# Patient Record
Sex: FEMALE | Race: WHITE | NOT HISPANIC OR LATINO | Employment: PART TIME | URBAN - METROPOLITAN AREA
[De-identification: names, ages, dates, MRNs, and addresses within clinical notes are randomized per-mention and may not be internally consistent; named-entity substitution may affect disease eponyms.]

---

## 2019-02-11 ENCOUNTER — HOSPITAL ENCOUNTER (INPATIENT)
Facility: CLINIC | Age: 19
LOS: 4 days | Discharge: PSYCHIATRIC OR SUBSTANCE USE FACILITY | End: 2019-02-15
Attending: EMERGENCY MEDICINE | Admitting: PSYCHIATRY & NEUROLOGY
Payer: MEDICAID

## 2019-02-11 ENCOUNTER — TELEPHONE (OUTPATIENT)
Dept: BEHAVIORAL HEALTH | Facility: CLINIC | Age: 19
End: 2019-02-11

## 2019-02-11 DIAGNOSIS — F19.20 POLYSUBSTANCE (EXCLUDING OPIOIDS) DEPENDENCE (H): ICD-10-CM

## 2019-02-11 LAB
ALCOHOL BREATH TEST: 0.08 (ref 0–0.01)
AMPHETAMINES UR QL SCN: NEGATIVE
BARBITURATES UR QL: NEGATIVE
BENZODIAZ UR QL: NEGATIVE
CANNABINOIDS UR QL SCN: POSITIVE
COCAINE UR QL: POSITIVE
ETHANOL UR QL SCN: NEGATIVE
HCG UR QL: NEGATIVE
OPIATES UR QL SCN: NEGATIVE

## 2019-02-11 PROCEDURE — 25000132 ZZH RX MED GY IP 250 OP 250 PS 637: Performed by: NURSE PRACTITIONER

## 2019-02-11 PROCEDURE — 99284 EMERGENCY DEPT VISIT MOD MDM: CPT | Mod: Z6 | Performed by: EMERGENCY MEDICINE

## 2019-02-11 PROCEDURE — 12800008 ZZH R&B CD ADULT

## 2019-02-11 PROCEDURE — 80307 DRUG TEST PRSMV CHEM ANLYZR: CPT | Performed by: FAMILY MEDICINE

## 2019-02-11 PROCEDURE — HZ2ZZZZ DETOXIFICATION SERVICES FOR SUBSTANCE ABUSE TREATMENT: ICD-10-PCS | Performed by: PSYCHIATRY & NEUROLOGY

## 2019-02-11 PROCEDURE — 99285 EMERGENCY DEPT VISIT HI MDM: CPT | Mod: 25 | Performed by: EMERGENCY MEDICINE

## 2019-02-11 PROCEDURE — 81025 URINE PREGNANCY TEST: CPT | Performed by: EMERGENCY MEDICINE

## 2019-02-11 PROCEDURE — 82075 ASSAY OF BREATH ETHANOL: CPT | Performed by: EMERGENCY MEDICINE

## 2019-02-11 PROCEDURE — 80320 DRUG SCREEN QUANTALCOHOLS: CPT | Performed by: FAMILY MEDICINE

## 2019-02-11 PROCEDURE — 25000132 ZZH RX MED GY IP 250 OP 250 PS 637: Performed by: EMERGENCY MEDICINE

## 2019-02-11 RX ORDER — ATENOLOL 50 MG/1
50 TABLET ORAL DAILY PRN
Status: DISCONTINUED | OUTPATIENT
Start: 2019-02-11 | End: 2019-02-15 | Stop reason: HOSPADM

## 2019-02-11 RX ORDER — IBUPROFEN 600 MG/1
600 TABLET, FILM COATED ORAL EVERY 6 HOURS PRN
Status: DISCONTINUED | OUTPATIENT
Start: 2019-02-11 | End: 2019-02-15 | Stop reason: HOSPADM

## 2019-02-11 RX ORDER — ACETAMINOPHEN 325 MG/1
650 TABLET ORAL EVERY 4 HOURS PRN
Status: DISCONTINUED | OUTPATIENT
Start: 2019-02-11 | End: 2019-02-15 | Stop reason: HOSPADM

## 2019-02-11 RX ORDER — FOLIC ACID 1 MG/1
1 TABLET ORAL DAILY
Status: DISCONTINUED | OUTPATIENT
Start: 2019-02-12 | End: 2019-02-15 | Stop reason: HOSPADM

## 2019-02-11 RX ORDER — LANOLIN ALCOHOL/MO/W.PET/CERES
100 CREAM (GRAM) TOPICAL DAILY
Status: DISPENSED | OUTPATIENT
Start: 2019-02-12 | End: 2019-02-14

## 2019-02-11 RX ORDER — TRAZODONE HYDROCHLORIDE 50 MG/1
50 TABLET, FILM COATED ORAL
Status: DISCONTINUED | OUTPATIENT
Start: 2019-02-11 | End: 2019-02-15 | Stop reason: HOSPADM

## 2019-02-11 RX ORDER — ALUMINA, MAGNESIA, AND SIMETHICONE 2400; 2400; 240 MG/30ML; MG/30ML; MG/30ML
30 SUSPENSION ORAL EVERY 4 HOURS PRN
Status: DISCONTINUED | OUTPATIENT
Start: 2019-02-11 | End: 2019-02-15 | Stop reason: HOSPADM

## 2019-02-11 RX ORDER — DIAZEPAM 5 MG
5-20 TABLET ORAL EVERY 30 MIN PRN
Status: DISCONTINUED | OUTPATIENT
Start: 2019-02-11 | End: 2019-02-15 | Stop reason: HOSPADM

## 2019-02-11 RX ORDER — NICOTINE 21 MG/24HR
1 PATCH, TRANSDERMAL 24 HOURS TRANSDERMAL ONCE
Status: COMPLETED | OUTPATIENT
Start: 2019-02-11 | End: 2019-02-11

## 2019-02-11 RX ORDER — MULTIPLE VITAMINS W/ MINERALS TAB 9MG-400MCG
1 TAB ORAL DAILY
Status: DISCONTINUED | OUTPATIENT
Start: 2019-02-12 | End: 2019-02-15 | Stop reason: HOSPADM

## 2019-02-11 RX ORDER — HYDROXYZINE HYDROCHLORIDE 25 MG/1
25 TABLET, FILM COATED ORAL EVERY 4 HOURS PRN
Status: DISCONTINUED | OUTPATIENT
Start: 2019-02-11 | End: 2019-02-15 | Stop reason: HOSPADM

## 2019-02-11 RX ORDER — ONDANSETRON 4 MG/1
4 TABLET, ORALLY DISINTEGRATING ORAL EVERY 6 HOURS PRN
Status: DISCONTINUED | OUTPATIENT
Start: 2019-02-11 | End: 2019-02-15 | Stop reason: HOSPADM

## 2019-02-11 RX ADMIN — NICOTINE 1 PATCH: 21 PATCH, EXTENDED RELEASE TRANSDERMAL at 14:31

## 2019-02-11 RX ADMIN — IBUPROFEN 600 MG: 600 TABLET ORAL at 23:23

## 2019-02-11 RX ADMIN — HYDROXYZINE HYDROCHLORIDE 25 MG: 25 TABLET ORAL at 23:23

## 2019-02-11 ASSESSMENT — ACTIVITIES OF DAILY LIVING (ADL)
RETIRED_EATING: 0-->INDEPENDENT
BATHING: 0-->INDEPENDENT
RETIRED_COMMUNICATION: 0-->UNDERSTANDS/COMMUNICATES WITHOUT DIFFICULTY
PRIOR_FUNCTIONAL_LEVEL_COMMENT: INDEPENDENT
TRANSFERRING: 0-->INDEPENDENT
DRESS: 0-->INDEPENDENT
TOILETING: 0-->INDEPENDENT
AMBULATION: 0-->INDEPENDENT
COGNITION: 0 - NO COGNITION ISSUES REPORTED
NUMBER_OF_TIMES_PATIENT_HAS_FALLEN_WITHIN_LAST_SIX_MONTHS: 2
SWALLOWING: 0-->SWALLOWS FOODS/LIQUIDS WITHOUT DIFFICULTY
FALL_HISTORY_WITHIN_LAST_SIX_MONTHS: YES

## 2019-02-11 ASSESSMENT — ENCOUNTER SYMPTOMS
DYSURIA: 0
CHEST TIGHTNESS: 0
SEIZURES: 0
VOMITING: 0
DIARRHEA: 0
NAUSEA: 0
ABDOMINAL PAIN: 0
NUMBNESS: 0
PALPITATIONS: 0
SHORTNESS OF BREATH: 0

## 2019-02-11 ASSESSMENT — MIFFLIN-ST. JEOR: SCORE: 1226.3

## 2019-02-11 NOTE — ED TRIAGE NOTES
Pt. here for chemical dependency treatment for alcohol, xanax and cocaine.  Last used alcohol  and cocaine today.  Xanax last used yesterday.

## 2019-02-11 NOTE — ED PROVIDER NOTES
History     Chief Complaint   Patient presents with     Addiction Problem     HPI  Sophia Stiles is a 18 year old female who has a PMHx of polysubstance abuse requesting detox.  Patient has been using drinking a pint of christiana per day for the past couple of weeks.  Also 1 g of cocaine for the past week and Xanax over the past week.  Last use of everything was this morning.  Denies hallucinations, voices, suicidal or homicidal ideation at this point.  Patient denies any anxiety or other symptoms of withdrawal.  She is never had a seizure or delirium tremens that she knows.  She has been sober in the past.  Denies any other issues at this point.    I have reviewed the Medications, Allergies, Past Medical and Surgical History, and Social History in the Epic system.    Review of Systems   Respiratory: Negative for chest tightness and shortness of breath.    Cardiovascular: Negative for chest pain and palpitations.   Gastrointestinal: Negative for abdominal pain, diarrhea, nausea and vomiting.   Genitourinary: Negative for dysuria.   Neurological: Negative for seizures and numbness.   Psychiatric/Behavioral: Negative for self-injury and suicidal ideas.   All other systems reviewed and are negative.      Physical Exam   BP: 120/65  Heart Rate: 93  Temp: 98  F (36.7  C)  Resp: 16  Weight: 49.8 kg (109 lb 12.8 oz)  SpO2: (!) 71 %      Physical Exam  Physical Exam   Constitutional: oriented to person, place, and time. appears well-developed and well-nourished.   HENT:   Head: Normocephalic and atraumatic.   Neck: Normal range of motion.   Pulmonary/Chest: Effort normal. No respiratory distress.   Cardiac: No murmurs, rubs, gallops. RRR.  Abdominal: Abdomen soft, nontender, nondistended. No rebound tenderness.  MSK: Long bones without deformity or evidence of trauma  Neurological: alert and oriented to person, place, and time. Gait intact.  No tongue fasciculations or tremor.  Skin: Skin is warm and dry.   Psychiatric:   normal mood and affect.  behavior is normal. Thought content normal.     ED Course        Procedures          Labs Ordered and Resulted from Time of ED Arrival Up to the Time of Departure from the ED   ALCOHOL BREATH TEST POCT - Abnormal; Notable for the following components:       Result Value    Alcohol Breath Test 0.078 (*)     All other components within normal limits            Assessments & Plan (with Medical Decision Making)   MDM  Patient requesting detox from multiple drugs.  Patient does not appear to be in active withdrawal.  I discussed with intake who accepts the patient pending discharge and will likely happen today.  Patient is voluntary.  And I believe patient would meet criteria to be placed on a hold.  Will observe for signs of withdrawal.    I have reviewed the nursing notes.    I have reviewed the findings, diagnosis, plan and need for follow up with the patient.       Medication List      There are no discharge medications for this visit.         Final diagnoses:   Polysubstance (excluding opioids) dependence (H)       2/11/2019   [facility], [place], EMERGENCY DEPARTMENT     Aj Tovar MD  02/11/19 8344

## 2019-02-11 NOTE — ED NOTES
I have performed an in person assessment of the patient. Based on this assessment the patient no longer requires a one on one attendant at this point in time.    Aj Tovar MD  1:49 PM  February 11, 2019         Aj Tovar MD  02/11/19 5664

## 2019-02-11 NOTE — TELEPHONE ENCOUNTER
S: Pt is a 19 yo female in the [place] ED for detox from etoh and xanax    B: Pt using etoh, drinking about 1 pint of christiana daily for the past week. PT using cocaine daily as well. Pt also has been taking Xanax daily for the past week and a half, dose unkown. Pt is calm and cooperative. No behavioral concerns. No SI/HI/SIB/hallucinations. No history of DT's or seizures.    A: Pt medically clear    R: Waiting for female discharge on 3a before presenting to [facility]

## 2019-02-11 NOTE — PHARMACY-ADMISSION MEDICATION HISTORY
Admission medication history interview status for the 2/11/2019 admission is complete. See Epic admission navigator for allergy information, pharmacy, prior to admission medications and immunization status.     Medication history interview sources:  patient, pt's sister     Changes made to PTA medication list (reason)  Added: none   Deleted: none  Changed: none    Additional medication history information (including reliability of information, actions taken by pharmacist):  Pt reported she did not get a flu vaccination this year yet.       Prior to Admission medications    Not on File         Medication history completed by: Gretchen Boo, ShaeD

## 2019-02-12 LAB
ALBUMIN SERPL-MCNC: 3.2 G/DL (ref 3.4–5)
ALP SERPL-CCNC: 65 U/L (ref 40–150)
ALT SERPL W P-5'-P-CCNC: 11 U/L (ref 0–50)
ANION GAP SERPL CALCULATED.3IONS-SCNC: 7 MMOL/L (ref 3–14)
AST SERPL W P-5'-P-CCNC: 11 U/L (ref 0–35)
BILIRUB SERPL-MCNC: 0.4 MG/DL (ref 0.2–1.3)
BUN SERPL-MCNC: 13 MG/DL (ref 7–30)
CALCIUM SERPL-MCNC: 8.6 MG/DL (ref 8.5–10.1)
CHLORIDE SERPL-SCNC: 111 MMOL/L (ref 96–110)
CO2 SERPL-SCNC: 23 MMOL/L (ref 20–32)
CREAT SERPL-MCNC: 0.88 MG/DL (ref 0.5–1)
ERYTHROCYTE [DISTWIDTH] IN BLOOD BY AUTOMATED COUNT: 13 % (ref 10–15)
GFR SERPL CREATININE-BSD FRML MDRD: >90 ML/MIN/{1.73_M2}
GGT SERPL-CCNC: 20 U/L (ref 0–30)
GLUCOSE SERPL-MCNC: 73 MG/DL (ref 70–99)
HCT VFR BLD AUTO: 42.2 % (ref 35–47)
HGB BLD-MCNC: 13.8 G/DL (ref 11.7–15.7)
MCH RBC QN AUTO: 32.4 PG (ref 26.5–33)
MCHC RBC AUTO-ENTMCNC: 32.7 G/DL (ref 31.5–36.5)
MCV RBC AUTO: 99 FL (ref 78–100)
PLATELET # BLD AUTO: 226 10E9/L (ref 150–450)
POTASSIUM SERPL-SCNC: 4 MMOL/L (ref 3.4–5.3)
PROT SERPL-MCNC: 6.6 G/DL (ref 6.8–8.8)
RBC # BLD AUTO: 4.26 10E12/L (ref 3.8–5.2)
SODIUM SERPL-SCNC: 141 MMOL/L (ref 133–144)
TSH SERPL DL<=0.005 MIU/L-ACNC: 0.54 MU/L (ref 0.4–4)
WBC # BLD AUTO: 5.9 10E9/L (ref 4–11)

## 2019-02-12 PROCEDURE — 25000131 ZZH RX MED GY IP 250 OP 636 PS 637: Performed by: NURSE PRACTITIONER

## 2019-02-12 PROCEDURE — 99207 ZZC CONSULT E&M CHANGED TO SUBSEQUENT LEVEL: CPT | Performed by: PHYSICIAN ASSISTANT

## 2019-02-12 PROCEDURE — 25000132 ZZH RX MED GY IP 250 OP 250 PS 637: Performed by: PSYCHIATRY & NEUROLOGY

## 2019-02-12 PROCEDURE — 36415 COLL VENOUS BLD VENIPUNCTURE: CPT | Performed by: NURSE PRACTITIONER

## 2019-02-12 PROCEDURE — 82977 ASSAY OF GGT: CPT | Performed by: NURSE PRACTITIONER

## 2019-02-12 PROCEDURE — 85027 COMPLETE CBC AUTOMATED: CPT | Performed by: NURSE PRACTITIONER

## 2019-02-12 PROCEDURE — 25000132 ZZH RX MED GY IP 250 OP 250 PS 637: Performed by: NURSE PRACTITIONER

## 2019-02-12 PROCEDURE — 84443 ASSAY THYROID STIM HORMONE: CPT | Performed by: NURSE PRACTITIONER

## 2019-02-12 PROCEDURE — 80053 COMPREHEN METABOLIC PANEL: CPT | Performed by: NURSE PRACTITIONER

## 2019-02-12 PROCEDURE — 99231 SBSQ HOSP IP/OBS SF/LOW 25: CPT | Performed by: PHYSICIAN ASSISTANT

## 2019-02-12 PROCEDURE — 99221 1ST HOSP IP/OBS SF/LOW 40: CPT | Mod: AI | Performed by: PSYCHIATRY & NEUROLOGY

## 2019-02-12 PROCEDURE — 99207 ZZC DOWN CODE DUE TO SUBSEQUENT EXAM: CPT | Performed by: PSYCHIATRY & NEUROLOGY

## 2019-02-12 PROCEDURE — 12800008 ZZH R&B CD ADULT

## 2019-02-12 RX ORDER — MIRTAZAPINE 15 MG/1
15 TABLET, FILM COATED ORAL AT BEDTIME
Status: DISCONTINUED | OUTPATIENT
Start: 2019-02-12 | End: 2019-02-15 | Stop reason: HOSPADM

## 2019-02-12 RX ORDER — PHENOBARBITAL 32.4 MG/1
32.4 TABLET ORAL AT BEDTIME
Status: DISCONTINUED | OUTPATIENT
Start: 2019-02-12 | End: 2019-02-15 | Stop reason: HOSPADM

## 2019-02-12 RX ADMIN — DIAZEPAM 5 MG: 5 TABLET ORAL at 16:15

## 2019-02-12 RX ADMIN — DIAZEPAM 5 MG: 5 TABLET ORAL at 08:50

## 2019-02-12 RX ADMIN — DIAZEPAM 5 MG: 5 TABLET ORAL at 12:17

## 2019-02-12 RX ADMIN — Medication 100 MG: at 08:50

## 2019-02-12 RX ADMIN — ONDANSETRON 4 MG: 4 TABLET, ORALLY DISINTEGRATING ORAL at 16:15

## 2019-02-12 RX ADMIN — MULTIPLE VITAMINS W/ MINERALS TAB 1 TABLET: TAB at 08:50

## 2019-02-12 RX ADMIN — PHENOBARBITAL 32.4 MG: 32.4 TABLET ORAL at 21:25

## 2019-02-12 RX ADMIN — FOLIC ACID 1 MG: 1 TABLET ORAL at 08:50

## 2019-02-12 RX ADMIN — ONDANSETRON 4 MG: 4 TABLET, ORALLY DISINTEGRATING ORAL at 08:51

## 2019-02-12 RX ADMIN — MIRTAZAPINE 15 MG: 15 TABLET, FILM COATED ORAL at 21:25

## 2019-02-12 ASSESSMENT — ACTIVITIES OF DAILY LIVING (ADL)
HYGIENE/GROOMING: INDEPENDENT
DRESS: INDEPENDENT
LAUNDRY: WITH SUPERVISION
ORAL_HYGIENE: INDEPENDENT

## 2019-02-12 NOTE — PLAN OF CARE
Behavioral Team Discussion: (2/12/2019)    Continued Stay Criteria/Rationale: Patient admitted for Chemical Use Issues.  Plan: The following services will be provided to the patient; psychiatric assessment, medication management, therapeutic milieu, individual and group support, and skills groups.   Participants: 3A Provider: Dr. Jake Blackwood MD; 3A RN's: Kranthi Baldwin, RN, Dutch Ferguson, RN, Shayna Scruggs, RN and Meera Trejo, SAUMYA; 3A CM's: Viviana Vela  and Jarod Collins.  Summary/Recommendation: Providers will assess today for treatment recommendations, discharge planning, and aftercare plans. CM will meet with pt for discharge planning.   Medical/Physical: Deferred (see medical notes).  Precautions:   Behavioral Orders   Procedures     Code 1 - Restrict to Unit     Fall precautions     Routine Programming     As clinically indicated     Status 15     Every 15 minutes.     Withdrawal precautions     Rationale for change in precautions or plan: N/A  Progress: Improving.

## 2019-02-12 NOTE — ED NOTES
ED to Behavioral Floor Handoff    SITUATION  Sophia Stiles is a 18 year old female who speaks English and lives in a home with family members The patient arrived in the ED by private car from home with a complaint of Addiction Problem  .The patient's current symptoms started/worsened 3 month(s) ago and during this time the symptoms have increased.   In the ED, pt was diagnosed with   Final diagnoses:   Polysubstance (excluding opioids) dependence (H)        Initial vitals were: BP: 120/65  Heart Rate: 93  Temp: 98  F (36.7  C)  Resp: 16  Weight: 49.8 kg (109 lb 12.8 oz)  SpO2: (!) 71 %   --------  Is the patient diabetic? No   If yes, last blood glucose? --     If yes, was this treated in the ED? --  --------  Is the patient inebriated (ETOH) No or Impaired on other substances? No  MSSA done? No  Last MSSA score: --    Were withdrawal symptoms treated? N/A  Does the patient have a seizure history? No. If yes, date of most recent seizure--  --------  Is the patient patient experiencing suicidal ideation? denies current or recent suicidal ideation     Homicidal ideation? denies current or recent homicidal ideation or behaviors.    Self-injurious behavior/urges? denies current or recent self injurious behavior or ideation.  ------  Was pt aggressive in the ED No  Was a code called No  Is the pt now cooperative? Yes  -------  Meds given in ED:   Medications   nicotine (NICODERM CQ) 21 MG/24HR 24 hr patch 1 patch (1 patch Transdermal Given 2/11/19 3330)      Family present during ED course? Yes  Family currently present? No    BACKGROUND  Does the patient have a cognitive impairment or developmental disability? No  Allergies: No Known Allergies.   Social demographics are   Social History     Socioeconomic History     Marital status: Single     Spouse name: None     Number of children: None     Years of education: None     Highest education level: None   Social Needs     Financial resource strain: None     Food  insecurity - worry: None     Food insecurity - inability: None     Transportation needs - medical: None     Transportation needs - non-medical: None   Occupational History     None   Tobacco Use     Smoking status: Current Every Day Smoker     Packs/day: 0.25     Smokeless tobacco: Current User   Substance and Sexual Activity     Alcohol use: Yes     Comment: 1 pint christiana per day,  last drink earlier today     Drug use: Yes     Types: Cocaine, Marijuana, Benzodiazepines     Comment: last snorted cocaine earlier today,  last used xanax yesterday     Sexual activity: None   Other Topics Concern     None   Social History Narrative     None        ASSESSMENT  Labs results   Labs Ordered and Resulted from Time of ED Arrival Up to the Time of Departure from the ED   DRUG ABUSE SCREEN 6 CHEM DEP URINE ([facility]) - Abnormal; Notable for the following components:       Result Value    Cannabinoids Qual Urine Positive (*)     Cocaine Qual Urine Positive (*)     All other components within normal limits   ALCOHOL BREATH TEST POCT - Abnormal; Notable for the following components:    Alcohol Breath Test 0.078 (*)     All other components within normal limits   HCG QUALITATIVE URINE      Imaging Studies: No results found for this or any previous visit (from the past 24 hour(s)).   Most recent vital signs /65   Temp 98  F (36.7  C) (Oral)   Resp 16   Wt 49.8 kg (109 lb 12.8 oz)   LMP 02/11/2019   SpO2 (!) 71%   BMI 19.45 kg/m     Abnormal labs/tests/findings requiring intervention:---   Pain control: pt had none  Nausea control: pt had none    RECOMMENDATION  Are any infection precautions needed (MRSA, VRE, etc.)? No If yes, what infection? --  ---  Does the patient have mobility issues? independently. If yes, what device does the pt use? ---  ---  Is patient on 72 hour hold or commitment? No If on 72 hour hold, have hold and rights been given to patient? N/A  Are admitting orders written if after 10 p.m. ?N/A  Tasks  needing to be completed:---     Marcela Lynch    [phone] [facility]

## 2019-02-12 NOTE — PLAN OF CARE
Alcohol Withdrawal  Alcohol Withdrawal Symptom Control  2/11/2019 2327 - No Change by Luz Penny, RN     Substance Withdrawal  Substance Withdrawal  Description  Signs and symptoms of listed problems will be absent or manageable.    1) Patient will achieve medical stabilization of acute withdrawal sx.  2) Patient will remain safe and free from injury  3) Patient will demonstrate improvement of ADLs (appetite, hygiene)  4) Verbalize reduction of fear or anxiety to a manageable level.  5) Verbalize knowledge of substance abuse as a disease  6) Verbalize risks and negative effects related to drug ingestion  7) Demonstrate participation in unit programming and attends specific substance use group therapy (i.e AA meetings)  8) Accept referral to substance abuse treatment  9) Express sense of regaining some control of situation/life (possible by verbalizing alternative coping mechanisms as alternatives to substance use in response to stress)   2/12/2019 1028 - No Change by Meera Trejo, RN    Patient remains isolative, withdrawn to room. Patient is alert and oriented x 4. Patient is not attending/participating in unit programming. Pt is not social with peers. Affect is blunted/flat, mood is irritable/labile. Patient denies SI/SIB/HI. Pt denies auditory/visual hallucinations. Pt did previous report passive SI upon admission, but patient denies that to this RN. Patient verbally contracts for safety on the unit.     This RN administered the PRN medications Zofran 4mg ODT x 1, (see MAR) at the request of the patient. Patient is tolerating medications well, denies any current side effects.     Pt's MSSA's = 8 upon first morning withdrawal assessment, patient medicated with 10mg valium per unit protocol. Patient reports a poor appetite, and fair sleep. Patient hopeful to discharge to [facility]. This RN has been encouraging patient to drink adequate amounts of fluids.     Blood pressure 108/75, pulse 72, temperature  "97.2  F (36.2  C), temperature source Oral, resp. rate 16, height 1.575 m (5' 2\"), weight 49.8 kg (109 lb 12.8 oz), last menstrual period 02/11/2019, SpO2 99 %.     "

## 2019-02-12 NOTE — PROGRESS NOTES
02/11/19 1088   Patient Belongings   Did you bring any home meds/supplements to the hospital?  No   Patient Belongings locker   Patient Belongings Remaining with Patient other (see comments)   Patient Belongings Put in Hospital Secure Location (Security or Locker, etc.) other (see comments)   Belongings Search Yes   Clothing Search Yes   Second Staff Arcelia   Comment see note   Storage bin  [place], pants w/string and bracelet   Medical Bin   Nothing  Security Envelope  Nothing  A             Admission:  I am responsible for any personal items that are not sent to the safe or pharmacy.  [facility] is not responsible for loss, theft or damage of any property in my possession.  Signature:  _________________________________ Date: _______  Time: _____                                              Staff Signature:  ____________________________ Date: ________  Time: _____      2nd Staff person, if patient is unable/unwilling to sign:    Signature: ________________________________ Date: ________  Time: _____   Discharge:  [facility] has returned all of my personal belongings:  Signature: _________________________________ Date: ________  Time: _____                                          Staff Signature:  ____________________________ Date: ________  Time: _____

## 2019-02-12 NOTE — H&P
Admitted:     02/11/2019      IDENTIFYING INFORMATION:  The patient is a 18-year-old  female.  She is single.  She is planning to live with her sister.  She works as a .      CHIEF COMPLAINT:  Alcohol.      The patient came to the emergency room wanting detox from alcohol.  She says that she began to drink alcohol at age 14 and at 17, it became more of a problem.  Six months ago, her drinking increased.  She was in treatment in 2017 but sober for almost 4 months and then relapsed and has been struggling to stay sober.  The patient has tolerance, withdrawal, progressive loss of control, tried to quit unsuccessfully and spread negative consequences, money, family.  She has been using Xanax this time for 2 weeks; however, in the past, she was using Xanax not prescribed to her; she quit for 2 months and then picked it up again.  She uses cocaine and snorts it.  She has smoking 1 pack a day.  She does have history of ADHD.  She has history of depression.  When she gets depressed, she feels sad, angry don't want to talk and she feels shutdown.  She has lack of sleep, lack of interest, lack of energy.  She also describes that she has anxiety, where she has shortness of breath and freaks out, like mad, heart races, shortness of breath.      PAST PSYCHIATRIC HISTORY:  Psychiatrically hospitalized twice for overdoses, once at the age of 14 and once at the age of 16.  She has history of self-injurious behaviors.  She has never had ECT.  She was in 1 inpatient treatment and 1 outpatient treatment.  Patient denies any symptoms of katie.      FAMILY HISTORY:  The patient has a significant family history of mental illness in the family.  Mother is bipolar.  Father has a significant recent substance use in his family.      SOCIAL HISTORY:  Born in [place].  Mood and moved here when she was 3, lost childhood.  The patient dropped out of high school.  She has 2 sisters.      PAST MEDICAL HISTORY:  A 10-point was  reviewed and is negative.        The patient's vitals are as below:   VITAL SIGNS:  Temperature of 97.2, pulse of 72, respiratory rate of 16, blood pressure of 108/72.      MENTAL STATUS EXAMINATION:  The patient is a 18-year-old  female lying in bed, poor grooming, poor hygiene, poor eye contact.  Mood is tired.  Affect is congruent.  Speech is spontaneous, normal rate.  Less logical in thinking, no loose associations.  Insight and judgment are limited.  Does not have any active suicidal or homicidal ideation, plan or intent.  Does not have any auditory or visual hallucinations.  Recent and remote memory, language, fund of knowledge are all adequate.    dx alcohol use dx severe  Panic dx  mdd    PLAN:  The patient will be detoxed off alcohol using MSSA protocol and Valium.  She will have a very short taper off phenobarbital 30 mg at bedtime.  The patient will be started on Remeron for her mood.  The patient wants to do treatment.  The patient will be seen by case management and Internal Medicine.         KAHLIL TERRELL MD             D: 2019   T: 2019   MT: ASH      Name:     ZHANE DUMONT   MRN:      [id number]        Account:      [id number]   :      2000        Admitted:     2019                   Document: [id number]

## 2019-02-12 NOTE — CONSULTS
[facility]  Internal Medicine Consult     Sophia Stiles MRN# [id number]   Age: 18 year old YOB: 2000     Date of Admission: 2/11/2019  Date of Consult: 2/12/2019    Primary Care Provider: Pham Mena    Requesting Service: Dr. Blackwood  Reason for Consult: General Medical Evaluation      SUBJECTIVE   CC:   Hypotension    Assessment and Plan/Recommendations:   Sophia Stiles is a 18 year old female with history of polysubstance abuse, depression, anxiety, ADHD, and SIB who was admitted to station 3A with acute etoh withdrawal    Depression, anxiety, ADHD, polysubstance abuse: With acute etoh withdrawal  - Management per psych     Hypotension: BP low to 84/47 at 0415 while asleep. Asymptomatic. No recheck obtained. No intervention at the time, does not appear as if overnight medicine provider contacted. Most recent BP c/w prior at 108/75. Etiology likely due to some dehdyration in the setting of poor oral intake as well as physiologic given slightly low BP at BL, age, and the fact she was sleeping  - Trend vitals at least tid  - Contact medicine if BP <95/<50 or if patient symptomatic   - Encourage oral intake    Currently, medically stable and internal medicine will sign off. Please contact if future questions or concerns arise. Thank you for the opportunity to be a part of this patient's care.      Maren Smith  Internal Medicine [facility] Hospitalist  [phone]  February 12, 2019         HPI:   Sophia Stiles is a 18 year old female with history of polysubstance abuse, depression, anxiety, ADHD, and SIB who was admitted to station 3A with acute etoh withdrawal    Patient denies medical conditions and reports she would rather be left alone. She says she is very tired and nauseated. No chest pain or dyspnea. No confusion. Denies symptoms of hypotension overnight and reports her blood pressure often runs low. Remainder of HPI limited due to patient preference      Past  "Medical History:     Past Medical History:   Diagnosis Date     Substance abuse (H)         Reviewed and updated in [facility].     Past Surgical History:    History reviewed. No pertinent surgical history.   Unable to complete due to patient preference      Social History:   Unable to complete due to patient preference      Family History:   History reviewed. No pertinent family history.   Unable to complete due to patient preference      Allergies:   No Known Allergies   Unable to complete due to patient preference      Medications:   Reviewed. Please see MAR     Review of Systems:   10 point ROS of systems including Constitutional, Eyes, Respiratory, Cardiovascular, Gastroenterology, Genitourinary, Integumentary, Muscularskeletal, Psychiatric were all negative except for pertinent positives noted in my HPI.    OBJECTIVE   Physical Exam:   Vitals were reviewed  Blood pressure 108/75, pulse 72, temperature 97.2  F (36.2  C), temperature source Oral, resp. rate 16, height 1.575 m (5' 2\"), weight 49.8 kg (109 lb 12.8 oz), last menstrual period 02/11/2019, SpO2 99 %.  General: Alert and oriented x3, pleasantly declined complete H&P  HEENT: Anicteric sclera, EOMI, membranes moist  Cardiovascular: RRR, S1S2. No murmur noted  Lungs: CTAB without wheezing or crackles   GI: Refused  Vascular: Refused  Neurologic: Refused  Neuropsychiatric: Per psych  Skin: No jaundice, rashes, or lesions on exposed skin        Data:        Lab Results   Component Value Date     02/12/2019    Lab Results   Component Value Date    CHLORIDE 111 02/12/2019    Lab Results   Component Value Date    BUN 13 02/12/2019      Lab Results   Component Value Date    POTASSIUM 4.0 02/12/2019    Lab Results   Component Value Date    CO2 23 02/12/2019    Lab Results   Component Value Date    CR 0.88 02/12/2019        Lab Results   Component Value Date    WBC 5.9 02/12/2019    HGB 13.8 02/12/2019    HCT 42.2 02/12/2019    MCV 99 02/12/2019     " 02/12/2019     Lab Results   Component Value Date    WBC 5.9 02/12/2019

## 2019-02-12 NOTE — ED NOTES
Patient reports use of Xanax 3 pills daily (green ones, and white ones), last use yesterday. Cocaine snorts 1 gram plus per day, last use today PTA. Suni liquor, 1 pint daily, last use today PTA. Denies seizures with withdrawal.

## 2019-02-12 NOTE — PLAN OF CARE
QUIANA Stiles is a 18 year old year old female with a chief complaint of Addiction Problem        S = Situation:   Patient a voluntary admission seeking detox for alcohol and xanax    B  = Background:   Patient reports drinking a pint of christiana daily for 2 weeks, using 2-3 bars of xanax daily for 2 weeks, and also using cocaine. Patient's urine drug screen positive for cocaine and cannabinoids. Urine pregnancy was collected and was negative. Patient denies any history of withdrawal seizures. Patient reports being at [facility] once, at age 16. Patient has also been hospitalized for mental health and suicide attempts in the past. Patient has attended one residential  treatment, and has also done some outpatient psychotherapy. Patient used to self-harm by cutting or burning self. Has attempted suicide 3 times total. Patient reports being diagnosed with depression, anxiety, and ADHD. Patient also reports having lumps in both breasts, would like them assessed. No other medical concerns.     A  =  Assessment:   Patient affect flat, blunted. Patient denies active suicidal ideation, denies SIB, HI, or hallucinations. Mood is calm.Patient is not currently on medications for reported mental illnesses. Patient MSSA for alcohol and benzodiazepine withdrawal is 4. Patient's pupils moderately dilated as well.    R =   Request or Recommendation:   On-call [facility] notified of admission and gave orders. Patient is on MSSA with valium for alcohol withdrawal and benzodiapine withdrawal (per [facility]). Patient on fall and withdrawal precautions. Usual comfort medications and labs ordered. Lipid panel and vitamin B12 lab deferred at this time. Patient's general psychosocial and medical well-being will be monitored and interventions will be provided as needed and/or as ordered.

## 2019-02-13 PROCEDURE — 12800008 ZZH R&B CD ADULT

## 2019-02-13 PROCEDURE — 25000131 ZZH RX MED GY IP 250 OP 636 PS 637: Performed by: NURSE PRACTITIONER

## 2019-02-13 PROCEDURE — 25000132 ZZH RX MED GY IP 250 OP 250 PS 637: Performed by: PSYCHIATRY & NEUROLOGY

## 2019-02-13 RX ADMIN — PHENOBARBITAL 32.4 MG: 32.4 TABLET ORAL at 21:40

## 2019-02-13 RX ADMIN — MIRTAZAPINE 15 MG: 15 TABLET, FILM COATED ORAL at 21:40

## 2019-02-13 RX ADMIN — ONDANSETRON 4 MG: 4 TABLET, ORALLY DISINTEGRATING ORAL at 19:40

## 2019-02-13 ASSESSMENT — ACTIVITIES OF DAILY LIVING (ADL)
HYGIENE/GROOMING: HANDWASHING;INDEPENDENT
ORAL_HYGIENE: INDEPENDENT
DRESS: STREET CLOTHES;INDEPENDENT

## 2019-02-13 NOTE — PROGRESS NOTES
Case Management Note  2/13/2019    Writer met with pt to initiate discharge planning. Pt reports she would like to go to [facility]. Writer informed pt she will need to meet with the Finance Office to open her [employer] application for [place]. Writer informed pt Nieves Pike from the Finance Office will meet with her today to open her [employer] application. Writer encouraged pt to complete her intake information worksheet for assessment and referral purposes. Pt reports she will begin working on this right away.    Jarod Collins MA, LADC

## 2019-02-13 NOTE — PROGRESS NOTES
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 past but relapsed. Pt lacks insight into her personal relapse process along with early warning signs and triggers. Pt lacks impulse control, sober coping skills and long-term sober maintenance skills. Pt lacks insight into the effects her use has had on her physical and mental health. Pt is at a high risk for relapse/continued use.       DIMENSION VI - Recovery Environment   1. Are you employed/attending school? Tell me about that.     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    2A. Describe a typical day; evening for you. Work, school, social, leisure, volunteer, spiritual practices. Include time spent obtaining, using, recovering from drugs or alcohol. (DSM)     I get up and use, go to work, use, work, use, go home use some more then go to sleep. I don't do much of anything except for work and using and I lack daily structure.    Please describe what leisure activities have been associated with your substance abuse:     The patient denied having any leisure activities which had been associated with her substance abuse.    2B. How often do you spend more time than you planned using or use more than you planned? (DSM)     Alot.    3. How important is using to your social connections? Do many of  your family or friends use?     Not important at all.  Most of my family and friends use drugs and/or alcohol.    4A. Are you currently in a significant relationship?     Yes. Have been in a relationship for 4 months.    4C. Sexual Orientation:     Bisexual    5A. Who do you live with?      Pt reports she lives with her sister    5B. Tell me about their alcohol/drug use and mental health issues.     My sister is almost 3 years sober    5C. Are you concerned for your safety there? No    5D. Are you concerned about the safety of anyone else who lives with you? No    6A. Do you have children who live with you?     No    6B. Do you have children who do not live with you?     No    7A. Who supports you in making changes in your alcohol or drug use? What are they willing to do to support you? Who is upset or angry about you making changes in your alcohol or drug use? How big a problem is this for you?      My family and friends are supportive. I am sure some would help if I needed something and if they knew I was working hard on my sobriety.    7B. This table is provided to record information about the person s relationships and available support It is not necessary to ask each item; only to get a comprehensive picture of their support system.  How often can you count on the following people when you need someone?   Partner / Spouse Always supportive   Parent(s)/Aunt(s)/Uncle(s)/Grandparents The patient doesn't have any current contact with parents or other family members.   Sibling(s)/Cousin(s) Always supportive   Child(dee) The patient have any children.   Other relative(s) Always supportive   Friend(s)/neighbor(s) Always supportive   Child(dee) s father(s)/mother(s) The patient doesn't have any children.   Support group member(s) Always supportive   [facility] [place] members Always supportive   /counselor/therapist/healer Always supportive   Other (specify) No     8A. What is your current living  situation?     I am currently living with my sister    8B. What is your long term plan for where you will be living?     I would like to get a apartment    8C. Tell me about your living environment/neighborhood? Ask enough follow up questions to determine safety, criminal activity, availability of alcohol and drugs, supportive or antagonistic to the person making changes.      Everything is safe and I have no concerns.    9. Criminal justice history: Gather current/recent history and any significant history related to substance use--Arrests? Convictions? Circumstances? Alcohol or drug involvement? Sentences? Still on probation or parole? Expectations of the court? Current court order? Any sex offenses - lifetime? What level? (DSM)    Paying off a ticket for a domestic in [place]    10. What obstacles exist to participating in treatment? (Time off work, childcare, funding, transportation, pending nursing home time, living situation)     The patient denied having any obstacles for participating in substance abuse treatment.    Dimension VI Ratings   Recovery environment - The placing authority must use the criteria in Dimension VI to determine a client s recovery environment.   RISK DESCRIPTIONS - Severity ratin Client is engaged in structured, meaningful activity, but peers, family, significant other, and living environment are unsupportive, or there is criminal justice involvement by the client or among the client's peers, significant others, or in the client's living environment.    REASONS SEVERITY WAS ASSIGNED - (What support does the person have for making changes? What structure/stability does the person have in his or her daily life that will increase the likelihood that changes can be sustained? What problems exist in the person s environment that will jeopardize getting/staying clean and sober?)     Patient reports that her current living situation is supportive towards her recovery. Pt reports that she is  currently living with her sister. Pt lacks a daily structure and meaningful activities that promote recovery. Pt reports that she is currently employed and is not attending school at this time. Pt lacks a sober support network. Pt reports that she has some legal involvement for domestic in [place] and isn't on probation for it.         Client Choice/Exceptions   Would you like services specific to language, age, gender, culture, Muslim preference, race, ethnicity, sexual orientation or disability?  No    What particular treatment choices and options would you like to have? [facility]    Do you have a preference for a particular treatment program? [facility]    Criteria for Diagnosis     Criteria for Diagnosis  DSM-5 Criteria for Substance Use Disorder  Instructions: Determine whether the client currently meets the criteria for Substance Use Disorder using the diagnostic criteria in the DSM-V pp.481-58. Current means during the most recent 12 months outside a facility that controls access to substances    Category of Substance Severity (ICD-10 Code / DSM 5 Code)     Alcohol Use Disorder Severe  (10.20) (303.90)   Cannabis Use Disorder Moderate  (F12.20) (304.30)   Hallucinogen Use Disorder The patient does not meet the criteria for a Hallucinogen use disorder.   Inhalant Use Disorder The patient does not meet the criteria for an Inhalant use disorder.   Opioid Use Disorder The patient does not currently meet the criteria for an Opioid use disorder, but has a history of opioid use.   Sedative, Hypnotic, or Anxiolytic Use Disorder The patient does not currently meet the criteria for a Sedative/Hypnotic use disorder, but has a history of benzodiazapine use.   Stimulant Related Disorder The patient does not currently meet the criteria for a Stimulant use disorder, but has a history of Cocaine use.   Tobacco Use Disorder Mild    (Z72.0) (305.1)   Other (or unknown) Substance Use Disorder The patient does not  "meet the criteria for a Other (or unknown) Substance use disorder.       Collateral Contact Summary   Number of contacts made: 2    Contact with referring person:  Yes    If court related records were reviewed, summarize here: No court records had been reviewed at the time of this documentation.    Information from collateral contacts supported/largely agreed with information from the client and associated risk ratings.      Rule 25 Assessment Summary and Plan   's Recommendation    1)  Complete a residential based/IOP W/ lodging or similar treatment program.   2)  Abstain from all mood-altering chemicals unless prescribed by a licensed provider.   3)  Attend weekly 12-step support group meetings.     4)  Actively work with a female sponsor or  through [facility] ([phone]).   5)  Follow all the recommendations of your treatment/medical providers.  6)  Remain law abiding.  7)  Patient may benefit from obtaining a full mental health evaluation.  8)  Patient may benefit from 1:1 psychotherapy due to past abuse and mental health diagnosis.           Collateral Contacts     Name:    Dr. GABRIELA Blackwood MD   Relationship:    3A Physician   Phone Number:    [phone] Releases:         \"The patient came to the emergency room wanting detox from alcohol.  She says that she began to drink alcohol at age 14 and at 17, it became more of a problem.  Six months ago, her drinking increased.  She was in treatment in 2017 but sober for almost 4 months and then relapsed and has been struggling to stay sober.  The patient has tolerance, withdrawal, progressive loss of control, tried to quit unsuccessfully and spread negative consequences, money, family.  She has been using Xanax this time for 2 weeks; however, in the past, she was using Xanax not prescribed to her; she quit for 2 months and then picked it up again.  She uses cocaine and snorts it.  She has smoking 1 pack a day.  She does have " "history of ADHD.  She has history of depression.  When she gets depressed, she feels sad, angry don't want to talk and she feels shutdown.  She has lack of sleep, lack of interest, lack of energy.  She also describes that she has anxiety, where she has shortness of breath and freaks out, like mad, heart races, shortness of breath.\"    Collateral Contacts     Name:    Dr. MAGAN Tovar MD   Relationship:    ER Physician   Phone Number:    [phone]   Releases:         \"Sophia Stiles is a 18 year old female who has a PMHx of polysubstance abuse requesting detox.  Patient has been using drinking a pint of christiana per day for the past couple of weeks.  Also 1 g of cocaine for the past week and Xanax over the past week.  Last use of everything was this morning.  Denies hallucinations, voices, suicidal or homicidal ideation at this point.  Patient denies any anxiety or other symptoms of withdrawal.  She is never had a seizure or delirium tremens that she knows.  She has been sober in the past.  Denies any other issues at this point.\"    ollateral Contacts      A problematic pattern of alcohol/drug use leading to clinically significant impairment or distress, as manifested by at least two of the following, occurring within a 12-month period:    1.) Alcohol/drug is often taken in larger amounts or over a longer period than was intended.  2.) There is a persistent desire or unsuccessful efforts to cut down or control alcohol/drug use  3.) A great deal of time is spent in activities necessary to obtain alcohol, use alcohol, or recover from its effects.  4.) Craving, or a strong desire or urge to use alcohol/drug  5.) Recurrent alcohol/drug use resulting in a failure to fulfill major role obligations at work, school or home.  6.) Continued alcohol use despite having persistent or recurrent social or interpersonal problems caused or exacerbated by the effects of alcohol/drug.  7.) Important social, occupational, or " recreational activities are given up or reduced because of alcohol/drug use.  8.) Recurrent alcohol/drug use in situations in which it is physically hazardous.  9.) Alcohol/drug use is continued despite knowledge of having a persistent or recurrent physical or psychological problem that is likely to have been caused or exacerbated by alcohol.  10.) Tolerance, as defined by either of the following: A need for markedly increased amounts of alcohol/drug to achieve intoxication or desired effect. and A markedly diminished effect with continued use of the same amount of alcohol/drug.  11.) Withdrawal, as manifested by either of the following: The characteristic withdrawal syndrome for alcohol/drug (refer to Criteria A and B of the criteria set for alcohol/drug withdrawal). and Alcohol/drug (or a closely related substance, such as a benzodiazepine) is taken to relieve or avoid withdrawal symptoms.      Specify if: In early remission:  After full criteria for alcohol/drug use disorder were previously met, none of the criteria for alcohol/drug use disorder have been met for at least 3 months but for less than 12 months (with the exception that Criterion A4,  Craving or a strong desire or urge to use alcohol/drug  may be met).     In sustained remission:   After full criteria for alcohol use disorder were previously met, none of the criteria for alcohol/drug use disorder have been met at any time during a period of 12 months or longer (with the exception that Criterion A4,  Craving or strong desire or urge to use alcohol/drug  may be met).   Specify if:   This additional specifier is used if the individual is in an environment where access to alcohol is restricted.    Mild: Presence of 2-3 symptoms  Moderate: Presence of 4-5 symptoms  Severe: Presence of 6 or more symptoms

## 2019-02-14 PROCEDURE — 12800008 ZZH R&B CD ADULT

## 2019-02-14 PROCEDURE — 25000132 ZZH RX MED GY IP 250 OP 250 PS 637: Performed by: NURSE PRACTITIONER

## 2019-02-14 PROCEDURE — H2032 ACTIVITY THERAPY, PER 15 MIN: HCPCS

## 2019-02-14 PROCEDURE — 25000132 ZZH RX MED GY IP 250 OP 250 PS 637: Performed by: PSYCHIATRY & NEUROLOGY

## 2019-02-14 RX ORDER — BUSPIRONE HYDROCHLORIDE 5 MG/1
5 TABLET ORAL 3 TIMES DAILY
Status: DISCONTINUED | OUTPATIENT
Start: 2019-02-14 | End: 2019-02-15 | Stop reason: HOSPADM

## 2019-02-14 RX ADMIN — MIRTAZAPINE 15 MG: 15 TABLET, FILM COATED ORAL at 20:23

## 2019-02-14 RX ADMIN — PHENOBARBITAL 32.4 MG: 32.4 TABLET ORAL at 20:24

## 2019-02-14 RX ADMIN — FOLIC ACID 1 MG: 1 TABLET ORAL at 11:17

## 2019-02-14 RX ADMIN — HYDROXYZINE HYDROCHLORIDE 25 MG: 25 TABLET ORAL at 22:34

## 2019-02-14 RX ADMIN — NICOTINE POLACRILEX 8 MG: 4 GUM, CHEWING ORAL at 21:54

## 2019-02-14 RX ADMIN — Medication 25 MG: at 11:17

## 2019-02-14 RX ADMIN — BUSPIRONE HYDROCHLORIDE 5 MG: 5 TABLET ORAL at 11:16

## 2019-02-14 RX ADMIN — MULTIPLE VITAMINS W/ MINERALS TAB 1 TABLET: TAB at 11:17

## 2019-02-14 RX ADMIN — BUSPIRONE HYDROCHLORIDE 5 MG: 5 TABLET ORAL at 20:22

## 2019-02-14 RX ADMIN — BUSPIRONE HYDROCHLORIDE 5 MG: 5 TABLET ORAL at 16:37

## 2019-02-14 RX ADMIN — TRAZODONE HYDROCHLORIDE 50 MG: 50 TABLET ORAL at 22:35

## 2019-02-14 ASSESSMENT — ACTIVITIES OF DAILY LIVING (ADL)
HYGIENE/GROOMING: INDEPENDENT
LAUNDRY: WITH SUPERVISION
ORAL_HYGIENE: INDEPENDENT
HYGIENE/GROOMING: INDEPENDENT
DRESS: STREET CLOTHES

## 2019-02-14 NOTE — TELEPHONE ENCOUNTER
Referral created, kendy to team, Nieves ALANIZ and Jarod DUMONT on letting intake know when pmi is assigned to patient. Reg will need updating once [employer] Fund active. [facility]

## 2019-02-14 NOTE — TELEPHONE ENCOUNTER
LP SCREEN TELEPHONE NOTE   Sophia Stiles paperwork was reviewed by JUNE Anaya and the patient was deemed ELIGIBLE for the LP program.     Medical: Deferred, because this patient is currently on 3A IP detoxification unit.     Insurance: Consolidated Funding at [facility]. Jarod is working on the auth.     This will be a: 3A DIRECT TRANSFER, The LP RN will complete the VA & ISP.  The primary counselors will complete the LP UPDATE.     IV: This patient is not an IV drug user.     Business office: The patient has Consolidated Funds and she has met with the STACE[facility]   List: This patient CAN NOT be placed on the PRIORITY LP Waiting List until after we have the CPA from [facility].     Group: Women's Group or Mental Health Enhanced Mixed Group     Additional Info as needed: NA     The best current contact telephone number for the patient is: FADI Casanova,  JUNE Anaya   2/14/2019

## 2019-02-14 NOTE — PLAN OF CARE
S: Patient scored less than 8 on the MSSA for greater than 24 hours. She has required no medication for alcohol withdrawal for > 24 hours,.  B: Patient admitted for alcohol & benzodiazepines  withdrawal and detoxification.  A: Patient stable in the alcohol withdrawal process AEB MSSA scores < 8 for > 24 hours.  R: Patient removed from alcohol withdrawal status per unit Protocol. She remains on benzodiazepine withdrawal protocol.

## 2019-02-14 NOTE — PLAN OF CARE
"Patient's MSSA 1 and 1 for benzodiazepine. Patient is out of detox for ETOH. Patient has completed paperwork and is waiting for funding for treatment. Patient is [facility]. Patient ate well and has a bright affect, participated in group and in the milieu playing cards with peers. Patient rates anxiety at 8/10 due to waiting for funding for treatment and 2/10 for depression stating she is in a good mood, \"it's Reed's Day\". Patient requested and approved to have bf and sister visit after AA meeting tonight. Pt signed up for medicaid with financial counselor today and excited about having insurance.   "

## 2019-02-14 NOTE — PROGRESS NOTES
Case Management Note  2/14/2019    Writer met with pt to review completed intake information worksheet for pt's Rule 25 assessment. Pt signed DAGOBERTO for [facility] Rule 25. Rule 25 assessment completed and faxed to [facility] requesting Rule 25 funding for [facility].    Jarod Collins MA, [facility]

## 2019-02-14 NOTE — PROGRESS NOTES
"CLINICAL NUTRITION SERVICES - ASSESSMENT NOTE     Nutrition Prescription    RECOMMENDATIONS FOR MDs/PROVIDERS TO ORDER:  Continue to encourage PO.    Malnutrition Status:    Severe malnutrition in the context of chronic illness    Recommendations already ordered by Registered Dietitian (RD):  Supplements: Boost Plus strawberry TID with meals + 2pm Tuna salad sandwich with american cheese, lettuce, tomato on wheat bread    Future/Additional Recommendations:  Monitor PO and wt trends     REASON FOR ASSESSMENT  Sophia Stiles is a/an 18 year old female assessed by the dietitian for Admission Nutrition Risk Screen for unintentional loss of 10# or more in the past two months    NUTRITION HISTORY  PMH/Reason for Admission: Polysubstance abuse, depression, anxiety, ADHD, and SIB. Detox from polysubstance abuse.    Nutrition Hx: Per ED note: \"Patient has been using drinking a pint of christiana per day for the past couple of weeks.  Also 1 g of cocaine for the past week and Xanax over the past week.\"  Pt reports that she started losing wt ~5 months ago since starting to use cocaine; she reports minimal intake during this time (snacking on some foods occasionally).     CURRENT NUTRITION ORDERS  Diet: Regular  Intake/Tolerance: Good appetite and intake per pt. She reports eating 3 full meals daily and had trialed a Boost from staff on unit and enjoyed it a lot, requesting RD to order one with each meal. She reports being highly motivated with regaining back to her UBW (130-135 lbs).     LABS  Labs reviewed    MEDICATIONS  Medications reviewed  -Thera-Vit-M  -Folvite  -Thiamine    ANTHROPOMETRICS  Height: 157.5 cm (5' 2\")  Most Recent Weight: 49.8 kg (109 lb 12.8 oz) on 2/11/19   IBW: 50 kg   BMI: 20.08 kg/m2; Normal BMI  Weight History: Lost 20-30 lbs over past 5 months per pt. UBW is 130-135 lbs and she would like to regain back to this.   Wt Readings from Last 10 Encounters:   02/11/19 49.8 kg (109 lb 12.8 oz) (15 %)* "   10/14/15 53.1 kg (117 lb 1.6 oz) (43 %)*     * Growth percentiles are based on CDC (Girls, 2-20 Years) data.   Dosing Weight: 50 kg (actual, based on most recent wt of 49.8 kg on 2/11/19)    ASSESSED NUTRITION NEEDS  Estimated Energy Needs: 0276-3359+ kcals/day (25 - 30+ kcals/kg)  Justification: Maintenance and/or Repletion  Estimated Protein Needs: 60-75 grams protein/day (1.2 - 1.5 grams of pro/kg)  Justification: Repletion  Estimated Fluid Needs: 1 mL/kcal  Justification: Maintenance or Per provider pending fluid status    PHYSICAL FINDINGS  See malnutrition section below.    MALNUTRITION  % Intake: Decreased intake does not meet criteria, pt now eating well since admission  % Weight Loss: > 10% in 6 months (severe); 16% wt loss   Subcutaneous Fat Loss: Facial region, Upper arm, Lower arm: Mild-moderate  Muscle Loss: Temporal, Facial & jaw region, Upper arm (bicep, tricep), Lower arm  (forearm) and Dorsal hand: Mild-Moderate  Fluid Accumulation/Edema: None noted  Malnutrition Diagnosis: Severe malnutrition in the context of chronic illness    NUTRITION DIAGNOSIS  Unintended wt loss related to polysubstance abuse as evidenced by pt report of little intake during time of substance use (over past 5 months), wt loss of 20-30 lbs during this time, and signs of muscle and fat loss.       INTERVENTIONS  Implementation  -Nutrition Education: Provided education on adequate PO and continuing to eat 3 meals daily with snacks.    -Medical food supplement therapy: Ordered as above per pt preference.     Goals  Patient to consume % of nutritionally adequate meal trays TID, or the equivalent with supplements/snacks.     Monitoring/Evaluation  Progress toward goals will be monitored and evaluated per protocol.    Bisi Yancey RD, LD  Pager: [phone]

## 2019-02-14 NOTE — PROGRESS NOTES
"Pt mood greatly improved this evening. Out of bed all shift, social with peers, had \"sober BF and sister\" visit. She reports that she is eager to complete CD assessment and would like  to go directly to LP+. She had been tearful and wanting to leave during the day shift but is now accepting of need to stay complete the process and get into treatment.   "
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
## 2019-02-14 NOTE — PROGRESS NOTES
"Patient seen, chart reviewed, care discussed with staff.    Blood pressure 108/76, pulse 62, temperature 97  F (36.1  C), temperature source Oral, resp. rate 16, height 1.575 m (5' 2\"), weight 49.8 kg (109 lb 12.8 oz), last menstrual period 02/11/2019, SpO2 99 %.    Alert.  Affect fair, anxious.  Speech normal.  Eye contact low.  Psychomotor behavior and gait  normal.  No delusions or hallucinations.  Thoughts logical.  Associations intact. Cognitions intact.  Not suicidal.  Anxiety discussed, and cravings.    Plan: Start Buspar and Naltrexone      Current Facility-Administered Medications:      acetaminophen (TYLENOL) tablet 650 mg, 650 mg, Oral, Q4H PRN, Gabibe Guillermo APRN CNP     alum & mag hydroxide-simethicone (MYLANTA ES/MAALOX  ES) suspension 30 mL, 30 mL, Oral, Q4H PRN, Gabbie Guillermo APRN CNP     atenolol (TENORMIN) tablet 50 mg, 50 mg, Oral, Daily PRN, Gabbie Guillermo APRN CNP     busPIRone (BUSPAR) tablet 5 mg, 5 mg, Oral, TID, Jose L Samaniego MD     diazepam (VALIUM) tablet 5-20 mg, 5-20 mg, Oral, Q30 Min PRN, Gabbie Guillermo APRROBBY CNP, 5 mg at 02/12/19 1615     folic acid (FOLVITE) tablet 1 mg, 1 mg, Oral, Daily, Gabbie Guillermo APRN CNP, 1 mg at 02/12/19 0850     hydrOXYzine (ATARAX) tablet 25 mg, 25 mg, Oral, Q4H PRN, Gabbie Guillermo APRROBBY CNP, 25 mg at 02/11/19 2323     ibuprofen (ADVIL/MOTRIN) tablet 600 mg, 600 mg, Oral, Q6H PRN, MildarcieevaGabbieeva, APRN CNP, 600 mg at 02/11/19 2323     influenza quadrivalent (PF) vacc (FLUZONE or Flulaval or FLUARIX) injection 0.5 mL, 0.5 mL, Intramuscular, Prior to discharge, Gabbie Guillermo APRN CNP     magnesium hydroxide (MILK OF MAGNESIA) suspension 30 mL, 30 mL, Oral, At Bedtime PRN, Gabbie Guillermo APRN CNP     mirtazapine (REMERON) tablet 15 mg, 15 mg, Oral, At Bedtime, Jake Blackwood MD, 15 mg at 02/13/19 2326     multivitamin " w/minerals (THERA-VIT-M) tablet 1 tablet, 1 tablet, Oral, Daily, Gabbie Guillermo APRN CNP, 1 tablet at 02/12/19 0850     naltrexone (DEPADE;REVIA) half-tab 25 mg, 25 mg, Oral, Daily, Jose L Samaniego MD     nicotine polacrilex (NICORETTE) gum 4-8 mg, 4-8 mg, Buccal, Q1H PRN, Gabbie Guillermo APRN CNP     ondansetron (ZOFRAN-ODT) ODT tab 4 mg, 4 mg, Oral, Q6H PRN, Gabbie Guillermo APRN CNP, 4 mg at 02/13/19 1940     PHENobarbital (LUMINAL) tablet 32.4 mg, 32.4 mg, Oral, At Bedtime, Jake Blackwood MD, 32.4 mg at 02/13/19 2140     traZODone (DESYREL) tablet 50 mg, 50 mg, Oral, At Bedtime PRN, Gabbie Guillermo APRN CNP     vitamin B1 (THIAMINE) tablet 100 mg, 100 mg, Oral, Daily, Gabbie Guillermo APRN CNP, 100 mg at 02/12/19 0850  Recent Results (from the past 168 hour(s))   Alcohol breath test POCT    Collection Time: 02/11/19  1:42 PM   Result Value Ref Range    Alcohol Breath Test 0.078 (A) 0.00 - 0.01   Drug abuse screen 6 urine (tox)    Collection Time: 02/11/19  6:22 PM   Result Value Ref Range    Amphetamine Qual Urine Negative NEG^Negative    Barbiturates Qual Urine Negative NEG^Negative    Benzodiazepine Qual Urine Negative NEG^Negative    Cannabinoids Qual Urine Positive (A) NEG^Negative    Cocaine Qual Urine Positive (A) NEG^Negative    Ethanol Qual Urine Negative NEG^Negative    Opiates Qualitative Urine Negative NEG^Negative   HCG qualitative urine    Collection Time: 02/11/19  6:22 PM   Result Value Ref Range    HCG Qual Urine Negative NEG^Negative   CBC with platelets    Collection Time: 02/12/19  7:35 AM   Result Value Ref Range    WBC 5.9 4.0 - 11.0 10e9/L    RBC Count 4.26 3.8 - 5.2 10e12/L    Hemoglobin 13.8 11.7 - 15.7 g/dL    Hematocrit 42.2 35.0 - 47.0 %    MCV 99 78 - 100 fl    MCH 32.4 26.5 - 33.0 pg    MCHC 32.7 31.5 - 36.5 g/dL    RDW 13.0 10.0 - 15.0 %    Platelet Count 226 150 - 450 10e9/L   Comprehensive metabolic panel     Collection Time: 02/12/19  7:35 AM   Result Value Ref Range    Sodium 141 133 - 144 mmol/L    Potassium 4.0 3.4 - 5.3 mmol/L    Chloride 111 (H) 96 - 110 mmol/L    Carbon Dioxide 23 20 - 32 mmol/L    Anion Gap 7 3 - 14 mmol/L    Glucose 73 70 - 99 mg/dL    Urea Nitrogen 13 7 - 30 mg/dL    Creatinine 0.88 0.50 - 1.00 mg/dL    GFR Estimate >90 >60 mL/min/[1.73_m2]    GFR Estimate If Black >90 >60 mL/min/[1.73_m2]    Calcium 8.6 8.5 - 10.1 mg/dL    Bilirubin Total 0.4 0.2 - 1.3 mg/dL    Albumin 3.2 (L) 3.4 - 5.0 g/dL    Protein Total 6.6 (L) 6.8 - 8.8 g/dL    Alkaline Phosphatase 65 40 - 150 U/L    ALT 11 0 - 50 U/L    AST 11 0 - 35 U/L   GGT    Collection Time: 02/12/19  7:35 AM   Result Value Ref Range    GGT 20 0 - 30 U/L   TSH with free T4 reflex and/or T3 as indicated    Collection Time: 02/12/19  7:35 AM   Result Value Ref Range    TSH 0.54 0.40 - 4.00 mU/L

## 2019-02-15 ENCOUNTER — TELEPHONE (OUTPATIENT)
Dept: BEHAVIORAL HEALTH | Facility: CLINIC | Age: 19
End: 2019-02-15

## 2019-02-15 VITALS
WEIGHT: 109.8 LBS | HEART RATE: 63 BPM | SYSTOLIC BLOOD PRESSURE: 107 MMHG | RESPIRATION RATE: 16 BRPM | HEIGHT: 62 IN | BODY MASS INDEX: 20.2 KG/M2 | TEMPERATURE: 97 F | DIASTOLIC BLOOD PRESSURE: 67 MMHG | OXYGEN SATURATION: 99 %

## 2019-02-15 PROCEDURE — 25000132 ZZH RX MED GY IP 250 OP 250 PS 637: Performed by: NURSE PRACTITIONER

## 2019-02-15 PROCEDURE — 25000132 ZZH RX MED GY IP 250 OP 250 PS 637: Performed by: PSYCHIATRY & NEUROLOGY

## 2019-02-15 PROCEDURE — 99239 HOSP IP/OBS DSCHRG MGMT >30: CPT | Performed by: PSYCHIATRY & NEUROLOGY

## 2019-02-15 RX ORDER — NALTREXONE HYDROCHLORIDE 50 MG/1
50 TABLET, FILM COATED ORAL DAILY
Qty: 30 TABLET | Refills: 0 | Status: SHIPPED | OUTPATIENT
Start: 2019-02-16 | End: 2020-01-07

## 2019-02-15 RX ORDER — MIRTAZAPINE 15 MG/1
15 TABLET, FILM COATED ORAL AT BEDTIME
Qty: 30 TABLET | Refills: 0 | Status: SHIPPED | OUTPATIENT
Start: 2019-02-15 | End: 2020-01-07

## 2019-02-15 RX ORDER — MULTIPLE VITAMINS W/ MINERALS TAB 9MG-400MCG
1 TAB ORAL DAILY
Qty: 30 TABLET | Refills: 0 | Status: SHIPPED | OUTPATIENT
Start: 2019-02-15 | End: 2019-03-17

## 2019-02-15 RX ORDER — BUSPIRONE HYDROCHLORIDE 5 MG/1
5 TABLET ORAL 3 TIMES DAILY
Qty: 90 TABLET | Refills: 0 | Status: SHIPPED | OUTPATIENT
Start: 2019-02-15 | End: 2019-03-17

## 2019-02-15 RX ORDER — FOLIC ACID 1 MG/1
1 TABLET ORAL DAILY
Qty: 30 TABLET | Refills: 0 | Status: SHIPPED | OUTPATIENT
Start: 2019-02-15 | End: 2019-03-17

## 2019-02-15 RX ADMIN — FOLIC ACID 1 MG: 1 TABLET ORAL at 08:31

## 2019-02-15 RX ADMIN — MULTIPLE VITAMINS W/ MINERALS TAB 1 TABLET: TAB at 08:31

## 2019-02-15 RX ADMIN — BUSPIRONE HYDROCHLORIDE 5 MG: 5 TABLET ORAL at 14:01

## 2019-02-15 RX ADMIN — Medication 25 MG: at 08:31

## 2019-02-15 RX ADMIN — BUSPIRONE HYDROCHLORIDE 5 MG: 5 TABLET ORAL at 08:31

## 2019-02-15 ASSESSMENT — ACTIVITIES OF DAILY LIVING (ADL)
HYGIENE/GROOMING: HANDWASHING;INDEPENDENT
ORAL_HYGIENE: INDEPENDENT
DRESS: INDEPENDENT;STREET CLOTHES
HYGIENE/GROOMING: INDEPENDENT

## 2019-02-15 NOTE — PROGRESS NOTES
Day nurse reviewed all discharge orders, medications and follow up appointments with patient.  Patient denies thoughts of suicide, no intent to self harm.  Pt discharging to home to await admission [facility].  Pt has no questions.  Pt picked up by boyfriend.

## 2019-02-15 NOTE — TELEPHONE ENCOUNTER
Signed and dated CPA from Keily Holt for 28 days with [facility] for L+.  Inbasket to Jarod DUMONT and added cons funds to reg. Filed in blue folder. pricila

## 2019-02-15 NOTE — DISCHARGE INSTRUCTIONS
Behavioral Discharge Planning and Instructions  THANK YOU FOR CHOOSING THE [address]  [phone]    Summary: You were admitted to [facility] on 2/11/19 for detoxification from Alcohol and benzodiazepines. A medical exam was performed that included lab work. You have met with a  and opted to follow-up with referral to [facility]  Please take care and make your recovery a priority!    Main Diagnosis:  Per  Dr. Blackwood  Alcohol use disorder    Major Treatments, Procedures and Findings:  You were detoxed from alcohol. You have met with a  to develop a treatment plan for discharge.  You have had labs drawn and a copy of those labs will be sent home with you. Your alcohol withdrawal is complete and you are being discharged today.  Please bring your lab results with to your follow up doctor appointment.  Make your recovery a priority!                      Symptoms to Report:  If you experience more anxiety, confusion, sleeplessness, deep sadness or thoughts of suicide, notify your treatment team or notify your primary care physician. IF ANY OF THE SYMPTOMS YOU ARE EXPERIENCING ARE A MEDICAL EMERGENCY CALL 911 IMMEDIATELY.     Lifestyle Adjustment: Adjust your lifestyle to get enough sleep, relaxation, exercise and  good nutrition. Continue to develop healthy coping skills to decrease stress and promote a sober living environment. Do not use alcohol, illegal drugs or addictive medications other than what is currently prescribed. AA, NA, and  Sponsor are excellent resources for support.     Disposition: Home    Treatment Follow-Up:  [facility]  [address] [place] [address]-[address] [place] [address]  [phone]  *Please call the above number to schedule your admission to the [facility] program.    Rule 25 Funding Follow-Up:  [facility]   [phone]  *Please call the above number to verify your funding has been approved for  [facility]    Primary Care Follow-up:  You will schedule a follow up appointment when your insurance is active as you applied for MA and have been told that it would be active in 3 days. You have been seen at [facility] and would like to schedule with them. Information is listed below.    [facility]  Address: [address] [place] [address]  Phone: [phone]      DISCHARGE RESOURCES:  -SMART Recovery - self management for addiction recovery:  www.smartrecovery.org    -Pathways ~ A Health Crisis Resource & Support Center: 982.862.5271.  -[facility] [phone]   -[facility] Behavioral Intake [phone] or [phone].  -Crisis Intervention: 838.627.4297 or 410-763-8050 (TTY: 518.707.3409).  Call anytime.  -Suicide Awareness Voices of Education (SAVE) (www.save.org): 733-326-XAZH (0288)  -National Suicide Prevention Line (www.mentalhealthmn.org): 550-225-UEBT (0286)  -National Malcolm on Mental Illness (www.[place].audra.org): 219.808.4500 or 072-630-3867.  -Kovr4awvv: text the word LIFE to 23074 for immediate support and crisis intervention  -Mental Health Consumer/Survivor Network of [place] (www.mhcsn.net): 227.709.6282 or 412-569-3252  -Mental Health Association of [place] (www.mentalhealth.org): 158.891.8743 or 007-986-5938     -Substance Abuse and Mental Health Services (www.samhsa.gov)  -Harm Reduction Coalition (www. Harmreduction.org)  -www.prescribetoprevent.org or http://prescribetoprevent.org/video  -Poison control 8-680-436-7246     Sober Support Group Information:  AA/NA & Sponsor/Support  -Alcoholics Anonymous (www.alcoholics-anonymous.org): for local information 24 hours/day  -AA Intergroup service office in [place] (http://www.aastpaul.org/) 748.859.1984  -AA Intergroup service office in [facility]: 143.871.9286. (http://www.a[place].org/)  -Narcotics Anonymous (www.nam[place].org) (350) 102-9211   **Sober Fun Activities:  www.sober-activities.Gusto.[employer]/[facility]//[facility] Connection ([facility])  [facility] connects people seeking recovery to resources that help foster and sustain long-term recovery.  Whether you are seeking resources for treatment, transportation, housing, job training, education, health care or other pathways to recovery, [facility] is a great place to start.  [phone].  Www.minnesotarecovery.org    General Medication Instructions:   See your medication sheet(s) for instructions.   Take all medicines as directed.  Make no changes unless your doctor suggests them.   Go to all your doctor visits.  Be sure to have all your required lab tests. This way, your medicines can be refilled on time.  Do not use any drugs not prescribed by your provider.  AA/NA and Sponsors are excellent resources for support  Avoid alcohol.    Please Note:  If you have any questions at anytime after you are discharged please call the [facility] detox unit 3AW unit at [phone].  [facility], Behavioral Intake [phone]  Please take this discharge folder with you to all your follow up appointments, it contains your lab results, diagnosis, medication list and discharge recommendations.      THANK YOU FOR CHOOSING THE [facility]

## 2019-02-15 NOTE — PROGRESS NOTES
02/14/19 2208   General Information   Art Directive other (see comments)   AT directive is to draw a bridge to recovery, Goals of directive are to: identify positive supports, assess motivation for change, identify personal strengths and goals.  Pt was a positive participant, focused on task for the full duration of group.

## 2019-02-15 NOTE — DISCHARGE SUMMARY
More than 35 minutes spent on discharge summary, doing the discharge instructions, discharge medications, discharge mental status examination.       DISCHARGE DIAGNOSIS   Axis I.  Alcohol use disorder, severe.  Please review the detailed admission note by Dr. Blackwood on 02/12/2018.      HOSPITAL COURSE:  During the hospitalization, the patient was detoxed off alcohol using the MSSA protocol on Valium.  She was put on BuSpar for her anxiety. During the hospitalization, the patient's energy, motivation, sleep and interests improved.  She started on BuSpar 5 mg 3 times a day and Wellbutrin was restarted and she was seen by Maren Smith for an Internal Medicine consult.  Please see detailed note on 02/12/2018.  The patient had lab work done, which was normal.  Complete metabolic panel, chloride is 111, albumin is 3.2, protein is 6.6.  bhcg  is negative.  During hospitalization, the patient's energy, motivation, sleep, and interest improved.  She did not have any suicidal or homicidal ideation, plan or intent.  Met with the  and the plan is to do treatment at [facility].      DISCHARGE DISPOSITION:  The patient is supposed to go to treatment, but there is no case management.  The patient wants to go home  She is not holdable.  She is not suicidal.  Ideally, I would like her to actual treatment, but she wants to go home.  She will be discharged to her sister's care and will come back to treatment when there is a bed available.                 DISCHARGE MENTAL STATUS EXAMINATION:  The patient is alert, oriented x3.  Good fund of knowledge.  Good use of language.  Recent and remote memory, language, fund of knowledge are all adequate.  Euthymic mood congruent affect  Speech normal rate/rhythm linear tp no loose asso,The patient does not have any active suicidal or homicidal ideation.  Does not have any auditory or visual hallucination.  Fair insight/judgment At this time, the patient was  stable to be discharged.        Pt was not determined to not be a danger to himself or others. At the current time of discharge, the patient does not meet criteria for involuntary hospitalization. On the day of discharge, the patient reports that they do not have suicidal or homicidal ideation and would never hurt themselves or others. Steps taken to minimize risk include: assessing patient s behavior and thought process daily during hospital stay, discharging patient with adequate plan for follow up for mental and physical health and discussing safety plan of returning to the hospital should the patient ever have thoughts of harming themselves or others. Therefore, based on all available evidence including the factors cited above, the patient does not appear to be at imminent risk for self-harm, and is appropriate for outpatient level of care.     Educated about side effects/risk vs benefits /alternative including non treatment.Pt consented to be on medication.     .Total time spent on discharge summary more than 35 min  More than  20 min  planning, coordination of care, medication reconciliation and performance of physical exam on day of discharge.Care was coordinated with unit RN and unit therapist       Sophia Stiles   Home Medication Instructions ROYAL:[id number]    Printed on:02/24/19 4181   Medication Information                      busPIRone (BUSPAR) 5 MG tablet  Take 1 tablet (5 mg) by mouth 3 times daily             folic acid (FOLVITE) 1 MG tablet  Take 1 tablet (1 mg) by mouth daily             mirtazapine (REMERON) 15 MG tablet  Take 1 tablet (15 mg) by mouth At Bedtime             multivitamin w/minerals (THERA-VIT-M) tablet  Take 1 tablet by mouth daily             naltrexone (DEPADE/REVIA) 50 MG tablet  Take 1 tablet (50 mg) by mouth daily             nicotine polacrilex (NICORETTE) 4 MG gum  Place 1-2 each (4-8 mg) inside cheek every hour as needed for other (nicotine withdrawal symptoms)                  Disposition: Home     Treatment Follow-Up:  [facility]  [address]th [place] [address]  [phone]  *Please call the above number to schedule your admission to the [facility] program.     Rule 25 Funding Follow-Up:  [facility]   [phone]  *Please call the above number to verify your funding has been approved for [facility]     Primary Care Follow-up:  You will schedule a follow up appointment when your insurance is active as you applied for MA and have been told that it would be active in 3 days. You have been seen at [facility] and would like to schedule with them. Information is listed below.     [facility]  Address: [address] [place] [address]  Phone: [phone]        DISCHARGE RESOURCES  KAHLIL TERRELL MD             D: 02/15/2019   T: 02/15/2019   MT: CHAVA      Name:     ZHANE DUMONT   MRN:      [id number]        Account:        [id number]   :      2000           Admit Date:     2019                                  Discharge Date:       Document: [id number]       cc: HENRI GAONA MD

## 2019-02-15 NOTE — TELEPHONE ENCOUNTER
Marley Holt from [facility] calling to say she will be faxing the CPA to intake today for 28 days of L+. Left msg for Jarod DUMONT on 3A. We cannot take verbal auth from [facility], so we need to wait for the CPA to be faxed.

## 2019-02-15 NOTE — PROGRESS NOTES
"Case Management Note  2/15/2019    Writer called [facility] to check on the status of pt's Rule 25 request for [facility] funding. Writer informed the Rule 25 assessment has been received and will be assigned to a  this morning at 9:00 am for review. Writer will be contacted when a decision has been made. Pt informed. Pt reports \"I don't want to be here until Tuesday.\"    Writer met with pt to see if she was going to discharge today to her sisters or stay to wait on funding for [facility]. Pt reports she has not decided.    Writer received a voicemail message from [facility]. 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 Pt signed statement and writer faxed to [facility] [email or web address].    Jarod Collins MA, LADC  "

## 2019-02-18 NOTE — TELEPHONE ENCOUNTER
----- Message from JUNE Hickman sent at 2/18/2019  7:53 AM CST -----  Regarding: Add Pt to [facility] Wait List  Please add the above named pt to the Priority [facility] Wait List.    Signed and dated CPA from Marley Holt for 28 days with [facility] for L+. Received on Friday, 2/15/19.    Pt discharged to her sisters on Friday, 2/15/19 because there was no word from [facility].    Please contact pt to see if she is still wanting to admit to [facility].    Thanks,    Jarod

## 2019-02-25 NOTE — TELEPHONE ENCOUNTER
[facility] bed available, patient will have her admission on Tuesday, 2/26/19, at 10:00 am, but will arrive to [facility] at 9:00 am to check in for vaa/isp & LP update. Patient was on 3A, but discharged on 2/15/19. Reminded patient that she will need to bring 30 day supply of meds, anything over the counter will need to come in brand new bottles, she will need to be sober and not at risk of withdrawal to enter the program, and her admission cannot be reschedule if she cannot admit on scheduled day and time. Will go to women's group E.

## 2019-02-26 ENCOUNTER — HOSPITAL ENCOUNTER (OUTPATIENT)
Dept: BEHAVIORAL HEALTH | Facility: CLINIC | Age: 19
End: 2019-02-26
Attending: FAMILY MEDICINE
Payer: MEDICAID

## 2019-02-26 VITALS — TEMPERATURE: 97.4 F | HEART RATE: 82 BPM | DIASTOLIC BLOOD PRESSURE: 67 MMHG | SYSTOLIC BLOOD PRESSURE: 104 MMHG

## 2019-02-26 PROBLEM — F19.20 CHEMICAL DEPENDENCY (H): Status: ACTIVE | Noted: 2019-02-26

## 2019-02-26 PROCEDURE — 10020000 ZZH LODGING PLUS FACILITY CHARGE ADULT

## 2019-02-26 PROCEDURE — H2035 A/D TX PROGRAM, PER HOUR: HCPCS | Mod: HQ

## 2019-02-26 PROCEDURE — H2035 A/D TX PROGRAM, PER HOUR: HCPCS

## 2019-02-26 RX ORDER — LANOLIN ALCOHOL/MO/W.PET/CERES
3 CREAM (GRAM) TOPICAL
COMMUNITY
End: 2019-03-21

## 2019-02-26 RX ORDER — AMOXICILLIN 250 MG
2 CAPSULE ORAL DAILY PRN
COMMUNITY
End: 2019-03-21

## 2019-02-26 RX ORDER — IBUPROFEN 200 MG
200-400 TABLET ORAL EVERY 4 HOURS PRN
COMMUNITY
End: 2019-03-21

## 2019-02-26 RX ORDER — ACETAMINOPHEN 325 MG/1
325-650 TABLET ORAL EVERY 4 HOURS PRN
COMMUNITY
End: 2019-03-21

## 2019-02-26 RX ORDER — LORATADINE 10 MG/1
10 TABLET ORAL DAILY PRN
COMMUNITY
End: 2019-03-21

## 2019-02-26 RX ORDER — MAGNESIUM HYDROXIDE/ALUMINUM HYDROXICE/SIMETHICONE 120; 1200; 1200 MG/30ML; MG/30ML; MG/30ML
30 SUSPENSION ORAL EVERY 6 HOURS PRN
COMMUNITY
End: 2019-03-21

## 2019-02-26 ASSESSMENT — ANXIETY QUESTIONNAIRES
7. FEELING AFRAID AS IF SOMETHING AWFUL MIGHT HAPPEN: SEVERAL DAYS
3. WORRYING TOO MUCH ABOUT DIFFERENT THINGS: MORE THAN HALF THE DAYS
4. TROUBLE RELAXING: SEVERAL DAYS
5. BEING SO RESTLESS THAT IT IS HARD TO SIT STILL: SEVERAL DAYS
1. FEELING NERVOUS, ANXIOUS, OR ON EDGE: MORE THAN HALF THE DAYS
2. NOT BEING ABLE TO STOP OR CONTROL WORRYING: MORE THAN HALF THE DAYS
GAD7 TOTAL SCORE: 11
6. BECOMING EASILY ANNOYED OR IRRITABLE: MORE THAN HALF THE DAYS

## 2019-02-26 ASSESSMENT — PATIENT HEALTH QUESTIONNAIRE - PHQ9: SUM OF ALL RESPONSES TO PHQ QUESTIONS 1-9: 9

## 2019-02-26 NOTE — PROGRESS NOTES
Progress Note       This patient had a comprehensive assessment on 2/13/2019 completed by Jarod Collins.  This patient was seen for a face to face update of the comprehensive assessment on 2/26/2019 by Mary Bauer [facility]:  Yes    Alcohol/Drug use since the last CD evaluation (include date of last use):   Last use:  Alcohol: about 2/17/2019  THC: yesterday, 2/25/2019, 2 bowls  Coke: about 4-5 days ago.  Xanax: no use     Please note any other clinical changes since the last CD evaluation (such as medication changes, additional legal charges, detoxification admissions, overdoses, etc.)     No significant changes since the last CD evaluation       ASAM Dimensions Original scores Current Scores   I.) Intoxication and Withdrawal: 1 1   II.) Biomedical:  0 0   III.) Emotional and Behavioral:  2 2   IV.) Readiness to Change:  2 2   V.) Relapse Potential: 4 4   VI.) Recovery Environmental: 3 3     Please list clinical justifications for the above ASAM score changes since the original comprehensive assessment:     None of the ASAM scores on the six dimensions had changed since the original comprehensive assessment was completed on 2/13/2019.       Current RHEA: Current UA:     .000     Positive for BAR, HAN, BZO, & THC and negative for all other screened drugs.       PHQ-9, YELITZA-7   PHQ-9 on 2/26/2019 YELITZA-7 on 2/26/2019   The patient's PHQ-9 score was 9 out of 27, indicating mild depression.   The patient's YELITZA-7 score was 11 out of 21, indicating moderate anxiety.       [place]-Suicide Severity Rating Scale Reassessment   Have you ever wished you were dead or that you could go to sleep and not wake up?  Past Month:  No- not since she was discharged from detox on 2/15/2019     Have you actually had any thoughts of killing yourself?  Past Month:  No     Have you been thinking about how you might do this?     Past Month:  Yes, Describe: taking pills   Lifetime:  Yes   Have you had these thoughts and had some  "intention of acting on them?     Past Month:  Yes, Describe: see above   Lifetime:  Yes, Describe: taking pills   Have you started to work out the details of how to kill yourself?   Past Month:  Yes, Describe: see above   Lifetime:  Yes, Describe: see above   Do you intend to carry out this plan?   No     When you have the thoughts how long do they last?  1-4 hours/a lot of time     Are there things - anyone or anything (i.e. family, Gnosticism, pain of death) that stopped you from wanting to die or acting on thoughts of suicide?  Protective factors probably stopped you       2008  [employer] [facility] [employer]  Used with permission by Katarzyna Oliveros, PhD.       Guide to C-SSRS Risk Ratings   NO IDEATION:  with no active thoughts IDEATION: with a wish to die. IDEATION: with active thoughts. Risk Ratings   If Yes No No 0 - Very Low Risk   If NA Yes No 1 - Low Risk   If NA Yes Yes 2 - Low/moderate risk   IDEATION: associated thoughts of methods without intent or plan INTENT: Intent to follow through on suicide PLAN: Plan to follow through on suicide Risk Ratings cont...   If Yes No No 3 - Moderate Risk   If Yes Yes No 4 - High Risk   If Yes Yes Yes 5 - High Risk   The patient's ADDITIONAL RISK FACTORS and lack of PROTECTIVE FACTORS may increase their overall suicide risk ratings.     Additional Risk Factors:    Someone close to the patient (family member/friend) completed a suicide     Significant history of trauma and/or abuse issues   Protective Factors:    Having people in his/her life that would prevent the patient from considering a suicide attempt (i.e. young children, spouse, parents, etc.)     Having easy access to supportive family members     Having cultural, Mandaeism or spiritual beliefs that discourage suicide      Risk Status   1. - Low Risk: Evaluation Counselors:  Document in Epic / SBAR to counselor \"Low Risk\".      Treatment Counselors:  Reassess upon admission as applicable, assess " weekly in progress notes under Dimension 3 and summarize in Discharge / Treatment summary under Dimension 3.     Additional information to support suicide risk rating: There was no additional information to provide at this time.

## 2019-02-26 NOTE — PROGRESS NOTES
Initial Services Plan        Service Initiation Date: 2/26/2019    Immediate health and/or safety concerns: No    Identify health and safety concern(s) below and include plan to address:    None Identified    Client issues to be addressed in the first treatment sessions:     Fear of adjusting to roommate or different environment  Fear of failing  Fear of being in a group and/or speaking in front of people    Treatment suggestions for client during the time between intake (admit date) and completion of the individual treatment plan:     Look for a sober support network, i.e. 12 step, Smart Recovery, Cele[facility] Recovery, etc  Tour the treatment center or outpatient clinic  Introduce yourself to your treatment group. Spend time getting to know your peers  Review your patient or client handbook  Begin working on your treatment goal list    Completed by: JUNE Anaya  Date completed: 2/26/2019 at 9:29 AM

## 2019-02-26 NOTE — TELEPHONE ENCOUNTER
"2/26/2019  Pt admitted into [facility] today.    SBAR  Name:   Sophia Stiles   YOB: 2000 Age:  18 year old Gender:  female   Referral Source: Self   Referral DAGOBERTO: N/A   Insurance: [facility] Funding: [facility]     Precipitating Event: Treatment due to own awareness of need for help     DOC: Alcohol, Marijuana, Cocaine/Crack and Benzodiazepines     Additional abused substances: Meth/Amphetamines and Opiates     Medical: No chronic health problems     Mental Health: Depression, Anxiety, ADHD and ADD     Prior Detox admissions: 2 prior IP detoxification admission(s).    Prior CD treatments: 1 prior CD treatment(s).     Psychosocial history:   Single, in no serious relationship    No children    Stable housing and no concerns    Minimal support network, Relationship conflict with family members or friends due to substance abuse and Currently employed     [employer] Suicide Risk Status:  Past month: 2. - Low/moderate risk: Addressed in detox    Past 24 hours: 1. - Low Risk: Evaluation Counselors:  Document in Epic / SBAR to counselor \"Low Risk\".      Treatment Counselors:  Reassess upon admission as applicable, assess weekly in progress notes under Dimension 3 and summarize in Discharge / Treatment summary under Dimension 3.     Additional Info as needed: There was no additional information to provide at this time.         "
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
## 2019-02-26 NOTE — TELEPHONE ENCOUNTER
Patient have not arrive for her LP admission. Called and patient stated that she is outside of building.

## 2019-02-27 ENCOUNTER — HOSPITAL ENCOUNTER (OUTPATIENT)
Dept: BEHAVIORAL HEALTH | Facility: CLINIC | Age: 19
End: 2019-02-27
Attending: FAMILY MEDICINE
Payer: MEDICAID

## 2019-02-27 LAB
BARBITURATES UR QL: POSITIVE
CANNABINOIDS UR QL SCN: POSITIVE
COCAINE UR QL: POSITIVE

## 2019-02-27 PROCEDURE — 80307 DRUG TEST PRSMV CHEM ANLYZR: CPT | Performed by: FAMILY MEDICINE

## 2019-02-27 PROCEDURE — 10020000 ZZH LODGING PLUS FACILITY CHARGE ADULT

## 2019-02-27 PROCEDURE — H2035 A/D TX PROGRAM, PER HOUR: HCPCS | Mod: HQ

## 2019-02-27 ASSESSMENT — ANXIETY QUESTIONNAIRES: GAD7 TOTAL SCORE: 11

## 2019-02-28 ENCOUNTER — HOSPITAL ENCOUNTER (OUTPATIENT)
Dept: BEHAVIORAL HEALTH | Facility: CLINIC | Age: 19
End: 2019-02-28
Attending: FAMILY MEDICINE
Payer: MEDICAID

## 2019-02-28 PROCEDURE — 10020000 ZZH LODGING PLUS FACILITY CHARGE ADULT

## 2019-02-28 PROCEDURE — H2035 A/D TX PROGRAM, PER HOUR: HCPCS | Mod: HQ
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
## 2019-03-01 ENCOUNTER — HOSPITAL ENCOUNTER (OUTPATIENT)
Dept: BEHAVIORAL HEALTH | Facility: CLINIC | Age: 19
End: 2019-03-01
Attending: FAMILY MEDICINE
Payer: MEDICAID

## 2019-03-01 PROCEDURE — 10020000 ZZH LODGING PLUS FACILITY CHARGE ADULT

## 2019-03-01 PROCEDURE — H2035 A/D TX PROGRAM, PER HOUR: HCPCS

## 2019-03-01 PROCEDURE — H2035 A/D TX PROGRAM, PER HOUR: HCPCS | Mod: HQ

## 2019-03-02 ENCOUNTER — HOSPITAL ENCOUNTER (OUTPATIENT)
Dept: BEHAVIORAL HEALTH | Facility: CLINIC | Age: 19
End: 2019-03-02
Attending: FAMILY MEDICINE
Payer: MEDICAID

## 2019-03-02 PROCEDURE — 10020000 ZZH LODGING PLUS FACILITY CHARGE ADULT

## 2019-03-02 PROCEDURE — H2035 A/D TX PROGRAM, PER HOUR: HCPCS

## 2019-03-03 ENCOUNTER — HOSPITAL ENCOUNTER (OUTPATIENT)
Dept: BEHAVIORAL HEALTH | Facility: CLINIC | Age: 19
End: 2019-03-03
Attending: FAMILY MEDICINE
Payer: MEDICAID

## 2019-03-03 PROCEDURE — 10020000 ZZH LODGING PLUS FACILITY CHARGE ADULT

## 2019-03-03 PROCEDURE — H2035 A/D TX PROGRAM, PER HOUR: HCPCS

## 2019-03-04 ENCOUNTER — HOSPITAL ENCOUNTER (OUTPATIENT)
Dept: BEHAVIORAL HEALTH | Facility: CLINIC | Age: 19
End: 2019-03-04
Attending: FAMILY MEDICINE
Payer: MEDICAID

## 2019-03-04 PROCEDURE — H2035 A/D TX PROGRAM, PER HOUR: HCPCS

## 2019-03-04 PROCEDURE — 10020000 ZZH LODGING PLUS FACILITY CHARGE ADULT

## 2019-03-04 PROCEDURE — H2035 A/D TX PROGRAM, PER HOUR: HCPCS | Mod: HQ

## 2019-03-05 ENCOUNTER — HOSPITAL ENCOUNTER (OUTPATIENT)
Dept: BEHAVIORAL HEALTH | Facility: CLINIC | Age: 19
End: 2019-03-05
Attending: FAMILY MEDICINE
Payer: MEDICAID

## 2019-03-05 PROCEDURE — H2035 A/D TX PROGRAM, PER HOUR: HCPCS | Mod: HQ

## 2019-03-05 PROCEDURE — H2035 A/D TX PROGRAM, PER HOUR: HCPCS

## 2019-03-05 PROCEDURE — 10020000 ZZH LODGING PLUS FACILITY CHARGE ADULT

## 2019-03-06 ENCOUNTER — HOSPITAL ENCOUNTER (OUTPATIENT)
Dept: BEHAVIORAL HEALTH | Facility: CLINIC | Age: 19
End: 2019-03-06
Attending: FAMILY MEDICINE
Payer: MEDICAID

## 2019-03-06 PROCEDURE — 10020000 ZZH LODGING PLUS FACILITY CHARGE ADULT

## 2019-03-06 PROCEDURE — H2035 A/D TX PROGRAM, PER HOUR: HCPCS | Mod: HQ

## 2019-03-06 NOTE — PROGRESS NOTES
Patient met with program  to review and initiate aftercare placement.  Patient presents as a high risk for relapse with limited independent sober living skills.  She lacks insight into ways to effectively manage co-occurring disorders.  Patient acknowledges a need to transition into a program offering outpatient services   with a housing/residential component. Patient was provided a list of sober houses affiliated with the [facility] program. Required documentation was faxed to [facility] as well.

## 2019-03-07 ENCOUNTER — HOSPITAL ENCOUNTER (OUTPATIENT)
Dept: BEHAVIORAL HEALTH | Facility: CLINIC | Age: 19
End: 2019-03-07
Attending: FAMILY MEDICINE
Payer: MEDICAID

## 2019-03-07 PROCEDURE — H2035 A/D TX PROGRAM, PER HOUR: HCPCS

## 2019-03-07 PROCEDURE — 10020000 ZZH LODGING PLUS FACILITY CHARGE ADULT

## 2019-03-07 PROCEDURE — H2035 A/D TX PROGRAM, PER HOUR: HCPCS | Mod: HQ
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
## 2019-03-08 ENCOUNTER — HOSPITAL ENCOUNTER (OUTPATIENT)
Dept: BEHAVIORAL HEALTH | Facility: CLINIC | Age: 19
End: 2019-03-08
Attending: FAMILY MEDICINE
Payer: MEDICAID

## 2019-03-08 PROCEDURE — H2035 A/D TX PROGRAM, PER HOUR: HCPCS | Mod: HQ

## 2019-03-08 PROCEDURE — H2035 A/D TX PROGRAM, PER HOUR: HCPCS

## 2019-03-08 PROCEDURE — 10020000 ZZH LODGING PLUS FACILITY CHARGE ADULT

## 2019-03-09 ENCOUNTER — HOSPITAL ENCOUNTER (OUTPATIENT)
Dept: BEHAVIORAL HEALTH | Facility: CLINIC | Age: 19
End: 2019-03-09
Attending: FAMILY MEDICINE
Payer: MEDICAID

## 2019-03-09 PROCEDURE — H2035 A/D TX PROGRAM, PER HOUR: HCPCS | Mod: HQ

## 2019-03-09 PROCEDURE — 10020000 ZZH LODGING PLUS FACILITY CHARGE ADULT

## 2019-03-09 NOTE — PROGRESS NOTES
"Patient:  Sophia Stiles            Adult CD Progress Note and Treatment Plan Review     Attendance  Please refer to OP BEH CD Adult Attendance Record Documentation Flowsheet    Support group attended this week: yes    Reporting sobriety:  yes    Treatment Plan     Treatment Plan Review competed on: 3/9/19       Client preferred learning style: Visual  Hands on  Verbal  Demonstration    Staff Members contributing JUNE Beaulieu; JUNE Lamb            Received Supervision: Yes    Client: contributed to goals and plan.    Client received copy of plan/revised plan: Yes    Client agrees with plan/revised plan: Yes        Changes to Treatment Plan: No    New Goals added since last review None    Goals worked on since last review: Consequences assignment, healing abandonment wounds assignment, coping skills, relapse prevention, individual therapy and maintaining stabilization     Strategies effective: yes    Strategies need these changes: No    1) Care Coordination Activities:  Patient met with  JUNE Mandel and jaz ARENAS for [facility] and [facility]   2) Medical, Mental Health and other appointments the client attended: 1:1 with GERRY Enamorado  3) Medication issues: Following medication regime  4) Physical and mental health problems: See dimension 2 & 3  5) Any changes in Vulnerable Adult Status?  No If yes, add to treatment plan and individual abuse prevention plan.  6) Review and evaluation of the individual abuse prevention plan: Current IAPP for this program is adequate for this client      ASAM Risk Rating:    Dimension 1 0 Patient denies any withdrawal symptoms at this time. Patient's rating in this dimension lowered from a \"1\" to a \"0\" at this time.     Dimension 2 0 Patient denies any medical concerns and is able to seek medical services as needed independently.     Dimension 3 2 Patient reported her mood has been up and down this week. She met with staff therapist for " "individual therapy and began to process her relational trauma. Patient reports she feels safe to \"cry\" during group therapy and is practicing vulnerability. Patient denies current suicidal ideation or thoughts of self-harm.     Dimension 4 1 Patient is actively engaged in the treatment process and puts thoughtful effort into her  homework assignments. Patient's risk rating lowered from a \"2\" to a \"1\" in this dimension based on patient's increasing internal motivation for change.     Dimension 5 4 Patient reported strong cravings this week when a peer offered to \"get her anything she wanted\" after treatment. Patient spoke with counselor and practiced PMRT skills to reduce anxiety and cravings. Patient appears to be a high risk for relapse and is working to build tools for sobriety and managing her mental health.       Dimension 6 3 Patient is attending 12-step support groups and building relationships with female peers. Patient is in the process of researching aftercare options and working with case maanger.        Guide to Risk Ratings for Suicidality:   IDEATION: Active thoughts of suicide? INTENT: Intent to follow on suicide? PLAN: Plan to follow through on suicide? Level of Risk:   IF Yes Yes Yes Patient = High Emergent   IF Yes Yes No Patient = High Urgent/Non-Emergent   IF Yes No No Patient = Moderate Non-Urgent   IF No No   No Patient = Low Risk   The patient's ADDITIONAL RISK FACTORS and lack of PROTECTIVE FACTORS may increase their overall suicide risk ratings.     Patient's/client's current risk rating:  Low Risk    Family Involvement:   none schedule this week    Data:   offered feedback good insight client did actively participate      Intervention:   Aftercare planning  Behavior modification  Cognitive Behavioral Therapy  Counselor feedback  Education  Emotional management  Group feedback  Motivational Enhancement Therapy  Relapse prevention  Twelve Step [facility]  Mental health " education    Assessment:   Stages of Change Model  Contemplation  Preparation/Determination    Appears/Sounds:  Cooperative  Motivated  Engaged  Anxious    Plan:  Focus on recovery environment  Monitor emotional/physical health      JUNE Beaulieu

## 2019-03-10 ENCOUNTER — HOSPITAL ENCOUNTER (OUTPATIENT)
Dept: BEHAVIORAL HEALTH | Facility: CLINIC | Age: 19
End: 2019-03-10
Attending: FAMILY MEDICINE
Payer: MEDICAID

## 2019-03-10 PROCEDURE — 10020000 ZZH LODGING PLUS FACILITY CHARGE ADULT

## 2019-03-10 PROCEDURE — H2035 A/D TX PROGRAM, PER HOUR: HCPCS | Mod: HQ

## 2019-03-11 ENCOUNTER — HOSPITAL ENCOUNTER (OUTPATIENT)
Dept: BEHAVIORAL HEALTH | Facility: CLINIC | Age: 19
End: 2019-03-11
Attending: FAMILY MEDICINE
Payer: MEDICAID

## 2019-03-11 PROCEDURE — H2035 A/D TX PROGRAM, PER HOUR: HCPCS

## 2019-03-11 PROCEDURE — H2035 A/D TX PROGRAM, PER HOUR: HCPCS | Mod: HQ

## 2019-03-11 PROCEDURE — 10020000 ZZH LODGING PLUS FACILITY CHARGE ADULT

## 2019-03-12 ENCOUNTER — HOSPITAL ENCOUNTER (OUTPATIENT)
Dept: BEHAVIORAL HEALTH | Facility: CLINIC | Age: 19
End: 2019-03-12
Attending: FAMILY MEDICINE
Payer: MEDICAID

## 2019-03-12 PROCEDURE — 10020000 ZZH LODGING PLUS FACILITY CHARGE ADULT

## 2019-03-12 PROCEDURE — H2035 A/D TX PROGRAM, PER HOUR: HCPCS

## 2019-03-12 PROCEDURE — H2035 A/D TX PROGRAM, PER HOUR: HCPCS | Mod: HQ

## 2019-03-13 ENCOUNTER — HOSPITAL ENCOUNTER (OUTPATIENT)
Dept: BEHAVIORAL HEALTH | Facility: CLINIC | Age: 19
End: 2019-03-13
Attending: FAMILY MEDICINE
Payer: MEDICAID

## 2019-03-13 PROCEDURE — 10020000 ZZH LODGING PLUS FACILITY CHARGE ADULT

## 2019-03-13 PROCEDURE — H2035 A/D TX PROGRAM, PER HOUR: HCPCS | Mod: HQ

## 2019-03-13 PROCEDURE — H2035 A/D TX PROGRAM, PER HOUR: HCPCS
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
## 2019-03-13 NOTE — PROGRESS NOTES
"Patient:  Sophia Stiles            Adult CD Progress Note and Treatment Plan Review     Attendance  Please refer to OP BEH CD Adult Attendance Record Documentation Flowsheet    Support group attended this week: yes    Reporting sobriety:  yes    Treatment Plan     Treatment Plan Review competed on: 3/13/2019    Client preferred learning style: Visual  Hands on  Verbal  Demonstration    Staff Members contributing Rachelle Hdz [facility]; CARLA Lamb; JUNE Damon                Received Supervision: No    Client: contributed to goals and plan.    Client received copy of plan/revised plan: Yes    Client agrees with plan/revised plan: Yes    Changes to Treatment Plan: No    New Goals added since last review None    Goals worked on since last review:  coping skills, relapse prevention, individual therapy and maintaining stabilization     Strategies effective: yes    Strategies need these changes: No    1) Care Coordination Activities:  Patient met with  Lico Banerjee [facility] and jaz ARENAS for [facility] and [facility]   2) Medical, Mental Health and other appointments the client attended: 1:1 with GERRY Enamorado  3) Medication issues: Following medication regime  4) Physical and mental health problems: See dimension 2 & 3  5) Any changes in Vulnerable Adult Status?  No If yes, add to treatment plan and individual abuse prevention plan.  6) Review and evaluation of the individual abuse prevention plan: Current IAPP for this program is adequate for this client      ASAM Risk Rating:    Dimension 1 0 Patient denies any withdrawal symptoms at this time. Patient's rating in this dimension lowered from a \"1\" to a \"0\" at this time.     Dimension 2 0 Patient denies any medical concerns and is able to seek medical services as needed independently.     Dimension 3 2 Patient reported her mood has been up and down this week. She met with staff therapist for individual therapy and began to process her " "relational trauma. Patient reports she feels safe to \"cry\" during group therapy and is practicing vulnerability. Patient denies current suicidal ideation or thoughts of self-harm.     Dimension 4 1 Patient is actively engaged in the treatment process and puts thoughtful effort into her  homework assignments. Patient's risk rating lowered from a \"2\" to a \"1\" in this dimension based on patient's increasing internal motivation for change.     Dimension 5 4 Patient reported strong cravings this week when a peer offered to \"get her anything she wanted\" after treatment. Patient spoke with counselor and practiced PMRT skills to reduce anxiety and cravings. Patient appears to be a high risk for relapse and is working to build tools for sobriety and managing her mental health.       Dimension 6 3 Patient is attending 12-step support groups and building relationships with female peers. Patient is in the process of researching aftercare options and working with case maanger.        Guide to Risk Ratings for Suicidality:   IDEATION: Active thoughts of suicide? INTENT: Intent to follow on suicide? PLAN: Plan to follow through on suicide? Level of Risk:   IF Yes Yes Yes Patient = High Emergent   IF Yes Yes No Patient = High Urgent/Non-Emergent   IF Yes No No Patient = Moderate Non-Urgent   IF No No   No Patient = Low Risk   The patient's ADDITIONAL RISK FACTORS and lack of PROTECTIVE FACTORS may increase their overall suicide risk ratings.     Patient's/client's current risk rating:  Low Risk    Family Involvement:   none schedule this week    Data:   offered feedback good insight client did actively participate      Intervention:   Aftercare planning  Behavior modification  Cognitive Behavioral Therapy  Counselor feedback  Education  Emotional management  Group feedback  Motivational Enhancement Therapy  Relapse prevention  Twelve Step facilitation  Mental health education    Assessment:   Stages of Change " Model  Contemplation  Preparation/Determination    Appears/Sounds:  Cooperative  Motivated  Engaged  Anxious    Plan:  Focus on recovery environment  Monitor emotional/physical health      Albina Cagle, JUNE

## 2019-03-14 ENCOUNTER — HOSPITAL ENCOUNTER (OUTPATIENT)
Dept: BEHAVIORAL HEALTH | Facility: CLINIC | Age: 19
End: 2019-03-14
Attending: FAMILY MEDICINE
Payer: MEDICAID

## 2019-03-14 PROCEDURE — H2035 A/D TX PROGRAM, PER HOUR: HCPCS | Mod: HQ

## 2019-03-14 PROCEDURE — H2035 A/D TX PROGRAM, PER HOUR: HCPCS

## 2019-03-14 PROCEDURE — 10020000 ZZH LODGING PLUS FACILITY CHARGE ADULT

## 2019-03-15 ENCOUNTER — HOSPITAL ENCOUNTER (OUTPATIENT)
Dept: BEHAVIORAL HEALTH | Facility: CLINIC | Age: 19
End: 2019-03-15
Attending: FAMILY MEDICINE
Payer: MEDICAID

## 2019-03-15 PROCEDURE — 10020000 ZZH LODGING PLUS FACILITY CHARGE ADULT

## 2019-03-15 PROCEDURE — H2035 A/D TX PROGRAM, PER HOUR: HCPCS | Mod: HQ

## 2019-03-15 PROCEDURE — H2035 A/D TX PROGRAM, PER HOUR: HCPCS

## 2019-03-16 ENCOUNTER — HOSPITAL ENCOUNTER (OUTPATIENT)
Dept: BEHAVIORAL HEALTH | Facility: CLINIC | Age: 19
End: 2019-03-16
Attending: FAMILY MEDICINE
Payer: MEDICAID

## 2019-03-16 PROCEDURE — H2035 A/D TX PROGRAM, PER HOUR: HCPCS

## 2019-03-16 PROCEDURE — 10020000 ZZH LODGING PLUS FACILITY CHARGE ADULT

## 2019-03-17 ENCOUNTER — HOSPITAL ENCOUNTER (OUTPATIENT)
Dept: BEHAVIORAL HEALTH | Facility: CLINIC | Age: 19
End: 2019-03-17
Attending: FAMILY MEDICINE
Payer: MEDICAID

## 2019-03-17 PROCEDURE — H2035 A/D TX PROGRAM, PER HOUR: HCPCS

## 2019-03-17 PROCEDURE — 10020000 ZZH LODGING PLUS FACILITY CHARGE ADULT

## 2019-03-18 ENCOUNTER — HOSPITAL ENCOUNTER (OUTPATIENT)
Dept: BEHAVIORAL HEALTH | Facility: CLINIC | Age: 19
End: 2019-03-18
Attending: FAMILY MEDICINE
Payer: MEDICAID

## 2019-03-18 PROCEDURE — H2035 A/D TX PROGRAM, PER HOUR: HCPCS

## 2019-03-18 PROCEDURE — H2035 A/D TX PROGRAM, PER HOUR: HCPCS | Mod: HQ

## 2019-03-18 PROCEDURE — 10020000 ZZH LODGING PLUS FACILITY CHARGE ADULT

## 2019-03-19 ENCOUNTER — HOSPITAL ENCOUNTER (OUTPATIENT)
Dept: BEHAVIORAL HEALTH | Facility: CLINIC | Age: 19
End: 2019-03-19
Attending: FAMILY MEDICINE
Payer: MEDICAID

## 2019-03-19 PROCEDURE — 10020000 ZZH LODGING PLUS FACILITY CHARGE ADULT

## 2019-03-19 PROCEDURE — H2035 A/D TX PROGRAM, PER HOUR: HCPCS | Mod: HQ

## 2019-03-19 PROCEDURE — H2035 A/D TX PROGRAM, PER HOUR: HCPCS

## 2019-03-19 NOTE — PROGRESS NOTES
Program  was informed by admissions specialist that the [facility] program at [facility] will not have any openings until mid to late April. Patient had been provided with a list of sober houses affiliated with the [facility] program at her initial meeting with . Patient has been encouraged by both  and staff psychotherapist to actively pursue this as an alternative option.
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
## 2019-03-19 NOTE — PROGRESS NOTES
Spoke with Taya Holt with [facility] regarding patient's funding. Writer to fax copy of patient's med rec for continued coverage review. Requesting medication bridge until funding is in place.     JUNE Beaulieu

## 2019-03-20 ENCOUNTER — HOSPITAL ENCOUNTER (OUTPATIENT)
Dept: BEHAVIORAL HEALTH | Facility: CLINIC | Age: 19
End: 2019-03-20
Attending: FAMILY MEDICINE
Payer: MEDICAID

## 2019-03-20 PROCEDURE — 10020000 ZZH LODGING PLUS FACILITY CHARGE ADULT

## 2019-03-20 PROCEDURE — H2035 A/D TX PROGRAM, PER HOUR: HCPCS | Mod: HQ
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
## 2019-03-20 NOTE — TELEPHONE ENCOUNTER
A phone call was made as follow-up to the Family Program mailing for the week of 04/01.19. Message left or spoke in person to individuals on DAGOBERTO.
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
## 2019-03-21 ENCOUNTER — HOSPITAL ENCOUNTER (OUTPATIENT)
Dept: BEHAVIORAL HEALTH | Facility: CLINIC | Age: 19
End: 2019-03-21
Attending: FAMILY MEDICINE
Payer: MEDICAID

## 2019-03-21 PROCEDURE — 10020000 ZZH LODGING PLUS FACILITY CHARGE ADULT

## 2019-03-21 PROCEDURE — H2035 A/D TX PROGRAM, PER HOUR: HCPCS | Mod: HQ

## 2019-03-21 PROCEDURE — H2035 A/D TX PROGRAM, PER HOUR: HCPCS
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
## 2019-03-21 NOTE — IP AVS SNAPSHOT
"                  MRN:[id number]                      After Visit Summary   3/21/2019    Sophia Stiles    MRN: [id number]           Visit Information        Provider Department      3/21/2019  7:15 AM ADULT [facility]        Your next 10 appointments already scheduled    Mar 22, 2019  7:15 AM CDT  Treatment with ADULT [facility] E  [facility] ([facility]) [address] [place] [id number]  [phone]   Mar 23, 2019  7:15 AM CDT  Treatment with ADULT [facility]GING [facility] E  [facility] ([facility]) [address] [place] [phone]  [phone]   Mar 24, 2019  7:15 AM CDT  Treatment with ADULT [facility]GING PL[facility] E  [facility] ([facility]) [address] [place] [phone]  [phone]   Mar 25, 2019  7:15 AM CDT  Treatment with ADULT [facility]ING PL[facility] E  [facility] ([facility]) [address] [place] [phone]  [phone]   Mar 26, 2019  7:15 AM CDT  Treatment with ADULT [facility]GING PL[facility] E  [facility] ([facility]) [address] [place] [phone]  [phone]      MyChart Information    MyChart lets you send messages to your doctor, view your test results, renew your prescriptions, schedule appointments and more. To sign up, go to [email or web address] . Click on \"Log in\" on the left side of the screen, which will take you to the Welcome page. Then click on \"Sign up Now\" on the right side of the page.     You will be asked to enter the access code listed below, as well as some personal information. Please follow the directions to create your username and password.     Your access code is: " [email or web address]  Expires: 2019 11:12 AM     Your access code will  in 60 days. If you need help or a new code, please call your [place] [facility] clinic or [phone].       Care EveryWhere ID    This is your Care EveryWhere ID. This could be used by other organizations to access your [place] [facility] medical records  [id number]       Equal Access to Services    TOLU MOSES : Hadii colten hernandez Soadilson, waaxda lujoanneadaha, qaybta kaalmada adethong, sonya recinos. So [facility] [phone].    ATENCIÓN: Si habla español, tiene a wylie disposición servicios gratuitos de asistencia lingüística. Llame al [phone].    We comply with applicable federal civil rights laws and [place] laws. We do not discriminate on the basis of race, color, national origin, age, disability, sex, sexual orientation, or gender identity.

## 2019-03-21 NOTE — PROGRESS NOTES
A couple of peers from group approached this counselor regarding concern about pt. They shared they are concerned about pt that she is not in a good place.   Spoke to pt after group, she reported peers are telling her to contact her parents for assistance and she is telling them they will not help. Pt seems frustrated peers are not listening to her.   Pt was given information yesterday about financial assistance for those under the age of 21. Pt shared she is waiting for a call back to see if she qualifies for assistance from [employer].

## 2019-03-21 NOTE — IP AVS SNAPSHOT
Medication List       Patient:  ZHANE DUMONT   :  2000   Physician:  Pham Mena MD           This is your record.  Keep this with you and show to your community pharmacist(s) and physician(s) at each visit.     Allergies:  No Known Allergies          Medications  Valid as of: 2019 - 12:12 PM    Generic Name Brand Name Tablet Size Instructions for use    busPIRone HCl BUSPAR 5 MG Take 1 tablet (5 mg) by mouth 3 times daily        Folic Acid FOLVITE 1 MG Take 1 tablet (1 mg) by mouth daily        Mirtazapine REMERON 15 MG Take 1 tablet (15 mg) by mouth At Bedtime        Multiple Vitamins-Minerals THERA-VIT-M  Take 1 tablet by mouth daily        Naltrexone HCl DEPADE/REVIA 50 MG Take 1 tablet (50 mg) by mouth daily        Nicotine Polacrilex NICORETTE 4 MG Place 1-2 each (4-8 mg) inside cheek every hour as needed for other (nicotine withdrawal symptoms)        .           .           .           .

## 2019-03-22 ENCOUNTER — HOSPITAL ENCOUNTER (OUTPATIENT)
Dept: BEHAVIORAL HEALTH | Facility: CLINIC | Age: 19
End: 2019-03-22
Attending: FAMILY MEDICINE
Payer: MEDICAID

## 2019-03-22 PROCEDURE — 10020000 ZZH LODGING PLUS FACILITY CHARGE ADULT

## 2019-03-22 PROCEDURE — H2035 A/D TX PROGRAM, PER HOUR: HCPCS | Mod: HQ

## 2019-03-22 PROCEDURE — H2035 A/D TX PROGRAM, PER HOUR: HCPCS

## 2019-03-22 NOTE — PROGRESS NOTES
Patient reports that she has secured funds to cover the deposit for [facility] and is scheduled to move in on 3/26. Patient will transition into the [facility] outpatient program in [place]. Her intake appointment is set for 9:00am on Wednesday 3/27.

## 2019-03-23 ENCOUNTER — HOSPITAL ENCOUNTER (OUTPATIENT)
Dept: BEHAVIORAL HEALTH | Facility: CLINIC | Age: 19
End: 2019-03-23
Attending: FAMILY MEDICINE
Payer: MEDICAID

## 2019-03-23 PROCEDURE — H2035 A/D TX PROGRAM, PER HOUR: HCPCS | Mod: HQ

## 2019-03-23 PROCEDURE — 10020000 ZZH LODGING PLUS FACILITY CHARGE ADULT

## 2019-03-24 ENCOUNTER — HOSPITAL ENCOUNTER (OUTPATIENT)
Dept: BEHAVIORAL HEALTH | Facility: CLINIC | Age: 19
End: 2019-03-24
Attending: FAMILY MEDICINE
Payer: MEDICAID

## 2019-03-24 PROCEDURE — H2035 A/D TX PROGRAM, PER HOUR: HCPCS | Mod: HQ

## 2019-03-24 PROCEDURE — 10020000 ZZH LODGING PLUS FACILITY CHARGE ADULT

## 2019-03-25 ENCOUNTER — HOSPITAL ENCOUNTER (OUTPATIENT)
Dept: BEHAVIORAL HEALTH | Facility: CLINIC | Age: 19
End: 2019-03-25
Attending: FAMILY MEDICINE
Payer: MEDICAID

## 2019-03-25 PROCEDURE — H2035 A/D TX PROGRAM, PER HOUR: HCPCS | Mod: HQ

## 2019-03-25 PROCEDURE — 10020000 ZZH LODGING PLUS FACILITY CHARGE ADULT

## 2019-03-26 NOTE — TELEPHONE ENCOUNTER
Client was discharged from  today.  She came into [facility] with a cart full of belongings.  When she came into the office a brown paper bag that she had on the cart ripped and cranberry and orange juice single serving containers fell on the floor.  There were so many I had to give her a large trash bag to put them in.  There were at least 20-30 containers.
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
## 2019-11-05 ENCOUNTER — TRANSFERRED RECORDS (OUTPATIENT)
Dept: HEALTH INFORMATION MANAGEMENT | Facility: CLINIC | Age: 19
End: 2019-11-05

## 2019-12-19 ENCOUNTER — HOSPITAL ENCOUNTER (OUTPATIENT)
Dept: BEHAVIORAL HEALTH | Facility: CLINIC | Age: 19
End: 2019-12-19
Attending: FAMILY MEDICINE
Payer: COMMERCIAL

## 2019-12-19 VITALS — SYSTOLIC BLOOD PRESSURE: 154 MMHG | DIASTOLIC BLOOD PRESSURE: 101 MMHG | HEART RATE: 106 BPM

## 2019-12-19 PROCEDURE — 40000007 ZZH STATISTIC ADULT CD FACE TO FACE-NO CHRG

## 2019-12-19 PROCEDURE — 10020000 ZZH LODGING PLUS FACILITY CHARGE ADULT

## 2019-12-19 RX ORDER — IBUPROFEN 200 MG
200-400 TABLET ORAL EVERY 6 HOURS PRN
COMMUNITY
End: 2020-01-11

## 2019-12-19 RX ORDER — AMOXICILLIN 250 MG
2 CAPSULE ORAL DAILY PRN
COMMUNITY
End: 2020-01-07

## 2019-12-19 RX ORDER — MAGNESIUM HYDROXIDE/ALUMINUM HYDROXICE/SIMETHICONE 120; 1200; 1200 MG/30ML; MG/30ML; MG/30ML
30 SUSPENSION ORAL EVERY 6 HOURS PRN
COMMUNITY
End: 2020-01-07

## 2019-12-19 RX ORDER — LANOLIN ALCOHOL/MO/W.PET/CERES
3 CREAM (GRAM) TOPICAL
COMMUNITY
End: 2020-01-11

## 2019-12-19 RX ORDER — ACETAMINOPHEN 325 MG/1
325-650 TABLET ORAL EVERY 4 HOURS PRN
COMMUNITY
End: 2020-01-11

## 2019-12-19 RX ORDER — LORATADINE 10 MG/1
10 TABLET ORAL DAILY PRN
COMMUNITY
End: 2020-01-11

## 2019-12-19 ASSESSMENT — ANXIETY QUESTIONNAIRES
5. BEING SO RESTLESS THAT IT IS HARD TO SIT STILL: MORE THAN HALF THE DAYS
1. FEELING NERVOUS, ANXIOUS, OR ON EDGE: MORE THAN HALF THE DAYS
6. BECOMING EASILY ANNOYED OR IRRITABLE: MORE THAN HALF THE DAYS
3. WORRYING TOO MUCH ABOUT DIFFERENT THINGS: MORE THAN HALF THE DAYS
7. FEELING AFRAID AS IF SOMETHING AWFUL MIGHT HAPPEN: NOT AT ALL
2. NOT BEING ABLE TO STOP OR CONTROL WORRYING: MORE THAN HALF THE DAYS
4. TROUBLE RELAXING: NEARLY EVERY DAY
GAD7 TOTAL SCORE: 13
IF YOU CHECKED OFF ANY PROBLEMS ON THIS QUESTIONNAIRE, HOW DIFFICULT HAVE THESE PROBLEMS MADE IT FOR YOU TO DO YOUR WORK, TAKE CARE OF THINGS AT HOME, OR GET ALONG WITH OTHER PEOPLE: SOMEWHAT DIFFICULT

## 2019-12-19 ASSESSMENT — PATIENT HEALTH QUESTIONNAIRE - PHQ9: SUM OF ALL RESPONSES TO PHQ QUESTIONS 1-9: 19

## 2019-12-19 ASSESSMENT — PAIN SCALES - GENERAL: PAINLEVEL: NO PAIN (0)

## 2019-12-19 NOTE — PROGRESS NOTES
Initial Services Plan        Service Initiation Date: 12/19/2019    Immediate health and/or safety concerns: No    Identify health and safety concern(s) below and include plan to address:    None Identified    Treatment suggestions for client during the time between intake (admit date) and completion of the individual treatment plan:     Look for a sober support network, i.e. 12 step, [facility] Recovery, [facility], etc  Tour the treatment center or outpatient clinic  Introduce yourself to your treatment group. Spend time getting to know your peers  Review your patient or client handbook  Begin working on your treatment goal list    Completed by: JUNE Luna  Date completed: 12/19/2019 at 2:05 PM
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
## 2019-12-19 NOTE — PROGRESS NOTES
Progress Note    This patient had a Rule 25 Assessment on 11/5/2019 completed by Andie MAGALLON.  This patient was seen for a face to face update of the Rule 25 Assessment on 12/19/2019 by JUNE Luna.  OUTSIDE: A paper copy of the Rule 25 Assessment will be placed in the patient's paper chart until being scanned into the patient's electronic medical record in Epic under the Media tab.    Alcohol/Drug use since the last CD evaluation (include date of last use):   Heroin: Daily, snort, 1/2 gram  ADARSH: 12/18/2019 sort, 1/2 gram  Crack: Daily [id number]/1/2019, snort, 3 lines  ADARSH: 12/1/2019 snort 3 lines  THC: Daily, smoked, 1 blunt  ADARSH: 12/18/2019 3 bowls       Please note any other clinical changes since the last CD evaluation (such as medication changes, additional legal charges, detoxification admissions, overdoses, etc.)     No significant changes since the last CD evaluation       ASAM Dimensions Original scores Current Scores   I.) Intoxication and Withdrawal: 2 2   II.) Biomedical:  1 1   III.) Emotional and Behavioral:  2 2   IV.) Readiness to Change:  1 1   V.) Relapse Potential: 4 4   VI.) Recovery Environmental: 4 4     Please list clinical justifications for the above ASAM score changes since the original comprehensive assessment:     None of the ASAM scores on the six dimensions had changed since the Rule 25 Assessment was completed on 11/5/2019.       Current RHEA: Current UA:     0.000     Positive for Cocaine and THC and negative for all other screened drugs.       PHQ-9, YELITZA-7   PHQ-9 on 12/19/2019 YELITZA-7 on 12/19/2019   The patient's PHQ-9 score was 19 out of 27, indicating moderately severe depression.   The patient's YELITZA-7 score was 13 out of 21, indicating moderate anxiety.       Ingham-Suicide Severity Rating Scale Reassessment   Have you ever wished you were dead or that you could go to sleep and not wake up?  Past Month:  Yes     Have you actually had any thoughts of killing yourself?   Past Month:  Yes     Have you been thinking about how you might do this?     Past Month:  No   Lifetime:  Yes, Describe: overdose   Have you had these thoughts and had some intention of acting on them?     Past Month: No   Lifetime:  Yes, Describe: attempted at age 14 and again 1 year ago   Have you started to work out the details of how to kill yourself?   Past Month:  No   Lifetime:  Yes, Describe: overdose   Do you intend to carry out this plan?   No     When you have the thoughts how long do they last?  The patient denied having any suicidal thoughts within the past month.     Are there things - anyone or anything (i.e. family, Judaism, pain of death) that stopped you from wanting to die or acting on thoughts of suicide?  Protective factors definitely did not stop you       2008  [employer]  Used with permission by Katarzyna Oliveros, PhD.       Guide to C-SSRS Risk Ratings   NO IDEATION:  with no active thoughts IDEATION: with a wish to die. IDEATION: with active thoughts. Risk Ratings   If Yes No No 0 - Very Low Risk   If NA Yes No 1 - Low Risk   If NA Yes Yes 2 - Low/moderate risk   IDEATION: associated thoughts of methods without intent or plan INTENT: Intent to follow through on suicide PLAN: Plan to follow through on suicide Risk Ratings cont...   If Yes No No 3 - Moderate Risk   If Yes Yes No 4 - High Risk   If Yes Yes Yes 5 - High Risk   The patient's ADDITIONAL RISK FACTORS and lack of PROTECTIVE FACTORS may increase their overall suicide risk ratings.     Additional Risk Factors:    Significant history of having untreated or poorly treated mental health symptoms     A recent death of someone close to the patient and/or unresolved grief and loss issues     A recent loss that was significant to the patient, i.e. loss of job, loss of home, divorce, break-up, etc.     Significant history of trauma and/or abuse issues   Protective Factors:    Having people in his/her life  "that would prevent the patient from considering a suicide attempt (i.e. young children, spouse, parents, etc.)     Having easy access to supportive family members     Having a good community support network     Risk Status   0. - Very Low Risk:  Evaluation Counselors:  Document in Epic / SBAR to counselor \"Very Low Risk\".      Treatment Counselors:  Reassess upon admission as applicable, assess weekly in progress notes under Dimension 3 and summarize in Discharge / Treatment summary under Dimension 3.     Additional information to support suicide risk rating: There was no additional information to provide at this time.     "
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
## 2019-12-19 NOTE — PROGRESS NOTES
Name: Sophia Stiles  Date: 12/19/2019  Medical Record: [id number]    Envelope Number: [id number]    List of Contents (List each item separately in new row):   1 tube ORASOL (oral anesthetic gel)    Admission:  I am responsible for any personal items that are not sent to the safe or pharmacy.  [facility] is not responsible for loss, theft or damage of any property in my possession.      Patient Signature:  ___________________________________________       Date/Time:__________________________    Staff Signature: __________________________________       Date/Time:__________________________    2nd Staff person, if patient is unable/unwilling to sign:      __________________________________________________________       Date/Time: __________________________      Discharge:  [facility] has returned all of my personal belongings:    Patient Signature: ________________________________________     Date/Time: ____________________________________    Staff Signature: ______________________________________     Date/Time:_____________________________________
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
## 2019-12-20 ENCOUNTER — HOSPITAL ENCOUNTER (OUTPATIENT)
Dept: BEHAVIORAL HEALTH | Facility: CLINIC | Age: 19
End: 2019-12-20
Attending: FAMILY MEDICINE
Payer: COMMERCIAL

## 2019-12-20 PROCEDURE — 10020000 ZZH LODGING PLUS FACILITY CHARGE ADULT

## 2019-12-20 PROCEDURE — H2035 A/D TX PROGRAM, PER HOUR: HCPCS | Mod: HQ

## 2019-12-20 ASSESSMENT — ANXIETY QUESTIONNAIRES: GAD7 TOTAL SCORE: 13

## 2019-12-20 NOTE — PROGRESS NOTES
"Patient:  Sophia Stiles    Date: December 20, 2019    Comprehensive Assessment UPDATE       Comprehensive Summary Update and Review  Counselor met with patient on 12/20/19 and reviewed the Comprehensive Assessment.    Patient risk rating in dimension lowered to a \"1\" based on patient's report of mild withdrawal symptoms and ability to cope.     Rachelle Hdz [facility]  "
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
## 2019-12-21 ENCOUNTER — HOSPITAL ENCOUNTER (OUTPATIENT)
Dept: BEHAVIORAL HEALTH | Facility: CLINIC | Age: 19
End: 2019-12-21
Attending: FAMILY MEDICINE
Payer: COMMERCIAL

## 2019-12-21 PROCEDURE — 10020000 ZZH LODGING PLUS FACILITY CHARGE ADULT

## 2019-12-21 PROCEDURE — H2035 A/D TX PROGRAM, PER HOUR: HCPCS

## 2019-12-21 NOTE — PROGRESS NOTES
D. Patient met with counselor on this date due to patient being upset after a phone conversation she had with her significant other during break over lunch. Patient verbalized her shame and regret over her last relapse and being sick of using.   I. This writer validated patient feelings and discussed coping strategies.   A.  Patient verbalized understanding the importance of continuing to focus on herself and her recovery and to utilize appropriate boundaries when necessary.   P. Patient appeared to have calmed down and was able to return and participate in the afternoon group lecture.
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
## 2019-12-22 ENCOUNTER — HOSPITAL ENCOUNTER (OUTPATIENT)
Dept: BEHAVIORAL HEALTH | Facility: CLINIC | Age: 19
End: 2019-12-22
Attending: FAMILY MEDICINE
Payer: COMMERCIAL

## 2019-12-22 PROCEDURE — H2035 A/D TX PROGRAM, PER HOUR: HCPCS

## 2019-12-22 PROCEDURE — 10020000 ZZH LODGING PLUS FACILITY CHARGE ADULT

## 2019-12-23 ENCOUNTER — HOSPITAL ENCOUNTER (OUTPATIENT)
Dept: BEHAVIORAL HEALTH | Facility: CLINIC | Age: 19
End: 2019-12-23
Attending: FAMILY MEDICINE
Payer: COMMERCIAL

## 2019-12-23 PROCEDURE — H2035 A/D TX PROGRAM, PER HOUR: HCPCS

## 2019-12-23 PROCEDURE — H2035 A/D TX PROGRAM, PER HOUR: HCPCS | Mod: HQ

## 2019-12-23 PROCEDURE — 10020000 ZZH LODGING PLUS FACILITY CHARGE ADULT

## 2019-12-24 ENCOUNTER — HOSPITAL ENCOUNTER (OUTPATIENT)
Dept: BEHAVIORAL HEALTH | Facility: CLINIC | Age: 19
End: 2019-12-24
Attending: FAMILY MEDICINE
Payer: COMMERCIAL

## 2019-12-24 PROCEDURE — 10020000 ZZH LODGING PLUS FACILITY CHARGE ADULT

## 2019-12-24 PROCEDURE — H2035 A/D TX PROGRAM, PER HOUR: HCPCS | Mod: HQ

## 2019-12-25 ENCOUNTER — HOSPITAL ENCOUNTER (OUTPATIENT)
Dept: BEHAVIORAL HEALTH | Facility: CLINIC | Age: 19
End: 2019-12-25
Attending: FAMILY MEDICINE
Payer: COMMERCIAL

## 2019-12-25 PROCEDURE — 10020000 ZZH LODGING PLUS FACILITY CHARGE ADULT

## 2019-12-25 PROCEDURE — H2035 A/D TX PROGRAM, PER HOUR: HCPCS | Mod: HQ

## 2019-12-26 ENCOUNTER — HOSPITAL ENCOUNTER (OUTPATIENT)
Dept: BEHAVIORAL HEALTH | Facility: CLINIC | Age: 19
End: 2019-12-26
Attending: FAMILY MEDICINE
Payer: COMMERCIAL

## 2019-12-26 PROCEDURE — H2035 A/D TX PROGRAM, PER HOUR: HCPCS

## 2019-12-26 PROCEDURE — 10020000 ZZH LODGING PLUS FACILITY CHARGE ADULT

## 2019-12-26 PROCEDURE — H2035 A/D TX PROGRAM, PER HOUR: HCPCS | Mod: HQ

## 2019-12-26 NOTE — ADDENDUM NOTE
Encounter addended by: Keisha Grimm on: 12/26/2019 9:31 AM   Actions taken: Charge Capture section accepted
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
## 2019-12-27 ENCOUNTER — HOSPITAL ENCOUNTER (OUTPATIENT)
Dept: BEHAVIORAL HEALTH | Facility: CLINIC | Age: 19
End: 2019-12-27
Attending: FAMILY MEDICINE
Payer: COMMERCIAL

## 2019-12-27 PROCEDURE — H2035 A/D TX PROGRAM, PER HOUR: HCPCS | Mod: HQ

## 2019-12-27 PROCEDURE — 10020000 ZZH LODGING PLUS FACILITY CHARGE ADULT

## 2019-12-27 NOTE — PROGRESS NOTES
Patient met with program  to review and initiate aftercare placement opportunities. She is currently homeless, unemployed and lacks financial resources.Patient presents as a high risk for relapse with limited independent sober living skills. Patient acknowledges a need to enter a program offering outpatient services with an added housing/residential component. Required documentation has been forwarded to both [facility] and [facility] programs for placement consideration.

## 2019-12-27 NOTE — ADDENDUM NOTE
Encounter addended by: Rachelle Hdz LADC on: 12/27/2019 11:03 AM   Actions taken: Clinical Note Signed

## 2019-12-27 NOTE — PROGRESS NOTES
Patient:  Sophia Stiles            Adult CD Progress Note and Treatment Plan Review     Attendance  Please refer to OP BEH CD Adult Attendance Record Documentation Flowsheet    Support group attended this week: yes    Reporting sobriety:  yes    Treatment Plan     Treatment Plan Review competed on: 12/27/19       Client preferred learning style: Visual  Hands on  Demonstration    Staff Members contributing JUNE Beaulieu; JUNE Lamb                      Received Supervision: None    Client: contributed to goals and plan.    Client received copy of plan/revised plan: Yes    Client agrees with plan/revised plan: Yes    Changes to Treatment Plan: No    New Goals added since last review No additional goals added at this time    Goals worked on since last review Book of Me assignment, relapse prevention, coping skills, and maintaining stabilization.     Strategies effective: yes    Strategies need these changes: No changes needed at this time    1) Care Coordination Activities:  None this week    2) Medical, Mental Health and other appointments the client attended: None this week  3) Medication issues: Following medication regime  4) Physical and mental health problems: No concerns  5) Any changes in Vulnerable Adult Status?  No If yes, add to treatment plan and individual abuse prevention plan.  6) Review and evaluation of the individual abuse prevention plan: Current IAPP for this program is adequate for this client    ASAM Risk Rating:  Dimension 1, 1: Patient reports her last date of use as 12/18/19. Patient denies any withdrawal symptoms that would interfere with full participation in treatment programming at this time. Patient will continue to be monitored throughout treatment.   Dimension 2, 1: Patient denies any biomedical concerns that would interfere with full participation in treatment programming at this time.  Patient appears able to access medical aid as needed.  Patient will continue to be  "monitored throughout treatment.   Dimension 3, 2: Patient reports a mental health diagnosis of ADD, depression, and anxiety. Upon admissions patient was given a suicidal risk screening. Patient was rated as \"very low-risk\". Patient denies any suicidal thoughts or ideations at this time. Patient will continue to be monitored throughout treatment.   Dimension 4, 1: Upon admission patient verbally reports being motivated for treatment and sobriety. Patient appears to be in the contemplation, moving towards preparation stage of change.   Dimension 5, 4: Upon admission patient reports having attended two treatment facilities prior to entering into [facility].  Patient reports her longest period of sobriety as 90 days.   Dimension 6, 4: Upon admission patient reports she is homeless. Upon admission patient reports being unemployed. Patient denies any legal concerns at this time.     Guide to Risk Ratings for Suicidality:   IDEATION: Active thoughts of suicide? INTENT: Intent to follow on suicide? PLAN: Plan to follow through on suicide? Level of Risk:   IF Yes Yes Yes Patient = High Emergent   IF Yes Yes No Patient = High Urgent/Non-Emergent   IF Yes No No Patient = Moderate Non-Urgent   IF No No   No Patient = Low Risk   The patient's ADDITIONAL RISK FACTORS and lack of PROTECTIVE FACTORS may increase their overall suicide risk ratings.     Patient's/client's current risk rating:  Low Risk    Family Involvement:   DAGOBERTO signed    Data:   offered feedback good insight client did participate    Intervention:   Behavior modification  Cognitive Behavioral Therapy  Counselor feedback  Education  Emotional management  Group feedback  Motivational Enhancement Therapy  Relapse prevention  Mental health education    Assessment:   Stages of Change Model  Contemplation  Preparation/Determination    Appears/Sounds:  Cooperative  Engaged  Depressed  Anxious    Plan:  Focus on recovery environment  Monitor " emotional/physical health  Continue working through treatment plan goals.     Rachelle Hdz, JUNE

## 2019-12-28 ENCOUNTER — HOSPITAL ENCOUNTER (OUTPATIENT)
Dept: BEHAVIORAL HEALTH | Facility: CLINIC | Age: 19
End: 2019-12-28
Attending: FAMILY MEDICINE
Payer: COMMERCIAL

## 2019-12-28 PROCEDURE — H2035 A/D TX PROGRAM, PER HOUR: HCPCS

## 2019-12-28 PROCEDURE — 10020000 ZZH LODGING PLUS FACILITY CHARGE ADULT

## 2019-12-28 PROCEDURE — H2035 A/D TX PROGRAM, PER HOUR: HCPCS | Mod: HQ

## 2019-12-29 ENCOUNTER — HOSPITAL ENCOUNTER (OUTPATIENT)
Dept: BEHAVIORAL HEALTH | Facility: CLINIC | Age: 19
End: 2019-12-29
Attending: FAMILY MEDICINE
Payer: COMMERCIAL

## 2019-12-29 PROCEDURE — 10020000 ZZH LODGING PLUS FACILITY CHARGE ADULT

## 2019-12-29 PROCEDURE — H2035 A/D TX PROGRAM, PER HOUR: HCPCS | Mod: HQ

## 2019-12-30 ENCOUNTER — HOSPITAL ENCOUNTER (OUTPATIENT)
Dept: BEHAVIORAL HEALTH | Facility: CLINIC | Age: 19
End: 2019-12-30
Attending: FAMILY MEDICINE
Payer: COMMERCIAL

## 2019-12-30 PROCEDURE — H2035 A/D TX PROGRAM, PER HOUR: HCPCS | Mod: HQ

## 2019-12-30 PROCEDURE — H2035 A/D TX PROGRAM, PER HOUR: HCPCS

## 2019-12-30 PROCEDURE — 10020000 ZZH LODGING PLUS FACILITY CHARGE ADULT

## 2019-12-31 ENCOUNTER — OFFICE VISIT (OUTPATIENT)
Dept: ORTHOPEDICS | Facility: CLINIC | Age: 19
End: 2019-12-31
Payer: COMMERCIAL

## 2019-12-31 ENCOUNTER — HOSPITAL ENCOUNTER (OUTPATIENT)
Dept: BEHAVIORAL HEALTH | Facility: CLINIC | Age: 19
End: 2019-12-31
Attending: FAMILY MEDICINE
Payer: COMMERCIAL

## 2019-12-31 ENCOUNTER — ANCILLARY PROCEDURE (OUTPATIENT)
Dept: GENERAL RADIOLOGY | Facility: CLINIC | Age: 19
End: 2019-12-31
Attending: FAMILY MEDICINE
Payer: COMMERCIAL

## 2019-12-31 DIAGNOSIS — S52.552A OTHER CLOSED EXTRA-ARTICULAR FRACTURE OF DISTAL END OF LEFT RADIUS, INITIAL ENCOUNTER: ICD-10-CM

## 2019-12-31 DIAGNOSIS — S52.552A OTHER CLOSED EXTRA-ARTICULAR FRACTURE OF DISTAL END OF LEFT RADIUS, INITIAL ENCOUNTER: Primary | ICD-10-CM

## 2019-12-31 LAB — B-HCG SERPL-ACNC: <1 IU/L (ref 0–5)

## 2019-12-31 PROCEDURE — 10020000 ZZH LODGING PLUS FACILITY CHARGE ADULT

## 2019-12-31 PROCEDURE — H2035 A/D TX PROGRAM, PER HOUR: HCPCS

## 2019-12-31 PROCEDURE — H2035 A/D TX PROGRAM, PER HOUR: HCPCS | Mod: HQ
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
## 2019-12-31 NOTE — PROGRESS NOTES
Patient:  Sophia Stiles            Adult CD Progress Note and Treatment Plan Review     Attendance  Please refer to OP BEH CD Adult Attendance Record Documentation Flowsheet    Support group attended this week: yes    Reporting sobriety:  yes    Treatment Plan     Treatment Plan Review competed on: 12/31/19    Client preferred learning style: Visual  Hands on  Verbal  Demonstration    Staff Members contributing JUNE Beaulieu;  JUNE Lamb                        Received Supervision: None    Client: contributed to goals and plan.    Client received copy of plan/revised plan: Yes    Client agrees with plan/revised plan: Yes    Changes to Treatment Plan: No    New Goals added since last review No additional goals added at this time    Goals worked on since last review: Life story assignment, assertive communication, boundary setting, coping skills, and maintaining stabilization.    Strategies effective: yes    Strategies need these changes: No changes needed at this time    1) Care Coordination Activities:  Met with  Phil Banerjee to discuss aftercare planning  2) Medical, Mental Health and other appointments the client attended: None this week  3) Medication issues: Following medication regime  4) Physical and mental health problems: No concerns  5) Any changes in Vulnerable Adult Status?  No If yes, add to treatment plan and individual abuse prevention plan.  6) Review and evaluation of the individual abuse prevention plan: Current IAPP for this program is adequate for this client.    ASAM Risk Rating:    Dimension 1, 1: Patient reports mild symptoms of withdrawal. Patient is able to tolerate symptoms of post-acute withdrawal and participate fully in treatment.     Dimension 2, 1: Patient denies any biomedical concerns that would interfere with full participation in treatment programming at this time. Patient received a new cast for fracture in left arm.     Dimension 3, 2: Patient  "reports her mood is improving and feels \"genuinely happy.\" Patient reports symptoms of anxiety, likely related to her PTSD diagnosis. Patient denies symptoms of suicidal ideation or thoughts of self-harm at this time.     Dimension 4, 1: Upon admission patient verbally reports being motivated for long-term recovery. Patient appears to be in the preparation stage of change.     Dimension 5, 4: Patient reports her cravings a \"3\" on a scale of 1-10(high). Patient reports she is utilizing coping skills and seeking external resources.     Dimension 6, 4: Patient is attending 12-step meetings and building relationships with female peers. Patient is working on identifying a sponsor to work with in the community.     Guide to Risk Ratings for Suicidality:   IDEATION: Active thoughts of suicide? INTENT: Intent to follow on suicide? PLAN: Plan to follow through on suicide? Level of Risk:   IF Yes Yes Yes Patient = High Emergent   IF Yes Yes No Patient = High Urgent/Non-Emergent   IF Yes No No Patient = Moderate Non-Urgent   IF No No   No Patient = Low Risk   The patient's ADDITIONAL RISK FACTORS and lack of PROTECTIVE FACTORS may increase their overall suicide risk ratings.     Patient's/client's current risk rating:  Low Risk    Family Involvement:   DAGOBERTO signed    Data:   offered feedback good insight client did actively participate    Intervention:   Aftercare planning  Behavior modification  Cognitive Behavioral Therapy  Counselor feedback  Education  Emotional management  Group feedback  Motivational Enhancement Therapy  Relapse prevention  Twelve Step facilitation  Mental health education    Assessment:   Stages of Change Model  Preparation/Determination    Appears/Sounds:  Cooperative  Motivated  Engaged  Anxious    Plan:  Focus on recovery environment  Monitor emotional/physical health  Continue working through treatment plan goals.       JUNE Beaulieu    "

## 2019-12-31 NOTE — LETTER
[facility] SPORTS AND ORTHOPAEDIC WALK IN CLINIC  [address]  [address]  [facility] [address]  [phone]          December 31, 2019    RE:  Sophia Stiles                                                                                                                                                       [address] [place] [id number]            To whom it may concern:    Sophia Stiles is under my professional care for displaced distal radius fracture suffered about 7 weeks ago.  She was seen and examined today by me as this is my first encounter with her.  She has evidence of healing distal radius fracture.  I have recommended a brace that she should wear for daily activities over the next 3 weeks.  I will also like her to take the brace off daily for range of motion exercises.  She may return our orthopedic clinic if she continues to have pain.    Sincerely,        Yazan Torres, DO
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
## 2020-01-01 ENCOUNTER — HOSPITAL ENCOUNTER (OUTPATIENT)
Dept: BEHAVIORAL HEALTH | Facility: CLINIC | Age: 20
End: 2020-01-01
Attending: FAMILY MEDICINE
Payer: MEDICAID

## 2020-01-01 PROCEDURE — 10020000 ZZH LODGING PLUS FACILITY CHARGE ADULT

## 2020-01-01 PROCEDURE — H2035 A/D TX PROGRAM, PER HOUR: HCPCS | Mod: HQ

## 2020-01-02 ENCOUNTER — HOSPITAL ENCOUNTER (OUTPATIENT)
Dept: BEHAVIORAL HEALTH | Facility: CLINIC | Age: 20
End: 2020-01-02
Attending: FAMILY MEDICINE
Payer: MEDICAID

## 2020-01-02 PROCEDURE — H2035 A/D TX PROGRAM, PER HOUR: HCPCS | Mod: HQ

## 2020-01-02 PROCEDURE — 10020000 ZZH LODGING PLUS FACILITY CHARGE ADULT

## 2020-01-02 PROCEDURE — H2035 A/D TX PROGRAM, PER HOUR: HCPCS

## 2020-01-02 NOTE — ADDENDUM NOTE
Encounter addended by: Rachelle Hdz LADC on: 1/2/2020 1:29 PM   Actions taken: Charge Capture section accepted

## 2020-01-02 NOTE — PROGRESS NOTES
Patient has been approved for admission into the Transitions program. However, the are no current openings and she will be placed on their waiting list.  at [facility] will be returning to the office on 1/6. Alternative options will be discussed with patient.

## 2020-01-03 ENCOUNTER — HOSPITAL ENCOUNTER (OUTPATIENT)
Dept: BEHAVIORAL HEALTH | Facility: CLINIC | Age: 20
End: 2020-01-03
Attending: FAMILY MEDICINE
Payer: MEDICAID

## 2020-01-03 PROCEDURE — H2035 A/D TX PROGRAM, PER HOUR: HCPCS | Mod: HQ

## 2020-01-03 PROCEDURE — 10020000 ZZH LODGING PLUS FACILITY CHARGE ADULT

## 2020-01-04 ENCOUNTER — HOSPITAL ENCOUNTER (OUTPATIENT)
Dept: BEHAVIORAL HEALTH | Facility: CLINIC | Age: 20
End: 2020-01-04
Attending: FAMILY MEDICINE
Payer: MEDICAID

## 2020-01-04 PROCEDURE — H2035 A/D TX PROGRAM, PER HOUR: HCPCS | Mod: HQ

## 2020-01-04 PROCEDURE — 10020000 ZZH LODGING PLUS FACILITY CHARGE ADULT

## 2020-01-05 ENCOUNTER — HOSPITAL ENCOUNTER (OUTPATIENT)
Dept: BEHAVIORAL HEALTH | Facility: CLINIC | Age: 20
End: 2020-01-05
Attending: FAMILY MEDICINE
Payer: MEDICAID

## 2020-01-05 PROCEDURE — H2035 A/D TX PROGRAM, PER HOUR: HCPCS | Mod: HQ

## 2020-01-05 PROCEDURE — 10020000 ZZH LODGING PLUS FACILITY CHARGE ADULT

## 2020-01-06 ENCOUNTER — HOSPITAL ENCOUNTER (OUTPATIENT)
Dept: BEHAVIORAL HEALTH | Facility: CLINIC | Age: 20
End: 2020-01-06
Attending: FAMILY MEDICINE
Payer: MEDICAID

## 2020-01-06 PROCEDURE — H2035 A/D TX PROGRAM, PER HOUR: HCPCS | Mod: HQ

## 2020-01-06 PROCEDURE — H2035 A/D TX PROGRAM, PER HOUR: HCPCS

## 2020-01-06 PROCEDURE — 10020000 ZZH LODGING PLUS FACILITY CHARGE ADULT

## 2020-01-06 NOTE — PROGRESS NOTES
Patient:  Sophia Stiles            Adult CD Progress Note and Treatment Plan Review     Attendance  Please refer to OP BEH CD Adult Attendance Record Documentation Flowsheet    Support group attended this week: yes    Reporting sobriety:  yes    Treatment Plan     Treatment Plan Review competed on: 1/6/20    Client preferred learning style: Visual  Hands on  Verbal  Demonstration    Staff Members contributing JUNE Beaulieu;  JUNE Lamb, JUNE Hickey                       Received Supervision: None    Client: contributed to goals and plan.    Client received copy of plan/revised plan: Yes    Client agrees with plan/revised plan: Yes    Changes to Treatment Plan: No    New Goals added since last review:                                                                                                          Goals worked on since last review:   Motivation, Change, Relationships, Relapse Prevention, Narrative Therapy, DBT. Drama, Women and Relationships     Strategies effective: yes    Strategies need these changes: None    1) Care Coordination Activities: Patient will work with counselors and  to address and such issues.  2) Medical, Mental Health and other appointments the client attended: None this week  3) Medication issues: Following medication regime  4) Physical and mental health problems: No concerns  5) Any changes in Vulnerable Adult Status?  No If yes, add to treatment plan and individual abuse prevention plan.  6) Review and evaluation of the individual abuse prevention plan: Current IAPP for this program is adequate for this client.    ASAM Risk Rating:    Dimension 1-1  Patient reports symptomology which includes night terrors and sweats. Patient reports that these symptoms will not prohibit her from completing [facility] program.      Dimension 2, 1: Patient reports that her teeth are hurting and causing discomfort, but no biomedical issues that would interfere with  "her ability to complete [facility] program.      Dimension 3-2: Patient reports her mood as bi-polar with swings from super irritated to super happy.  Patient states, \"My anxiety is pretty bad!\" Patient reports that she deals with her anxiety by walking, breathing exercises and journaling, prayer, and meditation. Patient reports no suicidal ideation at this time.      Dimension 4-1 Patient is continuing to work on her internal motivation for change. Patient reports that her main motivation for change is herself, her peers and feeling good. Patient is working on her \"Staying Off Cocaine\" assignment and will present in group when complete.      Dimension 5, 4: Patient reports her cravings rate a 4 , 1-(low)-10-(high). Patient is working on her Reapd    Dimension 6, 4: Patient is attending 12-step meetings and building relationships with female peers. Patient is working on identifying a sponsor to work with in the community.     Guide to Risk Ratings for Suicidality:   IDEATION: Active thoughts of suicide? INTENT: Intent to follow on suicide? PLAN: Plan to follow through on suicide? Level of Risk:   IF Yes Yes Yes Patient = High Emergent   IF Yes Yes No Patient = High Urgent/Non-Emergent   IF Yes No No Patient = Moderate Non-Urgent   IF No No   No Patient = Low Risk   The patient's ADDITIONAL RISK FACTORS and lack of PROTECTIVE FACTORS may increase their overall suicide risk ratings.     Patient's/client's current risk rating:  Low Risk    Family Involvement:   DAGOBERTO signed    Data:   offered feedback good insight client did actively participate    Intervention:   Aftercare planning  Behavior modification  Cognitive Behavioral Therapy  Counselor feedback  Education  Emotional management  Group feedback  Motivational Enhancement Therapy  Relapse prevention  Twelve Step facilitation  Mental health education    Assessment:   Stages of Change " Model  Preparation/Determination    Appears/Sounds:  Cooperative  Motivated  Engaged  Anxious    Plan:  Focus on recovery environment  Monitor emotional/physical health  Continue working through treatment plan goals.       JUNE Beaulieu
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
## 2020-01-06 NOTE — PROGRESS NOTES
Patient:  Sophia Stiles            Adult CD Progress Note and Treatment Plan Review     Attendance  Please refer to OP BEH CD Adult Attendance Record Documentation Flowsheet    Support group attended this week: yes    Reporting sobriety:  yes    Treatment Plan     Treatment Plan Review competed on: 1/6/20    Client preferred learning style: Visual  Hands on  Verbal  Demonstration    Staff Members contributing JUNE Beaulieu;  CARLA Lamb, [facility]                       Received Supervision: None    Client: contributed to goals and plan.    Client received copy of plan/revised plan: Yes    Client agrees with plan/revised plan: Yes    Changes to Treatment Plan: No    New Goals added since last review:                                                                                                          Goals worked on since last review:   Motivation, Change, Relationships, Relapse Prevention, Narrative Therapy, DBT. Drama, Women and Relationships     Strategies effective: yes    Strategies need these changes: None    1) Care Coordination Activities: Patient will work with counselors and  to address and such issues.  2) Medical, Mental Health and other appointments the client attended: None this week  3) Medication issues: Following medication regime  4) Physical and mental health problems: No concerns  5) Any changes in Vulnerable Adult Status?  No If yes, add to treatment plan and individual abuse prevention plan.  6) Review and evaluation of the individual abuse prevention plan: Current IAPP for this program is adequate for this client.    ASAM Risk Rating:    Dimension 1-1  Patient reports symptomology which includes night terrors and sweats. Patient reports that these symptoms will not prohibit her from completing [facility] program.      Dimension 2, 1: Patient reports that her teeth are hurting and causing discomfort, but no biomedical issues that would interfere with  "her ability to complete [facility] program.      Dimension 3-2: Patient reports her mood as bi-polar with swings from super irritated to super happy.  Patient states, \"My anxiety is pretty bad!\" Patient reports that she deals with her anxiety by walking, breathing exercises, journaling, prayer, and meditation. Patient completed her \"Narrative Therapy\" assignment and presented in group. Patient has been completing her \"DBT Card\" assignment and reporting to counselor. Patient is also working on her \"Women and Relationships\" assignment and will present in group when complete.  Patient reports no suicidal ideation at this time.      Dimension 4-1: Patient is continuing to work on her internal motivation for change. Patient reports that her main motivation for change is herself, her peers and feeling good. Patient is working on her \"Staying Off Cocaine\" assignment and will present in group when complete.      Dimension 5-4: Patient reports her cravings rate a 4 , 1-(low)-10-(high). Patient is working on her \"Relapse Prevention Plan\" assignment and will present in group when complete.  Patient is also working  on her \"Identifying Relapse Triggers and Cues\" and \"Feelings and Defenses\" assignments and will present both in group when complete.      Dimension 6-4: Patient reports that she attended 3 12-step meetings this week according to her treatment plan.  Patient is also working on identifying a sponsor to work with in the community while in treatment at [facility]. Patient will continue to work with counselors and  to develop an aftercare treatment protocol.     Guide to Risk Ratings for Suicidality:   IDEATION: Active thoughts of suicide? INTENT: Intent to follow on suicide? PLAN: Plan to follow through on suicide? Level of Risk:   IF Yes Yes Yes Patient = High Emergent   IF Yes Yes No Patient = High Urgent/Non-Emergent   IF Yes No No Patient = Moderate Non-Urgent   IF No No   No Patient = Low Risk   The " patient's ADDITIONAL RISK FACTORS and lack of PROTECTIVE FACTORS may increase their overall suicide risk ratings.     Patient's/client's current risk rating:  Low Risk    Family Involvement:   DAGOBERTO signed    Data:   offered feedback good insight client did actively participate    Intervention:   Aftercare planning  Behavior modification  Cognitive Behavioral Therapy  Counselor feedback  Education  Emotional management  Group feedback  Motivational Enhancement Therapy  Relapse prevention  Twelve Step facilitation  Mental health education    Assessment:   Stages of Change Model  Preparation/Determination    Appears/Sounds:  Cooperative  Motivated  Engaged  Anxious    Plan:  Focus on recovery environment  Monitor emotional/physical health  Continue working through treatment plan goals.     JUNE Hickey

## 2020-01-07 ENCOUNTER — HOSPITAL ENCOUNTER (OUTPATIENT)
Dept: BEHAVIORAL HEALTH | Facility: CLINIC | Age: 20
End: 2020-01-07
Attending: FAMILY MEDICINE
Payer: MEDICAID

## 2020-01-07 ENCOUNTER — OFFICE VISIT (OUTPATIENT)
Dept: FAMILY MEDICINE | Facility: CLINIC | Age: 20
End: 2020-01-07
Payer: MEDICAID

## 2020-01-07 VITALS
HEIGHT: 63 IN | TEMPERATURE: 98.6 F | WEIGHT: 122.5 LBS | OXYGEN SATURATION: 98 % | HEART RATE: 89 BPM | BODY MASS INDEX: 21.71 KG/M2 | DIASTOLIC BLOOD PRESSURE: 68 MMHG | SYSTOLIC BLOOD PRESSURE: 118 MMHG

## 2020-01-07 DIAGNOSIS — F51.4 NIGHT TERRORS: ICD-10-CM

## 2020-01-07 DIAGNOSIS — F41.0 PANIC DISORDER WITHOUT AGORAPHOBIA: ICD-10-CM

## 2020-01-07 DIAGNOSIS — F41.1 GAD (GENERALIZED ANXIETY DISORDER): Primary | ICD-10-CM

## 2020-01-07 DIAGNOSIS — F43.10 PTSD (POST-TRAUMATIC STRESS DISORDER): ICD-10-CM

## 2020-01-07 DIAGNOSIS — Z11.3 SCREEN FOR STD (SEXUALLY TRANSMITTED DISEASE): ICD-10-CM

## 2020-01-07 PROCEDURE — 87591 N.GONORRHOEAE DNA AMP PROB: CPT | Performed by: PHYSICIAN ASSISTANT

## 2020-01-07 PROCEDURE — H2035 A/D TX PROGRAM, PER HOUR: HCPCS

## 2020-01-07 PROCEDURE — 87389 HIV-1 AG W/HIV-1&-2 AB AG IA: CPT | Performed by: PHYSICIAN ASSISTANT

## 2020-01-07 PROCEDURE — 86780 TREPONEMA PALLIDUM: CPT | Performed by: PHYSICIAN ASSISTANT

## 2020-01-07 PROCEDURE — H2035 A/D TX PROGRAM, PER HOUR: HCPCS | Mod: HQ

## 2020-01-07 PROCEDURE — 87491 CHLMYD TRACH DNA AMP PROBE: CPT | Performed by: PHYSICIAN ASSISTANT

## 2020-01-07 PROCEDURE — 36415 COLL VENOUS BLD VENIPUNCTURE: CPT | Performed by: PHYSICIAN ASSISTANT

## 2020-01-07 PROCEDURE — 86803 HEPATITIS C AB TEST: CPT | Performed by: PHYSICIAN ASSISTANT

## 2020-01-07 PROCEDURE — 99204 OFFICE O/P NEW MOD 45 MIN: CPT | Performed by: PHYSICIAN ASSISTANT

## 2020-01-07 PROCEDURE — 10020000 ZZH LODGING PLUS FACILITY CHARGE ADULT

## 2020-01-07 RX ORDER — MIRTAZAPINE 15 MG/1
15 TABLET, FILM COATED ORAL AT BEDTIME
Qty: 30 TABLET | Refills: 1 | Status: SHIPPED | OUTPATIENT
Start: 2020-01-07 | End: 2020-03-10

## 2020-01-07 RX ORDER — PRAZOSIN HYDROCHLORIDE 1 MG/1
1 CAPSULE ORAL AT BEDTIME
Qty: 30 CAPSULE | Refills: 1 | Status: SHIPPED | OUTPATIENT
Start: 2020-01-07 | End: 2020-03-10

## 2020-01-07 ASSESSMENT — MIFFLIN-ST. JEOR: SCORE: 1298.4

## 2020-01-07 NOTE — LETTER
January 13, 2020      Sophia Stiles  [address] [place] [id number]        Dear ,    We are writing to inform you of your test results.    Your test results fall within the expected range(s) or remain unchanged from previous results.  Please continue with current treatment plan.    Resulted Orders   NEISSERIA GONORRHOEA PCR   Result Value Ref Range    Specimen Descrip Urine     N Gonorrhea PCR Negative NEG^Negative      Comment:      Negative for N. gonorrhoeae rRNA by transcription mediated amplification.  A negative result by transcription mediated amplification does not preclude   the presence of N. gonorrhoeae infection because results are dependent on   proper and adequate collection, absence of inhibitors, and sufficient rRNA to   be detected.     CHLAMYDIA TRACHOMATIS PCR   Result Value Ref Range    Specimen Description Urine     Chlamydia Trachomatis PCR Negative NEG^Negative      Comment:      Negative for C. trachomatis rRNA by transcription mediated amplification.  A negative result by transcription mediated amplification does not preclude   the presence of C. trachomatis infection because results are dependent on   proper and adequate collection, absence of inhibitors, and sufficient rRNA to   be detected.     HIV Antigen Antibody Combo   Result Value Ref Range    HIV Antigen Antibody Combo Nonreactive NR^Nonreactive          Comment:      HIV-1 p24 Ag & HIV-1/HIV-2 Ab Not Detected   Hepatitis C antibody   Result Value Ref Range    Hepatitis C Antibody Nonreactive NR^Nonreactive      Comment:      Assay performance characteristics have not been established for newborns,   infants, and children     Treponema Abs w Reflex to RPR and Titer   Result Value Ref Range    Treponema Antibodies Nonreactive NR^Nonreactive       If you have any questions or concerns, please call the clinic at the number listed above.       Sincerely,        Luciana Richardson PA-C

## 2020-01-07 NOTE — PATIENT INSTRUCTIONS
Start Zoloft in the morning daily, Remeron 15 mg at night  If night terrors aren't controlled tonight, start Prazosin 1 mg nightly  Follow up on Friday for a recheck  Labs today  Return to clinic for any new or worsening symptoms or go to ER Urgent care in off hours

## 2020-01-08 ENCOUNTER — HOSPITAL ENCOUNTER (OUTPATIENT)
Dept: BEHAVIORAL HEALTH | Facility: CLINIC | Age: 20
End: 2020-01-08
Attending: FAMILY MEDICINE
Payer: MEDICAID

## 2020-01-08 LAB
C TRACH DNA SPEC QL NAA+PROBE: NEGATIVE
HCV AB SERPL QL IA: NONREACTIVE
HIV 1+2 AB+HIV1 P24 AG SERPL QL IA: NONREACTIVE
N GONORRHOEA DNA SPEC QL NAA+PROBE: NEGATIVE
SPECIMEN SOURCE: NORMAL
SPECIMEN SOURCE: NORMAL
T PALLIDUM AB SER QL: NONREACTIVE

## 2020-01-08 PROCEDURE — 10020000 ZZH LODGING PLUS FACILITY CHARGE ADULT

## 2020-01-08 PROCEDURE — H2035 A/D TX PROGRAM, PER HOUR: HCPCS

## 2020-01-08 PROCEDURE — H2035 A/D TX PROGRAM, PER HOUR: HCPCS | Mod: HQ

## 2020-01-08 NOTE — PROGRESS NOTES
Care Coordination Note  1/8/2020 10:05 AM  Length of Care Coordination service: 5    Person(s) contacted/involved: Meseret at [facility]  Type of assistance provided: Support accessing treatment follow up.  Narrative/Outcome: Transportation provided by [facility] will arrive at 10am on 1/16/2020 to bring patient to sober house.

## 2020-01-09 ENCOUNTER — HOSPITAL ENCOUNTER (OUTPATIENT)
Dept: BEHAVIORAL HEALTH | Facility: CLINIC | Age: 20
End: 2020-01-09
Attending: FAMILY MEDICINE
Payer: MEDICAID

## 2020-01-09 PROCEDURE — H2035 A/D TX PROGRAM, PER HOUR: HCPCS | Mod: HQ

## 2020-01-09 PROCEDURE — H2035 A/D TX PROGRAM, PER HOUR: HCPCS

## 2020-01-09 PROCEDURE — 10020000 ZZH LODGING PLUS FACILITY CHARGE ADULT

## 2020-01-10 ENCOUNTER — HOSPITAL ENCOUNTER (OUTPATIENT)
Dept: BEHAVIORAL HEALTH | Facility: CLINIC | Age: 20
End: 2020-01-10
Attending: FAMILY MEDICINE
Payer: MEDICAID

## 2020-01-10 PROCEDURE — H2035 A/D TX PROGRAM, PER HOUR: HCPCS | Mod: HQ

## 2020-01-10 PROCEDURE — 10020000 ZZH LODGING PLUS FACILITY CHARGE ADULT

## 2020-01-11 ENCOUNTER — HOSPITAL ENCOUNTER (OUTPATIENT)
Dept: BEHAVIORAL HEALTH | Facility: CLINIC | Age: 20
End: 2020-01-11
Attending: FAMILY MEDICINE
Payer: MEDICAID

## 2020-01-11 DIAGNOSIS — F17.200 NICOTINE DEPENDENCE: Primary | ICD-10-CM

## 2020-01-11 PROCEDURE — H2035 A/D TX PROGRAM, PER HOUR: HCPCS | Mod: HQ

## 2020-01-11 PROCEDURE — 10020000 ZZH LODGING PLUS FACILITY CHARGE ADULT

## 2020-01-11 NOTE — IP AVS SNAPSHOT
"                  MRN:[id number]                      After Visit Summary   1/11/2020    Sophia Stiles    MRN: [id number]           Visit Information        Provider Department      1/11/2020  7:15 AM ADULT LODGING PL[facility] E [facility]        Your next 10 appointments already scheduled    Jan 12, 2020  7:15 AM CST  Treatment with ADULT LODGING PLUS E  [facility] ([facility]) [address] [place] [address]  [phone]      Jan 13, 2020  7:15 AM CST  Treatment with ADULT LODGING PLUS E  [facility] ([facility]) [address] [place] [address]  [phone]      Jan 14, 2020  7:15 AM CST  Treatment with ADULT LODGING PLUS E  [facility] ([facility]) [address] [place] [address]  [phone]      Xavier 15, 2020  7:15 AM CST  Treatment with ADULT LODGING PLUS E  [facility] ([facility]) [address] [place] [address]  [phone]      Jan 16, 2020 10:20 AM CST  SHORT with Luciana Richardson PA-C  [facility] ([address] [address]  [phone]         MyChart Information    [employer] lets you send messages to your doctor, view your test results, renew your prescriptions, schedule appointments and more. To sign up, go to [email or web address] . Click on \"Log in\" on the left side of the screen, which will take you to the Welcome page. Then click on \"Sign up Now\" on the right side of the page.     You will be asked to enter the access code listed below, as well as some personal information. Please follow the directions to create your username and password.   "   Your access code is: [email or web address]  Expires: 3/5/2020  9:03 AM     Your access code will  in 60 days. If you need help or a new code, please call your [facility] clinic or [phone].       Care EveryWhere ID    This is your Care EveryWhere ID. This could be used by other organizations to access your [facility] medical records  [id number]       Equal Access to Services    TOLU MOSES : Hadii colten horner hadasho Soomaali, waaxda luqadaha, qaybta kaalmada adeegyada, sonya recinos. So [facility] [phone].    ATENCIÓN: Si habla español, tiene a wylie disposición servicios gratuitos de asistencia lingüística. Llame al [phone].    We comply with applicable federal and state civil rights laws, including the [place] Human Rights Act. We do not discriminate on the basis of race, color, creed, Yazidism, national origin, marital status, age, disability, sex, sexual orientation, or gender identity.

## 2020-01-11 NOTE — IP AVS SNAPSHOT
Medication List       Patient:  ZHANE DUMONT   :  2000   Physician:  Pham Mena MD           This is your record.  Keep this with you and show to your community pharmacist(s) and physician(s) at each visit.     Allergies:  No Known Allergies          Medications  Valid as of: 2020 - 11:13 AM    Generic Name Brand Name Tablet Size Instructions for use    Mirtazapine REMERON 15 MG Take 1 tablet (15 mg) by mouth At Bedtime May increase to 30 mg daily in 1 week        Prazosin HCl MINIPRESS 1 MG Take 1 capsule (1 mg) by mouth At Bedtime Ok to start if remeron isn't working        Sertraline HCl ZOLOFT 50 MG Take 1 tablet (50 mg) by mouth daily        .           .           .           .

## 2020-01-11 NOTE — PROGRESS NOTES
Nursing Discharge Planning Meeting    Writer completed discharge planning meeting with patient. Discharge is planned for 1/16/20. Pt is attending outpatient programming at [facility] and sober housing.    Discussed appropriate follow up care to manage FARA, MH, and to obtain medication refills. Patient given a copy of their current medications for reference. Questions were answered at this time and the patient verbalized an understanding of the post-discharge follow up plan.    Patient has a scheduled appointment with PCP Dr. Mann on 1/16/20. Pt given details on this appointment. Pt also has refills available on her medications. Pt verbalizes comfort with managing their healthcare independently in the future.    Continue to support patient in discharge planning as needed to assure appropriate continuity of care.     Tobacco Cessation  Patient participated in the nicotine replacement therapy for tobacco cessation or reduction during their treatment programming: Yes. Pt requesting nicotine gum to assist further with this also. ALICIA RN facilitated this per standing orders.    The patient was provided with community resources for follow-up to continue tobacco cessation support once in the community. Also the patient was encoruaged to discuss their tobacco cessation efforts with the primary care provider.

## 2020-01-12 ENCOUNTER — HOSPITAL ENCOUNTER (OUTPATIENT)
Dept: BEHAVIORAL HEALTH | Facility: CLINIC | Age: 20
End: 2020-01-12
Attending: FAMILY MEDICINE
Payer: MEDICAID

## 2020-01-12 PROCEDURE — H2035 A/D TX PROGRAM, PER HOUR: HCPCS | Mod: HQ

## 2020-01-12 PROCEDURE — 10020000 ZZH LODGING PLUS FACILITY CHARGE ADULT

## 2020-01-13 ENCOUNTER — HOSPITAL ENCOUNTER (OUTPATIENT)
Dept: BEHAVIORAL HEALTH | Facility: CLINIC | Age: 20
End: 2020-01-13
Attending: FAMILY MEDICINE
Payer: MEDICAID

## 2020-01-13 PROCEDURE — H2035 A/D TX PROGRAM, PER HOUR: HCPCS

## 2020-01-13 PROCEDURE — H2035 A/D TX PROGRAM, PER HOUR: HCPCS | Mod: HQ

## 2020-01-13 PROCEDURE — 10020000 ZZH LODGING PLUS FACILITY CHARGE ADULT

## 2020-01-13 NOTE — PROGRESS NOTES
"Patient:  Sophia Stiles            Adult CD Progress Note and Treatment Plan Review     Attendance  Please refer to OP BEH CD Adult Attendance Record Documentation Flowsheet    Support group attended this week: yes    Reporting sobriety:  Yes    Treatment Plan     Treatment Plan Review competed on: 1/13/2020  This review covers 1/06-1/13/2020      Client preferred learning style: Visual  Hands on    Staff Members contributing  CARLA Beaulieu and  CARLA Lamb, CARLA Hickey, Nirmala Wang [facility].      Received Supervision: Yes    Client: contributed to goals and plan.    Client received copy of plan/revised plan: Yes    Client agrees with plan/revised plan: Yes    Changes to Treatment Plan: No    New Goals added since last review No    Goals worked on since last review: Pt completed her assignments; \"Control dramas\", and \"Women and relationships    Strategies effective: yes    Strategies need these changes: Continue to address goals.    1) Care Coordination Activities:  Met with primary counselor to make after- care arrangement.  2) Medical, Mental Health and other appointments the client attended: Pt reports vivitral shot appointment on 1/16/2020, Thursday at 10:20 am  3) Medication issues: None reported  4) Physical and mental health problems: See dimension 2/3  5) Any changes in Vulnerable Adult Status?  No.  6) Review and evaluation of the individual abuse prevention plan: yes      Guide to Risk Ratings for Suicidality:   IDEATION: Active thoughts of suicide? INTENT: Intent to follow on suicide? PLAN: Plan to follow through on suicide? Level of Risk:   IF Yes Yes Yes Patient = High Emergent   IF Yes Yes No Patient = High Urgent/Non-Emergent   IF Yes No No Patient = Moderate Non-Urgent   IF No No   No Patient = Low Risk   The patient's ADDITIONAL RISK FACTORS and lack of PROTECTIVE FACTORS may increase their overall suicide risk ratings.     Patient's/client's current risk rating:  Low " "Risk      ASAM Risk Rating:    Dimension 1 Risk 1:  Pt reports last use as 12/18/2019. Pt reports no withdrawal symptoms this past week.     Dimension 2 Risk 1:  Pt denies any biomedical problems this past week.  Pt attended lecture given by LP nurse on, Self-care. Pt reports vivitral shot appointment on 1/16/2020, Thursday at 10:20 am     Dimension 3 Risk 2:  Pt's suicide risk assessment on admission was \"Very low risk\"  Pt denies  any thoughts of self-harm or suicide ideation. Pt's current CGI 3/2 .  Pt reports  Her mood this week is excitement, and timothy. She denies any stress and states she  Journal and pray as coping skills to manage stress and emotional difficulty. Pt reports Sangita is her therapist and had a session with her last Tuesday.    Dimension 4 Risk  Pt reports her motivation this week is \"Me, myself and I\".     Dimension 5 Risk 4:  Pt reports no cravings this past week. Pt participated in the spirituality group, facilitated by Trinidad Bridges and counseling staff was present during group. Pt participated in weekend workshop on relapse prevention and completed all activities.    Dimension 6  Risk 4:  Pt is spending free time with female peers and attending at least 3, 12-step meetings weekly while in LP. Pt reports after care plan post LP as continuing with out-patient treatment, sober housing, therapy, and meetings.    Any changes in Vulnerable Adult Status?  No      Family Involvement:   Not this week    Data:   offered feedback good insight client did actively participate    Intervention:   Aftercare planning  Behavior modification  Counselor feedback  Education  Emotional management  Group feedback  Motivational Enhancement Therapy  Relapse prevention  Twelve Step facilitation  Client & counselor reviewed and signed ISP & assessment summary  Mental health education      Assessment:   Stages of Change Model  Preparation/Determination    Appears/Sounds:  Cooperative  Motivated  Engaged    Plan:  Focus " on recovery environment    Nirmala Wang [facility]

## 2020-01-13 NOTE — PROGRESS NOTES
Name: Sophia Stiles  Date: 1/12/2020  Medical Record: [id number]  Envelope Number:[id number]  List of Contents (List each item separately in new row):   One cracked iphone 7  One manila envelope  Admission:  I am responsible for any personal items that are not sent to the safe or pharmacy.  [facility] is not responsible for loss, theft or damage of any property in my possession.    Patient Signature:  ___________________________________________       Date/Time:__________________________    Staff Signature: __________________________________       Date/Time:__________________________    2nd Staff person, if patient is unable/unwilling to sign:    __________________________________________________________       Date/Time: __________________________    Discharge:  [facility] has returned all of my personal belongings:    Patient Signature: ________________________________________     Date/Time: ____________________________________    Staff Signature: ______________________________________     Date/Time:_____________________________________

## 2020-01-14 ENCOUNTER — HOSPITAL ENCOUNTER (OUTPATIENT)
Dept: BEHAVIORAL HEALTH | Facility: CLINIC | Age: 20
End: 2020-01-14
Attending: FAMILY MEDICINE
Payer: MEDICAID

## 2020-01-14 PROCEDURE — H2035 A/D TX PROGRAM, PER HOUR: HCPCS

## 2020-01-14 PROCEDURE — 10020000 ZZH LODGING PLUS FACILITY CHARGE ADULT

## 2020-01-14 PROCEDURE — H2035 A/D TX PROGRAM, PER HOUR: HCPCS | Mod: HQ

## 2020-01-14 NOTE — PROGRESS NOTES
"INDIVIDUAL SESSION SUMMARY    D) Met with client on 1/14/20 from 10:00-10:30 am.    Client reported being a \"bit anxious\" about leaving  tomorrow.  Client reflected on her last experience in treatment and how she because bored and \"craved the chaos in her life\".  Client is hopeful that she will not relapse in her next phase of treatment at [facility]. Client reported that she used excuses in the past to allow her to fall into her old habits of using. Client spoke of having a goal of being sober for one year and then reconnecting with her Aunt, who raised her since she was 6.     I) Individual session with client provided client with verbal interventions including: validation, nurturing, compassion and support. Discussed the importance of recovery behaviors such as, a lifetime of sober support,  daily rituals, and goal setting. Discussed the benefits of: healthy boundaries, positive self-talk, gratitude, self-compassion, assertive communication. Therapist encouraged client to increase self-care activities including: praying and meditation. Therapist encouraged client to continue with individual therapy and provided several therapy referrals.     A) Client appears to have experienced early attachment disruption, resulting in lack of trust, loss of safety, fear of abandonment, and symptoms of co-dependency. Client appears to have trouble identifying emotions and to lack skills for emotional regulation and stress management. Client appears to struggle with regulating impulses and focusing attention. Client appears to have difficulty making decisions and planning for the future. . Client appears to lack a sober support network. Client appears to lack a daily routine, meaningful activities, and a sense of purpose. Client would benefit from continuing support to help with processing past traumas, stress management, emotional regulation, impulse control, increasing resiliency and self-esteem.     P) No " future sessions scheduled. This therapist has provided the client with several therapist referrals to contact in the community.   Sangita Tovar, Student Intern  1/14/2020

## 2020-01-15 ENCOUNTER — HOSPITAL ENCOUNTER (OUTPATIENT)
Dept: BEHAVIORAL HEALTH | Facility: CLINIC | Age: 20
End: 2020-01-15
Attending: FAMILY MEDICINE
Payer: MEDICAID

## 2020-01-15 PROCEDURE — H2035 A/D TX PROGRAM, PER HOUR: HCPCS | Mod: HQ

## 2020-01-15 PROCEDURE — H2035 A/D TX PROGRAM, PER HOUR: HCPCS

## 2020-01-15 PROCEDURE — 10020000 ZZH LODGING PLUS FACILITY CHARGE ADULT

## 2020-01-15 NOTE — PROGRESS NOTES
[facility] has approved funding for patient to enter the [facility] program.

## 2020-01-16 ENCOUNTER — OFFICE VISIT (OUTPATIENT)
Dept: FAMILY MEDICINE | Facility: CLINIC | Age: 20
End: 2020-01-16
Payer: MEDICAID

## 2020-01-16 ENCOUNTER — TELEPHONE (OUTPATIENT)
Dept: ADDICTION MEDICINE | Facility: CLINIC | Age: 20
End: 2020-01-16

## 2020-01-16 VITALS
SYSTOLIC BLOOD PRESSURE: 121 MMHG | TEMPERATURE: 98.6 F | WEIGHT: 126 LBS | DIASTOLIC BLOOD PRESSURE: 84 MMHG | HEART RATE: 102 BPM | OXYGEN SATURATION: 99 % | BODY MASS INDEX: 22.16 KG/M2

## 2020-01-16 DIAGNOSIS — F19.20 POLYSUBSTANCE (EXCLUDING OPIOIDS) DEPENDENCE (H): Primary | ICD-10-CM

## 2020-01-16 DIAGNOSIS — F41.1 GAD (GENERALIZED ANXIETY DISORDER): ICD-10-CM

## 2020-01-16 PROCEDURE — 99213 OFFICE O/P EST LOW 20 MIN: CPT | Performed by: PHYSICIAN ASSISTANT

## 2020-01-16 RX ORDER — HYDROXYZINE HYDROCHLORIDE 10 MG/1
10-50 TABLET, FILM COATED ORAL 3 TIMES DAILY PRN
Qty: 90 TABLET | Refills: 1 | Status: SHIPPED | OUTPATIENT
Start: 2020-01-16 | End: 2020-04-01

## 2020-01-16 NOTE — TELEPHONE ENCOUNTER
Please schedule appointment for patient with   Addiction Medicine Provider   For possible vivitrol per PCP (recently completed LP)

## 2020-01-16 NOTE — TELEPHONE ENCOUNTER
Writer attempted to reach pt; no answer. LVM requesting a call back for an appt. Two more attempts will be made.     Marilin Dexter  [facility]

## 2020-01-16 NOTE — PATIENT INSTRUCTIONS
Follow up in 4 weeks  Return to clinic for any new or worsening symptoms or go to ER Urgent care in off hours

## 2020-01-16 NOTE — PROGRESS NOTES
Shaina Stiles is a 19 year old female who presents to clinic today for the following health issues:    HPI   Follow up - night terrors   Onset: since started treatment    Description:   Pt gets night terrors when no using heroine, cocaine     Intensity: mild    Progression of Symptoms:  worsening    Accompanying Signs & Symptoms:  None     Previous history of similar problem:   Yes, only when not usig drugs     Precipitating factors:   Worsened by: not using     Alleviating factors:  Improved by: using and prazosin 1mg - helping     Therapies Tried and outcome:       Needs vivitrol shot    Feels significantly better  During the day her anxiety is still bothersome    Prazosin is working for nightmares    Graduates today  [facility] outpatient        Problem list and histories reviewed & adjusted, as indicated.  Additional history: as documented    ROS:  CONSTITUTIONAL: NEGATIVE for fever, chills, change in weight  ENT/MOUTH: NEGATIVE for ear, mouth and throat problems  RESP: NEGATIVE for significant cough or SOB  CV: NEGATIVE for chest pain, palpitations or peripheral edema    Patient Active Problem List   Diagnosis     Suicidal ideation     Polysubstance (excluding opioids) dependence (H)     Chemical dependency (H)     No past surgical history on file.    Social History     Tobacco Use     Smoking status: Current Every Day Smoker     Packs/day: 0.50     Start date: 1/7/2015     Smokeless tobacco: Current User     Tobacco comment: Declines NRT at admission   Substance Use Topics     Alcohol use: Yes     Comment: 1 pint christiana per day,  last drink earlier today     Family History   Adopted: Yes           Patient Active Problem List   Diagnosis     Suicidal ideation     Polysubstance (excluding opioids) dependence (H)     Chemical dependency (H)     No past surgical history on file.    Social History     Tobacco Use     Smoking status: Current Every Day Smoker     Packs/day: 0.50     Start date:  "1/7/2015     Smokeless tobacco: Current User     Tobacco comment: Declines NRT at admission   Substance Use Topics     Alcohol use: Yes     Comment: 1 pint christiana per day,  last drink earlier today     Family History   Adopted: Yes           OBJECTIVE:                                                    /84   Pulse 102   Temp 98.6  F (37  C) (Oral)   Wt 57.2 kg (126 lb)   SpO2 99%   BMI 22.16 kg/m   Body mass index is 22.16 kg/m .   GENERAL: healthy, alert, well nourished, well hydrated, no distress  HENT: ear canals- normal; TMs- normal; Nose- normal; Mouth- no ulcers, no lesions  NECK: no tenderness, no adenopathy, no asymmetry, no masses, no stiffness; thyroid- normal to palpation  RESP: lungs clear to auscultation - no rales, no rhonchi, no wheezes  CV: regular rates and rhythm, normal S1 S2, no S3 or S4 and no murmur, no click or rub -  PSYCH: Alert and oriented times 3; speech- coherent , normal rate and volume; able to articulate logical thoughts, able to abstract reason, no tangential thoughts, no hallucinations or delusions, affect- normal    No results found for this or any previous visit (from the past 24 hour(s)).       ASSESSMENT/PLAN:                                                        ICD-10-CM    1. Polysubstance (excluding opioids) dependence (H) F19.20 ADDICTION MEDICINE REFERRAL   2. YELITZA (generalized anxiety disorder) F41.1 hydrOXYzine (ATARAX) 10 MG tablet       Patient Instructions   Follow up in 4 weeks  Return to clinic for any new or worsening symptoms or go to ER Urgent care in off hours           Estimated body mass index is 22.16 kg/m  as calculated from the following:    Height as of 1/7/20: 1.606 m (5' 3.23\").    Weight as of this encounter: 57.2 kg (126 lb).       Luciana Richardson  [facility]    "

## 2020-01-16 NOTE — TELEPHONE ENCOUNTER
Please review referral. Please route back to reception pool #[phone].     Thank you,    Marilin Dexter  [facility]

## 2020-01-16 NOTE — PROGRESS NOTES
MICD Discharge Summary/Instructions     Patient: Sophia Stiles   MRN: [id number]  : 2000  Age: 19 year old Sex: female    Focus of Treatment / Discharge Recommendations    Personal Safety/ Management of Symptoms    * Follow your safety plan.  Report increased symptoms to your care team and /or go to the nearest Emergency Department.    * Call crisis lines as needed   [facility] [phone]                [facility] [phone]  [facility] [phone]               C[facility]is Connection [phone]  [facility] [phone]              [facility] [phone]  [facility] [phone]          National Suicide Prevention [phone]  [facility] [phone]          Suicide Prevention [phone]  [facility] [phone]   CALL 911    Abstinence/Relapse Prevention  * Take all medicines as directed.  Carry a current list of medicines with you.   * Use coping skills: nutrition, rest, exercise, spirituality, journal, meditation, engage in activities you enjoy, seek sober support, affirmations, voice feelings of shame, fear and other feelings  * Do not use illicit (street) drugs, controlled substances (narcotics) or alcohol.    Develop/Improve Independent Living/Socialization Skills: Pt is entering [facility] Program with sober housing.     Community Resources/Supports and Discharge Planning:  Maintain abstinence from all mood altering substances, attend sober support per [facility] recommendations, enter [facility] outpatient program - and follow recommendations, continue with 1.1 therapy, maintain good physical and mental health care,     [facility] Alcoholics Anonymous:  [phone] ( 24 hours a day)    [place]  Alcoholics Anonymous : [phone]    Narcotics Anonymous : [address] ( [phone])    [place] Recovery Connection: Free recovery resources.  [phone]    Access chats, online meetings,  discussions and healthy check-in activities any day, any time, from anywhere. Participate as anonymously as you like. In order to access this resource ,  Go to: [email or web address]   Click on the In recovery tab. Then click on Social Community. Click on The daily Pledge: people helping people 24/7 to join.     Follow up with psychiatrist / main caregiver: Dr Mann    Next visit: 1/16/2020 @ 10:20 am    Follow up with your therapist: MARIANNE   Next visit: n/a    Go to group therapy and / or support groups at: Attend programming at [facility] and sober support groups in your community.    See your medical doctor about: any medical concerns    Other:  n/a    Client Signature:_______________________   Date / Time:___________    Staff Signature:________________________   Date / Time:___________

## 2020-01-16 NOTE — PROGRESS NOTES
[address] and 6[address] [place] [address]          Sophia Stiles, 2000, was admitted for evaluation/treatment of chemical dependency at [facility].  This person took part in these program(s):    ______ The Inpatient Program   ______ The Outpatient Program   __X___ The L[facility]ging Plus Program   ______ Lodging Day Outpatient       Date admitted: 12/19/19  Date discharged: 1/16/2020     Type of discharge:   __X___ Satisfactory - completed evaluation / treatment   ______ Discharged without completing   ______ Behavioral discharge   ______ Transferred to another chemical dependency program   ______ Transferred to another type of service   ______ Left against medical advice (AMA) / Eloped       Comments: Sophia completed the [facility]ging Plus Program this date. She is entering [facility], in a door to door transfer today.       Counselor: JUNE Lamb                       Date: 1/16/2020             Time: 9:39 AM

## 2020-01-17 NOTE — ADDENDUM NOTE
Encounter addended by: Nirmala Wang [facility] on: 1/17/2020 2:14 PM   Actions taken: Pend clinical note

## 2020-01-17 NOTE — ADDENDUM NOTE
Encounter addended by: Nirmala Wang [facility] on: 1/17/2020 2:47 PM   Actions taken: Pend clinical note

## 2020-01-17 NOTE — PROGRESS NOTES
CHEMICAL DEPENDENCY DISCHARGE SUMMARY   NAME:   :   MR #     EVALUATION COUNSELOR:   TREATMENT COUNSELOR:  Rachelle Hdz, [facility]; Deb Lazo [facility], Nirmala Wnag [facility].    REFERRAL SOURCE:  Self           PROGRAM:  Adult Chemical Dependency [facility]ging Plus    ADMISSION DATE: 2019   LAST SESSION DATE:1/15/2020   DISCHARGE DATE: 2020    ADMISSION DIAGNOSIS:  F15.20 Stimulant Use Disorder, Moderate/Severe   F11.20 Opioid Use Disorder, Moderate/Severe  F12.20 Cannabis Use Disorder, Moderate/Severe    DISCHARGE DIAGNOSIS:   F15.20 Stimulant Use Disorder, Moderate/Severe   F11.20 Opioid Use Disorder, Moderate/Severe  F12.20 Cannabis Use Disorder, Moderate/Severe    DISCHARGE STATUS: Patient was discharged with staff approval.    LAST USE DATE:2019    DAYS OF TREATMENT COMPLETED: 28      PRESENTING INFORMATION: Per Brett Yates,this patient had a Rule 25 Assessment on 2019 at [facility] completed by Andie Yancey.       SERVICES PROVIDED:  Services included assessment, orientation, treatment planning, individual counseling, group therapy sessions, family therapy, spiritual care counseling, aftercare planning, acupuncture, nutrition in recovery lecture, workshops focusing on relapse prevention and relationships, education on chemical dependency, mental illness, and AIDS/HIV awareness.     ISSUES ADDRESSED IN TREATMENT: Patient s treatment plan goals        DIMENSION 1/ACUTE WITHDRAWAL ISSUES/DETOX:    Admit RR:1        DC RR: 0   Patient reports last use date of 2019.  Patient denied symptoms of post-acute withdrawal while in treatment.  She attended an educational lecture on post-acute withdrawal and verbalized understanding of the timeline of symptoms. Pt was stable at the time of discharge, therefore her risk rating is reduced to 0.       DIMENSION 2/BIOMEDICAL:  Admit RR:1           DC RR: 0    Patient denied any biomedical concerns that would interfere with treatment. Patient plans to  "follow up with her primary provider as needed.       DIMENSION 3/EMOTIONAL/BEHAVIORAL:   Admit RR: 2     DC RR: 2     Patient reports a mental health diagnosis of ADD, depression, anxiety, and hx of trauma. Patient was compliant with following medication regime while in treatment. Patient participated in a suicide risk screening on admission and was assessed as very low risk. Patient completed a required safety plan the first week of treatment with primary counselor. Patient denied suicidal or homicidal ideation while in treatment. Patient worked to develop a more positive self-image by completing her  Book of Me  assignment and presenting in group. Patient demonstrated the ability to turn her negative self-talk into positive affirmations and began to practice daily meditation while in treatment. Patient met with staff therapist and to process mental health concerns, se also read a card daily from the DBT skills and wrote down skills she can use post Lodging plus. She also completed assignment entitled \"control drama, \" and \"Women in relationship\" to address male dependency and gain skills to form healthy relationship. Due to patient's difficulty with emotional regulation and low distress tolerance, she remains at risk rating  2.      DIMENSION 4/READINESS TO CHANGE:  Admit RR: 1          DC RR: 1     At time of admission, patient verbalized her primary motivation for treatment was for herself. Patient completed her  Consequences  assignment and identified how her alcohol use has contributed to violating her personal value system. Patient's internal motivation was increased by creating a collage of what her life will look like in five years sober vs. still using. Patient appears to be in the preparation stage of change at this time. Patient's risk rating in this area remains, patient will need to follow her aftercare plans. She is cooperative, motivated.    DIMENSION 5/RELAPSE & CONTINUED PROBLEM POTENTIAL:  Admit " RR: 4   DC RR: 3       Patient worked on assignments to identify her relapse patterns and read material on emotional regulation. Patient participated in two weekend workshops on relapse prevention and completed a relapse prevention plan. Patient demonstrated the ability to verbalize high risk situations for relapse and identified detailed coping skills for triggering situations. She monitored any urges throughout treatment and discussed her Urge Tracker worksheet with counselors. Patient learned about boundaries and co-dependency and challenged her tendency to isolate by engaging with peers while in treatment.Patient continues to struggle with setting boundaries with unsupportive people in her life and would benefit from individual therapy. Patient's risk rating in this dimension lowered to a  3  based on her completion of all treatment plan goals in this area.     DIMENSION 6/RECOVERY ENVIRONMENT: Admit RR      4      DC RR: 3   Patient identified ways to improve her sober environment by identifying 25 sober activities attending weekly 12-step support group meetings.  Patient worked to build supportive relationships while in treatment with female peers. Patient attended two relationships workshops and received education on family roles in addiction, healthy versus unhealthy relationships, and codependency. Patient's risk rating lowered to a  3  based on her aftercare plans and safe housing.    STRENGTHS: The patient appeared sincere in her desire to establish a recovery lifestyle, put forth consistent effort in reaching treatment plan goals and following staff recommendations, expressed genuine concern for other group members and freely offered support as well as encouragement, gained considerable insight into relapse prevention strategies and resources which can be utilized in recovery. Patient was honest about the struggle/ambivalence about sobriety in the beginning of treatment. Patient was able to respectfully  challenge peers, about addictive thinking patterns using self as an example. Patient added spontaneity to the group using honesty and creative approach.     PROGNOSIS:   1. Patient has a favorable assuming that she follows all the aftercare recommendations and continues to build sober support network.    LIVING ARRANGEMENTS AT DISCHARGE: Patient is continuing programming at transitions.        CONTINUING CARE RECOMMENDATIONS/REFERRALS:   1. Remain abstinent from all mood altering chemicals.  2. Enter aftercare at [facility]  3. Comply with expectations of extended care.   4. Monitor and comply with the advice of your doctor regarding mental and physical health, remain medication compliant.  5. Attend a minimum of two 12-step meetings weekly and obtain a female sponsor.  6. Continue investment in building sober support network in recovery.  7. Seek individual therapy with referrals given.  8. Continue to practice coping skills for emotional health and substance use relapse prevention.  9. Continue to pursue employment or volunteer opportunities.   10. Complete all legal requirements to resolve issues.    Nirmala WATKINS,

## 2020-01-17 NOTE — ADDENDUM NOTE
Encounter addended by: Nirmala Wang [facility] on: 1/17/2020 12:23 PM   Actions taken: Pend clinical note

## 2020-01-17 NOTE — ADDENDUM NOTE
Encounter addended by: Nirmala Wang [facility] on: 1/17/2020 1:06 PM   Actions taken: Pend clinical note

## 2020-01-21 NOTE — ADDENDUM NOTE
Encounter addended by: Nirmala Wang [facility] on: 1/21/2020 4:38 PM   Actions taken: Clinical Note Signed

## 2020-01-23 NOTE — TELEPHONE ENCOUNTER
Writer attempted to reach pt; no answer. LVM requesting a call back for an appt. One more attempt will be made.     Marilin Dexter  [facility]

## 2020-01-24 ENCOUNTER — TELEPHONE (OUTPATIENT)
Dept: OBGYN | Facility: CLINIC | Age: 20
End: 2020-01-24

## 2020-01-24 NOTE — TELEPHONE ENCOUNTER
Pt is scheduled with Yahaira Sandhu on 01/30 @ 11am @ [facility] . Closing encounter as no further follow up is needed.     Marilin Dexter  [facility]

## 2020-02-25 ENCOUNTER — HOSPITAL ENCOUNTER (EMERGENCY)
Facility: CLINIC | Age: 20
Discharge: HOME | End: 2020-02-25
Attending: EMERGENCY MEDICINE | Admitting: EMERGENCY MEDICINE
Payer: COMMERCIAL

## 2020-02-25 ENCOUNTER — APPOINTMENT (OUTPATIENT)
Dept: ULTRASOUND IMAGING | Facility: CLINIC | Age: 20
End: 2020-02-25
Attending: EMERGENCY MEDICINE
Payer: COMMERCIAL

## 2020-02-25 VITALS
RESPIRATION RATE: 16 BRPM | OXYGEN SATURATION: 97 % | SYSTOLIC BLOOD PRESSURE: 103 MMHG | TEMPERATURE: 98.1 F | DIASTOLIC BLOOD PRESSURE: 68 MMHG | BODY MASS INDEX: 24.84 KG/M2 | HEART RATE: 84 BPM | HEIGHT: 62 IN | WEIGHT: 135 LBS

## 2020-02-25 DIAGNOSIS — R10.32 ABDOMINAL PAIN, LEFT LOWER QUADRANT: ICD-10-CM

## 2020-02-25 DIAGNOSIS — R10.31 ABDOMINAL PAIN, RIGHT LOWER QUADRANT: ICD-10-CM

## 2020-02-25 DIAGNOSIS — O26.891 OTHER SPECIFIED PREGNANCY RELATED CONDITIONS, FIRST TRIMESTER: ICD-10-CM

## 2020-02-25 DIAGNOSIS — O26.891 ABDOMINAL PAIN DURING PREGNANCY IN FIRST TRIMESTER: ICD-10-CM

## 2020-02-25 DIAGNOSIS — R10.9 ABDOMINAL PAIN DURING PREGNANCY IN FIRST TRIMESTER: ICD-10-CM

## 2020-02-25 DIAGNOSIS — Z3A.10 10 WEEKS GESTATION OF PREGNANCY: ICD-10-CM

## 2020-02-25 LAB
ALBUMIN SERPL-MCNC: 3.7 G/DL (ref 3.4–5)
ALBUMIN UR-MCNC: NEGATIVE MG/DL
ALP SERPL-CCNC: 39 U/L (ref 40–150)
ALT SERPL W P-5'-P-CCNC: 17 U/L (ref 0–50)
ANION GAP SERPL CALCULATED.3IONS-SCNC: 5 MMOL/L (ref 3–14)
APPEARANCE UR: ABNORMAL
AST SERPL W P-5'-P-CCNC: 12 U/L (ref 0–45)
BASOPHILS # BLD AUTO: 0 10E9/L (ref 0–0.2)
BASOPHILS NFR BLD AUTO: 0.1 %
BILIRUB SERPL-MCNC: 0.2 MG/DL (ref 0.2–1.3)
BILIRUB UR QL STRIP: NEGATIVE
BUN SERPL-MCNC: 8 MG/DL (ref 7–30)
CALCIUM SERPL-MCNC: 8.5 MG/DL (ref 8.5–10.1)
CHLORIDE SERPL-SCNC: 109 MMOL/L (ref 94–109)
CO2 SERPL-SCNC: 24 MMOL/L (ref 20–32)
COLOR UR AUTO: ABNORMAL
CREAT SERPL-MCNC: 0.49 MG/DL (ref 0.52–1.04)
DIFFERENTIAL METHOD BLD: NORMAL
EOSINOPHIL # BLD AUTO: 0.2 10E9/L (ref 0–0.7)
EOSINOPHIL NFR BLD AUTO: 2.5 %
ERYTHROCYTE [DISTWIDTH] IN BLOOD BY AUTOMATED COUNT: 12.1 % (ref 10–15)
GFR SERPL CREATININE-BSD FRML MDRD: >90 ML/MIN/{1.73_M2}
GLUCOSE SERPL-MCNC: 75 MG/DL (ref 70–99)
GLUCOSE UR STRIP-MCNC: NEGATIVE MG/DL
HCT VFR BLD AUTO: 36.6 % (ref 35–47)
HGB BLD-MCNC: 12.7 G/DL (ref 11.7–15.7)
HGB UR QL STRIP: NEGATIVE
IMM GRANULOCYTES # BLD: 0 10E9/L (ref 0–0.4)
IMM GRANULOCYTES NFR BLD: 0.1 %
KETONES UR STRIP-MCNC: NEGATIVE MG/DL
LEUKOCYTE ESTERASE UR QL STRIP: NEGATIVE
LYMPHOCYTES # BLD AUTO: 2.1 10E9/L (ref 0.8–5.3)
LYMPHOCYTES NFR BLD AUTO: 25 %
MCH RBC QN AUTO: 32.6 PG (ref 26.5–33)
MCHC RBC AUTO-ENTMCNC: 34.7 G/DL (ref 31.5–36.5)
MCV RBC AUTO: 94 FL (ref 78–100)
MONOCYTES # BLD AUTO: 0.5 10E9/L (ref 0–1.3)
MONOCYTES NFR BLD AUTO: 5.4 %
MUCOUS THREADS #/AREA URNS LPF: PRESENT /LPF
NEUTROPHILS # BLD AUTO: 5.6 10E9/L (ref 1.6–8.3)
NEUTROPHILS NFR BLD AUTO: 66.9 %
NITRATE UR QL: NEGATIVE
NRBC # BLD AUTO: 0 10*3/UL
NRBC BLD AUTO-RTO: 0 /100
PH UR STRIP: 7 PH (ref 5–7)
PLATELET # BLD AUTO: 239 10E9/L (ref 150–450)
POTASSIUM SERPL-SCNC: 3.7 MMOL/L (ref 3.4–5.3)
PROT SERPL-MCNC: 6.8 G/DL (ref 6.8–8.8)
RBC # BLD AUTO: 3.9 10E12/L (ref 3.8–5.2)
RBC #/AREA URNS AUTO: <1 /HPF (ref 0–2)
SODIUM SERPL-SCNC: 138 MMOL/L (ref 133–144)
SOURCE: ABNORMAL
SP GR UR STRIP: 1.01 (ref 1–1.03)
SPECIMEN SOURCE: NORMAL
SQUAMOUS #/AREA URNS AUTO: 2 /HPF (ref 0–1)
UROBILINOGEN UR STRIP-MCNC: NORMAL MG/DL (ref 0–2)
WBC # BLD AUTO: 8.3 10E9/L (ref 4–11)
WBC #/AREA URNS AUTO: 2 /HPF (ref 0–5)
WET PREP SPEC: NORMAL

## 2020-02-25 PROCEDURE — 81001 URINALYSIS AUTO W/SCOPE: CPT | Performed by: EMERGENCY MEDICINE

## 2020-02-25 PROCEDURE — 80053 COMPREHEN METABOLIC PANEL: CPT | Performed by: EMERGENCY MEDICINE

## 2020-02-25 PROCEDURE — 76801 OB US < 14 WKS SINGLE FETUS: CPT

## 2020-02-25 PROCEDURE — 99284 EMERGENCY DEPT VISIT MOD MDM: CPT | Mod: 25

## 2020-02-25 PROCEDURE — 87210 SMEAR WET MOUNT SALINE/INK: CPT | Performed by: EMERGENCY MEDICINE

## 2020-02-25 PROCEDURE — 99284 EMERGENCY DEPT VISIT MOD MDM: CPT | Mod: Z6 | Performed by: EMERGENCY MEDICINE

## 2020-02-25 PROCEDURE — 85025 COMPLETE CBC W/AUTO DIFF WBC: CPT | Performed by: EMERGENCY MEDICINE

## 2020-02-25 PROCEDURE — 87591 N.GONORRHOEAE DNA AMP PROB: CPT | Performed by: EMERGENCY MEDICINE

## 2020-02-25 PROCEDURE — 87491 CHLMYD TRACH DNA AMP PROBE: CPT | Performed by: EMERGENCY MEDICINE

## 2020-02-25 ASSESSMENT — ENCOUNTER SYMPTOMS
HEMATURIA: 0
COUGH: 0
ABDOMINAL PAIN: 1
DIARRHEA: 0
DIFFICULTY URINATING: 0
BACK PAIN: 1
CONSTIPATION: 0
DYSURIA: 0
SHORTNESS OF BREATH: 0
VOMITING: 0
NAUSEA: 1

## 2020-02-25 ASSESSMENT — MIFFLIN-ST. JEOR: SCORE: 1335.61

## 2020-02-25 NOTE — ED AVS SNAPSHOT
[facility], [place], Emergency Department  [address]  [facility] [address]  Phone:  [phone]  Fax:  [phone]                                    Sophia Stiles   MRN: [id number]    Department:  [facility], Emergency Department   Date of Visit:  2/25/2020           After Visit Summary Signature Page    I have received my discharge instructions, and my questions have been answered. I have discussed any challenges I see with this plan with the nurse or doctor.    ..........................................................................................................................................  Patient/Patient Representative Signature      ..........................................................................................................................................  Patient Representative Print Name and Relationship to Patient    ..................................................               ................................................  Date                                   Time    ..........................................................................................................................................  Reviewed by Signature/Title    ...................................................              ..............................................  Date                                               Time          22EPIC Rev 08/18

## 2020-02-25 NOTE — ED TRIAGE NOTES
Sharp pain on her lower badominal area and back and started today, patient is 11 weeks pregnant. Cloudy urine reported, no other urinary sx. No vaginal bleeding reported.

## 2020-02-25 NOTE — ED PROVIDER NOTES
[facility] EMERGENCY DEPARTMENT ([facility])     2020    History     Chief Complaint   Patient presents with     Abdominal Pain     Sharp pain on her lower badominal area and back and started today, patient is 11 weeks pregnant. Cloudy urine reported, no other urinary sx.      HPI  Jeanneicitpatricia Stiles is a 19 year old  female with a positive pregnancy test in the [facility] ED on 20 and a history of depression, anxiety, and polysubstance use disorder who presents to the ED today for evaluation of back and lower abdominal pain. Patient reports she has had back pain for the past 2 days. She states she developed pain on both sides of her lower abdomen earlier today. She complains of nausea, but denies vomiting. Patient denies previous history of similar back or abdominal pain. She states she has had white vaginal discharge for 1 week. She denies vaginal bleeding. Patient denies hematuria, dysuria, difficulty urinating, diarrhea, or constipation. She also denies chest pain, shortness of breath, or cough. Patient states she has no known allergies to medications.      PAST MEDICAL HISTORY  Past Medical History:   Diagnosis Date     Anxiety      Depression      Substance abuse (H)      PAST SURGICAL HISTORY  History reviewed. No pertinent surgical history.  FAMILY HISTORY  Family History   Adopted: Yes     SOCIAL HISTORY  Social History     Tobacco Use     Smoking status: Current Every Day Smoker     Packs/day: 0.50     Start date: 2015     Smokeless tobacco: Current User     Tobacco comment: Declines NRT at admission   Substance Use Topics     Alcohol use: Yes     Comment: 1 pint christiana per day,  last drink earlier today     MEDICATIONS  No current facility-administered medications for this encounter.      Current Outpatient Medications   Medication     sertraline (ZOLOFT) 50 MG tablet     hydrOXYzine (ATARAX) 10 MG tablet     mirtazapine (REMERON) 15 MG tablet     nicotine (NICORETTE) 2  "MG gum     prazosin (MINIPRESS) 1 MG capsule     Facility-Administered Medications Ordered in Other Encounters   Medication     Self Administer Medications: Behavioral Services     Self Administer Medications: Behavioral Services     ALLERGIES  No Known Allergies    I have reviewed the Medications, Allergies, Past Medical and Surgical History, and Social History in the Epic system.    Review of Systems   Respiratory: Negative for cough and shortness of breath.    Cardiovascular: Negative for chest pain.   Gastrointestinal: Positive for abdominal pain (lower) and nausea. Negative for constipation, diarrhea and vomiting.   Genitourinary: Positive for vaginal discharge (white). Negative for difficulty urinating, dysuria, hematuria and vaginal bleeding.   Musculoskeletal: Positive for back pain.   All other systems reviewed and are negative.      Physical Exam   BP: 111/44  Pulse: 84  Heart Rate: 97  Temp: 97.6  F (36.4  C)  Resp: 16  Height: 157.5 cm (5' 2\")  Weight: 61.2 kg (135 lb)  SpO2: 98 %      Physical Exam  Constitutional:       General: She is not in acute distress.     Appearance: She is well-developed.   HENT:      Head: Normocephalic and atraumatic.      Mouth/Throat:      Mouth: Mucous membranes are moist.   Eyes:      Extraocular Movements: Extraocular movements intact.   Cardiovascular:      Rate and Rhythm: Normal rate and regular rhythm.   Pulmonary:      Effort: Pulmonary effort is normal.      Breath sounds: Normal breath sounds.   Abdominal:      General: Abdomen is flat.      Palpations: Abdomen is soft.      Tenderness: There is abdominal tenderness (Minimal bilateral lower quadrant tenderness). There is no guarding or rebound.   Genitourinary:     Comments: Scant thin white discharge no blood no cervical tenderness  Skin:     General: Skin is warm.   Neurological:      General: No focal deficit present.      Mental Status: She is alert and oriented to person, place, and time.         ED Course "        Procedures        Results for orders placed or performed during the hospital encounter of 02/25/20   US OB < 14 Weeks Single     Status: None    Narrative    US OB < 14 WEEKS SINGLE 2/25/2020 5:06 PM    CLINICAL HISTORY: Abdominal pain.  TECHNIQUE: Transabdominal scans were performed. Endovaginal ultrasound  was performed to better visualize the embryo.    COMPARISON: None.    FINDINGS:  UTERUS: Single normal appearing intrauterine gestation sac.  CRL: measures 39 mm, equals 10 weeks 6 days.  FETAL HEART RATE: 166 bpm.  AMNIOTIC FLUID: Normal.  PLACENTA: Not yet formed. Yolk sac is present. No evidence for  sub-chorionic hemorrhage.    RIGHT OVARY: Normal.  LEFT OVARY: Not visualized due to intestinal gas.      Impression    IMPRESSION: Single living intrauterine gestation at 10 weeks 6 days,  EDC 9/16/2020.      MABEL INFANTE MD   UA reflex to Microscopic and Culture     Status: Abnormal   Result Value Ref Range    Color Urine Light Yellow     Appearance Urine Slightly Cloudy     Glucose Urine Negative NEG^Negative mg/dL    Bilirubin Urine Negative NEG^Negative    Ketones Urine Negative NEG^Negative mg/dL    Specific Gravity Urine 1.014 1.003 - 1.035    Blood Urine Negative NEG^Negative    pH Urine 7.0 5.0 - 7.0 pH    Protein Albumin Urine Negative NEG^Negative mg/dL    Urobilinogen mg/dL Normal 0.0 - 2.0 mg/dL    Nitrite Urine Negative NEG^Negative    Leukocyte Esterase Urine Negative NEG^Negative    Source Midstream Urine     RBC Urine <1 0 - 2 /HPF    WBC Urine 2 0 - 5 /HPF    Squamous Epithelial /HPF Urine 2 (H) 0 - 1 /HPF    Mucous Urine Present (A) NEG^Negative /LPF   CBC with platelets differential     Status: None   Result Value Ref Range    WBC 8.3 4.0 - 11.0 10e9/L    RBC Count 3.90 3.8 - 5.2 10e12/L    Hemoglobin 12.7 11.7 - 15.7 g/dL    Hematocrit 36.6 35.0 - 47.0 %    MCV 94 78 - 100 fl    MCH 32.6 26.5 - 33.0 pg    MCHC 34.7 31.5 - 36.5 g/dL    RDW 12.1 10.0 - 15.0 %    Platelet Count 239  150 - 450 10e9/L    Diff Method Automated Method     % Neutrophils 66.9 %    % Lymphocytes 25.0 %    % Monocytes 5.4 %    % Eosinophils 2.5 %    % Basophils 0.1 %    % Immature Granulocytes 0.1 %    Nucleated RBCs 0 0 /100    Absolute Neutrophil 5.6 1.6 - 8.3 10e9/L    Absolute Lymphocytes 2.1 0.8 - 5.3 10e9/L    Absolute Monocytes 0.5 0.0 - 1.3 10e9/L    Absolute Eosinophils 0.2 0.0 - 0.7 10e9/L    Absolute Basophils 0.0 0.0 - 0.2 10e9/L    Abs Immature Granulocytes 0.0 0 - 0.4 10e9/L    Absolute Nucleated RBC 0.0    Comprehensive metabolic panel     Status: Abnormal   Result Value Ref Range    Sodium 138 133 - 144 mmol/L    Potassium 3.7 3.4 - 5.3 mmol/L    Chloride 109 94 - 109 mmol/L    Carbon Dioxide 24 20 - 32 mmol/L    Anion Gap 5 3 - 14 mmol/L    Glucose 75 70 - 99 mg/dL    Urea Nitrogen 8 7 - 30 mg/dL    Creatinine 0.49 (L) 0.52 - 1.04 mg/dL    GFR Estimate >90 >60 mL/min/[1.73_m2]    GFR Estimate If Black >90 >60 mL/min/[1.73_m2]    Calcium 8.5 8.5 - 10.1 mg/dL    Bilirubin Total 0.2 0.2 - 1.3 mg/dL    Albumin 3.7 3.4 - 5.0 g/dL    Protein Total 6.8 6.8 - 8.8 g/dL    Alkaline Phosphatase 39 (L) 40 - 150 U/L    ALT 17 0 - 50 U/L    AST 12 0 - 45 U/L   Wet prep     Status: None   Result Value Ref Range    Specimen Description Vagina     Wet Prep No yeast seen     Wet Prep No motile Trichomonas seen     Wet Prep Rare  PMNs seen       Wet Prep No clue cells seen             Labs Ordered and Resulted from Time of ED Arrival Up to the Time of Departure from the ED   UA MACROSCOPIC WITH REFLEX TO MICRO AND CULTURE - Abnormal; Notable for the following components:       Result Value    Squamous Epithelial /HPF Urine 2 (*)     Mucous Urine Present (*)     All other components within normal limits   COMPREHENSIVE METABOLIC PANEL - Abnormal; Notable for the following components:    Creatinine 0.49 (*)     Alkaline Phosphatase 39 (*)     All other components within normal limits   CBC WITH PLATELETS DIFFERENTIAL    NEISSERIA GONORRHOEAE PCR   CHLAMYDIA TRACHOMATIS PCR   WET PREP            Assessments & Plan (with Medical Decision Making)   Patient nontoxic in no acute distress presents for bilateral lower quadrant abdominal pain and back pain.  Exam is benign she had minimal tenderness.  No surgical abdomen.  She had scant white discharge on pelvic.  This will be sent for culture.  Urinalysis is unremarkable for infection.  Wet prep negative.  Ultrasound shows viable pregnancy.  Remainder of labs are unremarkable.  I discussed results with the patient.  Discussed taking Tylenol for pain control.  Follow-up with primary care physician and OB.  Her symptoms are likely related to being pregnant do not feel she needs any further tests at this time and she understands.  She is stable for discharge and outpatient follow-up    I have reviewed the nursing notes.    I have reviewed the findings, diagnosis, plan and need for follow up with the patient.    Discharge Medication List as of 2/25/2020  6:41 PM          Final diagnoses:   Abdominal pain during pregnancy in first trimester     IPearl, am serving as a trained medical scribe to document services personally performed by Som Austin MD, based on the provider's statements to me.      ISom MD, was physically present and have reviewed and verified the accuracy of this note documented by Pearl Pagan.       2/25/2020   [facility], EMERGENCY DEPARTMENT     Som Austin MD  02/25/20 [id number]

## 2020-02-26 LAB
C TRACH DNA SPEC QL NAA+PROBE: NEGATIVE
N GONORRHOEA DNA SPEC QL NAA+PROBE: NEGATIVE
SPECIMEN SOURCE: NORMAL
SPECIMEN SOURCE: NORMAL

## 2020-02-26 NOTE — RESULT ENCOUNTER NOTE
Final result for both N. Gonorrhoeae PCR and Chlamydia Trachomatis PCR are NEGATIVE.  No treatment or change in treatment per [facility] ED Lab Result protocol.

## 2020-02-26 NOTE — DISCHARGE INSTRUCTIONS
Please make an appointment to follow up with Your Primary Care Provider and OB/Gyn--[facility] (phone: [phone]) as soon as possible even if entirely better.

## 2020-03-10 ENCOUNTER — OFFICE VISIT (OUTPATIENT)
Dept: OBGYN | Facility: CLINIC | Age: 20
End: 2020-03-10
Attending: ADVANCED PRACTICE MIDWIFE
Payer: COMMERCIAL

## 2020-03-10 ENCOUNTER — HEALTH MAINTENANCE LETTER (OUTPATIENT)
Age: 20
End: 2020-03-10

## 2020-03-10 ENCOUNTER — ANCILLARY PROCEDURE (OUTPATIENT)
Dept: ULTRASOUND IMAGING | Facility: CLINIC | Age: 20
End: 2020-03-10
Attending: MIDWIFE
Payer: COMMERCIAL

## 2020-03-10 VITALS
SYSTOLIC BLOOD PRESSURE: 108 MMHG | DIASTOLIC BLOOD PRESSURE: 70 MMHG | HEART RATE: 84 BPM | BODY MASS INDEX: 24.49 KG/M2 | HEIGHT: 62 IN | WEIGHT: 133.1 LBS

## 2020-03-10 DIAGNOSIS — Z34.90 ENCOUNTER FOR SUPERVISION OF NORMAL PREGNANCY, ANTEPARTUM, UNSPECIFIED GRAVIDITY: ICD-10-CM

## 2020-03-10 DIAGNOSIS — O09.91 SUPERVISION OF HIGH RISK PREGNANCY IN FIRST TRIMESTER: Primary | ICD-10-CM

## 2020-03-10 LAB
ABO + RH BLD: NORMAL
ABO + RH BLD: NORMAL
AMPHETAMINES UR QL SCN: NEGATIVE
BASOPHILS # BLD AUTO: 0 10E9/L (ref 0–0.2)
BASOPHILS NFR BLD AUTO: 0.1 %
BLD GP AB SCN SERPL QL: NORMAL
BLOOD BANK CMNT PATIENT-IMP: NORMAL
CANNABINOIDS UR QL: ABNORMAL
COCAINE UR QL: NEGATIVE
CREAT UR-MCNC: 224 MG/DL
DEPRECATED CALCIDIOL+CALCIFEROL SERPL-MC: 22 UG/L (ref 20–75)
DIFFERENTIAL METHOD BLD: NORMAL
EOSINOPHIL # BLD AUTO: 0.1 10E9/L (ref 0–0.7)
EOSINOPHIL NFR BLD AUTO: 0.9 %
ERYTHROCYTE [DISTWIDTH] IN BLOOD BY AUTOMATED COUNT: 12.1 % (ref 10–15)
HBV SURFACE AB SERPL IA-ACNC: 5.52 M[IU]/ML
HBV SURFACE AG SERPL QL IA: NONREACTIVE
HCT VFR BLD AUTO: 39.3 % (ref 35–47)
HCV AB SERPL QL IA: NONREACTIVE
HGB BLD-MCNC: 13.3 G/DL (ref 11.7–15.7)
HIV 1+2 AB+HIV1 P24 AG SERPL QL IA: NONREACTIVE
IMM GRANULOCYTES # BLD: 0 10E9/L (ref 0–0.4)
IMM GRANULOCYTES NFR BLD: 0.2 %
LYMPHOCYTES # BLD AUTO: 1.4 10E9/L (ref 0.8–5.3)
LYMPHOCYTES NFR BLD AUTO: 15.5 %
MCH RBC QN AUTO: 32.2 PG (ref 26.5–33)
MCHC RBC AUTO-ENTMCNC: 33.8 G/DL (ref 31.5–36.5)
MCV RBC AUTO: 95 FL (ref 78–100)
MONOCYTES # BLD AUTO: 0.6 10E9/L (ref 0–1.3)
MONOCYTES NFR BLD AUTO: 7 %
NEUTROPHILS # BLD AUTO: 6.6 10E9/L (ref 1.6–8.3)
NEUTROPHILS NFR BLD AUTO: 76.3 %
NRBC # BLD AUTO: 0 10*3/UL
NRBC BLD AUTO-RTO: 0 /100
OPIATES UR QL SCN: NEGATIVE
PCP UR QL SCN: NEGATIVE
PLATELET # BLD AUTO: 207 10E9/L (ref 150–450)
RBC # BLD AUTO: 4.13 10E12/L (ref 3.8–5.2)
SPECIMEN EXP DATE BLD: NORMAL
WBC # BLD AUTO: 8.7 10E9/L (ref 4–11)

## 2020-03-10 PROCEDURE — 86762 RUBELLA ANTIBODY: CPT | Performed by: ADVANCED PRACTICE MIDWIFE

## 2020-03-10 PROCEDURE — 82306 VITAMIN D 25 HYDROXY: CPT | Performed by: ADVANCED PRACTICE MIDWIFE

## 2020-03-10 PROCEDURE — 86787 VARICELLA-ZOSTER ANTIBODY: CPT | Performed by: ADVANCED PRACTICE MIDWIFE

## 2020-03-10 PROCEDURE — G0463 HOSPITAL OUTPT CLINIC VISIT: HCPCS | Mod: 25,ZF

## 2020-03-10 PROCEDURE — 87340 HEPATITIS B SURFACE AG IA: CPT | Performed by: ADVANCED PRACTICE MIDWIFE

## 2020-03-10 PROCEDURE — 82570 ASSAY OF URINE CREATININE: CPT | Performed by: ADVANCED PRACTICE MIDWIFE

## 2020-03-10 PROCEDURE — 86706 HEP B SURFACE ANTIBODY: CPT | Performed by: ADVANCED PRACTICE MIDWIFE

## 2020-03-10 PROCEDURE — 86900 BLOOD TYPING SEROLOGIC ABO: CPT | Performed by: ADVANCED PRACTICE MIDWIFE

## 2020-03-10 PROCEDURE — 85025 COMPLETE CBC W/AUTO DIFF WBC: CPT | Performed by: ADVANCED PRACTICE MIDWIFE

## 2020-03-10 PROCEDURE — 87086 URINE CULTURE/COLONY COUNT: CPT | Performed by: ADVANCED PRACTICE MIDWIFE

## 2020-03-10 PROCEDURE — 87389 HIV-1 AG W/HIV-1&-2 AB AG IA: CPT | Performed by: ADVANCED PRACTICE MIDWIFE

## 2020-03-10 PROCEDURE — 36415 COLL VENOUS BLD VENIPUNCTURE: CPT | Performed by: ADVANCED PRACTICE MIDWIFE

## 2020-03-10 PROCEDURE — 80307 DRUG TEST PRSMV CHEM ANLYZR: CPT | Performed by: ADVANCED PRACTICE MIDWIFE

## 2020-03-10 PROCEDURE — 86901 BLOOD TYPING SEROLOGIC RH(D): CPT | Performed by: ADVANCED PRACTICE MIDWIFE

## 2020-03-10 PROCEDURE — 86780 TREPONEMA PALLIDUM: CPT | Performed by: ADVANCED PRACTICE MIDWIFE

## 2020-03-10 PROCEDURE — 76801 OB US < 14 WKS SINGLE FETUS: CPT

## 2020-03-10 PROCEDURE — 86803 HEPATITIS C AB TEST: CPT | Performed by: ADVANCED PRACTICE MIDWIFE

## 2020-03-10 PROCEDURE — 86850 RBC ANTIBODY SCREEN: CPT | Performed by: ADVANCED PRACTICE MIDWIFE

## 2020-03-10 RX ORDER — PYRIDOXINE HCL (VITAMIN B6) 25 MG
25 TABLET ORAL 3 TIMES DAILY
Qty: 90 TABLET | Refills: 1 | Status: SHIPPED | OUTPATIENT
Start: 2020-03-10 | End: 2020-04-01

## 2020-03-10 SDOH — SOCIAL STABILITY: SOCIAL NETWORK: HOW OFTEN DO YOU ATTENT MEETINGS OF THE CLUB OR ORGANIZATION YOU BELONG TO?: NEVER

## 2020-03-10 SDOH — ECONOMIC STABILITY: TRANSPORTATION INSECURITY
IN THE PAST 12 MONTHS, HAS LACK OF TRANSPORTATION KEPT YOU FROM MEETINGS, WORK, OR FROM GETTING THINGS NEEDED FOR DAILY LIVING?: NO

## 2020-03-10 SDOH — SOCIAL STABILITY: SOCIAL INSECURITY
WITHIN THE LAST YEAR, HAVE YOU BEEN KICKED, HIT, SLAPPED, OR OTHERWISE PHYSICALLY HURT BY YOUR PARTNER OR EX-PARTNER?: NO

## 2020-03-10 SDOH — ECONOMIC STABILITY: TRANSPORTATION INSECURITY
IN THE PAST 12 MONTHS, HAS THE LACK OF TRANSPORTATION KEPT YOU FROM MEDICAL APPOINTMENTS OR FROM GETTING MEDICATIONS?: NO

## 2020-03-10 SDOH — SOCIAL STABILITY: SOCIAL NETWORK
IN A TYPICAL WEEK, HOW MANY TIMES DO YOU TALK ON THE PHONE WITH FAMILY, FRIENDS, OR NEIGHBORS?: MORE THAN THREE TIMES A WEEK

## 2020-03-10 SDOH — SOCIAL STABILITY: SOCIAL NETWORK: ARE YOU MARRIED, WIDOWED, DIVORCED, SEPARATED, NEVER MARRIED, OR LIVING WITH A PARTNER?: NEVER MARRIED

## 2020-03-10 SDOH — HEALTH STABILITY: MENTAL HEALTH: HOW OFTEN DO YOU HAVE 6 OR MORE DRINKS ON ONE OCCASION?: NEVER

## 2020-03-10 SDOH — SOCIAL STABILITY: SOCIAL INSECURITY
WITHIN THE LAST YEAR, HAVE TO BEEN RAPED OR FORCED TO HAVE ANY KIND OF SEXUAL ACTIVITY BY YOUR PARTNER OR EX-PARTNER?: NO

## 2020-03-10 SDOH — SOCIAL STABILITY: SOCIAL INSECURITY: WITHIN THE LAST YEAR, HAVE YOU BEEN HUMILIATED OR EMOTIONALLY ABUSED IN OTHER WAYS BY YOUR PARTNER OR EX-PARTNER?: NO

## 2020-03-10 SDOH — ECONOMIC STABILITY: INCOME INSECURITY: HOW HARD IS IT FOR YOU TO PAY FOR THE VERY BASICS LIKE FOOD, HOUSING, MEDICAL CARE, AND HEATING?: NOT VERY HARD

## 2020-03-10 SDOH — HEALTH STABILITY: PHYSICAL HEALTH: ON AVERAGE, HOW MANY DAYS PER WEEK DO YOU ENGAGE IN MODERATE TO STRENUOUS EXERCISE (LIKE A BRISK WALK)?: 1 DAY

## 2020-03-10 SDOH — HEALTH STABILITY: PHYSICAL HEALTH: ON AVERAGE, HOW MANY MINUTES DO YOU ENGAGE IN EXERCISE AT THIS LEVEL?: 60 MIN

## 2020-03-10 SDOH — ECONOMIC STABILITY: FOOD INSECURITY: WITHIN THE PAST 12 MONTHS, THE FOOD YOU BOUGHT JUST DIDN'T LAST AND YOU DIDN'T HAVE MONEY TO GET MORE.: NEVER TRUE

## 2020-03-10 SDOH — SOCIAL STABILITY: SOCIAL NETWORK
DO YOU BELONG TO ANY CLUBS OR ORGANIZATIONS SUCH AS CHURCH GROUPS UNIONS, FRATERNAL OR ATHLETIC GROUPS, OR SCHOOL GROUPS?: NO

## 2020-03-10 SDOH — SOCIAL STABILITY: SOCIAL INSECURITY: WITHIN THE LAST YEAR, HAVE YOU BEEN AFRAID OF YOUR PARTNER OR EX-PARTNER?: NO

## 2020-03-10 SDOH — SOCIAL STABILITY: SOCIAL NETWORK: HOW OFTEN DO YOU ATTEND CHURCH OR RELIGIOUS SERVICES?: NEVER

## 2020-03-10 SDOH — HEALTH STABILITY: MENTAL HEALTH
STRESS IS WHEN SOMEONE FEELS TENSE, NERVOUS, ANXIOUS, OR CAN'T SLEEP AT NIGHT BECAUSE THEIR MIND IS TROUBLED. HOW STRESSED ARE YOU?: VERY MUCH

## 2020-03-10 SDOH — SOCIAL STABILITY: SOCIAL NETWORK: HOW OFTEN DO YOU GET TOGETHER WITH FRIENDS OR RELATIVES?: MORE THAN THREE TIMES A WEEK

## 2020-03-10 SDOH — ECONOMIC STABILITY: FOOD INSECURITY: WITHIN THE PAST 12 MONTHS, YOU WORRIED THAT YOUR FOOD WOULD RUN OUT BEFORE YOU GOT MONEY TO BUY MORE.: NEVER TRUE

## 2020-03-10 SDOH — HEALTH STABILITY: MENTAL HEALTH: HOW OFTEN DO YOU HAVE A DRINK CONTAINING ALCOHOL?: NEVER

## 2020-03-10 ASSESSMENT — ANXIETY QUESTIONNAIRES
1. FEELING NERVOUS, ANXIOUS, OR ON EDGE: MORE THAN HALF THE DAYS
3. WORRYING TOO MUCH ABOUT DIFFERENT THINGS: MORE THAN HALF THE DAYS
2. NOT BEING ABLE TO STOP OR CONTROL WORRYING: MORE THAN HALF THE DAYS
5. BEING SO RESTLESS THAT IT IS HARD TO SIT STILL: SEVERAL DAYS
GAD7 TOTAL SCORE: 13
7. FEELING AFRAID AS IF SOMETHING AWFUL MIGHT HAPPEN: MORE THAN HALF THE DAYS
6. BECOMING EASILY ANNOYED OR IRRITABLE: MORE THAN HALF THE DAYS

## 2020-03-10 ASSESSMENT — PATIENT HEALTH QUESTIONNAIRE - PHQ9
SUM OF ALL RESPONSES TO PHQ QUESTIONS 1-9: 5
5. POOR APPETITE OR OVEREATING: MORE THAN HALF THE DAYS

## 2020-03-10 ASSESSMENT — MIFFLIN-ST. JEOR: SCORE: 1326.99

## 2020-03-10 NOTE — LETTER
3/10/2020       RE: Sophia Stiles   [address]  [place] [address]     Dear Colleague,    Thank you for referring your patient, Sophia Stiles, to the [facility] at [facility]. Please see a copy of my visit note below.    [facility] OB Intake note  Subjective   19 year old woman presents to clinic for initiation of OB care. Patient's last menstrual period was 2019. . Estimated Date of Delivery: Sep 18, 2020 based on US. Reviewed dating ultrasound. Pregnancy is unplanned but welcomed.      - Symptoms since LMP include cramping, nausea, vomiting and fatigue. Patient has tried these relief measures: increased rest.    Other concerns:    - Patient reports increased anxiety. Has rx for hydroxyzine that she plans on picking up today.   - Patient has a history of substance abuse. Last smoked marijuana on 2/10/20. Currently in remission, utilizing [facility]. She is currently still using nicotine, she is interested in nicotine cessation. Patient currently resides at the [facility], through her outpatient treatment program.     - Genetic/Infection questionnaire completed, risks include: none. Pt  does not have a recent known exposure to Parvo or CMV so IgG/IgM testing WILL NOT be ordered.   Have you traveled during the pregnancy? No  Have your sexual partner(s) travelled during the pregnancy? No    - Current Medications:    Current Outpatient Medications   Medication Sig Dispense Refill     doxylamine (UNISOM) 25 MG TABS tablet Take 0.5 tablets (12.5 mg) by mouth At Bedtime 90 tablet 1     hydrOXYzine (ATARAX) 10 MG tablet Take 1-5 tablets (10-50 mg) by mouth 3 times daily as needed for itching 90 tablet 1     Prenatal MV-Min-Fe Fum-FA-DHA (PRENATAL 1 PO)        pyridOXINE (VITAMIN B6) 25 MG tablet Take 1 tablet (25 mg) by mouth 3 times daily 90 tablet 1     sertraline (ZOLOFT) 50 MG tablet Take 1 tablet (50 mg) by  mouth daily 30 tablet 1         - Co-morbidities:   Past Medical History:   Diagnosis Date     Anxiety      Depression      Substance abuse (H)      - Risk for GDM -  has No known risk factors for GDM WILL NOT have an early GCT and possible Hgb A1C    - High Risk for Pre E-  Has No known risk factors of High risk for Pre E so WILL NOT start low dose aspirin (81mg) starting between 12 and 28 weeks to prevent early onset preeclampsia.    - Moderate risk - has moderate risk factors for Pre E including Nulliparity    Since she meets one of the moderate risk facrtors for Pre E -   so WILL NOT consider starting low dose aspirin (81mg) starting between 12 and 28 weeks to prevent early onset preeclampsia.    - The patient  does not have a history of spontaneous  birth so  WILL NOT consider progesterone starting at 16-20 weeks and/or serial transvaginal cervical length ultrasounds from 16-24 weeks.     -The patient does not have a history of immunosuppresion or HIV so Toxoplasma IgG/IgM WILL NOT be ordered.    PERSONAL/SOCIAL HISTORY  Partnered: Boyfriend's name is Williams   Currently living at the [facility] through [facility]. Anticipates being there until 2020.   Employment: Unemployed.   History of anxiety and depression - if Yes, therapy/medication/hospitalization: Currently on Hydroxyzine, Zoloft.    -Hospitalizations x 2:  and 2018 for suicidal ideation.  Additional items: Receiving information regarding [place]  Knows about [facility] through her outpatient treatment program.    Objective  -VS: reviewed and within normal limits   -General appearance: no acute distress, patient is comfortable   NEUROLOGICAL/PSYCHIATRIC   - Orientated x 3   -Mood and affect: normal     Assessment/Plan:  Supervision of high risk pregnancy in first trimester  Orders Placed This Encounter   Procedures     Vitamin D Deficiency     CBC with platelets differential     Hepatitis B Surface Antibody      Hepatitis B surface antigen     Hepatitis C antibody     Treponema Abs w Reflex to RPR and Titer     Rubella Antibody IgG Quantitative     HIV Antigen Antibody Combo     Drug abuse scrn 7 UR (/) (RH, SH, UR)     Varicella Zoster Virus Antibody IgG     Creatinine random urine     Creatinine random urine     ABO/Rh type and screen     - Oriented to Practice, types of care, and how to reach a provider.  Pt prefers CNM team  - Patient received 1st trimester new OB education packet complete with aide of [employer] booklet including information on genetic screening test options.    - Patient desires screening, however undecided which one she would like. She is looking into insurance coverage. Will let clinic know if she desires first trimester screening, aware of timeline.  - Educated patient on eating small, frequent meals to help with nausea. Unisom and Vitamin B6 Rx sent to pharmacy.   - Educational handout on the prevention of infections diseases during pregnancy provided.  - Patient was encouraged to start prenatal vitamins as tolerated.    - Reviewed options for NRT, pt would like to try lozenges, but declined prescription today.  - Patient was sent to lab for routine OB labs including varicella, DAU7 with patient's consent.   - Pregnancy concerns to be addressed by provider at new OB exam include: tobacco use, genetic screening, GC/CT  - Pt to RTO for NOB visit in 2 weeks and prn if questions or concerns    I, Milagros Faulkner, am serving as a scribe; to document services personally performed by Aiden Castro based on data collection and the provider's statements to me. - AUTUMN Fagan DNP Student   The encounter was performed by me and scribed by the SNM. The scribed note accurately reflects my personal services and decisions made by me. -SHAJI MonkM

## 2020-03-10 NOTE — PROGRESS NOTES
[facility] OB Intake note  Subjective   19 year old woman presents to clinic for initiation of OB care. Patient's last menstrual period was 2019. . Estimated Date of Delivery: Sep 18, 2020 based on US. Reviewed dating ultrasound. Pregnancy is unplanned but welcomed.      - Symptoms since LMP include cramping, nausea, vomiting and fatigue. Patient has tried these relief measures: increased rest.    Other concerns:    - Patient reports increased anxiety. Has rx for hydroxyzine that she plans on picking up today.   - Patient has a history of substance abuse. Last smoked marijuana on 2/10/20. Currently in remission, utilizing [facility]. She is currently still using nicotine, she is interested in nicotine cessation. Patient currently resides at the [facility], through her outpatient treatment program.     - Genetic/Infection questionnaire completed, risks include: none. Pt  does not have a recent known exposure to Parvo or CMV so IgG/IgM testing WILL NOT be ordered.   Have you traveled during the pregnancy? No  Have your sexual partner(s) travelled during the pregnancy? No    - Current Medications:    Current Outpatient Medications   Medication Sig Dispense Refill     doxylamine (UNISOM) 25 MG TABS tablet Take 0.5 tablets (12.5 mg) by mouth At Bedtime 90 tablet 1     hydrOXYzine (ATARAX) 10 MG tablet Take 1-5 tablets (10-50 mg) by mouth 3 times daily as needed for itching 90 tablet 1     Prenatal MV-Min-Fe Fum-FA-DHA (PRENATAL 1 PO)        pyridOXINE (VITAMIN B6) 25 MG tablet Take 1 tablet (25 mg) by mouth 3 times daily 90 tablet 1     sertraline (ZOLOFT) 50 MG tablet Take 1 tablet (50 mg) by mouth daily 30 tablet 1         - Co-morbidities:   Past Medical History:   Diagnosis Date     Anxiety      Depression      Substance abuse (H)      - Risk for GDM -  has No known risk factors for GDM WILL NOT have an early GCT and possible Hgb A1C    - High Risk for Pre E-  Has No known risk  factors of High risk for Pre E so WILL NOT start low dose aspirin (81mg) starting between 12 and 28 weeks to prevent early onset preeclampsia.    - Moderate risk - has moderate risk factors for Pre E including Nulliparity    Since she meets one of the moderate risk facrtors for Pre E -   so WILL NOT consider starting low dose aspirin (81mg) starting between 12 and 28 weeks to prevent early onset preeclampsia.    - The patient  does not have a history of spontaneous  birth so  WILL NOT consider progesterone starting at 16-20 weeks and/or serial transvaginal cervical length ultrasounds from 16-24 weeks.     -The patient does not have a history of immunosuppresion or HIV so Toxoplasma IgG/IgM WILL NOT be ordered.    PERSONAL/SOCIAL HISTORY  Partnered: Boyfriend's name is Williams   Currently living at the [facility] through [facility]. Anticipates being there until 2020.   Employment: Unemployed.   History of anxiety and depression - if Yes, therapy/medication/hospitalization: Currently on Hydroxyzine, Zoloft.    -Hospitalizations x 2:  and 2018 for suicidal ideation.  Additional items: Receiving information regarding [facility]  Knows about [facility] through her outpatient treatment program.    Objective  -VS: reviewed and within normal limits   -General appearance: no acute distress, patient is comfortable   NEUROLOGICAL/PSYCHIATRIC   - Orientated x 3   -Mood and affect: normal     Assessment/Plan:  Supervision of high risk pregnancy in first trimester  Orders Placed This Encounter   Procedures     Vitamin D Deficiency     CBC with platelets differential     Hepatitis B Surface Antibody     Hepatitis B surface antigen     Hepatitis C antibody     Treponema Abs w Reflex to RPR and Titer     Rubella Antibody IgG Quantitative     HIV Antigen Antibody Combo     Drug abuse scrn 7 UR (/) (RH, SH, UR)     Varicella Zoster Virus Antibody IgG     Creatinine random urine      Creatinine random urine     ABO/Rh type and screen     - Oriented to Practice, types of care, and how to reach a provider.  Pt prefers CNM team  - Patient received 1st trimester new OB education packet complete with aide of [employer] booklet including information on genetic screening test options.    - Patient desires screening, however undecided which one she would like. She is looking into insurance coverage. Will let clinic know if she desires first trimester screening, aware of timeline.  - Educated patient on eating small, frequent meals to help with nausea. Unisom and Vitamin B6 Rx sent to pharmacy.   - Educational handout on the prevention of infections diseases during pregnancy provided.  - Patient was encouraged to start prenatal vitamins as tolerated.    - Reviewed options for NRT, pt would like to try lozenges, but declined prescription today.  - Patient was sent to lab for routine OB labs including varicella, DAU7 with patient's consent.   - Pregnancy concerns to be addressed by provider at new OB exam include: tobacco use, genetic screening, GC/CT  - Pt to RTO for NOB visit in 2 weeks and prn if questions or concerns    I, Milagros Faulkner, am serving as a scribe; to document services personally performed by Aiden Castro based on data collection and the provider's statements to me. - AUTUMN Fagan DNP Student   The encounter was performed by me and scribed by the SN. The scribed note accurately reflects my personal services and decisions made by me. -SHAJI MonkM

## 2020-03-11 LAB
BACTERIA SPEC CULT: NORMAL
Lab: NORMAL
RUBV IGG SERPL IA-ACNC: 29 IU/ML
SPECIMEN SOURCE: NORMAL
T PALLIDUM AB SER QL: NONREACTIVE
VZV IGG SER QL IA: 0.5 AI (ref 0–0.8)

## 2020-03-11 ASSESSMENT — ANXIETY QUESTIONNAIRES: GAD7 TOTAL SCORE: 13

## 2020-03-12 PROBLEM — Z78.9 NONIMMUNE TO HEPATITIS B VIRUS: Status: ACTIVE | Noted: 2020-03-12

## 2020-03-12 PROBLEM — E55.9 VITAMIN D DEFICIENCY: Status: ACTIVE | Noted: 2020-03-12

## 2020-03-12 PROBLEM — O09.899 MATERNAL VARICELLA, NON-IMMUNE: Status: ACTIVE | Noted: 2020-03-12

## 2020-03-12 PROBLEM — O09.90 HIGH-RISK PREGNANCY, UNSPECIFIED TRIMESTER: Status: ACTIVE | Noted: 2020-03-12

## 2020-03-12 PROBLEM — Z28.39 MATERNAL VARICELLA, NON-IMMUNE: Status: ACTIVE | Noted: 2020-03-12

## 2020-03-15 PROBLEM — O09.91 SUPERVISION OF HIGH RISK PREGNANCY IN FIRST TRIMESTER: Status: ACTIVE | Noted: 2020-03-12

## 2020-03-20 ENCOUNTER — HOSPITAL ENCOUNTER (EMERGENCY)
Facility: CLINIC | Age: 20
Discharge: HOME | End: 2020-03-20
Attending: EMERGENCY MEDICINE | Admitting: EMERGENCY MEDICINE
Payer: COMMERCIAL

## 2020-03-20 VITALS
TEMPERATURE: 97.7 F | RESPIRATION RATE: 18 BRPM | DIASTOLIC BLOOD PRESSURE: 64 MMHG | OXYGEN SATURATION: 100 % | SYSTOLIC BLOOD PRESSURE: 118 MMHG | HEART RATE: 86 BPM

## 2020-03-20 DIAGNOSIS — F19.10 SUBSTANCE ABUSE (H): ICD-10-CM

## 2020-03-20 LAB — ALCOHOL BREATH TEST: 0 (ref 0–0.01)

## 2020-03-20 PROCEDURE — 99283 EMERGENCY DEPT VISIT LOW MDM: CPT | Performed by: EMERGENCY MEDICINE

## 2020-03-20 PROCEDURE — 99283 EMERGENCY DEPT VISIT LOW MDM: CPT | Mod: Z6 | Performed by: EMERGENCY MEDICINE

## 2020-03-20 PROCEDURE — 82075 ASSAY OF BREATH ETHANOL: CPT | Performed by: EMERGENCY MEDICINE

## 2020-03-20 NOTE — ED TRIAGE NOTES
Pt was 90 days clean and used heroin yesterday. Pt is 14 wks preg.  Pt was sent here for detox from the sober house.

## 2020-03-20 NOTE — DISCHARGE INSTRUCTIONS
May go to her mother's    Do not use recreational drugs while pregnant    Please make an appointment to follow up with our intake service at [phone] or Your Primary Care Provider as soon as possible.

## 2020-03-20 NOTE — ED PROVIDER NOTES
ED Provider Note  [facility]      History     Chief Complaint   Patient presents with     Drug / Alcohol Assessment     HPI  Sophia Stiles is a 19 year old female who states she got kicked out of her sober house for using heroin last night.  Patient was brought to the ER for further evaluation.  Patient admits that she is 14 weeks pregnant.  Patient states she does not want any evaluation and wants to go home to her mother says she knows she cannot go back to her sober house.    Past Medical History  Past Medical History:   Diagnosis Date     Anxiety      Depression      Substance abuse (H)      No past surgical history on file.     doxylamine (UNISOM) 25 MG TABS tablet  hydrOXYzine (ATARAX) 10 MG tablet  Prenatal MV-Min-Fe Fum-FA-DHA (PRENATAL 1 PO)  pyridOXINE (VITAMIN B6) 25 MG tablet  sertraline (ZOLOFT) 50 MG tablet      No Known Allergies  Past medical history, past surgical history, medications, and allergies were reviewed with the patient. Additional pertinent items: None    Family History  No family history on file.  Family history was reviewed with the patient. Additional pertinent items: None    Social History  Social History     Tobacco Use     Smoking status: Current Every Day Smoker     Packs/day: 0.50     Years: 5.00     Pack years: 2.50     Types: Cigarettes, Vaping Device     Start date: 1/7/2015     Smokeless tobacco: Current User     Tobacco comment: Patient utilizing nicotine lozenges    Substance Use Topics     Alcohol use: Not Currently     Frequency: Never     Binge frequency: Never     Comment: Went to treatment at [facility], currently in remission     Drug use: Not Currently     Types: Cocaine, Marijuana, Opiates     Comment: + heroin. last smoked marijuana 2/10, has not used any other substance since 12/18/2019      Social history was reviewed with the patient. Additional pertinent items: None    Review of Systems  A complete review of systems was performed  with pertinent positives and negatives noted in the HPI, and all other systems negative.    Physical Exam   BP: 118/64  Pulse: 86  Temp: 97.7  F (36.5  C)  Resp: 18  SpO2: 100 %  Physical Exam  Vitals signs and nursing note reviewed.   Constitutional:       Appearance: Normal appearance. She is not ill-appearing or diaphoretic.   HENT:      Head: Atraumatic.   Eyes:      Pupils: Pupils are equal, round, and reactive to light.   Neck:      Musculoskeletal: Normal range of motion.   Pulmonary:      Effort: No respiratory distress.   Musculoskeletal:      Comments: Grossly intact   Neurological:      General: No focal deficit present.      Mental Status: She is alert and oriented to person, place, and time.      Comments: Moving all extremities   Psychiatric:         Mood and Affect: Mood normal.      Comments: Patient is alert and oriented x3, patient has good grasp of common knowledge.         ED Course      Procedures           Results for orders placed or performed during the hospital encounter of 03/20/20   Alcohol breath test POCT     Status: Normal   Result Value Ref Range    Alcohol Breath Test 0.00 0.00 - 0.01          Assessments & Plan (with Medical Decision Making)     I have reviewed the nursing notes.    Patient was counseled that child protection services cannot hold her against her well should she continue to use recreational drugs while pregnant.  Patient was counseled not to use recreational drugs while pregnant and to get back into treatment.    At this time the patient is clinically competent and refusing further evaluation in the ER.        Final diagnoses:   Substance abuse (H)     May go to her mother's    Do not use recreational drugs while pregnant    Please make an appointment to follow up with our intake service at [phone] or Your Primary Care Provider as soon as possible.    Routine discharge instructions were given for this diagnosis    Maximo Moffett MD, MD    --  Maximo  Werner Moffett MD, MD   Emergency Medicine   [facility], [place], EMERGENCY DEPARTMENT  3/20/2020     Maximo Moffett MD  03/20/20 1046

## 2020-03-20 NOTE — ED NOTES
Pt states that she wants to leave. She states that she was at a treatment center and they drove her to the ED because she used heroin last night. She does not want detox and she does not want treatment

## 2020-03-21 ENCOUNTER — HOSPITAL ENCOUNTER (EMERGENCY)
Facility: CLINIC | Age: 20
Discharge: HOME | End: 2020-03-21
Attending: EMERGENCY MEDICINE | Admitting: EMERGENCY MEDICINE
Payer: COMMERCIAL

## 2020-03-21 VITALS
SYSTOLIC BLOOD PRESSURE: 112 MMHG | RESPIRATION RATE: 16 BRPM | OXYGEN SATURATION: 97 % | DIASTOLIC BLOOD PRESSURE: 52 MMHG | HEART RATE: 94 BPM | TEMPERATURE: 98.6 F

## 2020-03-21 DIAGNOSIS — F11.90 HEROIN USE: ICD-10-CM

## 2020-03-21 PROCEDURE — 99284 EMERGENCY DEPT VISIT MOD MDM: CPT | Mod: Z6 | Performed by: EMERGENCY MEDICINE

## 2020-03-21 PROCEDURE — 99283 EMERGENCY DEPT VISIT LOW MDM: CPT | Performed by: EMERGENCY MEDICINE

## 2020-03-21 ASSESSMENT — ENCOUNTER SYMPTOMS
FEVER: 0
COLOR CHANGE: 0
HEADACHES: 0
NECK STIFFNESS: 0
ABDOMINAL PAIN: 0
CONFUSION: 0
ARTHRALGIAS: 0
EYE REDNESS: 0
DIFFICULTY URINATING: 0
SHORTNESS OF BREATH: 0

## 2020-03-21 NOTE — ED AVS SNAPSHOT
[facility], [place], Emergency Department  [address]  [facility] [address]  Phone:  [phone]  Fax:  [phone]                                    Sophia Stiles   MRN: [id number]    Department:  [facility], Emergency Department   Date of Visit:  3/21/2020           After Visit Summary Signature Page    I have received my discharge instructions, and my questions have been answered. I have discussed any challenges I see with this plan with the nurse or doctor.    ..........................................................................................................................................  Patient/Patient Representative Signature      ..........................................................................................................................................  Patient Representative Print Name and Relationship to Patient    ..................................................               ................................................  Date                                   Time    ..........................................................................................................................................  Reviewed by Signature/Title    ...................................................              ..............................................  Date                                               Time          22EPIC Rev 08/18

## 2020-03-22 NOTE — DISCHARGE INSTRUCTIONS
Follow up with addiction medicine at [facility]. [phone]. If you feel like using or go into withdrawal, go to [facility] ED and talk to them about a dose of suboxone.

## 2020-03-22 NOTE — ED PROVIDER NOTES
[facility] EMERGENCY DEPARTMENT ([facility])  3/21/20    History     Chief Complaint   Patient presents with     Addiction Problem     Detox from heroin, last use Thursday, 14 weeks pregnant.     The history is provided by the patient and medical records.     Sophia Stiles is a 19 year old  female who is reportedly 14w1d by LMP of 2019 with a past medical history significant for depression, anxiety and polysubstance abuse who presents here to the Emergency Department seeking detox from heroin. States that she was sober for 90 days and relapsed on heroin on Thursday (3/19/2020). States that she only used once. Was kicked out of her sober house at that time.    Patient's last menstrual period was 2019.    I have reviewed the Medications, Allergies, Past Medical and Surgical History, and Social History in the [employer] system.  PAST MEDICAL HISTORY:   Past Medical History:   Diagnosis Date     Anxiety     has used mirazapine      Depression      Heroin overdose (H)     x2     Substance abuse (H)      Suicide attempt (H)     age 14 and age 17        PAST SURGICAL HISTORY: No past surgical history on file.    Past medical history, past surgical history, medications, and allergies were reviewed with the patient. Additional pertinent items: None    FAMILY HISTORY: No family history on file.    SOCIAL HISTORY:   Social History     Tobacco Use     Smoking status: Current Every Day Smoker     Packs/day: 0.50     Years: 5.00     Pack years: 2.50     Types: Cigarettes, Vaping Device     Start date: 2015     Smokeless tobacco: Current User     Tobacco comment: Patient utilizing nicotine lozenges    Substance Use Topics     Alcohol use: Not Currently     Frequency: Never     Binge frequency: Never     Comment: Went to treatment at [facility], currently in remission     Social history was reviewed with the patient. Additional pertinent items: None      Discharge Medication List as of 3/21/2020  9:38 PM       CONTINUE these medications which have NOT CHANGED    Details   Prenatal MV-Min-Fe Fum-FA-DHA (PRENATAL 1 PO) Historical      doxylamine (UNISOM) 25 MG TABS tablet Take 0.5 tablets (12.5 mg) by mouth At Bedtime, Disp-90 tablet,R-1, E-Prescribe      hydrOXYzine (ATARAX) 10 MG tablet Take 1-5 tablets (10-50 mg) by mouth 3 times daily as needed for itching, Disp-90 tablet, R-1, E-Prescribe      pyridOXINE (VITAMIN B6) 25 MG tablet Take 1 tablet (25 mg) by mouth 3 times daily, Disp-90 tablet,R-1, E-Prescribe      sertraline (ZOLOFT) 50 MG tablet Take 1 tablet (50 mg) by mouth daily, Disp-30 tablet, R-1, E-Prescribe              No Known Allergies     Review of Systems   Constitutional: Negative for fever.   HENT: Negative for congestion.    Eyes: Negative for redness.   Respiratory: Negative for shortness of breath.    Cardiovascular: Negative for chest pain.   Gastrointestinal: Negative for abdominal pain.   Genitourinary: Negative for difficulty urinating.   Musculoskeletal: Negative for arthralgias and neck stiffness.   Skin: Negative for color change.   Neurological: Negative for headaches.   Psychiatric/Behavioral: Negative for confusion.   All other systems reviewed and are negative.    A complete review of systems was performed with pertinent positives and negatives noted in the HPI, and all other systems negative.    Physical Exam   BP: 113/64  Pulse: 94  Heart Rate: 94  Temp: 98.5  F (36.9  C)  Resp: 16  SpO2: 100 %      Physical Exam  Constitutional:       General: She is not in acute distress.     Appearance: She is not diaphoretic.   HENT:      Head: Atraumatic.      Mouth/Throat:      Pharynx: No oropharyngeal exudate.   Eyes:      General: No scleral icterus.     Pupils: Pupils are equal, round, and reactive to light.   Cardiovascular:      Heart sounds: Normal heart sounds.   Pulmonary:      Effort: No respiratory distress.      Breath sounds: Normal breath sounds.   Abdominal:      General: Bowel  sounds are normal.      Palpations: Abdomen is soft.      Tenderness: There is no abdominal tenderness.   Musculoskeletal:         General: No tenderness.   Skin:     General: Skin is warm.      Findings: No rash.         ED Course   9:19 PM  The patient was seen and examined by Atif Daniel DO in Room ED16C.        Procedures                           No results found for this or any previous visit (from the past 24 hour(s)).  Medications - No data to display          Assessments & Plan (with Medical Decision Making)     21 yo f here w/ relapse in heroine, recommended she f/u w/ addiction medicine tomorrow regarding suboxone.    I have reviewed the nursing notes.    I have reviewed the findings, diagnosis, plan and need for follow up with the patient.    Discharge Medication List as of 3/21/2020  9:38 PM          Final diagnoses:   Heroin use     Carmelo RODRIGUEZ, am serving as a trained medical scribe to document services personally performed by Atif Daniel DO, based on the provider's statements to me.   IAtif DO, was physically present and have reviewed and verified the accuracy of this note documented by Carmelo Nicholas.    3/21/2020   [facility], [place], EMERGENCY DEPARTMENT     Atif Daniel MD  04/10/20 0036

## 2020-03-23 ENCOUNTER — TRANSFERRED RECORDS (OUTPATIENT)
Dept: HEALTH INFORMATION MANAGEMENT | Facility: CLINIC | Age: 20
End: 2020-03-23

## 2020-03-24 ENCOUNTER — OFFICE VISIT (OUTPATIENT)
Dept: OBGYN | Facility: CLINIC | Age: 20
End: 2020-03-24
Attending: ADVANCED PRACTICE MIDWIFE
Payer: COMMERCIAL

## 2020-03-24 VITALS
WEIGHT: 135.7 LBS | DIASTOLIC BLOOD PRESSURE: 68 MMHG | SYSTOLIC BLOOD PRESSURE: 104 MMHG | BODY MASS INDEX: 24.97 KG/M2 | HEART RATE: 84 BPM | HEIGHT: 62 IN

## 2020-03-24 DIAGNOSIS — O99.322 HEROIN ABUSE AFFECTING PREGNANCY IN SECOND TRIMESTER (H): ICD-10-CM

## 2020-03-24 DIAGNOSIS — F11.10 HEROIN ABUSE AFFECTING PREGNANCY IN SECOND TRIMESTER (H): ICD-10-CM

## 2020-03-24 DIAGNOSIS — F19.10 POLYSUBSTANCE ABUSE (H): ICD-10-CM

## 2020-03-24 DIAGNOSIS — O09.92 SUPERVISION OF HIGH RISK PREGNANCY IN SECOND TRIMESTER: Primary | ICD-10-CM

## 2020-03-24 PROBLEM — F19.20 CHEMICAL DEPENDENCY (H): Status: RESOLVED | Noted: 2019-02-26 | Resolved: 2020-03-24

## 2020-03-24 PROCEDURE — 87591 N.GONORRHOEAE DNA AMP PROB: CPT | Performed by: MIDWIFE

## 2020-03-24 PROCEDURE — 87491 CHLMYD TRACH DNA AMP PROBE: CPT | Performed by: MIDWIFE

## 2020-03-24 PROCEDURE — G0463 HOSPITAL OUTPT CLINIC VISIT: HCPCS | Mod: ZF

## 2020-03-24 RX ORDER — ASCORBIC ACID 250 MG
250 TABLET,CHEWABLE ORAL DAILY
Qty: 90 TABLET | Refills: 0 | Status: SHIPPED | OUTPATIENT
Start: 2020-03-24 | End: 2020-11-05

## 2020-03-24 RX ORDER — FERROUS SULFATE 325(65) MG
325 TABLET, DELAYED RELEASE (ENTERIC COATED) ORAL DAILY
Qty: 60 TABLET | Refills: 3 | Status: SHIPPED | OUTPATIENT
Start: 2020-03-24 | End: 2020-07-15

## 2020-03-24 RX ORDER — LANOLIN ALCOHOL/MO/W.PET/CERES
1000 CREAM (GRAM) TOPICAL DAILY
Qty: 60 TABLET | Refills: 3 | Status: SHIPPED | OUTPATIENT
Start: 2020-03-24 | End: 2020-11-05

## 2020-03-24 ASSESSMENT — MIFFLIN-ST. JEOR: SCORE: 1338.78

## 2020-03-24 NOTE — PROGRESS NOTES
"SUBJECTIVE:   Sophia is a 19 year old female who presents to clinic for a new OB visit.   at 14w4d with Estimated Date of Delivery: Sep 18, 2020 based on US confirms. Feels well. Has  started PNV.  Reviewed recommendation for FE< Vit C and Vit B 12   She has not had bleeding since her LMP.   She has not had nausea. Weight loss has not occurred.   This was not a planned pregnancy.   Partner is involved,  Williams 1 st excited    single  OTHER CONCERNS: pt had heroin relapse since OB intake  Presented to ED  Who filed rule 25  Pt had chemical assessment at [facility] and is waiting 1-2 days to get [facility]  Pt is willing to have UT screen today   considering subutex but not sure if wants to risk  Going on something  \"only used twice.\"     ===========================================   ROS: 10 point ROS neg other than the symptoms noted above in the HPI.      PSYCHIATRIC:  Denies mood changes.  Has History of mood changes anxiety  Is off of med  [facility]      PHQ-9 score:    PHQ-9 SCORE 3/10/2020   PHQ-9 Total Score 5   Some encounter information is confidential and restricted. Go to Review Flowsheets activity to see all data.     YELITZA-7 SCORE 3/10/2020   Total Score 13   Some encounter information is confidential and restricted. Go to Review Flowsheets activity to see all data.         Past History:  Her past medical history   Past Medical History:   Diagnosis Date     Anxiety      Depression      Substance abuse (H)    .   This is her first pregnancy  Since her last LMP she denies use of alcohol, tobacco and street drugs and admits to the use .  HISTORY:  No family history on file.  Social History     Socioeconomic History     Marital status: Single     Spouse name: Oskar other: Williams     Number of children: Not on file     Years of education: 11.5     Highest education level: 11th grade   Occupational History     Occupation: unemployed   Social Needs     Financial " resource strain: Not very hard     Food insecurity     Worry: Never true     Inability: Never true     Transportation needs     Medical: No     Non-medical: No   Tobacco Use     Smoking status: Current Every Day Smoker     Packs/day: 0.50     Years: 5.00     Pack years: 2.50     Types: Cigarettes, Vaping Device     Start date: 1/7/2015     Smokeless tobacco: Current User     Tobacco comment: Patient utilizing nicotine lozenges    Substance and Sexual Activity     Alcohol use: Not Currently     Frequency: Never     Binge frequency: Never     Comment: Went to treatment at [facility], currently in remission     Drug use: Not Currently     Types: Cocaine, Marijuana, Opiates     Comment: + heroin. last smoked marijuana 2/10, has not used any other substance since 12/18/2019     Sexual activity: Yes     Partners: Male   Lifestyle     Physical activity     Days per week: 1 day     Minutes per session: 60 min     Stress: Very much   Relationships     Social connections     Talks on phone: More than three times a week     Gets together: More than three times a week     Attends Caodaism service: Never     Active member of club or organization: No     Attends meetings of clubs or organizations: Never     Relationship status: Never      Intimate partner violence     Fear of current or ex partner: No     Emotionally abused: No     Physically abused: No     Forced sexual activity: No   Other Topics Concern     Parent/sibling w/ CABG, MI or angioplasty before 65F 55M? Not Asked   Social History Narrative     Not on file     Current Outpatient Medications   Medication Sig     ascorbic acid (VITAMIN C) 250 MG CHEW chewable tablet Take 1 tablet (250 mg) by mouth daily     aspirin (ASA) 81 MG tablet Take 1 tablet (81 mg) by mouth daily     cyanocobalamin (VITAMIN B-12) 1000 MCG tablet Take 1 tablet (1,000 mcg) by mouth daily     ferrous sulfate (FE TABS) 325 (65 Fe) MG EC tablet Take 1 tablet (325 mg) by mouth daily     Prenatal  "MV-Min-Fe Fum-FA-DHA (PRENATAL 1 PO)      doxylamine (UNISOM) 25 MG TABS tablet Take 0.5 tablets (12.5 mg) by mouth At Bedtime (Patient not taking: Reported on 3/24/2020)     hydrOXYzine (ATARAX) 10 MG tablet Take 1-5 tablets (10-50 mg) by mouth 3 times daily as needed for itching (Patient not taking: Reported on 3/24/2020)     pyridOXINE (VITAMIN B6) 25 MG tablet Take 1 tablet (25 mg) by mouth 3 times daily (Patient not taking: Reported on 3/24/2020)     sertraline (ZOLOFT) 50 MG tablet Take 1 tablet (50 mg) by mouth daily (Patient not taking: Reported on 3/24/2020)     No current facility-administered medications for this visit.      Facility-Administered Medications Ordered in Other Visits   Medication     Self Administer Medications: Behavioral Services     Self Administer Medications: Behavioral Services     No Known Allergies    ============================================  MEDICAL HISTORY  Past Medical History:   Diagnosis Date     Anxiety      Depression      Substance abuse (H)      No past surgical history on file.    OB History    Para Term  AB Living   1 0 0 0 0 0   SAB TAB Ectopic Multiple Live Births   0 0 0 0 0      # Outcome Date GA Lbr Marlo/2nd Weight Sex Delivery Anes PTL Lv   1 Current                  GYN History- none  Abnormal Pap Smears                        Cervical procedures: none                         History of STI: denies     I personally reviewed the past social/family/medical and surgical history on the date of service.   I reviewed lab work done at Intake visit with patient.    EXAM:  /68 (BP Location: Left arm, Patient Position: Chair)   Pulse 84   Ht 1.575 m (5' 2\")   Wt 61.6 kg (135 lb 11.2 oz)   LMP 2019   BMI 24.82 kg/m     EXAM:  GENERAL:  Pleasant pregnant female, alert, cooperative  and well groomed.  SKIN:  Warm and dry, without lesions or rashes  HEAD: Symmetrical features.  NECK:  Thyroid without enlargement and nodules.  Lymph nodes not " palpable.   LUNGS:  Clear to auscultation.  BREAST:    No dominant, fixed or suspicious masses are noted.  No skin or nipple changes or axillary nodes.   Nipples everted.      HEART:  RRR without murmur.  ABDOMEN: Soft without masses , tenderness or organomegaly.  No CVA tenderness.  Uterus palpable at size equal to dates.  No scars noted.. Fetal heart tones present.  MUSCULOSKELETAL:  Full range of motion  EXTREMITIES:  No edema. No significant varicosities.   PELVIC EXAM: deferred today   GC/CHLAMYDIA CULTURE OBTAINED:YES    ASSESSMENT:  19 year old , 14w4d weeks of pregnancy with JOSE of Sep 18, 2020 by US confirms  Intrauterine pregnancy 14w4d size  consistent with dates  Genetic Screening: Level 2 Ultrasound only    ICD-10-CM    1. Supervision of high risk pregnancy in second trimester  O09.92 MAT FETAL MED CTR REFERRAL-PREGNANCY     aspirin (ASA) 81 MG tablet     ferrous sulfate (FE TABS) 325 (65 Fe) MG EC tablet     ascorbic acid (VITAMIN C) 250 MG CHEW chewable tablet     cyanocobalamin (VITAMIN B-12) 1000 MCG tablet     Chlamydia PCR (Clinic Collect)     Gonorrhea PCR   2. Polysubstance abuse (H)  F19.10    3. Heroin abuse affecting pregnancy in second trimester (H)  O99.322 MAT FETAL MED CTR REFERRAL-PREGNANCY    F11.10 aspirin (ASA) 81 MG tablet     ferrous sulfate (FE TABS) 325 (65 Fe) MG EC tablet     ascorbic acid (VITAMIN C) 250 MG CHEW chewable tablet     cyanocobalamin (VITAMIN B-12) 1000 MCG tablet       PLAN:  - Reviewed use of triage nurse line and contacting the on-call provider after hours for an urgent need such as fever, vagina bleeding, bladder or vaginal infection, rupture of membranes,  or term labor.    - Reviewed best evidence for: weight gain for her weight and height for pregnancy:  Based on pre-pregnancy Body mass index is 24.82 kg/m . RECOMMENDED WEIGHT GAIN: 25-35 lbs.   Reviewed healthy diet and foods to avoid, exercise and activity during pregnancy; avoiding exposure to  toxoplasmosis; and maintenance of a generally healthy lifestyle.   - Discussed the harms, benefits, side effects and alternative therapies for current prescribed and OTC medications.- All pt's questions discussed and answered.  Pt verbalized understanding of and agreement to plan of care.   - Continue scheduled prenatal care and prn if questions or concerns    SHAJI Prado CNM

## 2020-03-24 NOTE — LETTER
"3/24/2020       RE: Sophia Stiles   [address]  [place] [address]     Dear Colleague,    Thank you for referring your patient, Sophia Stiles, to the [facility] at [facility]. Please see a copy of my visit note below.    SUBJECTIVE:   Sophia is a 19 year old female who presents to clinic for a new OB visit.   at 14w4d with Estimated Date of Delivery: Sep 18, 2020 based on US confirms. Feels well. Has  started PNV.  Reviewed recommendation for FE< Vit C and Vit B 12   She has not had bleeding since her LMP.   She has not had nausea. Weight loss has not occurred.   This was not a planned pregnancy.   Partner is involved,  Williams 1 st excited    single  OTHER CONCERNS: pt had heroin relapse since OB intake  Presented to ED  Who filed rule 25  Pt had chemical assessment at [facility] and is waiting 1-2 days to get [facility]  Pt is willing to have UT screen today   considering subutex but not sure if wants to risk  Going on something  \"only used twice.\"     ===========================================   ROS: 10 point ROS neg other than the symptoms noted above in the HPI.      PSYCHIATRIC:  Denies mood changes.  Has History of mood changes anxiety  Is off of med  Says [facility]      PHQ-9 score:    PHQ-9 SCORE 3/10/2020   PHQ-9 Total Score 5   Some encounter information is confidential and restricted. Go to Review Flowsheets activity to see all data.     YELITZA-7 SCORE 3/10/2020   Total Score 13   Some encounter information is confidential and restricted. Go to Review Flowsheets activity to see all data.         Past History:  Her past medical history   Past Medical History:   Diagnosis Date     Anxiety      Depression      Substance abuse (H)    .   This is her first pregnancy  Since her last LMP she denies use of alcohol, tobacco and street drugs and admits to the use .  HISTORY:  No family history on " file.  Social History     Socioeconomic History     Marital status: Single     Spouse name: Oskar other: Williams     Number of children: Not on file     Years of education: 11.5     Highest education level: 11th grade   Occupational History     Occupation: unemployed   Social Needs     Financial resource strain: Not very hard     Food insecurity     Worry: Never true     Inability: Never true     Transportation needs     Medical: No     Non-medical: No   Tobacco Use     Smoking status: Current Every Day Smoker     Packs/day: 0.50     Years: 5.00     Pack years: 2.50     Types: Cigarettes, Vaping Device     Start date: 1/7/2015     Smokeless tobacco: Current User     Tobacco comment: Patient utilizing nicotine lozenges    Substance and Sexual Activity     Alcohol use: Not Currently     Frequency: Never     Binge frequency: Never     Comment: Went to treatment at [place], currently in remission     Drug use: Not Currently     Types: Cocaine, Marijuana, Opiates     Comment: + heroin. last smoked marijuana 2/10, has not used any other substance since 12/18/2019     Sexual activity: Yes     Partners: Male   Lifestyle     Physical activity     Days per week: 1 day     Minutes per session: 60 min     Stress: Very much   Relationships     Social connections     Talks on phone: More than three times a week     Gets together: More than three times a week     Attends Buddhism service: Never     Active member of club or organization: No     Attends meetings of clubs or organizations: Never     Relationship status: Never      Intimate partner violence     Fear of current or ex partner: No     Emotionally abused: No     Physically abused: No     Forced sexual activity: No   Other Topics Concern     Parent/sibling w/ CABG, MI or angioplasty before 65F 55M? Not Asked   Social History Narrative     Not on file     Current Outpatient Medications   Medication Sig     ascorbic acid (VITAMIN C) 250 MG CHEW chewable tablet Take 1  "tablet (250 mg) by mouth daily     aspirin (ASA) 81 MG tablet Take 1 tablet (81 mg) by mouth daily     cyanocobalamin (VITAMIN B-12) 1000 MCG tablet Take 1 tablet (1,000 mcg) by mouth daily     ferrous sulfate (FE TABS) 325 (65 Fe) MG EC tablet Take 1 tablet (325 mg) by mouth daily     Prenatal MV-Min-Fe Fum-FA-DHA (PRENATAL 1 PO)      doxylamine (UNISOM) 25 MG TABS tablet Take 0.5 tablets (12.5 mg) by mouth At Bedtime (Patient not taking: Reported on 3/24/2020)     hydrOXYzine (ATARAX) 10 MG tablet Take 1-5 tablets (10-50 mg) by mouth 3 times daily as needed for itching (Patient not taking: Reported on 3/24/2020)     pyridOXINE (VITAMIN B6) 25 MG tablet Take 1 tablet (25 mg) by mouth 3 times daily (Patient not taking: Reported on 3/24/2020)     sertraline (ZOLOFT) 50 MG tablet Take 1 tablet (50 mg) by mouth daily (Patient not taking: Reported on 3/24/2020)     No current facility-administered medications for this visit.      Facility-Administered Medications Ordered in Other Visits   Medication     Self Administer Medications: Behavioral Services     Self Administer Medications: Behavioral Services     No Known Allergies    ============================================  MEDICAL HISTORY  Past Medical History:   Diagnosis Date     Anxiety      Depression      Substance abuse (H)      No past surgical history on file.    OB History    Para Term  AB Living   1 0 0 0 0 0   SAB TAB Ectopic Multiple Live Births   0 0 0 0 0      # Outcome Date GA Lbr Marlo/2nd Weight Sex Delivery Anes PTL Lv   1 Current                  GYN History- none  Abnormal Pap Smears                        Cervical procedures: none                         History of STI: denies     I personally reviewed the past social/family/medical and surgical history on the date of service.   I reviewed lab work done at Intake visit with patient.    EXAM:  /68 (BP Location: Left arm, Patient Position: Chair)   Pulse 84   Ht 1.575 m (5' 2\")   " Wt 61.6 kg (135 lb 11.2 oz)   LMP 2019   BMI 24.82 kg/m     EXAM:  GENERAL:  Pleasant pregnant female, alert, cooperative  and well groomed.  SKIN:  Warm and dry, without lesions or rashes  HEAD: Symmetrical features.  NECK:  Thyroid without enlargement and nodules.  Lymph nodes not palpable.   LUNGS:  Clear to auscultation.  BREAST:    No dominant, fixed or suspicious masses are noted.  No skin or nipple changes or axillary nodes.   Nipples everted.      HEART:  RRR without murmur.  ABDOMEN: Soft without masses , tenderness or organomegaly.  No CVA tenderness.  Uterus palpable at size equal to dates.  No scars noted.. Fetal heart tones present.  MUSCULOSKELETAL:  Full range of motion  EXTREMITIES:  No edema. No significant varicosities.   PELVIC EXAM: deferred today   GC/CHLAMYDIA CULTURE OBTAINED:YES    ASSESSMENT:  19 year old , 14w4d weeks of pregnancy with JOSE of Sep 18, 2020 by US confirms  Intrauterine pregnancy 14w4d size  consistent with dates  Genetic Screening: Level 2 Ultrasound only    ICD-10-CM    1. Supervision of high risk pregnancy in second trimester  O09.92 MAT FETAL MED CTR REFERRAL-PREGNANCY     aspirin (ASA) 81 MG tablet     ferrous sulfate (FE TABS) 325 (65 Fe) MG EC tablet     ascorbic acid (VITAMIN C) 250 MG CHEW chewable tablet     cyanocobalamin (VITAMIN B-12) 1000 MCG tablet     Chlamydia PCR (Clinic Collect)     Gonorrhea PCR   2. Polysubstance abuse (H)  F19.10    3. Heroin abuse affecting pregnancy in second trimester (H)  O99.322 MAT FETAL MED CTR REFERRAL-PREGNANCY    F11.10 aspirin (ASA) 81 MG tablet     ferrous sulfate (FE TABS) 325 (65 Fe) MG EC tablet     ascorbic acid (VITAMIN C) 250 MG CHEW chewable tablet     cyanocobalamin (VITAMIN B-12) 1000 MCG tablet       PLAN:  - Reviewed use of triage nurse line and contacting the on-call provider after hours for an urgent need such as fever, vagina bleeding, bladder or vaginal infection, rupture of membranes,  or  term labor.    - Reviewed best evidence for: weight gain for her weight and height for pregnancy:  Based on pre-pregnancy Body mass index is 24.82 kg/m . RECOMMENDED WEIGHT GAIN: 25-35 lbs.   Reviewed healthy diet and foods to avoid, exercise and activity during pregnancy; avoiding exposure to toxoplasmosis; and maintenance of a generally healthy lifestyle.   - Discussed the harms, benefits, side effects and alternative therapies for current prescribed and OTC medications.- All pt's questions discussed and answered.  Pt verbalized understanding of and agreement to plan of care.   - Continue scheduled prenatal care and prn if questions or concerns    SHAJI Prado CNM

## 2020-03-24 NOTE — PATIENT INSTRUCTIONS
Please use the information at the end of this document to sign up for [facility] where you can get your results and a message about those results sent to you through the [email or web address] application. If you do not have mychart we will call you with your results but it may take longer.    Regardless of if you have been tested or not:  Patient who have symptoms (cough, fever, or shortness of breath), need to isolate for 7 days from when symptoms started AND 72 hours after fever resolves (without fever reducing medications) AND improvement of respiratory symptoms (whichever is longer).      Isolate yourself at home (in own room/own bathroom if possible)    Do Not allow any visitors    Do Not go to work or school    Do Not go to Taoist,  centers, shopping, or other public places.    Do Not shake hands.    Avoid close and intimate contact with others (hugging, kissing).    Follow CDC recommendations for household cleaning of frequently touched services.     After the initial 7 days, continue to isolate yourself from household members as much as possible. To continue decrease the risk of community spread and exposure, you and any members of your household should limit activities in public for 14 days after starting home isolation.     You can reference the following CDC link for helpful home isolation/care tips:  https://www.cdc.gov/coronavirus/2019-ncov/downloads/10Things.pdf    Protect Others:    Cover Your Mouth and Nose with a mask, disposable tissue or wash cloth to avoid spreading germs to others.    Wash your hands and face frequently with soap and water    Call Back If: Breathing difficulty develops or you become worse.    For more information about COVID19 and options for caring for yourself at home, please visit the CDC website at https://www.cdc.gov/coronavirus/2019-ncov/about/steps-when-sick.html  For more options for care at [facility], please visit our website at  [email or web address]

## 2020-03-26 ENCOUNTER — TELEPHONE (OUTPATIENT)
Dept: BEHAVIORAL HEALTH | Facility: CLINIC | Age: 20
End: 2020-03-26

## 2020-03-26 NOTE — TELEPHONE ENCOUNTER
LP SCREEN TELEPHONE NOTE   Sophia Stiles paperwork was reviewed by JUNE Luna and the patient was deemed ELIGIBLE for the LP program.     Medical: Patient is pregnant     Insurance: [employer]: MA/PMAP (with an OUTSIDE EVAL/UPDATE) - 1.) The admission counselor (Suraj, Aye, Brett, or Lico for LP and the service initiation counselor for the EOP & DOP programs) NEEDS to COMPLETE a .CDUPDATE and fill out the [facility] referral forms with the patient's start date and then fax the [facility] referral forms to [facility] to activate the authorization for treatment.  2.) The admission counselor (see above) will document the authorization in the insurance referral note after the authorization has been obtained from [facility].      This will be a: HALF+ evaluation. (2-PAGE R25 UPDATE NOTE, LP UPDATE NOTE, ADDENDUM, VAA, ISP, DAGOBERTO's, DANNES, & SBAR)     IV use or Pregnant: This patient is pregnant and will have PRIORITY status.     Business office: The patient has [employer] MA/PMAP and would NOT need to consult with anyone in [facility] business office about the out of pocket costs of the LP program.     List: This patient CAN be placed on the PRIORITY LP Waiting List at this time.       Group: [facility]     Additional Info as needed: NA     The best current contact telephone number for the patient is: [phone] or [phone]       Morgan Casanova LADC   3/26/2020

## 2020-03-26 NOTE — TELEPHONE ENCOUNTER
LP Screening phone call to: Sophia Stiles, MR #: [id number]    This counselor talked with this patient on 3/26/2020 at 1:40 pm.       Funding:   [facility] Pays at [facility]%, no need for financial approval from the business office.   Recent use history:   Patient denies any use since her last assessment   Withdrawal risk:   The patient denied having any current risk of developing significant withdrawal symptoms.   Medical issues:   Patient is pregnant   Mental Health issues:   Anxiety, depression and PTSD   Current medications:    The patient denied taking any medications at this time.   SI/HI:   The patient denied having any current suicide ideation or homicide ideation.    Registered offender:   The patient denied being a registered sex offender or a registered predatory offender.    Results:   FEMALE - The patient was informed that she would be placed on the PRIORITYwaiting list.  The patient was informed to contact the LP Admission Coordinators at [phone] to inquire about the current projected wait for a female bed in the LP program.  The patient was informed to provide her full name and her best current contact telephone number to the LP Admission Coordinators to maintain her spot on the LP waiting list.

## 2020-03-31 ENCOUNTER — TELEPHONE (OUTPATIENT)
Dept: BEHAVIORAL HEALTH | Facility: CLINIC | Age: 20
End: 2020-03-31

## 2020-03-31 ENCOUNTER — HOSPITAL ENCOUNTER (OUTPATIENT)
Dept: BEHAVIORAL HEALTH | Facility: CLINIC | Age: 20
End: 2020-03-31
Attending: FAMILY MEDICINE
Payer: COMMERCIAL

## 2020-03-31 ENCOUNTER — TELEPHONE (OUTPATIENT)
Dept: OBGYN | Facility: CLINIC | Age: 20
End: 2020-03-31

## 2020-03-31 VITALS
SYSTOLIC BLOOD PRESSURE: 108 MMHG | HEART RATE: 100 BPM | WEIGHT: 140 LBS | BODY MASS INDEX: 25.76 KG/M2 | TEMPERATURE: 98.4 F | DIASTOLIC BLOOD PRESSURE: 67 MMHG | HEIGHT: 62 IN

## 2020-03-31 DIAGNOSIS — Z71.6 ENCOUNTER FOR SMOKING CESSATION COUNSELING: Primary | ICD-10-CM

## 2020-03-31 PROCEDURE — 10020000 ZZH LODGING PLUS FACILITY CHARGE ADULT

## 2020-03-31 PROCEDURE — H2035 A/D TX PROGRAM, PER HOUR: HCPCS

## 2020-03-31 PROCEDURE — H2035 A/D TX PROGRAM, PER HOUR: HCPCS | Mod: HQ

## 2020-03-31 RX ORDER — IBUPROFEN 200 MG
200-400 TABLET ORAL EVERY 6 HOURS PRN
COMMUNITY
End: 2020-04-04

## 2020-03-31 RX ORDER — ACETAMINOPHEN 325 MG/1
325-650 TABLET ORAL EVERY 4 HOURS PRN
COMMUNITY
End: 2020-04-25

## 2020-03-31 RX ORDER — AMOXICILLIN 250 MG
2 CAPSULE ORAL DAILY PRN
COMMUNITY
End: 2020-04-25

## 2020-03-31 RX ORDER — LORATADINE 10 MG/1
10 TABLET ORAL DAILY PRN
COMMUNITY
End: 2020-04-25

## 2020-03-31 RX ORDER — MAGNESIUM HYDROXIDE/ALUMINUM HYDROXICE/SIMETHICONE 120; 1200; 1200 MG/30ML; MG/30ML; MG/30ML
30 SUSPENSION ORAL EVERY 6 HOURS PRN
COMMUNITY
End: 2020-04-25

## 2020-03-31 RX ORDER — LANOLIN ALCOHOL/MO/W.PET/CERES
3 CREAM (GRAM) TOPICAL
COMMUNITY
End: 2020-04-25

## 2020-03-31 ASSESSMENT — PATIENT HEALTH QUESTIONNAIRE - PHQ9: SUM OF ALL RESPONSES TO PHQ QUESTIONS 1-9: 6

## 2020-03-31 ASSESSMENT — ANXIETY QUESTIONNAIRES
2. NOT BEING ABLE TO STOP OR CONTROL WORRYING: SEVERAL DAYS
5. BEING SO RESTLESS THAT IT IS HARD TO SIT STILL: SEVERAL DAYS
7. FEELING AFRAID AS IF SOMETHING AWFUL MIGHT HAPPEN: SEVERAL DAYS
6. BECOMING EASILY ANNOYED OR IRRITABLE: SEVERAL DAYS
4. TROUBLE RELAXING: SEVERAL DAYS
GAD7 TOTAL SCORE: 7
IF YOU CHECKED OFF ANY PROBLEMS ON THIS QUESTIONNAIRE, HOW DIFFICULT HAVE THESE PROBLEMS MADE IT FOR YOU TO DO YOUR WORK, TAKE CARE OF THINGS AT HOME, OR GET ALONG WITH OTHER PEOPLE: SOMEWHAT DIFFICULT
1. FEELING NERVOUS, ANXIOUS, OR ON EDGE: SEVERAL DAYS
3. WORRYING TOO MUCH ABOUT DIFFERENT THINGS: SEVERAL DAYS

## 2020-03-31 ASSESSMENT — PAIN SCALES - GENERAL: PAINLEVEL: NO PAIN (0)

## 2020-03-31 ASSESSMENT — MIFFLIN-ST. JEOR: SCORE: 1358.29

## 2020-03-31 NOTE — TELEPHONE ENCOUNTER
Behavioral OP Authorization:  Initial    Authorizing Agency: [facility]  Authorizing Person: Cory Ruth  Phone Number: [phone]  Level of Care: [facility]  Authorization Number: [id number]  Approved Services Dates:  From March 31, 2020 through September 30, 2020  Approval Details:  128 hours of    Quantity of Hours/Units: 128 hours of    Billing Code:

## 2020-03-31 NOTE — TELEPHONE ENCOUNTER
"ZAYAR  Name:   Sophia Stiles   YOB: 2000 Age:  19 year old Gender:  female   Referral Source: Self   Referral DAGOBERTO: N/A   Insurance: [facility]: MA/LIAP (with a [employer] EVAL/UPDATE) - 1.) The admission counselor (Suraj, Aye, Brett, or Lico for LP and the service initiation counselor for the EOP & DOP programs) will fill out the [facility] referral forms with the patient's start date and then fax the [facility] referral forms to [facility] to activate the authorization for treatment.  2.) The admission counselor (see above) will document the authorization in the insurance referral note after the authorization has been obtained from [facility].    Financial Screening: Financial approval from [place]'s business office is NOT NEEDED.     Precipitating Event: Treatment due to own awareness of need for help, Treatment due to unstable housing or homelessness and Treatment due to child protection issues     DOC: Alcohol and Heroin    Additional abused substances: Marijuana     Medical: pregnant     Mental Health: Depression, Anxiety and PTSD     Prior Detox admissions: 2 prior IP detoxification admission(s).    Prior CD treatments: 3 prior CD treatment(s).     Psychosocial history:   Single, in a serious relationship    No children    Homeless    Minimal support network, Current child protection involvement, Unemployed and Financial problems     Rains Suicide Risk Status:  Past month: 1. - Low Risk: Evaluation Counselors:  Document in Epic / [employer] to counselor \"Low Risk\".      Treatment Counselors:  Reassess upon admission as applicable, assess weekly in progress notes under Dimension 3 and summarize in Discharge / Treatment summary under Dimension 3.    Past 24 hours: 1. - Low Risk: Evaluation Counselors:  Document in Epic / [employer] to counselor \"Low Risk\".      Treatment Counselors:  Reassess upon admission as applicable, assess weekly in progress notes under Dimension 3 and summarize in Discharge / Treatment summary under Dimension " 3.     Additional Info as needed: There was no additional information to provide at this time.

## 2020-03-31 NOTE — PROGRESS NOTES
[address] [address]                Assessment and Placement Summary Update     Patient name:   Sophia Stiles   Patient phone: [phone] (home) [phone] (work)   Last #:   [id number]   : 2000      PMI #: [id number]   Patient address:   [facility] [address]  [place] [address]     Date of Original Assessment / Last Update: 3/23/2020 Update Assessment Date: 3/31/2020   Updated by:   JUNE Luna    phone number: [phone]   Referred by:   Self Agency / phone number: na   Referral to:   [facility] program at [facility] in [place], [place]   NPI: Brandon NPI #: [id number]   Summary:  This patient had a Rule / Combined Assessment Form on 3/23/2020 at [facility] completed by Radha Gonzalez.  OUTSIDE: A paper copy of the Rule  Combined Assessment Form will be placed in the patient's paper chart until being scanned into the patient's electronic medical record in Epic under the Media tab.    Reason for today's update: To admit to [facility]       Substance Use History Update:           X = Primary Drug Used   Age of First Use Most Recent Pattern of Use and Duration   Need enough information to show pattern (both frequency and amounts) and to show tolerance for each chemical that has a diagnosis   Date of last use and time, if needed   Withdrawal Potential? Requiring special care Method of use  (oral, smoked, snort, IV, etc)      Alcohol     No use            Marijuana/  Hashish   No use  3/19/2020 no Smoke        Cocaine/Crack     No use          Meth/  Amphetamines   No use          Heroin     No use  3/19/2020 no smoke      Other Opiates/  Synthetics   No use          Inhalants     No use          Benzodiazepines     No use          Hallucinogens     No use          Barbiturates/  Sedatives/  Hypnotics No use          Over-the-Counter Drugs   No use          Other     No  use          Nicotine     unk  Daily 1/2 pack 3/31/2020 no unk     Dimension: Severity Rating/ Reason for Changes from Previous Assessment:  Dimension I: Acute intoxication/Withdrawal potential     Previous ratin Current ratin   Comments:   Patent has not used for the past 11 days     Dimension II: Biomedical Conditions and Complications     Previous ratin Current ratin   Comments:   Patient is currently pregnant. Has regular OB appointments. Takes her pre keanu vitamins. Patient has no concerns regarding her pregnancy.     Dimension III: Emotional/Behavioral/Cognitive     Previous ratin Current ratin   Comments:   Patient has diagnosis of anxiety and depression and PTSD. She was advised by her OB to stop taking her medication while she is pregnant. She is stable.     Dimension IV: Readiness for Change     Previous ratin Current ratin   Comments:   Patient persents voluntarily for treatment. Motivated by her pregnancy and her desire to quit using.     Dimension V: Relapse/Continued Use/Continued problem potential     Previous ratin Current ratin   Comments:   Patient has continued to use despite being pregnant. Patient lacks sober support, sober support,  lacks coping skills, lacks impulse control and structure and mental health concerns.     Dimension VI: Recovery environment    Previous ratin Current ratin   Comments:   Patient is homeless and unemployed. Had been living with her boyfriend's parents but was asked to leave.       Summary of Assessment Update and Recommendations:   What was the outcome of last referral?  Continued use     Reason for changes in the Risk Description since last assessment? No changes.     Recommendation and rationale for current request and significant issues that need to be addressed:  Abstain from mood altering chemicals   Complete residential treatment at [facility]  Learn sober coping skills  Develop relapse prevention skills and  plan.
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
## 2020-03-31 NOTE — TELEPHONE ENCOUNTER
This patient was admitted to the LP program on 3/31/2020.  The [facility] referral paperwork was faxed to [facility] at [phone] on 3/31/2020 at 11:45 am to activate the authorization for the LP program.  The patient's Assessment and Placement Summary Update was included with the [facility] referral paperwork.  This counselor will document the authorization in the insurance referral notes after the authorization has been received from [facility].

## 2020-03-31 NOTE — TELEPHONE ENCOUNTER
RN intake at [facility] calling to ask for order for Nicorette gum. Patient is pregnant and is being admitted to [facility] for treatment. They have banned smoking due to COVID and patient smokes half a pack per day. Discussed with Jolynn Martino in clinic who agreed to order. Rx sent.

## 2020-03-31 NOTE — PROGRESS NOTES
Initial Services Plan        Service Initiation Date: 3/31/2020    Immediate health and/or safety concerns: No    Identify health and safety concern(s) below and include plan to address:    None Identified    Treatment suggestions for client during the time between intake (admit date) and completion of the individual treatment plan:     Look for a sober support network, i.e. 12 step, [facility], [facility], etc  Tour the treatment center or outpatient clinic  Introduce yourself to your treatment group. Spend time getting to know your peers  Review your patient or client handbook  Begin working on your treatment goal list    Completed by: JUNE Luna  Date completed: 3/31/2020 at 11:03 AM

## 2020-04-01 ENCOUNTER — HOSPITAL ENCOUNTER (OUTPATIENT)
Dept: BEHAVIORAL HEALTH | Facility: CLINIC | Age: 20
End: 2020-04-01
Attending: FAMILY MEDICINE
Payer: COMMERCIAL

## 2020-04-01 VITALS — TEMPERATURE: 98.6 F

## 2020-04-01 PROBLEM — F19.20 CHEMICAL DEPENDENCY (H): Status: ACTIVE | Noted: 2020-04-01

## 2020-04-01 PROCEDURE — 10020000 ZZH LODGING PLUS FACILITY CHARGE ADULT

## 2020-04-01 PROCEDURE — H2035 A/D TX PROGRAM, PER HOUR: HCPCS | Mod: HQ

## 2020-04-01 ASSESSMENT — ANXIETY QUESTIONNAIRES: GAD7 TOTAL SCORE: 7

## 2020-04-01 NOTE — PROGRESS NOTES
Comprehensive Summary Update and Review  Counselor met with patient on 4/1/2020 and reviewed the Comprehensive Assessment. Safety plan completed, copied for pt and sent for scanning to pt's EHR.  The following updates have been made:        DIM 2 RISK 0 to 1, pt is 14 weeks pregnant with her first child, she will be monitored by her OB providers as needed.
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
## 2020-04-02 ENCOUNTER — HOSPITAL ENCOUNTER (OUTPATIENT)
Dept: BEHAVIORAL HEALTH | Facility: CLINIC | Age: 20
End: 2020-04-02
Attending: FAMILY MEDICINE
Payer: COMMERCIAL

## 2020-04-02 VITALS — TEMPERATURE: 99.1 F

## 2020-04-02 PROCEDURE — 10020000 ZZH LODGING PLUS FACILITY CHARGE ADULT

## 2020-04-02 PROCEDURE — H2035 A/D TX PROGRAM, PER HOUR: HCPCS | Mod: HQ

## 2020-04-02 NOTE — PROGRESS NOTES
[facility] Child Protection Services contacted re: pt's use of drugs and alcohol. Phoebe recorded the report and requests an on-line report be completed.

## 2020-04-02 NOTE — PROGRESS NOTES
Pt signed DAGOBERTO for [facility] for step down after completing LP. Documents for admission consideration sent to both facilities. [facility] contacted for blank DAGOBERTO for original assessment to be sent to above facilities.

## 2020-04-02 NOTE — ADDENDUM NOTE
Encounter addended by: Morgan Yates LADC on: 4/2/2020 8:06 AM   Actions taken: Charge Capture section accepted

## 2020-04-03 ENCOUNTER — HOSPITAL ENCOUNTER (OUTPATIENT)
Dept: BEHAVIORAL HEALTH | Facility: CLINIC | Age: 20
End: 2020-04-03
Attending: FAMILY MEDICINE
Payer: COMMERCIAL

## 2020-04-03 VITALS — TEMPERATURE: 99 F

## 2020-04-03 PROCEDURE — H2035 A/D TX PROGRAM, PER HOUR: HCPCS

## 2020-04-03 PROCEDURE — H2035 A/D TX PROGRAM, PER HOUR: HCPCS | Mod: HQ

## 2020-04-03 PROCEDURE — 10020000 ZZH LODGING PLUS FACILITY CHARGE ADULT

## 2020-04-03 NOTE — PROGRESS NOTES
"INDIVIDUAL SESSION SUMMARY    D) Met with client on 4/3/20 from 12:30-1:30 pm.     Client reported that she was 92 days sober, but that she relapsed when she had a fight with her step-mom and boyfriend. Client reported that she has had a hard time getting past 90 days sober.  Client is currently 14 weeks pregnant and is accepting of being a mother. Client wants to stay sober for her baby. Client reported that she is considering sober houses after LP.  Client reported feeling good.  Client reported being in a relationship with her baby's father, whom she met at  last January.  Client reported no issues with sleep. Client identified resources including: praying and reading. Client identified strengths as being positive and non-judgmental. Client spoke about childhood including: her father and step-mother. Client spoke of relationship history including: her former boyfriend that was upset that she was pregnant.  Client spoke of stressors including: homelessness and \"being shamed for using again.\"    I) Individual session with client provided client with verbal interventions including: validation, nurturing, compassion and support. Discussed the importance of recovery behaviors such as utilizing sponsorship, sober support network, going to 12-step meetings, daily rituals, and goal setting. Discussed the benefits of: healthy boundaries, positive self-talk, gratitude, self-compassion, assertive communication, identifying and verbalizing unmet needs, resiliency, and self-trust. Provided psycho-education on: calming techniques and essential oils. Therapist encouraged client to increase self-care activities including: positive self-talk.     A) Client appears to have experienced early attachment disruption, resulting in lack of trust, loss of safety, fear of abandonment, and symptoms of co-dependency. Client appears to have trouble identifying emotions and to lack skills for emotional regulation and stress management. " Client appears to struggle with regulating impulses and focusing attention. Client appears to have difficulty making decisions and planning for the future. Client appears to lack a daily routine, meaningful activities, and a sense of purpose. Client appears to have good motivation at this time and would benefit from continuing support to help with processing past traumas, stress management, emotional regulation, impulse control, relapse prevention, increasing resiliency and self-esteem.     P) Next session is scheduled for the following week.    Sangita Tovar, Student Intern  4/3/2020

## 2020-04-04 ENCOUNTER — HOSPITAL ENCOUNTER (OUTPATIENT)
Dept: BEHAVIORAL HEALTH | Facility: CLINIC | Age: 20
End: 2020-04-04
Attending: FAMILY MEDICINE
Payer: COMMERCIAL

## 2020-04-04 VITALS — TEMPERATURE: 98.9 F

## 2020-04-04 DIAGNOSIS — F17.200 NICOTINE DEPENDENCE: Primary | ICD-10-CM

## 2020-04-04 PROCEDURE — 10020000 ZZH LODGING PLUS FACILITY CHARGE ADULT

## 2020-04-04 NOTE — PROGRESS NOTES
Name: Sophia Stiles  Date: 4/4/2020  Medical Record: [id number]    Envelope Number: [id number]    List of Contents (List each item separately in new row):     Mint nicotine lozenge 4 mg     Admission:  I am responsible for any personal items that are not sent to the safe or pharmacy.  [facility] is not responsible for loss, theft or damage of any property in my possession.      Patient Signature:  ___________________________________________       Date/Time:__________________________    Staff Signature: __________________________________       Date/Time:__________________________    2nd Staff person, if patient is unable/unwilling to sign:      __________________________________________________________       Date/Time: __________________________      Discharge:  [facility] has returned all of my personal belongings:    Patient Signature: ________________________________________     Date/Time: ____________________________________    Staff Signature: ______________________________________     Date/Time:_____________________________________

## 2020-04-05 ENCOUNTER — HOSPITAL ENCOUNTER (OUTPATIENT)
Dept: BEHAVIORAL HEALTH | Facility: CLINIC | Age: 20
End: 2020-04-05
Attending: FAMILY MEDICINE
Payer: COMMERCIAL

## 2020-04-05 VITALS — TEMPERATURE: 98.1 F

## 2020-04-05 PROCEDURE — H2035 A/D TX PROGRAM, PER HOUR: HCPCS | Mod: HQ

## 2020-04-05 PROCEDURE — 10020000 ZZH LODGING PLUS FACILITY CHARGE ADULT

## 2020-04-06 ENCOUNTER — HOSPITAL ENCOUNTER (OUTPATIENT)
Dept: BEHAVIORAL HEALTH | Facility: CLINIC | Age: 20
End: 2020-04-06
Attending: FAMILY MEDICINE
Payer: COMMERCIAL

## 2020-04-06 VITALS — TEMPERATURE: 98.8 F

## 2020-04-06 PROCEDURE — 10020000 ZZH LODGING PLUS FACILITY CHARGE ADULT

## 2020-04-06 PROCEDURE — H2035 A/D TX PROGRAM, PER HOUR: HCPCS | Mod: HQ

## 2020-04-07 ENCOUNTER — HOSPITAL ENCOUNTER (OUTPATIENT)
Dept: BEHAVIORAL HEALTH | Facility: CLINIC | Age: 20
End: 2020-04-07
Attending: FAMILY MEDICINE
Payer: COMMERCIAL

## 2020-04-07 VITALS — TEMPERATURE: 98.5 F

## 2020-04-07 PROCEDURE — H2035 A/D TX PROGRAM, PER HOUR: HCPCS | Mod: HQ

## 2020-04-07 PROCEDURE — 10020000 ZZH LODGING PLUS FACILITY CHARGE ADULT

## 2020-04-07 NOTE — PROGRESS NOTES
"Patient:  Sophia Stiles            Adult CD Progress Note and Treatment Plan Review     Attendance  Please refer to OP BEH CD Adult Attendance Record Documentation Flowsheet    Support group attended this week: yes    Reporting sobriety:  Yes    Treatment Plan     Treatment Plan Review competed on: 4/7/2020  This review covers  4/1-4/8/2020      Client preferred learning style: Visual  Hands on  Verbal  Demonstration    Staff Members contributing  Deb Lazo [facility], CARLA Lynne;      Received Supervision: Yes    Client: contributed to goals and plan.    Client received copy of plan/revised plan: Yes    Client agrees with plan/revised plan: Yes    Changes to Treatment Plan: No    New Goals added since last review No    Goals worked on since last review: This is pt's first review. Pt completed her treatment plan.  Also completed the \"Book of Me\" and \"Consequences\"  Assignment.      Strategies effective: yes    Strategies need these changes: Continue to address goals.    1) Care Coordination Activities: None  2) Medical, Mental Health and other appointments the client attended: Pt had an individual therapy session with staff therapist  3) Medication issues: None reported  4) Physical and mental health problems: See dimension 2/3  5) Any changes in Vulnerable Adult Status?  No     6) Review and evaluation of the individual abuse prevention plan: yes      Guide to Risk Ratings for Suicidality:   IDEATION: Active thoughts of suicide? INTENT: Intent to follow on suicide? PLAN: Plan to follow through on suicide? Level of Risk:   IF Yes Yes Yes Patient = High Emergent   IF Yes Yes No Patient = High Urgent/Non-Emergent   IF Yes No No Patient = Moderate Non-Urgent   IF No No   No Patient = Low Risk   The patient's ADDITIONAL RISK FACTORS and lack of PROTECTIVE FACTORS may increase their overall suicide risk ratings.     Patient's/client's current risk rating:  Low Risk      ASAM Risk Rating:    Dimension 1 Risk 0; " "Pt reports last use for opiates as 3/20/2020 and alcohol as 3/19/2020. Pt denies having  withdrawal symptoms this past week.      Dimension 2 Risk 1; Pt reports she is pregnant with her first child and she is feeling well.  Pt's OB provider is SHAJI Alicia CNM, at [facility]; her next appt is scheduled after pt completes LP. Pt reports she had an H&P on 3/24/2020.  Pt's PCP is Rody Richardson, at [facility]. Pt seems able to seek medical care as needed.  Pt reports no medical problems this past week. Pt attended lecture given by LP nurse on \"Self care\" on 4/5/2020.     Dimension 3 Risk 2;  Pt reports a hx of anxiety and depression. Pt is not currently on any medications due to pregnancy. Pt's suicide risk assessment on admission was \"Very low risk\". Pt denies  any thoughts of self-harm or suicide ideation at this time. Pt's current CGI 4/4 .  Pt reports she feels more depressed, \"Lots is going on\".  Pt reports she is stress because of relationship problem with SO and Mom. She reports she hasn't been using  coping skills and need to voice out more. Pt completed the \"Book of Me\", assignment an shared in group.   Dimension 4 Risk 1; Pt has consequences to herself and others due to use, pt lost housing due to her use. Pt has external motivation from CPS.  Pt reports her motivation this week is \"My baby\". Pt completed \"Consequences\" assignment and shared in group, which helped her to gain more insight into her value in relationship to her use.     Dimension 5 Risk 4; Pt lacks insight about personal relapse process, triggers, carvings, warning signs and coping skills to avoid relapse. She reports she was having a bad day and used. Pt reports craving 3/10, ten being high. The coping skill she used  \"Remembering what got me here and my baby . Pt participated in the spirituality group, facilitated by Trinidad Bridges with staff counselor present during group. Pt participated in weekend " workshop on relapse prevention and completed all activities on 4/4/2020.    Dimension 6  Risk 4;  Pt is spending free time with female peers. Pt reports aftercare plan post LP is to continue treatment with out-patient , sober house, therapy, and sober supports meetings. Pt request for therapist, and states,  she would like to set up appointment before she complete treatment here in LP    Family Involvement:   Significant other contacted.    Data:   offered feedback good insight client did actively participate    Intervention:   Aftercare planning  Behavior modification  Counselor feedback  Education  Emotional management  Group feedback  Motivational Enhancement Therapy  Relapse prevention  Client & counselor reviewed and signed ISP & assessment summary      Assessment:   Stages of Change Model  Contemplation    Appears/Sounds:  Cooperative  Motivated  Engaged      Plan:  Monitor emotional/physical health  Continue to work on treatment plan goals    Nirmala Wang, JUNE

## 2020-04-08 ENCOUNTER — HOSPITAL ENCOUNTER (OUTPATIENT)
Dept: BEHAVIORAL HEALTH | Facility: CLINIC | Age: 20
End: 2020-04-08
Attending: FAMILY MEDICINE
Payer: COMMERCIAL

## 2020-04-08 VITALS — TEMPERATURE: 99 F

## 2020-04-08 PROCEDURE — 10020000 ZZH LODGING PLUS FACILITY CHARGE ADULT

## 2020-04-08 PROCEDURE — H2035 A/D TX PROGRAM, PER HOUR: HCPCS | Mod: HQ

## 2020-04-08 NOTE — PROGRESS NOTES
"INDIVIDUAL SESSION SUMMARY    D) Met with client on 4/8/20 from 12:30-1:00 pm    Client reported being upset at her boyfriend because he \"isn't taking things seriously\" and doesn't want her to complete a sober housing, as they had agreed on.  Client shared that she is still angry at her step-mom because she wasn't supportive when she explained that she had relapsed. Client spoke about her boyfriend's mother and how supportive she is.  Client expressed feeling comfort in this relationship.  Client reported that she would like to be a \"good mother\" and \"not use.\" Client also reported that her spirituality was important to her and that she \"does think things happen for a reason.\"  Client related this to being alive still after using.  Client reported that she hopes that she can get into [facility] for recovery, but understands that there is a waiting list.      I) Individual session with client provided client with verbal interventions including: validation, nurturing, compassion and support. Discussed the importance of recovery behaviors such as utilizing sponsorship, sober support network, going to 12-step meetings, daily rituals, and goal setting. Discussed the benefits of: healthy boundaries, positive self-talk, gratitude, self-compassion, assertive communication, identifying and verbalizing unmet needs, resiliency, and self-trust.  Therapist encouraged client to increase self-care activities including: breathing. Therapist provided support as client spoke of feeling frustrated by the people in her life.    A)  Client appears to have experienced early attachment disruption, resulting in lack of trust, loss of safety, fear of abandonment, and symptoms of co-dependency. Client appears to be working on identifying emotions and skills for emotional regulation and stress management. Client appears to be contemplating plans for the future.  Client appears to lack a daily routine, meaningful activities, and a sense of " purpose. Client appears to have good motivation at this time and would benefit from continuing support to help with processing past traumas, stress management, emotional regulation, impulse control, relapse prevention, increasing resiliency and self-esteem.     P) Next session is scheduled for the following week.   Sangita Tovar, Student Intern  4/8/2020

## 2020-04-09 ENCOUNTER — HOSPITAL ENCOUNTER (OUTPATIENT)
Dept: BEHAVIORAL HEALTH | Facility: CLINIC | Age: 20
End: 2020-04-09
Attending: FAMILY MEDICINE
Payer: COMMERCIAL

## 2020-04-09 VITALS — TEMPERATURE: 99.4 F

## 2020-04-09 PROCEDURE — 10020000 ZZH LODGING PLUS FACILITY CHARGE ADULT

## 2020-04-09 PROCEDURE — H2035 A/D TX PROGRAM, PER HOUR: HCPCS | Mod: HQ

## 2020-04-09 NOTE — PROGRESS NOTES
Patient participated in a telephone interview yesterday for consideration for admission to the [facility].  Patient expressed interest in wanting to transition into the program after she has completed [facility]. This writer called and left a message for , Rupali [phone] for [facility] stating patients interest in their program and wanting to coordinate a date for patient to admit to their program.

## 2020-04-10 ENCOUNTER — HOSPITAL ENCOUNTER (OUTPATIENT)
Dept: BEHAVIORAL HEALTH | Facility: CLINIC | Age: 20
End: 2020-04-10
Attending: FAMILY MEDICINE
Payer: COMMERCIAL

## 2020-04-10 VITALS — TEMPERATURE: 98.6 F

## 2020-04-10 PROCEDURE — H2035 A/D TX PROGRAM, PER HOUR: HCPCS | Mod: HQ

## 2020-04-10 PROCEDURE — 10020000 ZZH LODGING PLUS FACILITY CHARGE ADULT

## 2020-04-11 ENCOUNTER — HOSPITAL ENCOUNTER (OUTPATIENT)
Dept: BEHAVIORAL HEALTH | Facility: CLINIC | Age: 20
End: 2020-04-11
Attending: FAMILY MEDICINE
Payer: COMMERCIAL

## 2020-04-11 VITALS — TEMPERATURE: 99.1 F

## 2020-04-11 PROCEDURE — H2035 A/D TX PROGRAM, PER HOUR: HCPCS | Mod: HQ

## 2020-04-11 PROCEDURE — 10020000 ZZH LODGING PLUS FACILITY CHARGE ADULT

## 2020-04-15 ENCOUNTER — HOSPITAL ENCOUNTER (OUTPATIENT)
Dept: BEHAVIORAL HEALTH | Facility: CLINIC | Age: 20
End: 2020-04-15
Attending: FAMILY MEDICINE
Payer: COMMERCIAL

## 2020-04-17 ENCOUNTER — HOSPITAL ENCOUNTER (OUTPATIENT)
Dept: BEHAVIORAL HEALTH | Facility: CLINIC | Age: 20
End: 2020-04-17
Attending: FAMILY MEDICINE
Payer: COMMERCIAL

## 2020-04-17 VITALS — TEMPERATURE: 98.4 F

## 2020-04-17 PROCEDURE — 10020000 ZZH LODGING PLUS FACILITY CHARGE ADULT

## 2020-04-17 PROCEDURE — H2035 A/D TX PROGRAM, PER HOUR: HCPCS | Mod: HQ

## 2020-04-18 ENCOUNTER — HOSPITAL ENCOUNTER (OUTPATIENT)
Dept: BEHAVIORAL HEALTH | Facility: CLINIC | Age: 20
End: 2020-04-18
Attending: FAMILY MEDICINE
Payer: COMMERCIAL

## 2020-04-18 VITALS — TEMPERATURE: 98.7 F

## 2020-04-18 PROCEDURE — 10020000 ZZH LODGING PLUS FACILITY CHARGE ADULT

## 2020-04-18 PROCEDURE — H2035 A/D TX PROGRAM, PER HOUR: HCPCS | Mod: HQ

## 2020-04-19 ENCOUNTER — HOSPITAL ENCOUNTER (OUTPATIENT)
Dept: BEHAVIORAL HEALTH | Facility: CLINIC | Age: 20
End: 2020-04-19
Attending: FAMILY MEDICINE
Payer: COMMERCIAL

## 2020-04-19 VITALS — TEMPERATURE: 98.5 F

## 2020-04-19 PROCEDURE — H2035 A/D TX PROGRAM, PER HOUR: HCPCS | Mod: HQ

## 2020-04-19 PROCEDURE — 10020000 ZZH LODGING PLUS FACILITY CHARGE ADULT

## 2020-04-20 ENCOUNTER — PRE VISIT (OUTPATIENT)
Dept: MATERNAL FETAL MEDICINE | Facility: CLINIC | Age: 20
End: 2020-04-20

## 2020-04-20 ENCOUNTER — HOSPITAL ENCOUNTER (OUTPATIENT)
Dept: BEHAVIORAL HEALTH | Facility: CLINIC | Age: 20
End: 2020-04-20
Attending: FAMILY MEDICINE
Payer: COMMERCIAL

## 2020-04-20 VITALS — TEMPERATURE: 99.1 F

## 2020-04-20 PROCEDURE — H2035 A/D TX PROGRAM, PER HOUR: HCPCS | Mod: HQ

## 2020-04-20 PROCEDURE — 10020000 ZZH LODGING PLUS FACILITY CHARGE ADULT

## 2020-04-20 NOTE — PROGRESS NOTES
Maternal-Fetal Medicine Consultation    Sophia Stiles  : 2000  MRN: [id number]    REFERRAL:  Sophia Stiles is a 20 year old sent by Jeanne Martino APRN CNM for MFM consultation.    HPI:  Sophia Stiles is a 20 year old  at 18w4d by LMP c/w 10w6d scan here for MFM consultation regarding her OUD (Heroin).    She had a relapse while pregnant on 3/21/2020 with heroin after being sober for 90 days. She was counseled about replacement therapy and declined. She is undergoing counseling and behavioral modification currently. Last UDS 3/10/2020 was positive for marijuana.     She also has a history of anxiety, depression, and suicidal attempts.    She is currently undergoing inpatient intensive behavioral treatment program that will end in a week and will be followed by another 90 days inpatient program. She have gone through this program in the past twice. She currently is experiencing no cravings.    Obstetrics History:  Nulliparous     Gynecologic History:  - Denies any history of abnormal pap smears  - Denies prior cervical surgery or procedures  - Denies any history of frequent UTIs, vaginal infections, or STIs    Past Medical History:  Past Medical History:   Diagnosis Date     Anxiety     has used mirazapine      Depression      Heroin overdose (H)     x2     Substance abuse (H)      Suicide attempt (H)     age 14 and age 17      Past Surgical History:  No past surgical history on file.    Current Medications:    Prior to Admission medications    Medication Sig Last Dose Taking? Auth Provider   acetaminophen (TYLENOL) 325 MG tablet Take 325-650 mg by mouth every 4 hours as needed for mild pain   Reported, Patient   alum & mag hydroxide-simethicone (MAALOX) 200-200-20 MG/5ML SUSP suspension Take 30 mLs by mouth every 6 hours as needed for indigestion   Reported, Patient   ascorbic acid (VITAMIN C) 250 MG CHEW chewable tablet Take 1 tablet (250 mg) by mouth daily   Jeanne Martino APRN CNM    cyanocobalamin (VITAMIN B-12) 1000 MCG tablet Take 1 tablet (1,000 mcg) by mouth daily   Jeanne Martino APRN CNM   ferrous sulfate (FE TABS) 325 (65 Fe) MG EC tablet Take 1 tablet (325 mg) by mouth daily   Jeanne Martino APRN CNM   guaiFENesin (ROBITUSSIN) 20 mg/mL SOLN solution Take 10 mLs by mouth every 4 hours as needed for cough   Reported, Patient   loratadine (CLARITIN) 10 MG tablet Take 10 mg by mouth daily as needed for allergies   Reported, Patient   melatonin 3 MG tablet Take 3 mg by mouth nightly as needed for sleep   Reported, Patient   nicotine (NICORETTE) 2 MG gum Place 1 each (2 mg) inside cheek as needed for smoking cessation   Trevor Frausto MD   nicotine (NICORETTE) 2 MG gum Place 1 each (2 mg) inside cheek as needed for smoking cessation SPEARMINT FLAVOR   Jeanne Martino APRN CNM   phenol-menthol (CEPASTAT) 14.5 MG lozenge Place 1 lozenge inside cheek every 2 hours as needed for moderate pain   Reported, Patient   Prenatal MV-Min-Fe Fum-FA-DHA (PRENATAL 1 PO)  Taking  Reported, Patient   senna-docusate (SENOKOT-S/PERICOLACE) 8.6-50 MG tablet Take 2 tablets by mouth daily as needed for constipation   Reported, Patient     Allergies:  Patient has no known allergies.    Social History:   OUD as above  Denies current tobacco or alcohol use    Family History:  Denies history of genetic disorders, preeclampsia, thromboembolic disease, bleeding disorders, mental retardation    Partner History:  Following with marriage counselor     Ultrasound:  Please review report under imaging tab. Overall normal    ASSESSMENT:  Sophia Stiles is a 20 year old  at 18w3d by LMP c/w 10w6d ultrasound here for M consultation regarding history of OUD (Heroin), currently on behavioral modification and counseling. She declined replacement therapy.    We discussed that opioid use disorder (OUD) in pregnancy is associated with an increased risk of pregnancy complications when untreated, including an  increased risk of miscarriage,  labor and delivery, intrauterine growth restriction, maternal and fetal infections and overdose/death. Medically assisted withdrawal, and patient initiated withdrawal are not recommended, as they are associated with very high relapse rates during pregnancy, up to 95%. There is not enough evidence comparing behavioral modification and medication replacement therapy, however, since she believes it does help her from relapse then encourage continuing it.      Up to 80% of women with OUD in pregnancy also suffer from tobacco use disorder, which likely worsens fetal outcomes such as growth restriction. Research suggests that even a decrease to less than   pack per day of cigarettes may improve fetal growth for women with OUD in pregnancy.     Given that she not currently on any opioid use, or smoking. Her risk for having FGR is very small, however, we would still recommend  serial growth ultrasound, typically at 28 and 34 weeks, is recommended. Antepartum surveillance using BPP or NST is reserved for patients with other complications, such as confirmed fetal growth restriction, or ongoing illicit substance misuse.     abstinence syndrome (WYATT) occurs in up to 70% of infants exposed to prenatal chronic opioids, including those of mothers on buprenorphine and methadone.If she is not on any medications then fetus is not expected to undergo withdrawal.  .    RECOMMENDATIONS:    - Continue current behavioral modification treatment and counseling.   - Fetal growth evaluation 28, 34 weeks.  - BPP ultrasounds is reserved for patients with other complications, such as confirmed fetal growth restriction, or ongoing illicit substance misuse.  - Antepartum consultations with anesthesiology, pediatrics/neonatology, lactation, as indicated if illicit drug use  - Close coordination of care with OUD treatment program including release of information to discuss care      Thank you for  "allowing us to participate in the care of your patient. Please do not hesitate to contact us if you have further questions regarding the management of your patient.     Seen with and staffed by Dr. Sarah Andersen MD  Maternal Fetal Medicine Fellow  4/20/2020 2:45 PM      Please see \"Imaging\" tab under \"Chart Review\" for details of today's US at the [facility].    Physician Attestation   I, Tulio Smith MD, saw this patient and agree with the findings and plan of care as documented in the note.      Items personally reviewed/procedural attestation: History, ultrasound, and plan of care. Total time spent in face-to-face counseling was 15 minutes (>50% for medical management discussion and plan of care).     Tulio Smith MD        "

## 2020-04-20 NOTE — PROGRESS NOTES
Patient: Sophia Stiles            Adult CD Progress Note and Treatment Plan Review     Attendance  Please refer to OP BEH CD Adult Attendance Record Documentation Flowsheet    Support group attended this week: Yes    Reporting sobriety:  Yes    Treatment Plan     Treatment Plan Review completed on: 4/20/2020    This review covers 4/14-4/20/2020       Client preferred learning style: Visual  Hands on  Verbal  Demonstration    Staff Members contributing: Deb Lazo [facility]; Yadira Walsh [facility]; Romy Mariano [facility]; Sangita Tovar LMFT                       Received Supervision: No    Client: Patient contributed to goals and plan.    Client received copy of plan/revised plan: Yes    Client agrees with plan/revised plan: Yes    Changes to Treatment Plan: None    New Goals added since last review: No additional goals added at this time    Goals worked on since last review: Patient continues to work on treatment plan assignments.  Patient reports focusing on dealing with shame and self-forgiveness.    Strategies effective: Yes    Strategies need these changes: No changes needed at this time    1) Care Coordination Activities: Patient reports she plans to attend [place] inpatient treatment for 90 days after completing LP.  2) Medical, Mental Health and other appointments the client attended: Patient attended an individual therapy session on 4/15 with staff therapist.  3) Medication issues: None reported.  4) Physical and mental health problems: See dimensions 2 and 3 below.  5) Any changes in Vulnerable Adult Status?  No If yes, add to treatment plan and individual abuse prevention plan.  6) Review and evaluation of the individual abuse prevention plan: Current IAPP for this program is adequate for this client    ASAM Risk Rating:    Dimension 1, 0: Patient reports her last date of use as 3/20/2020. Patient denies any withdrawal symptoms that would interfere with full participation in treatment programming at this  "time. Patient will continue to be monitored throughout treatment.      Dimension 2, 1: Patient is currently pregnant with her first child.  She denies any physical health concerns.  Patient reports that she is currently medication compliant. Patient reports she has an OB/GYN appointment on 4/21 for an ultrasound. Patient will continue to be monitored throughout treatment.     Dimension 3, 2: Patient reports mental health diagnoses of anxiety and depression and a history of trauma. Patient met with staff therapist aSngita Tovar for an individual therapy session. Patient reports that this is a healthy therapeutic relationship. Patient reports experiencing an increase in stress due to her relationship and being apart from her significant other for so long.  Patient reports \"just remembering I need to do whatever it takes\" as her primary coping skills she uses to manage stress and difficult emotions.  Patient denies any suicidal thoughts or ideations at this time. Patient will continue to be monitored throughout treatment.     Dimension 4, 1: Patient verbally reports being motivated for long-term recovery. Patient identifies \"my baby\" as her primary motivation to stay sober and in treatment this week.  Patient's group attendance and participation have been positive.  Patient appears willing to take the necessary steps to remain sober and be a \"good mom\".    Dimension 5, 4: Patient reports she has been craving cigarettes this week.  She reports that recognizing the consequences of breaking LP rules to satisfy these cravings has helped her to cope.    Dimension 6, 4: Patient's current aftercare plan includes 90 days of inpatient treatment at [facility] after discharging from .     Guide to Risk Ratings for Suicidality:   IDEATION: Active thoughts of suicide? INTENT: Intent to follow on suicide? PLAN: Plan to follow through on suicide? Level of Risk:   IF Yes Yes Yes Patient = High Emergent   IF Yes Yes No Patient = " High Urgent/Non-Emergent   IF Yes No No Patient = Moderate Non-Urgent   IF No No   No Patient = Low Risk   The patient's ADDITIONAL RISK FACTORS and lack of PROTECTIVE FACTORS may increase their overall suicide risk ratings.     Patient's/client's current risk rating:  Low Risk    Family Involvement:   Patient reports talking to her significant other and his family.    Data:   offered feedback good insight client did actively participate    Intervention:   Aftercare planning  Behavior modification  Cognitive Behavioral Therapy  Counselor feedback  Education  Emotional management  Group feedback  Motivational Enhancement Therapy  Relapse prevention  Twelve Step facilitation  Mental health education    Assessment:   Stages of Change Model  Preparation/Determination    Appears/Sounds:  Cooperative  Motivated  Engaged    Plan:  Focus on recovery environment  Monitor emotional/physical health  Continue working through treatment plan goals.   Continue group therapy, go to AA/NA meetings, work with sponsor, build sober support network, engage in daily structured activities, and have sober fun.    JUNE Puckett

## 2020-04-21 ENCOUNTER — HOSPITAL ENCOUNTER (OUTPATIENT)
Dept: BEHAVIORAL HEALTH | Facility: CLINIC | Age: 20
End: 2020-04-21
Attending: FAMILY MEDICINE
Payer: COMMERCIAL

## 2020-04-21 ENCOUNTER — OFFICE VISIT (OUTPATIENT)
Dept: MATERNAL FETAL MEDICINE | Facility: CLINIC | Age: 20
End: 2020-04-21
Attending: MIDWIFE
Payer: COMMERCIAL

## 2020-04-21 ENCOUNTER — HOSPITAL ENCOUNTER (OUTPATIENT)
Dept: ULTRASOUND IMAGING | Facility: CLINIC | Age: 20
End: 2020-04-21
Attending: MIDWIFE
Payer: COMMERCIAL

## 2020-04-21 VITALS — OXYGEN SATURATION: 99 % | TEMPERATURE: 98.8 F

## 2020-04-21 DIAGNOSIS — F11.10: Primary | ICD-10-CM

## 2020-04-21 DIAGNOSIS — O26.90 PREGNANCY RELATED CONDITION, ANTEPARTUM: ICD-10-CM

## 2020-04-21 PROCEDURE — H2035 A/D TX PROGRAM, PER HOUR: HCPCS | Mod: HQ

## 2020-04-21 PROCEDURE — 10020000 ZZH LODGING PLUS FACILITY CHARGE ADULT

## 2020-04-21 PROCEDURE — 76811 OB US DETAILED SNGL FETUS: CPT

## 2020-04-21 PROCEDURE — G0463 HOSPITAL OUTPT CLINIC VISIT: HCPCS | Mod: 25

## 2020-04-21 NOTE — PROGRESS NOTES
INDIVIDUAL SESSION SUMMARY    D) Met with client on 4/21/ from 9:00-9:30 am.     Client reported that she was excited to find out the sex of her baby today.  Client reported that she and her partner are doing well, and that it may be difficult to see each because she is going to an aftercare program after [facility], and the Covid-9 restrictions.  Client spoke about her plans after the baby is born. Client reported that she contemplated giving the baby up for adoption, but when she saw the first ultrasound reading, she decided that she wanted to keep the baby. Client reported that she has experience with infants and feels that she is prepared on how to care for one.  Client spoke about her father who is in residential, and that he also has a history of chemical dependency. Client spoke about her step-mother and the fact that she has a double-standard when it comes to her step-brothers partying. Client reported that she is hopeful that she can continue her education and finish her GED when she is in sober housing. Client identified that she has an interest in learning and doing better in her life.      I) Individual session with client provided client with verbal interventions including: validation, nurturing, compassion and support. Discussed the importance of recovery behaviors such as having a relapse plan in place in case she feels that she may want to use again. Therapist provided support as client spoke of feeling uncertain about having a baby.      A) Client appears to be planning for her future, but securing an aftercare housing. Client appears to be contemplating her future by exploring options for her life after her baby is born.  Client is using healthy coping skills by expressing her need to stay sober, and have a better life for herself and her child.  Client appears to be working on a plan and sense of purpose for her life.  Client appears to have good motivation at this time and would benefit from  continuing support to help with stress management, emotional regulation, relapse prevention, and to increase resiliency and self-esteem.    P) No future sessions scheduled. The client is planning aftercare at a sober housing facility, [place].    Sangita Tovar, Student Intern  4/21/2020

## 2020-04-21 NOTE — PROGRESS NOTES
Pt presents to [facility] for assessment and evaluation of her pregnancy due to hx of heroin use. Pt states she is currently in an inpatient program. States she is doing well pregnancy wise. Pt had us done today and reviewed by Dr. Smith. Pt met with Dr. Adnersen and Dr. Smith.See note in epic for today's discussion and recommendations. At this time pt will follow up with her PCP for ob care. Questions answered. Discharged stable. Christi Zimmerman RN

## 2020-04-22 ENCOUNTER — HOSPITAL ENCOUNTER (OUTPATIENT)
Dept: BEHAVIORAL HEALTH | Facility: CLINIC | Age: 20
End: 2020-04-22
Attending: FAMILY MEDICINE
Payer: COMMERCIAL

## 2020-04-22 VITALS — TEMPERATURE: 98.8 F | OXYGEN SATURATION: 98 %

## 2020-04-22 PROCEDURE — 10020000 ZZH LODGING PLUS FACILITY CHARGE ADULT

## 2020-04-22 PROCEDURE — H2035 A/D TX PROGRAM, PER HOUR: HCPCS | Mod: HQ

## 2020-04-23 ENCOUNTER — HOSPITAL ENCOUNTER (OUTPATIENT)
Dept: BEHAVIORAL HEALTH | Facility: CLINIC | Age: 20
End: 2020-04-23
Attending: FAMILY MEDICINE
Payer: COMMERCIAL

## 2020-04-23 VITALS — TEMPERATURE: 98.7 F | OXYGEN SATURATION: 98 %

## 2020-04-23 PROCEDURE — 10020000 ZZH LODGING PLUS FACILITY CHARGE ADULT

## 2020-04-23 PROCEDURE — H2035 A/D TX PROGRAM, PER HOUR: HCPCS | Mod: HQ

## 2020-04-24 ENCOUNTER — HOSPITAL ENCOUNTER (OUTPATIENT)
Dept: BEHAVIORAL HEALTH | Facility: CLINIC | Age: 20
End: 2020-04-24
Attending: FAMILY MEDICINE
Payer: COMMERCIAL

## 2020-04-24 VITALS — OXYGEN SATURATION: 97 % | TEMPERATURE: 98.6 F

## 2020-04-24 PROCEDURE — 10020000 ZZH LODGING PLUS FACILITY CHARGE ADULT

## 2020-04-24 PROCEDURE — H2035 A/D TX PROGRAM, PER HOUR: HCPCS | Mod: HQ

## 2020-04-25 ENCOUNTER — HOSPITAL ENCOUNTER (OUTPATIENT)
Dept: BEHAVIORAL HEALTH | Facility: CLINIC | Age: 20
End: 2020-04-25
Attending: FAMILY MEDICINE
Payer: COMMERCIAL

## 2020-04-25 VITALS — TEMPERATURE: 99.4 F | OXYGEN SATURATION: 100 %

## 2020-04-25 PROCEDURE — 10020000 ZZH LODGING PLUS FACILITY CHARGE ADULT

## 2020-04-25 PROCEDURE — H2035 A/D TX PROGRAM, PER HOUR: HCPCS | Mod: HQ

## 2020-04-25 RX ORDER — ASPIRIN 81 MG/1
81 TABLET, CHEWABLE ORAL DAILY
COMMUNITY
End: 2020-07-15

## 2020-04-25 NOTE — PROGRESS NOTES
Nursing Discharge Planning Meeting    Writer completed discharge planning meeting with patient. Discharge is planned for Tuesday, 4/28/2020.    Discussed appropriate follow up care to manage FARA, Medical (pt is pregnant), and to obtain medication refills. Patient given a copy of their current medications for reference. Questions were answered at this time and the patient verbalized an understanding of the post-discharge follow up plan.    Patient to schedule an appointment with their PCP / OB/Prenatal care.    Continue to support patient in discharge planning as needed to assure appropriate continuity of care.     Tobacco Cessation  Patient participated in the nicotine replacement therapy for tobacco cessation or reduction during their treatment programming: Yes.  Pt using NRT 2mg gum and has stopped using cigarettes completely    The patient was provided with community resources for follow-up to continue tobacco cessation support once in the community. Also the patient was encoruaged to discuss their tobacco cessation efforts with the primary care provider.

## 2020-04-25 NOTE — IP AVS SNAPSHOT
Medication List       Patient:  ZHANE DUMONT   :  2000   Physician:  Pham Mena MD           This is your record.  Keep this with you and show to your community pharmacist(s) and physician(s) at each visit.     Allergies:  No Known Allergies          Medications  Valid as of: 2020 -  1:04 PM    Generic Name Brand Name Tablet Size Instructions for use    Ascorbic Acid vitamin C 250 MG Take 1 tablet (250 mg) by mouth daily        Aspirin ASA 81 MG Take 81 mg by mouth daily        Cyanocobalamin VITAMIN B-12 1000 MCG Take 1 tablet (1,000 mcg) by mouth daily        Ferrous Sulfate FE TABS 325 (65 Fe) MG Take 1 tablet (325 mg) by mouth daily        Nicotine Polacrilex NICORETTE 2 MG Place 1 each (2 mg) inside cheek as needed for smoking cessation SPEARMINT FLAVOR        Nicotine Polacrilex NICORETTE 2 MG Place 1 each (2 mg) inside cheek as needed for smoking cessation        Prenatal MV-Min-Fe Fum-FA-DHA           .           .           .           .

## 2020-04-25 NOTE — IP AVS SNAPSHOT
MRN:[id number]                      After Visit Summary   4/25/2020    Sophia Stiles    MRN: [id number]           Visit Information        Provider Department      4/25/2020  7:45 AM ADULT [facility]        Your next 10 appointments already scheduled    Apr 26, 2020  7:45 AM CDT  Treatment with ADULT [facility]  [facility] ([facility]) [address] [address] [phone]      Apr 27, 2020  7:45 AM CDT  Treatment with ADULT [facility] E  [facility] ([facility]) [address] [address] [phone]         MyChart Information    [facility] gives you secure access to your electronic health record. If you see a primary care provider, you can also send messages to your care team and make appointments. If you have questions, please call your primary care clinic.  If you do not have a primary care provider, please call [phone] and they will assist you.       Care EveryWhere ID    This is your Care EveryWhere ID. This could be used by other organizations to access your [facility] medical records  [id number]       Equal Access to Services    TLOU MOSES AH: Israel Gutiérrez, adele freire, qaabdi kaalazam purdy, sonya recinos. So [facility] [phone].    ATENCIÓN: Si habla español, tiene a wylie disposición servicios gratuitos de asistencia lingüística. Llame al [phone].    We comply with applicable federal and state civil rights laws, including the [place] Human Rights Act. We do not discriminate on the basis of race, color, creed, Bahai, national origin, marital status, age, disability, sex, sexual orientation, or gender identity.

## 2020-04-26 ENCOUNTER — HOSPITAL ENCOUNTER (OUTPATIENT)
Dept: BEHAVIORAL HEALTH | Facility: CLINIC | Age: 20
End: 2020-04-26
Attending: FAMILY MEDICINE
Payer: COMMERCIAL

## 2020-04-26 VITALS — OXYGEN SATURATION: 100 % | TEMPERATURE: 98.9 F

## 2020-04-26 PROCEDURE — 10020000 ZZH LODGING PLUS FACILITY CHARGE ADULT

## 2020-04-26 PROCEDURE — H2035 A/D TX PROGRAM, PER HOUR: HCPCS | Mod: HQ

## 2020-04-27 ENCOUNTER — HOSPITAL ENCOUNTER (OUTPATIENT)
Dept: BEHAVIORAL HEALTH | Facility: CLINIC | Age: 20
End: 2020-04-27
Attending: FAMILY MEDICINE
Payer: COMMERCIAL

## 2020-04-27 VITALS — OXYGEN SATURATION: 100 % | TEMPERATURE: 99.4 F

## 2020-04-27 PROCEDURE — H2035 A/D TX PROGRAM, PER HOUR: HCPCS | Mod: HQ

## 2020-04-27 PROCEDURE — 10020000 ZZH LODGING PLUS FACILITY CHARGE ADULT

## 2020-04-27 NOTE — PROGRESS NOTES
MICD Discharge Summary/Instructions     Patient: Sophia Stiles   MRN: [id number]  : 2000  Age: 20 year old Sex: female    Focus of Treatment / Discharge Recommendations    Personal Safety/ Management of Symptoms    * Follow your safety plan.  Report increased symptoms to your care team and /or go to the nearest Emergency Department.    * Call crisis lines as needed     [facility] [phone]               [facility] [phone]  [facility] [phone]              Crisis Connection [phone]  [facility] [phone]              [facility] COPE [phone]  [facility] [phone]       National Suicide Prevention [phone]  [facility] [phone]            Suicide Prevention [phone]  [facility] [phone]                 CALL 911    Abstinence/Relapse Prevention  * Take all medicines as directed.  Carry a current list of medicines with you.   * Use coping skills: nutrition, rest, exercise, spirituality, journal, meditation, engage in activities you enjoy, seek sober support,therapy, communicate boundaries, say gratuities, be honest about mental health and obtain GED.    * Do not use illicit (street) drugs, controlled substances (narcotics) or alcohol.    Develop/Improve Independent Living/Socialization Skills:  Pt is admitting to [facility] for aftercare.     Community Resources/Supports and Discharge Planning:  Maintain abstinence from all mood altering substances, attend sober support meetings per Sh[facility]'s recommendation, enter [facility] and follow recommendations, continue with 1.1 therapy, maintain good physical, attend all OB appointments and mental health care, and comply with any CPS requests.     [facility] Alcoholics Anonymous:  [phone] ( 24 hours a day)    [facility]  Alcoholics Anonymous : [phone]    Narcotics Anonymous : [address] [place] [address] ( [phone])    [place]  Recovery Connection: Free recovery resources.  [phone]    Access chats, online meetings, discussions and healthy check-in activities any day, any time, from anywhere. Participate as anonymously as you like. In order to access this resource: Go to: [employer]  Click on the In recovery tab. Then click on Social Community. Click on The daily Pledge: people helping people 24/7 to join.     Follow up with psychiatrist / main caregiver: TBD    Next visit: n/a    Follow up with your therapist: TBD   Next visit: n/a    Go to group therapy and / or support groups at: Attend Ivte education/therapy groups and meeting in community, per Ivet's recommendation.     See your medical doctor about: about any and all concerns    Other:  n/a    Client Signature:_______________________   Date / Time:___________    Staff Signature:________________________   Date / Time:___________

## 2020-04-27 NOTE — IP AVS SNAPSHOT
MRN:[id number]                      After Visit Summary   4/27/2020    Sophia Stiles    MRN: [id number]           Visit Information        Provider Department      4/27/2020  7:45 AM ADULT [facility]        MyChart Information    [facility] gives you secure access to your electronic health record. If you see a primary care provider, you can also send messages to your care team and make appointments. If you have questions, please call your primary care clinic.  If you do not have a primary care provider, please call [phone] and they will assist you.       Care EveryWhere ID    This is your Care EveryWhere ID. This could be used by other organizations to access your [facility] medical records  [id number]       Equal Access to Services    TOLU MOSES : Israel Gutiérrez, adele freire, gerald purdy, sonya recinos. So [facility] [phone].    ATENCIÓN: Si habla español, tiene a wylie disposición servicios gratuitos de asistencia lingüística. Llame al [phone].    We comply with applicable federal and state civil rights laws, including the [place] Human Rights Act. We do not discriminate on the basis of race, color, creed, Mu-ism, national origin, marital status, age, disability, sex, sexual orientation, or gender identity.
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
## 2020-04-27 NOTE — PROGRESS NOTES
[facility]  [address] [place] [id number]                  Sophia Stiles, 2000, was admitted for evaluation/treatment of chemical dependency at [facility].  This person took part in these program(s):    ______ The Inpatient Program   ______ The Outpatient Program   __X___ [facility]   ______ [facility] Out[facility]       Date admitted: 3/31/2020  Date discharged: 4/28/2020    Type of discharge:   __X___ Satisfactory - completed evaluation / treatment   ______ Discharged without completing   ______ Behavioral discharge   ______ Transferred to another chemical dependency program   ______ Transferred to another type of service   ______ Left against medical advice (AMA) / Eloped       Comments: Sophia completed [facility] on 4/28/2020. She is admitting directly to [facility] for aftercare.      Counselor: JUNE Lamb                       Date: 4/27/2020             Time: 1:04 PM

## 2020-04-27 NOTE — IP AVS SNAPSHOT
Medication List       Patient:  ZHANE DUMONT   :  2000   Physician:  Pham Mena MD           This is your record.  Keep this with you and show to your community pharmacist(s) and physician(s) at each visit.     Allergies:  No Known Allergies          Medications  Valid as of: 2020 -  4:10 PM    Generic Name Brand Name Tablet Size Instructions for use    Ascorbic Acid vitamin C 250 MG Take 1 tablet (250 mg) by mouth daily        Aspirin ASA 81 MG Take 81 mg by mouth daily        Cyanocobalamin VITAMIN B-12 1000 MCG Take 1 tablet (1,000 mcg) by mouth daily        Ferrous Sulfate FE TABS 325 (65 Fe) MG Take 1 tablet (325 mg) by mouth daily        Nicotine Polacrilex NICORETTE 2 MG Place 1 each (2 mg) inside cheek as needed for smoking cessation SPEARMINT FLAVOR        Nicotine Polacrilex NICORETTE 2 MG Place 1 each (2 mg) inside cheek as needed for smoking cessation        Prenatal MV-Min-Fe Fum-FA-DHA           .           .           .           .
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
## 2020-04-30 NOTE — ADDENDUM NOTE
Encounter addended by: Romy Mariano LADC on: 4/30/2020 10:15 AM   Actions taken: Pend clinical note, Clinical Note Signed

## 2020-06-09 ENCOUNTER — TELEPHONE (OUTPATIENT)
Dept: OBGYN | Facility: CLINIC | Age: 20
End: 2020-06-09

## 2020-06-09 NOTE — TELEPHONE ENCOUNTER
Left message  Sarah at [facility] ([phone])  To return call to nursing (emergency line number left on VM)     No follow up appt appointments   LOV  03/17/2020.    No follow up appointments made for OB care.

## 2020-06-09 NOTE — TELEPHONE ENCOUNTER
Alejandra Rodríguez, Marley Covington CNM RN    Caller: Unspecified (Today, 10:52 AM)               Please schedule a phone JOES D for this patient to discuss-before Friday.     Thanks!      LM on voice mail for either Sarah KERNS or Fee return call to nursing at [phone]  to see if appt could be moved up to an earlier time . (initially tried to schedule earlier appt for 6/10 or 6/11/2020 however limited availability in consult room).

## 2020-06-09 NOTE — TELEPHONE ENCOUNTER
Pt returned call and reports nausea throughout the day  (currently 25.4 weeks pregnant and in inpatient program)  She has not been seen since March 2020.   She is taking unisom 12.5 mg and 25 mg B6 in the morning only  And this makes her drowsy and does not last all day.  She is wondering about medication for nausea in pregnancy.    Also scheduled a JOSE D  PHONE visit for Friday 6/12/2020.  States good fetal movement, no spotting bleeding or leaking fluid.  No problems in pregnancy.    Wondering about alternative to unisom or if she should increase frequency?    Routed to [facility] pool

## 2020-06-09 NOTE — TELEPHONE ENCOUNTER
----- Message from Mariia Tovar RN sent at 6/4/2020  3:57 PM CDT -----  Regarding: Unisom instructions  Sarah at [facility] wants directions for Unisom changed to half tab 3x/day so she can take it with B6. Needs order via fax to [phone].

## 2020-06-12 ENCOUNTER — VIRTUAL VISIT (OUTPATIENT)
Dept: OBGYN | Facility: CLINIC | Age: 20
End: 2020-06-12
Attending: ADVANCED PRACTICE MIDWIFE
Payer: COMMERCIAL

## 2020-06-12 ENCOUNTER — TELEPHONE (OUTPATIENT)
Dept: OBGYN | Facility: CLINIC | Age: 20
End: 2020-06-12

## 2020-06-12 DIAGNOSIS — O09.90 SUPERVISION OF HIGH RISK PREGNANCY, ANTEPARTUM: Primary | ICD-10-CM

## 2020-06-12 DIAGNOSIS — O21.9 NAUSEA AND VOMITING IN PREGNANCY: Primary | ICD-10-CM

## 2020-06-12 PROBLEM — F33.1 MODERATE EPISODE OF RECURRENT MAJOR DEPRESSIVE DISORDER (H): Status: ACTIVE | Noted: 2018-08-19

## 2020-06-12 PROBLEM — F12.20 CANNABIS DEPENDENCE (H): Status: ACTIVE | Noted: 2018-08-19

## 2020-06-12 PROBLEM — Z59.9 PROBLEM RELATED TO HOUSING AND ECONOMIC CIRCUMSTANCES, UNSPECIFIED: Status: ACTIVE | Noted: 2018-08-19

## 2020-06-12 PROBLEM — T40.1X1A ACCIDENTAL OVERDOSE OF HEROIN (H): Status: ACTIVE | Noted: 2020-05-01

## 2020-06-12 RX ORDER — ONDANSETRON 4 MG/1
4 TABLET, ORALLY DISINTEGRATING ORAL EVERY 8 HOURS PRN
Qty: 30 TABLET | Refills: 3 | Status: SHIPPED | OUTPATIENT
Start: 2020-06-12 | End: 2020-07-15

## 2020-06-12 RX ORDER — ONDANSETRON 4 MG/1
4-8 TABLET, ORALLY DISINTEGRATING ORAL EVERY 8 HOURS PRN
Qty: 60 TABLET | Refills: 3 | OUTPATIENT
Start: 2020-06-12 | End: 2024-08-08

## 2020-06-12 NOTE — LETTER
"6/12/2020       RE: Sophia Stiles  [address]  [place] [address]     Dear Colleague,    Thank you for referring your patient, Sophia Stiles, to the [facility]. Please see a copy of my visit note below.    Attempted to contact pt at number listed [phone] ext [phone] and received voicemail.  Left voicemail for center to page this writer at [phone] if pt is able to meet for phone visti.    Attempted to contact pt at number listed on voicemail as above for any staff member [phone] and was told \"unable to confirm or deny that anyone by that name is here\".  Was unable to leave message, was unable to speak to another employee to follow up.      Routed to RN team to see next steps. SHAJI Mera [facility]    ADDENDUM 6/12/2020 1608    Sophia Stiles is a 20 year old female who is being evaluated via a billable telephone visit.      The patient has been notified of following:     \"This telephone visit will be conducted via a call between you and your physician/provider. We have found that certain health care needs can be provided without the need for a physical exam.  This service lets us provide the care you need with a short phone conversation.  If a prescription is necessary we can send it directly to your pharmacy.  If lab work is needed we can place an order for that and you can then stop by our lab to have the test done at a later time.    Telephone visits are billed at different rates depending on your insurance coverage. During this emergency period, for some insurers they may be billed the same as an in-person visit.  Please reach out to your insurance provider with any questions.    If during the course of the call the physician/provider feels a telephone visit is not appropriate, you will not be charged for this service.\"    Patient has given verbal consent for Telephone visit?  Yes    Subjective:      20 year " old  at 26w0d presents for a problem telephone prenatal appointment.        Having nausea -  taking Vitmain B6 and Unisom just at night.  Will Take Vitamin B6 25 mg every 8 hours. Only taking Unisom 12.5mg, will try a few days without and see how she feels.      Zofran RX  - nursing team will find best pharmacy to send it to get to pt while in treatment.    Getting discharged next week!    Needs growth US and EOB 2 weeks, will schedule with .     Objective:  Coping well, engaged in visit    Assessment/Plan     Encounter Diagnosis   Name Primary?     Supervision of high risk pregnancy, antepartum Yes     Orders Placed This Encounter   Procedures     US OB >14 Weeks Follow Up         - Reviewed total weight gain, encouraged continued healthy diet and exercise.      - Reviewed why/how to contact provider.    Patient education/orders or handouts today:PTL handout and Plan for EOB visit w labs   Return to clinic in 2 weeks and prn if questions or concerns.   SHAJI Mera CNM    Duration 8 minutes

## 2020-06-12 NOTE — PROGRESS NOTES
"Attempted to contact pt at number listed [phone] ext [phone] and received voicemail.  Left voicemail for center to page this writer at [phone] if pt is able to meet for phone visti.    Attempted to contact pt at number listed on voicemail as above for any staff member [phone] and was told \"unable to confirm or deny that anyone by that name is here\".  Was unable to leave message, was unable to speak to another employee to follow up.      Routed to RN team to see next steps. Jennifer Edward, SHAJI CN    ADDENDUM 2020 1602    Sophia Stiles is a 20 year old female who is being evaluated via a billable telephone visit.      The patient has been notified of following:     \"This telephone visit will be conducted via a call between you and your physician/provider. We have found that certain health care needs can be provided without the need for a physical exam.  This service lets us provide the care you need with a short phone conversation.  If a prescription is necessary we can send it directly to your pharmacy.  If lab work is needed we can place an order for that and you can then stop by our lab to have the test done at a later time.    Telephone visits are billed at different rates depending on your insurance coverage. During this emergency period, for some insurers they may be billed the same as an in-person visit.  Please reach out to your insurance provider with any questions.    If during the course of the call the physician/provider feels a telephone visit is not appropriate, you will not be charged for this service.\"    Patient has given verbal consent for Telephone visit?  Yes    Subjective:      20 year old  at 26w0d presents for a problem telephone prenatal appointment.        Having nausea -  taking Vitmain B6 and Unisom just at night.  Will Take Vitamin B6 25 mg every 8 hours. Only taking Unisom 12.5mg, will try a few days without and see how she feels.      Zofran RX  - nursing team " will find best pharmacy to send it to get to pt while in treatment.    Getting discharged next week!    Needs growth US and EOB 2 weeks, will schedule with .     Objective:  Coping well, engaged in visit    Assessment/Plan     Encounter Diagnosis   Name Primary?     Supervision of high risk pregnancy, antepartum Yes     Orders Placed This Encounter   Procedures     US OB >14 Weeks Follow Up         - Reviewed total weight gain, encouraged continued healthy diet and exercise.      - Reviewed why/how to contact provider.    Patient education/orders or handouts today:PTL handout and Plan for EOB visit w labs   Return to clinic in 2 weeks and prn if questions or concerns.   Jennifer Edward, SHAJI CNM    Duration 8 minutes

## 2020-06-12 NOTE — ADDENDUM NOTE
Addended by: JUDD HERNANDEZ on: 6/12/2020 04:17 PM     Modules accepted: Orders, Level of Service

## 2020-06-12 NOTE — TELEPHONE ENCOUNTER
Kvng vaughn this morning (JOSE D)   Is available at 4 pm at [phone]    Will discuss with Jennifer Edward CNM

## 2020-06-26 ENCOUNTER — ANCILLARY PROCEDURE (OUTPATIENT)
Dept: ULTRASOUND IMAGING | Facility: CLINIC | Age: 20
End: 2020-06-26
Attending: ADVANCED PRACTICE MIDWIFE
Payer: COMMERCIAL

## 2020-06-26 DIAGNOSIS — O09.90 SUPERVISION OF HIGH RISK PREGNANCY, ANTEPARTUM: ICD-10-CM

## 2020-06-26 PROCEDURE — 76816 OB US FOLLOW-UP PER FETUS: CPT

## 2020-07-15 ENCOUNTER — OFFICE VISIT (OUTPATIENT)
Dept: OBGYN | Facility: CLINIC | Age: 20
End: 2020-07-15
Attending: ADVANCED PRACTICE MIDWIFE
Payer: COMMERCIAL

## 2020-07-15 VITALS
BODY MASS INDEX: 28.66 KG/M2 | DIASTOLIC BLOOD PRESSURE: 69 MMHG | HEART RATE: 91 BPM | WEIGHT: 155.74 LBS | SYSTOLIC BLOOD PRESSURE: 106 MMHG | HEIGHT: 62 IN

## 2020-07-15 DIAGNOSIS — O99.322 HEROIN ABUSE AFFECTING PREGNANCY IN SECOND TRIMESTER (H): ICD-10-CM

## 2020-07-15 DIAGNOSIS — Z78.9 NONIMMUNE TO HEPATITIS B VIRUS: ICD-10-CM

## 2020-07-15 DIAGNOSIS — Z28.39 MATERNAL VARICELLA, NON-IMMUNE: ICD-10-CM

## 2020-07-15 DIAGNOSIS — O09.899 MATERNAL VARICELLA, NON-IMMUNE: ICD-10-CM

## 2020-07-15 DIAGNOSIS — O09.92 SUPERVISION OF HIGH RISK PREGNANCY IN SECOND TRIMESTER: ICD-10-CM

## 2020-07-15 DIAGNOSIS — F11.10 HEROIN ABUSE AFFECTING PREGNANCY IN SECOND TRIMESTER (H): ICD-10-CM

## 2020-07-15 DIAGNOSIS — F17.200 SMOKER: ICD-10-CM

## 2020-07-15 DIAGNOSIS — E55.9 VITAMIN D DEFICIENCY: ICD-10-CM

## 2020-07-15 DIAGNOSIS — F19.20 CHEMICAL DEPENDENCY (H): ICD-10-CM

## 2020-07-15 DIAGNOSIS — O09.90 SUPERVISION OF HIGH RISK PREGNANCY, ANTEPARTUM: Primary | ICD-10-CM

## 2020-07-15 DIAGNOSIS — F33.1 MODERATE EPISODE OF RECURRENT MAJOR DEPRESSIVE DISORDER (H): ICD-10-CM

## 2020-07-15 PROBLEM — Z59.9 PROBLEM RELATED TO HOUSING AND ECONOMIC CIRCUMSTANCES, UNSPECIFIED: Status: RESOLVED | Noted: 2018-08-19 | Resolved: 2020-07-15

## 2020-07-15 PROCEDURE — 25000128 H RX IP 250 OP 636: Mod: ZF

## 2020-07-15 PROCEDURE — 90746 HEPB VACCINE 3 DOSE ADULT IM: CPT | Mod: ZF

## 2020-07-15 PROCEDURE — 90471 IMMUNIZATION ADMIN: CPT | Mod: ZF

## 2020-07-15 PROCEDURE — 90715 TDAP VACCINE 7 YRS/> IM: CPT | Mod: ZF

## 2020-07-15 PROCEDURE — 90472 IMMUNIZATION ADMIN EACH ADD: CPT | Mod: ZF

## 2020-07-15 PROCEDURE — G0463 HOSPITAL OUTPT CLINIC VISIT: HCPCS | Mod: ZF

## 2020-07-15 RX ORDER — ASPIRIN 81 MG/1
81 TABLET, CHEWABLE ORAL DAILY
Qty: 90 TABLET | Refills: 3 | Status: ON HOLD | OUTPATIENT
Start: 2020-07-15 | End: 2020-09-17

## 2020-07-15 RX ORDER — FERROUS SULFATE 325(65) MG
325 TABLET, DELAYED RELEASE (ENTERIC COATED) ORAL DAILY
Qty: 60 TABLET | Refills: 3 | Status: SHIPPED | OUTPATIENT
Start: 2020-07-15 | End: 2020-11-05

## 2020-07-15 ASSESSMENT — PATIENT HEALTH QUESTIONNAIRE - PHQ9
5. POOR APPETITE OR OVEREATING: SEVERAL DAYS
SUM OF ALL RESPONSES TO PHQ QUESTIONS 1-9: 1

## 2020-07-15 ASSESSMENT — ANXIETY QUESTIONNAIRES
3. WORRYING TOO MUCH ABOUT DIFFERENT THINGS: SEVERAL DAYS
5. BEING SO RESTLESS THAT IT IS HARD TO SIT STILL: NOT AT ALL
7. FEELING AFRAID AS IF SOMETHING AWFUL MIGHT HAPPEN: SEVERAL DAYS
GAD7 TOTAL SCORE: 6
6. BECOMING EASILY ANNOYED OR IRRITABLE: SEVERAL DAYS
2. NOT BEING ABLE TO STOP OR CONTROL WORRYING: SEVERAL DAYS
1. FEELING NERVOUS, ANXIOUS, OR ON EDGE: SEVERAL DAYS

## 2020-07-15 ASSESSMENT — PAIN SCALES - GENERAL: PAINLEVEL: NO PAIN (0)

## 2020-07-15 ASSESSMENT — MIFFLIN-ST. JEOR: SCORE: 1424.68

## 2020-07-15 NOTE — PROGRESS NOTES
"Subjective:      20 year old  at 30w5d presentst for a routine prenatal appointment.    no vaginal bleeding or leakage of fluid.  no contractions. pos fetal movement.       No HA, visual changes, RUQ or epigastric pain.   Patient concerns: doing well, living with BF who is also sober, feels supported, his mother is also supportive of couples soberity  Reviewed EOB labs with patient.  Reviewed TDAP Consents today  Objective:  Vitals:    07/15/20 1131   BP: 106/69   Pulse: 91   Weight: 70.6 kg (155 lb 11.8 oz)   Height: 1.575 m (5' 2\")   , see ob flowsheet  Assessment/Plan     Education completed today includes breast feeding, [facility] hand out , contraception, counting movements, signs of pre-term labor, when to present to birthplace, post partum depression, GBS, getting enough iron and labor induction.  Birth preferences reviewed:  encouraged, still learning about options, not sure yet  Labor support:  Partner   [place] Feeding plans :    Contraception planned:  unsure  The following labs were ordered today: could not stay for labs        Water birth consent form was not given.        Encounter Diagnoses   Name Primary?     Supervision of high risk pregnancy, antepartum Yes     Supervision of high risk pregnancy in second trimester      Heroin abuse affecting pregnancy in second trimester (H)      Chemical dependency (H)      Nonimmune to hepatitis B virus      Smoker      Moderate episode of recurrent major depressive disorder (H)      Vitamin D deficiency      Maternal varicella, non-immune      Orders Placed This Encounter   Procedures     TDAP VACCINE (BOOSTRIX)     HEPATITIS B VACCINE,ADULT,IM     Orders Placed This Encounter   Medications     aspirin (ASPIRIN 81) 81 MG chewable tablet     Sig: Take 1 tablet (81 mg) by mouth daily     Dispense:  90 tablet     Refill:  3     ferrous sulfate (FE TABS) 325 (65 Fe) MG EC tablet     Sig: Take 1 tablet (325 mg) by mouth daily     Dispense:  60 tablet    "  Refill:  3     ABO   Date Value Ref Range Status   03/10/2020 A  Final     RH(D)   Date Value Ref Range Status   03/10/2020 Pos  Final     Antibody Screen   Date Value Ref Range Status   03/10/2020 Neg  Final   , Rhogam  was notgiven.    - Reviewed total weight gain, encouraged continued healthy diet and exercise.  .  Reviewed importance of daily fetal kick count and why/how to contact provider.    - Reviewed why/how to contact provider if headache/visual changes/RUQ or epigastric pain, decreased fetal movement, vaginal bleeding, leakage of fluid or more than 4 contractions in an hour.     Patient education/orders or handouts today:  PTL signs/symptoms, TDAP and hep b  Reviewed GBS screening at 35-36 wks.    Return to clinic in 1 weeks and prn if questions or concerns.  NEEDS GCT and labs, could not stay today    Alejandra Rodríguez, APRN PAULA

## 2020-07-15 NOTE — LETTER
"7/15/2020       RE: Sophia Stiles  [address]  [place] [address]     Dear Colleague,    Thank you for referring your patient, Sophia Stiles, to the [facility] at [facility] Please see a copy of my visit note below.    Subjective:      20 year old  at 30w5d presentst for a routine prenatal appointment.    no vaginal bleeding or leakage of fluid.  no contractions. pos fetal movement.       No HA, visual changes, RUQ or epigastric pain.   Patient concerns: doing well, living with BF who is also sober, feels supported, his mother is also supportive of couples soberity  Reviewed EOB labs with patient.  Reviewed TDAP Consents today  Objective:  Vitals:    07/15/20 1131   BP: 106/69   Pulse: 91   Weight: 70.6 kg (155 lb 11.8 oz)   Height: 1.575 m (5' 2\")   , see ob flowsheet  Assessment/Plan     Education completed today includes breast feeding, [facility] hand out , contraception, counting movements, signs of pre-term labor, when to present to birthplace, post partum depression, GBS, getting enough iron and labor induction.  Birth preferences reviewed:  encouraged, still learning about options, not sure yet  Labor support:  Partner    Feeding plans :    Contraception planned:  unsure  The following labs were ordered today: could not stay for labs        Water birth consent form was not given.        Encounter Diagnoses   Name Primary?     Supervision of high risk pregnancy, antepartum Yes     Supervision of high risk pregnancy in second trimester      Heroin abuse affecting pregnancy in second trimester (H)      Chemical dependency (H)      Nonimmune to hepatitis B virus      Smoker      Moderate episode of recurrent major depressive disorder (H)      Vitamin D deficiency      Maternal varicella, non-immune      Orders Placed This Encounter   Procedures     TDAP VACCINE (BOOSTRIX)     HEPATITIS B VACCINE,ADULT,IM     Orders " Placed This Encounter   Medications     aspirin (ASPIRIN 81) 81 MG chewable tablet     Sig: Take 1 tablet (81 mg) by mouth daily     Dispense:  90 tablet     Refill:  3     ferrous sulfate (FE TABS) 325 (65 Fe) MG EC tablet     Sig: Take 1 tablet (325 mg) by mouth daily     Dispense:  60 tablet     Refill:  3     ABO   Date Value Ref Range Status   03/10/2020 A  Final     RH(D)   Date Value Ref Range Status   03/10/2020 Pos  Final     Antibody Screen   Date Value Ref Range Status   03/10/2020 Neg  Final   , Rhogam  was notgiven.    - Reviewed total weight gain, encouraged continued healthy diet and exercise.  .  Reviewed importance of daily fetal kick count and why/how to contact provider.    - Reviewed why/how to contact provider if headache/visual changes/RUQ or epigastric pain, decreased fetal movement, vaginal bleeding, leakage of fluid or more than 4 contractions in an hour.     Patient education/orders or handouts today:  PTL signs/symptoms, TDAP and hep b  Reviewed GBS screening at 35-36 wks.    Return to clinic in 1 weeks and prn if questions or concerns.  NEEDS GCT and labs, could not stay today    SHAJI Galeas CNM

## 2020-07-16 ASSESSMENT — ANXIETY QUESTIONNAIRES: GAD7 TOTAL SCORE: 6

## 2020-07-24 ENCOUNTER — OFFICE VISIT (OUTPATIENT)
Dept: OBGYN | Facility: CLINIC | Age: 20
End: 2020-07-24
Attending: ADVANCED PRACTICE MIDWIFE
Payer: COMMERCIAL

## 2020-07-24 VITALS
WEIGHT: 156.7 LBS | SYSTOLIC BLOOD PRESSURE: 111 MMHG | HEIGHT: 61 IN | BODY MASS INDEX: 29.59 KG/M2 | HEART RATE: 112 BPM | DIASTOLIC BLOOD PRESSURE: 72 MMHG

## 2020-07-24 DIAGNOSIS — O09.899 MATERNAL VARICELLA, NON-IMMUNE: ICD-10-CM

## 2020-07-24 DIAGNOSIS — F19.20 POLYSUBSTANCE (EXCLUDING OPIOIDS) DEPENDENCE (H): Primary | ICD-10-CM

## 2020-07-24 DIAGNOSIS — Z78.9 NONIMMUNE TO HEPATITIS B VIRUS: ICD-10-CM

## 2020-07-24 DIAGNOSIS — F19.20 CHEMICAL DEPENDENCY (H): ICD-10-CM

## 2020-07-24 DIAGNOSIS — F33.1 MODERATE EPISODE OF RECURRENT MAJOR DEPRESSIVE DISORDER (H): ICD-10-CM

## 2020-07-24 DIAGNOSIS — F17.200 SMOKER: ICD-10-CM

## 2020-07-24 DIAGNOSIS — Z28.39 MATERNAL VARICELLA, NON-IMMUNE: ICD-10-CM

## 2020-07-24 DIAGNOSIS — E55.9 VITAMIN D DEFICIENCY: ICD-10-CM

## 2020-07-24 DIAGNOSIS — O09.90 SUPERVISION OF HIGH RISK PREGNANCY, ANTEPARTUM: ICD-10-CM

## 2020-07-24 LAB
ERYTHROCYTE [DISTWIDTH] IN BLOOD BY AUTOMATED COUNT: 12.3 % (ref 10–15)
GLUCOSE 1H P 50 G GLC PO SERPL-MCNC: 109 MG/DL (ref 60–129)
HCT VFR BLD AUTO: 34.7 % (ref 35–47)
HGB BLD-MCNC: 11.7 G/DL (ref 11.7–15.7)
MCH RBC QN AUTO: 32 PG (ref 26.5–33)
MCHC RBC AUTO-ENTMCNC: 33.7 G/DL (ref 31.5–36.5)
MCV RBC AUTO: 95 FL (ref 78–100)
PLATELET # BLD AUTO: 256 10E9/L (ref 150–450)
RBC # BLD AUTO: 3.66 10E12/L (ref 3.8–5.2)
T PALLIDUM AB SER QL: NONREACTIVE
WBC # BLD AUTO: 11.1 10E9/L (ref 4–11)

## 2020-07-24 PROCEDURE — 86780 TREPONEMA PALLIDUM: CPT | Performed by: MIDWIFE

## 2020-07-24 PROCEDURE — 82950 GLUCOSE TEST: CPT | Performed by: MIDWIFE

## 2020-07-24 PROCEDURE — 36415 COLL VENOUS BLD VENIPUNCTURE: CPT | Performed by: MIDWIFE

## 2020-07-24 PROCEDURE — 82306 VITAMIN D 25 HYDROXY: CPT | Performed by: MIDWIFE

## 2020-07-24 PROCEDURE — 87491 CHLMYD TRACH DNA AMP PROBE: CPT | Performed by: MIDWIFE

## 2020-07-24 PROCEDURE — 87591 N.GONORRHOEAE DNA AMP PROB: CPT | Performed by: MIDWIFE

## 2020-07-24 PROCEDURE — 85027 COMPLETE CBC AUTOMATED: CPT | Performed by: MIDWIFE

## 2020-07-24 ASSESSMENT — PATIENT HEALTH QUESTIONNAIRE - PHQ9
SUM OF ALL RESPONSES TO PHQ QUESTIONS 1-9: 3
5. POOR APPETITE OR OVEREATING: SEVERAL DAYS

## 2020-07-24 ASSESSMENT — ANXIETY QUESTIONNAIRES
3. WORRYING TOO MUCH ABOUT DIFFERENT THINGS: SEVERAL DAYS
6. BECOMING EASILY ANNOYED OR IRRITABLE: SEVERAL DAYS
2. NOT BEING ABLE TO STOP OR CONTROL WORRYING: SEVERAL DAYS
GAD7 TOTAL SCORE: 7
1. FEELING NERVOUS, ANXIOUS, OR ON EDGE: SEVERAL DAYS
7. FEELING AFRAID AS IF SOMETHING AWFUL MIGHT HAPPEN: SEVERAL DAYS
5. BEING SO RESTLESS THAT IT IS HARD TO SIT STILL: SEVERAL DAYS

## 2020-07-24 ASSESSMENT — PAIN SCALES - GENERAL: PAINLEVEL: NO PAIN (0)

## 2020-07-24 ASSESSMENT — MIFFLIN-ST. JEOR: SCORE: 1413.55

## 2020-07-24 NOTE — PROGRESS NOTES
"Subjective:      20 year old  at 32w0d presentst for a routine prenatal appointment.    No vaginal bleeding or leakage of fluid.  Occs tightening. Good fetal movement.       No HA, visual changes, RUQ or epigastric pain.   Patient concerns: Feeling well overall.    - Pt did not have time to complete GCT and EOB labs at previous visit. Will complete today.   - Having some pain with SIC. Deep, dull pain with thrusting and sometimes feels dry. Denies change in discharge, odor, itching, burning. Reviewed positioning during intercourse and using lubricant. Accepting of CT/GC today.     - Interested in paternity testing. Boyfriend has been supportive of pregnancy, but now is brining up paternity question. Reviewed date of conception of 20. Other partner was 20, discussed highly unlikely given this was just before LMP.     - Pt feels stable right now. Denies symptoms of depression and anxiety. States she knows she won't relapse while pregnant, but is concerned about the postpartum period. Reports she started antidepressant earlier but did not tolerate it (N&V) so stopped. Encouraged her to set up therapy now to have this established for PP period. Pt is accepting, but worried about cost.   - Reviewed GBS screening 36-37 weeks.    - TDAP previously given.     Objective:  Vitals:    20 0829   BP: 111/72   Pulse: 112   Weight: 71.1 kg (156 lb 11.2 oz)   Height: 1.55 m (5' 1.02\")   , see ob flowsheet    Assessment/Plan     Encounter Diagnoses   Name Primary?     Polysubstance (excluding opioids) dependence (H) Yes     Chemical dependency (H)      Moderate episode of recurrent major depressive disorder (H)      Smoker      Maternal varicella, non-immune      Nonimmune to hepatitis B virus      Supervision of high risk pregnancy, antepartum      Vitamin D deficiency      Orders Placed This Encounter   Procedures     Glucose tolerance gest screen 1 hour     CBC with platelets     Treponema Abs w Reflex to " RPR and Titer     Vitamin D Deficiency     MENTAL HEALTH REFERRAL  - Adult; Outpatient Treatment; Individual/Couples/Family/Group Therapy/Health Psychology; Other: [facility] [phone]; We will contact you to schedule the appointment or please call with any questions     No orders of the defined types were placed in this encounter.    ABO   Date Value Ref Range Status   03/10/2020 A  Final     RH(D)   Date Value Ref Range Status   03/10/2020 Pos  Final     Antibody Screen   Date Value Ref Range Status   03/10/2020 Neg  Final   Rhogam was not given.    - Message sent to Chikis Amaya regarding therapy and cost.   - Reviewed total weight gain, encouraged continued healthy diet and exercise.    - Reviewed importance of daily fetal kick count and why/how to contact provider.  - Reviewed why/how to contact provider if headache/visual changes/RUQ or epigastric pain, decreased fetal movement, vaginal bleeding, leakage of fluid or more than 4 contractions in an hour.  - - Reinforced COVID 19 recommendations including social distancing, hand hygiene, avoid touching face, report any known or suspected exposure or s/s promptly. Reviewed most common symptoms of fever, cough, and shortness of breath. Reviewed CNM cohort scheduling and possible changes to provider schedule. Discussed hospital restrictions- limit to 1 visitor, visitor must remain in room, and can not switch visitors. Doulas are not excluded from this restriction. Discussed that if working outside of home, she should consider beginning maternity leave at 36-37 weeks to avoid exposure prior to labor/birth.      Patient education/orders or handouts today:  PTL signs/symptoms  Plan for GBS screening at 36-37 wks.    Next visit in 2 weeks, prefers by phone   Call prn if questions or concerns.     SHAJI Simmons, CNM

## 2020-07-24 NOTE — LETTER
"2020       RE: Sophia Stiles  [address]  [facility] [address]     Dear Colleague,    Thank you for referring your patient, Sophia Stiles, to the [facility] at [facility]. Please see a copy of my visit note below.    Subjective:      20 year old  at 32w0d presentst for a routine prenatal appointment.    No vaginal bleeding or leakage of fluid.  Occs tightening. Good fetal movement.       No HA, visual changes, RUQ or epigastric pain.   Patient concerns: Feeling well overall.    - Pt did not have time to complete GCT and EOB labs at previous visit. Will complete today.   - Having some pain with SIC. Deep, dull pain with thrusting and sometimes feels dry. Denies change in discharge, odor, itching, burning. Reviewed positioning during intercourse and using lubricant. Accepting of CT/GC today.     - Interested in paternity testing. Boyfriend has been supportive of pregnancy, but now is brining up paternity question. Reviewed date of conception of 20. Other partner was 20, discussed highly unlikely given this was just before LMP.     - Pt feels stable right now. Denies symptoms of depression and anxiety. States she knows she won't relapse while pregnant, but is concerned about the postpartum period. Reports she started antidepressant earlier but did not tolerate it (N&V) so stopped. Encouraged her to set up therapy now to have this established for PP period. Pt is accepting, but worried about cost.   - Reviewed GBS screening 36-37 weeks.    - TDAP previously given.     Objective:  Vitals:    20 0829   BP: 111/72   Pulse: 112   Weight: 71.1 kg (156 lb 11.2 oz)   Height: 1.55 m (5' 1.02\")   , see ob flowsheet    Assessment/Plan     Encounter Diagnoses   Name Primary?     Polysubstance (excluding opioids) dependence (H) Yes     Chemical dependency (H)      Moderate episode of recurrent major depressive disorder (H)      " Smoker      Maternal varicella, non-immune      Nonimmune to hepatitis B virus      Supervision of high risk pregnancy, antepartum      Vitamin D deficiency      Orders Placed This Encounter   Procedures     Glucose tolerance gest screen 1 hour     CBC with platelets     Treponema Abs w Reflex to RPR and Titer     Vitamin D Deficiency     MENTAL HEALTH REFERRAL  - Adult; Outpatient Treatment; Individual/Couples/Family/Group Therapy/Health Psychology; Other: [facility] [phone]; We will contact you to schedule the appointment or please call with any questions     No orders of the defined types were placed in this encounter.    ABO   Date Value Ref Range Status   03/10/2020 A  Final     RH(D)   Date Value Ref Range Status   03/10/2020 Pos  Final     Antibody Screen   Date Value Ref Range Status   03/10/2020 Neg  Final   Rhogam was not given.    - Message sent to Chikis Amaya regarding therapy and cost.   - Reviewed total weight gain, encouraged continued healthy diet and exercise.    - Reviewed importance of daily fetal kick count and why/how to contact provider.  - Reviewed why/how to contact provider if headache/visual changes/RUQ or epigastric pain, decreased fetal movement, vaginal bleeding, leakage of fluid or more than 4 contractions in an hour.  - - Reinforced COVID 19 recommendations including social distancing, hand hygiene, avoid touching face, report any known or suspected exposure or s/s promptly. Reviewed most common symptoms of fever, cough, and shortness of breath. Reviewed CNM cohort scheduling and possible changes to provider schedule. Discussed hospital restrictions- limit to 1 visitor, visitor must remain in room, and can not switch visitors. Doulas are not excluded from this restriction. Discussed that if working outside of home, she should consider beginning maternity leave at 36-37 weeks to avoid exposure prior to labor/birth.      Patient education/orders or handouts today:  PTL  signs/symptoms  Plan for GBS screening at 36-37 wks.    Next visit in 2 weeks, prefers by phone   Call prn if questions or concerns.     SHAJI Simmons, CNM

## 2020-07-25 ASSESSMENT — ANXIETY QUESTIONNAIRES: GAD7 TOTAL SCORE: 7

## 2020-07-26 LAB
C TRACH DNA SPEC QL NAA+PROBE: NEGATIVE
DEPRECATED CALCIDIOL+CALCIFEROL SERPL-MC: 60 UG/L (ref 20–75)
N GONORRHOEA DNA SPEC QL NAA+PROBE: NEGATIVE
SPECIMEN SOURCE: NORMAL
SPECIMEN SOURCE: NORMAL

## 2020-08-10 ENCOUNTER — ANCILLARY PROCEDURE (OUTPATIENT)
Dept: ULTRASOUND IMAGING | Facility: CLINIC | Age: 20
End: 2020-08-10
Attending: ADVANCED PRACTICE MIDWIFE
Payer: COMMERCIAL

## 2020-08-10 ENCOUNTER — OFFICE VISIT (OUTPATIENT)
Dept: OBGYN | Facility: CLINIC | Age: 20
End: 2020-08-10
Attending: ADVANCED PRACTICE MIDWIFE
Payer: COMMERCIAL

## 2020-08-10 VITALS
BODY MASS INDEX: 29.92 KG/M2 | SYSTOLIC BLOOD PRESSURE: 109 MMHG | WEIGHT: 158.5 LBS | HEIGHT: 61 IN | HEART RATE: 93 BPM | DIASTOLIC BLOOD PRESSURE: 74 MMHG

## 2020-08-10 DIAGNOSIS — F19.20 POLYSUBSTANCE (EXCLUDING OPIOIDS) DEPENDENCE (H): ICD-10-CM

## 2020-08-10 DIAGNOSIS — O09.93 SUPERVISION OF HIGH RISK PREGNANCY IN THIRD TRIMESTER: ICD-10-CM

## 2020-08-10 DIAGNOSIS — F19.20 POLYSUBSTANCE (EXCLUDING OPIOIDS) DEPENDENCE (H): Primary | ICD-10-CM

## 2020-08-10 PROCEDURE — 76816 OB US FOLLOW-UP PER FETUS: CPT

## 2020-08-10 PROCEDURE — G0463 HOSPITAL OUTPT CLINIC VISIT: HCPCS | Mod: ZF

## 2020-08-10 ASSESSMENT — PAIN SCALES - GENERAL: PAINLEVEL: NO PAIN (0)

## 2020-08-10 ASSESSMENT — MIFFLIN-ST. JEOR: SCORE: 1421.33

## 2020-08-10 NOTE — LETTER
"8/10/2020       RE: Sophia Stiles  [address]  [facility] [address]     Dear Colleague,    Thank you for referring your patient, Sophia Stiles, [facility] Please see a copy of my visit note below.    Subjective:      20 year old  at 34w3d presents for a routine prenatal appointment.       Has had some bright red spotting after intercourse last week,  No current  vaginal bleeding or leakage of fluid.  Denies contractions or cramping.    Reports regular fetal movement.       No HA, visual changes, RUQ or epigastric pain.   The patient presents with the following concerns: Is able to stay for growth US today, ordered and added to schedule.        Objective:  Vitals:    08/10/20 1046   BP: 109/74   Pulse: 93   Weight: 71.9 kg (158 lb 8 oz)   Height: 1.549 m (5' 1\")     See OB flowsheet    Assessment/Plan     Encounter Diagnoses   Name Primary?     Supervision of high risk pregnancy in third trimester      Polysubstance (excluding opioids) dependence (H) Yes     Orders Placed This Encounter   Procedures     US OB >14 Weeks Follow Up         - Reviewed total weight gain, encouraged continued healthy diet and exercise.      - Reviewed why/how to contact provider.    Patient education/orders or handouts today:  fetal movement.  Patient reminded to call if further spotting or cramping happens.  Plan GBS screening and 2nd Hep B at next visit.   Return to clinic in 2 weeks and prn if questions or concerns.   SHAJI Fernando CNM          "

## 2020-08-10 NOTE — PROGRESS NOTES
"Subjective:      20 year old  at 34w3d presents for a routine prenatal appointment.       Has had some bright red spotting after intercourse last week,  No current  vaginal bleeding or leakage of fluid.  Denies contractions or cramping.    Reports regular fetal movement.       No HA, visual changes, RUQ or epigastric pain.   The patient presents with the following concerns: Is able to stay for growth US today, ordered and added to schedule.        Objective:  Vitals:    08/10/20 1046   BP: 109/74   Pulse: 93   Weight: 71.9 kg (158 lb 8 oz)   Height: 1.549 m (5' 1\")     See OB flowsheet    Assessment/Plan     Encounter Diagnoses   Name Primary?     Supervision of high risk pregnancy in third trimester      Polysubstance (excluding opioids) dependence (H) Yes     Orders Placed This Encounter   Procedures     US OB >14 Weeks Follow Up         - Reviewed total weight gain, encouraged continued healthy diet and exercise.      - Reviewed why/how to contact provider.    Patient education/orders or handouts today:  fetal movement.  Patient reminded to call if further spotting or cramping happens.  Plan GBS screening and 2nd Hep B at next visit.   Return to clinic in 2 weeks and prn if questions or concerns.   SHAJI Fernando CNM              "

## 2020-08-24 ENCOUNTER — OFFICE VISIT (OUTPATIENT)
Dept: OBGYN | Facility: CLINIC | Age: 20
End: 2020-08-24
Attending: ADVANCED PRACTICE MIDWIFE
Payer: COMMERCIAL

## 2020-08-24 VITALS
SYSTOLIC BLOOD PRESSURE: 105 MMHG | WEIGHT: 159.4 LBS | HEIGHT: 61 IN | HEART RATE: 83 BPM | DIASTOLIC BLOOD PRESSURE: 73 MMHG | BODY MASS INDEX: 30.09 KG/M2

## 2020-08-24 DIAGNOSIS — E55.9 VITAMIN D DEFICIENCY: ICD-10-CM

## 2020-08-24 DIAGNOSIS — O99.322 HEROIN ABUSE AFFECTING PREGNANCY IN SECOND TRIMESTER (H): ICD-10-CM

## 2020-08-24 DIAGNOSIS — O09.90 SUPERVISION OF HIGH RISK PREGNANCY, ANTEPARTUM: Primary | ICD-10-CM

## 2020-08-24 DIAGNOSIS — F19.20 CHEMICAL DEPENDENCY (H): ICD-10-CM

## 2020-08-24 DIAGNOSIS — F33.1 MODERATE EPISODE OF RECURRENT MAJOR DEPRESSIVE DISORDER (H): ICD-10-CM

## 2020-08-24 DIAGNOSIS — F17.200 SMOKER: ICD-10-CM

## 2020-08-24 DIAGNOSIS — Z78.9 NONIMMUNE TO HEPATITIS B VIRUS: ICD-10-CM

## 2020-08-24 DIAGNOSIS — F19.20 POLYSUBSTANCE (EXCLUDING OPIOIDS) DEPENDENCE (H): ICD-10-CM

## 2020-08-24 DIAGNOSIS — F11.10 HEROIN ABUSE AFFECTING PREGNANCY IN SECOND TRIMESTER (H): ICD-10-CM

## 2020-08-24 DIAGNOSIS — F12.20 CANNABIS DEPENDENCE (H): ICD-10-CM

## 2020-08-24 PROCEDURE — 90746 HEPB VACCINE 3 DOSE ADULT IM: CPT | Mod: ZF

## 2020-08-24 PROCEDURE — G0463 HOSPITAL OUTPT CLINIC VISIT: HCPCS | Mod: ZF

## 2020-08-24 PROCEDURE — 25000128 H RX IP 250 OP 636: Mod: ZF

## 2020-08-24 PROCEDURE — 90471 IMMUNIZATION ADMIN: CPT | Mod: ZF

## 2020-08-24 RX ORDER — IBUPROFEN 600 MG/1
600 TABLET, FILM COATED ORAL EVERY 6 HOURS PRN
Qty: 60 TABLET | Refills: 0 | Status: ON HOLD | OUTPATIENT
Start: 2020-08-24 | End: 2020-09-17

## 2020-08-24 RX ORDER — AMOXICILLIN 250 MG
1 CAPSULE ORAL DAILY
Qty: 100 TABLET | Refills: 0 | Status: ON HOLD | OUTPATIENT
Start: 2020-08-24 | End: 2020-09-17

## 2020-08-24 ASSESSMENT — PAIN SCALES - GENERAL: PAINLEVEL: NO PAIN (0)

## 2020-08-24 ASSESSMENT — MIFFLIN-ST. JEOR: SCORE: 1425.41

## 2020-08-24 NOTE — PROGRESS NOTES
"Subjective:      20 year old  at 36w3d presents for a routine prenatal appointment.         No vaginal bleeding or spotting (previously reported after intercourse but denied any further sx),  leakage of fluid, or vaginal discharge.  No consistent contractions but believes having Kartik Davis intermittently.  Good fetal movement, often at night with some sleep disruption.  Intermittent HA but no visual changes, RUQ or epigastric pain.  Some hip pain in the morning upon waking.  Patient concerns: Fatigue but feeling well overall.     Objective:  Vitals:    20 0828   BP: 105/73   Pulse: 83   Weight: 72.3 kg (159 lb 6.4 oz)   Height: 1.549 m (5' 1\")    See OB flowsheet    Assessment/Plan     Encounter Diagnoses   Name Primary?     Supervision of high risk pregnancy, antepartum Yes     Polysubstance (excluding opioids) dependence (H)      Vitamin D deficiency      Heroin abuse affecting pregnancy in second trimester (H)      Chemical dependency (H)      Personal history of self-harm      Cannabis dependence (H)      Moderate episode of recurrent major depressive disorder (H)      Smoker      Orders Placed This Encounter   Procedures     HEPATITIS B VACCINE,ADULT,IM     Orders Placed This Encounter   Medications     senna-docusate (SENOKOT-S/PERICOLACE) 8.6-50 MG tablet     Sig: Take 1 tablet by mouth daily Start after delivery.     Dispense:  100 tablet     Refill:  0     ibuprofen (ADVIL/MOTRIN) 600 MG tablet     Sig: Take 1 tablet (600 mg) by mouth every 6 hours as needed for moderate pain Start after delivery     Dispense:  60 tablet     Refill:  0       PHQ-9 SCORE 3/10/2020 7/15/2020 2020   PHQ-9 Total Score 5 1 3   Some encounter information is confidential and restricted. Go to Review Flowsheets activity to see all data.     GBS screening: Defer to 37 week appointment.  Hepatitis B vaccination (second in series) today.  Birth preferences reviewed: Medicated and Breast Feeding planned.  Planning on " partner (Williams) present at birth.  Labor signs discussed. Reinforced daily fetal movement counts.  Encouraged walking and yoga/stretching, cushioned mattress pad to help with hip pain.  Ordered postpartum OTC.  Reviewed why/how to contact provider if headache/visual changes/RUQ or epigastric pain, decreased fetal movement, vaginal bleeding, leakage of fluid.     Return to clinic in 1 week and prn if questions or concerns.     I, Porsha Gomez, am serving as a scribe; to document services personally performed by  Alejandra Rodríguez based on data collection and the provider's statements to me.     Porsha Gomez, PA student   I agree with the PFSH and ROS as completed by the student, except for changes made by me. The remainder of the encounter was performed by me and scribed by the student. The scribed note accurately reflects my personal services and decisions made by me.  Alejandra Rodríguez, KRISTAL, PAULA, APRN    SHAJI Galeas CNM

## 2020-08-24 NOTE — LETTER
"2020       RE: Sophia Stiles  [address] [facility] [address]     Dear Colleague,    Thank you for referring your patient, Sophia Stiles, to the [facility] at [facility]. Please see a copy of my visit note below.    Subjective:      20 year old  at 36w3d presents for a routine prenatal appointment.         No vaginal bleeding or spotting (previously reported after intercourse but denied any further sx),  leakage of fluid, or vaginal discharge.  No consistent contractions but believes having Kartik Davis intermittently.  Good fetal movement, often at night with some sleep disruption.  Intermittent HA but no visual changes, RUQ or epigastric pain.  Some hip pain in the morning upon waking.  Patient concerns: Fatigue but feeling well overall.     Objective:  Vitals:    20 0828   BP: 105/73   Pulse: 83   Weight: 72.3 kg (159 lb 6.4 oz)   Height: 1.549 m (5' 1\")    See OB flowsheet    Assessment/Plan     Encounter Diagnoses   Name Primary?     Supervision of high risk pregnancy, antepartum Yes     Polysubstance (excluding opioids) dependence (H)      Vitamin D deficiency      Heroin abuse affecting pregnancy in second trimester (H)      Chemical dependency (H)      Personal history of self-harm      Cannabis dependence (H)      Moderate episode of recurrent major depressive disorder (H)      Smoker      Orders Placed This Encounter   Procedures     HEPATITIS B VACCINE,ADULT,IM     Orders Placed This Encounter   Medications     senna-docusate (SENOKOT-S/PERICOLACE) 8.6-50 MG tablet     Sig: Take 1 tablet by mouth daily Start after delivery.     Dispense:  100 tablet     Refill:  0     ibuprofen (ADVIL/MOTRIN) 600 MG tablet     Sig: Take 1 tablet (600 mg) by mouth every 6 hours as needed for moderate pain Start after delivery     Dispense:  60 tablet     Refill:  0       PHQ-9 SCORE 3/10/2020 7/15/2020 2020   PHQ-9 Total Score 5 " 1 3   Some encounter information is confidential and restricted. Go to Review Flowsheets activity to see all data.     GBS screening: Defer to 37 week appointment.  Hepatitis B vaccination (second in series) today.  Birth preferences reviewed: Medicated and Breast Feeding planned.  Planning on partner (Williams) present at birth.  Labor signs discussed. Reinforced daily fetal movement counts.  Encouraged walking and yoga/stretching, cushioned mattress pad to help with hip pain.  Ordered postpartum OTC.  Reviewed why/how to contact provider if headache/visual changes/RUQ or epigastric pain, decreased fetal movement, vaginal bleeding, leakage of fluid.     Return to clinic in 1 week and prn if questions or concerns.     I, Porsha Gomez, am serving as a scribe; to document services personally performed by  Alejandra Rodríguez based on data collection and the provider's statements to me.     Porsha Gomez, PA student   I agree with the PFSH and ROS as completed by the student, except for changes made by me. The remainder of the encounter was performed by me and scribed by the student. The scribed note accurately reflects my personal services and decisions made by me.  Alejandra Rodríguez, KRISTAL, CNM, APRN    Alejandra Rodríguez, SHAJI MITCHELL

## 2020-09-02 ENCOUNTER — OFFICE VISIT (OUTPATIENT)
Dept: OBGYN | Facility: CLINIC | Age: 20
End: 2020-09-02
Attending: ADVANCED PRACTICE MIDWIFE
Payer: COMMERCIAL

## 2020-09-02 VITALS
WEIGHT: 163.1 LBS | HEIGHT: 61 IN | BODY MASS INDEX: 30.79 KG/M2 | SYSTOLIC BLOOD PRESSURE: 109 MMHG | DIASTOLIC BLOOD PRESSURE: 72 MMHG | HEART RATE: 106 BPM

## 2020-09-02 DIAGNOSIS — Z28.39 MATERNAL VARICELLA, NON-IMMUNE: ICD-10-CM

## 2020-09-02 DIAGNOSIS — F33.1 MODERATE EPISODE OF RECURRENT MAJOR DEPRESSIVE DISORDER (H): ICD-10-CM

## 2020-09-02 DIAGNOSIS — O99.322 HEROIN ABUSE AFFECTING PREGNANCY IN SECOND TRIMESTER (H): ICD-10-CM

## 2020-09-02 DIAGNOSIS — O09.90 SUPERVISION OF HIGH RISK PREGNANCY, ANTEPARTUM: Primary | ICD-10-CM

## 2020-09-02 DIAGNOSIS — O09.899 MATERNAL VARICELLA, NON-IMMUNE: ICD-10-CM

## 2020-09-02 DIAGNOSIS — Z78.9 NONIMMUNE TO HEPATITIS B VIRUS: ICD-10-CM

## 2020-09-02 DIAGNOSIS — F11.10 HEROIN ABUSE AFFECTING PREGNANCY IN SECOND TRIMESTER (H): ICD-10-CM

## 2020-09-02 PROCEDURE — G0463 HOSPITAL OUTPT CLINIC VISIT: HCPCS | Mod: ZF

## 2020-09-02 PROCEDURE — 87653 STREP B DNA AMP PROBE: CPT | Performed by: ADVANCED PRACTICE MIDWIFE

## 2020-09-02 PROCEDURE — 87186 SC STD MICRODIL/AGAR DIL: CPT | Performed by: ADVANCED PRACTICE MIDWIFE

## 2020-09-02 ASSESSMENT — ANXIETY QUESTIONNAIRES
7. FEELING AFRAID AS IF SOMETHING AWFUL MIGHT HAPPEN: NOT AT ALL
2. NOT BEING ABLE TO STOP OR CONTROL WORRYING: NOT AT ALL
3. WORRYING TOO MUCH ABOUT DIFFERENT THINGS: SEVERAL DAYS
1. FEELING NERVOUS, ANXIOUS, OR ON EDGE: SEVERAL DAYS
GAD7 TOTAL SCORE: 4
6. BECOMING EASILY ANNOYED OR IRRITABLE: SEVERAL DAYS
5. BEING SO RESTLESS THAT IT IS HARD TO SIT STILL: NOT AT ALL

## 2020-09-02 ASSESSMENT — PATIENT HEALTH QUESTIONNAIRE - PHQ9
SUM OF ALL RESPONSES TO PHQ QUESTIONS 1-9: 4
5. POOR APPETITE OR OVEREATING: SEVERAL DAYS

## 2020-09-02 ASSESSMENT — MIFFLIN-ST. JEOR: SCORE: 1442.2

## 2020-09-02 NOTE — NURSING NOTE
Chief Complaint   Patient presents with     Prenatal Care     37w5d       See JULIO Miller 9/2/2020

## 2020-09-02 NOTE — LETTER
"2020       RE: Sophia Stiles  [address]  [facility] [address]     Dear Colleague,    Thank you for referring your patient, Sophia Stiles, to the [facility] at [facility]. Please see a copy of my visit note below.    Duplicate, please disregard. SHAJI Mera CNVERONICA      Subjective:      20 year old  at 37w5d presents for a routine prenatal appointment.    + occasional vaginal spotting after sex.  Unsure if leakage of fluid or just increase in vaginal discharge.  Last 4 days has woken up with wetness - clear and then it changes to white during the day. No malodor, no itching, no irritation.   + irregular contractions or cramping, could be [place] Davis. + fetal movement.     No HA, visual changes, RUQ or epigastric pain.     Pt desires SVE but aware of recommendation of rule out ROM.  Pt would prefer to have sterile speculum and ferning as she doesn't feel like she has time for triage visit unless clinically necessary.    Objective:  Vitals:    20 0804   BP: 109/72   BP Location: Left arm   Patient Position: Chair   Pulse: 106   Weight: 74 kg (163 lb 1.6 oz)   Height: 1.549 m (5' 1\")    See OB flowsheet    SSE - Noted no pooling. Negative Valsalva.   Negative ferning.  Cervix appears closed/long.   Wet prep - Negative clue cells, negative yeast, negative Trich, negative KOH whiff.     Assessment/Plan     Encounter Diagnoses   Name Primary?     Supervision of high risk pregnancy, antepartum Yes     Chemical dependency (H)      Cannabis dependence (H)      Moderate episode of recurrent major depressive disorder (H)      Personal history of self-harm      Orders Placed This Encounter   Procedures     CBC with Platelets     (O09.90) Supervision of high risk pregnancy, antepartum  (primary encounter diagnosis)  Plan: Group B strep PCR, CBC with Platelets          PHQ-9 SCORE 7/15/2020 2020 2020   PHQ-9 Total Score 1 3 4 "   Some encounter information is confidential and restricted. Go to Review Flowsheets activity to see all data.     GBS screening: Obtained.  Birth preferences reviewed: Medicated  Labor signs discussed. Reinforced daily fetal movement counts.     Encouraged spinning babies daily  Encouraged daily abdominal massage/meditation to enjoy last weeks of pregnancy   Reviewed why/how to contact provider if headache/visual changes/RUQ or epigastric pain, decreased fetal movement, vaginal bleeding, leakage of fluid.   Return to clinic in 1 week and prn if questions or concerns.     SHAJI MeraM

## 2020-09-03 LAB
GP B STREP DNA SPEC QL NAA+PROBE: POSITIVE
SPECIMEN SOURCE: ABNORMAL

## 2020-09-03 ASSESSMENT — ANXIETY QUESTIONNAIRES: GAD7 TOTAL SCORE: 4

## 2020-09-06 PROBLEM — B95.1 POSITIVE GBS TEST: Status: ACTIVE | Noted: 2020-09-06

## 2020-09-06 LAB
BACTERIA SPEC CULT: ABNORMAL
SPECIMEN SOURCE: ABNORMAL

## 2020-09-10 ENCOUNTER — OFFICE VISIT (OUTPATIENT)
Dept: OBGYN | Facility: CLINIC | Age: 20
End: 2020-09-10
Attending: MIDWIFE
Payer: COMMERCIAL

## 2020-09-10 VITALS
SYSTOLIC BLOOD PRESSURE: 111 MMHG | HEIGHT: 61 IN | WEIGHT: 167.3 LBS | DIASTOLIC BLOOD PRESSURE: 72 MMHG | HEART RATE: 84 BPM | BODY MASS INDEX: 31.59 KG/M2

## 2020-09-10 DIAGNOSIS — Z28.39 MATERNAL VARICELLA, NON-IMMUNE: ICD-10-CM

## 2020-09-10 DIAGNOSIS — O09.90 SUPERVISION OF HIGH RISK PREGNANCY, ANTEPARTUM: Primary | ICD-10-CM

## 2020-09-10 DIAGNOSIS — O09.899 MATERNAL VARICELLA, NON-IMMUNE: ICD-10-CM

## 2020-09-10 DIAGNOSIS — F19.20 CHEMICAL DEPENDENCY (H): ICD-10-CM

## 2020-09-10 DIAGNOSIS — F17.200 SMOKER: ICD-10-CM

## 2020-09-10 DIAGNOSIS — B95.1 POSITIVE GBS TEST: ICD-10-CM

## 2020-09-10 DIAGNOSIS — F12.20 CANNABIS DEPENDENCE (H): ICD-10-CM

## 2020-09-10 DIAGNOSIS — Z78.9 NONIMMUNE TO HEPATITIS B VIRUS: ICD-10-CM

## 2020-09-10 DIAGNOSIS — F33.1 MODERATE EPISODE OF RECURRENT MAJOR DEPRESSIVE DISORDER (H): ICD-10-CM

## 2020-09-10 DIAGNOSIS — E55.9 VITAMIN D DEFICIENCY: ICD-10-CM

## 2020-09-10 PROCEDURE — G0463 HOSPITAL OUTPT CLINIC VISIT: HCPCS | Mod: ZF

## 2020-09-10 ASSESSMENT — MIFFLIN-ST. JEOR: SCORE: 1461.25

## 2020-09-10 NOTE — LETTER
"9/10/2020       RE: Sophia Stiles  [address]  [facility] [address]     Dear Colleague,    Thank you for referring your patient, Sophia Sitles, to the [facility] at [facility]. Please see a copy of my visit note below.    Subjective:     20 year old  at 38w6d presents for routine prenatal visit.    No vaginal bleeding or leakage of fluid.  Occs contractions.  Good fetal movement.        No HA, visual changes, RUQ or epigastric pain.   Patient concerns: Feeling well overall.  - Some occs cramping    - Reviewed GBS positive and recommendation for IV antibiotics in active labor or with ROM.     Objective:  Vitals:    09/10/20 0803   BP: 111/72   Pulse: 84   Weight: 75.9 kg (167 lb 4.8 oz)   Height: 1.549 m (5' 1\")    See OB flowsheet  SVE: 1cm, 50%, -2, mid to posterior, soft     Assessment/Plan     Encounter Diagnoses   Name Primary?     Supervision of high risk pregnancy, antepartum Yes     Nonimmune to hepatitis B virus      Maternal varicella, non-immune      Vitamin D deficiency      Chemical dependency (H)      Moderate episode of recurrent major depressive disorder (H)      Positive GBS test      Smoker      Personal history of self-harm      Cannabis dependence (H)      No orders of the defined types were placed in this encounter.    - Reviewed postdates testing including BPP => 41 weeks and rationale for induction of labor based on results.   Patient desires induction.   - Reviewed why/how to contact provider if headache/visual changes/RUQ or epigastric pain, decreased fetal movement, vaginal bleeding, leakage of fluid or strong/regular contractions.   Patient education/orders or handouts today:  Sign/symptoms of labor and When to call for labor or other concerns    Return to clinic in 1 week and prn if questions or concerns.   SHAJI Simmons CNM    "

## 2020-09-10 NOTE — PROGRESS NOTES
"Subjective:     20 year old  at 38w6d presents for routine prenatal visit.    No vaginal bleeding or leakage of fluid.  Occs contractions.  Good fetal movement.        No HA, visual changes, RUQ or epigastric pain.   Patient concerns: Feeling well overall.  - Some occs cramping    - Reviewed GBS positive and recommendation for IV antibiotics in active labor or with ROM.     Objective:  Vitals:    09/10/20 0803   BP: 111/72   Pulse: 84   Weight: 75.9 kg (167 lb 4.8 oz)   Height: 1.549 m (5' 1\")    See OB flowsheet  SVE: 1cm, 50%, -2, mid to posterior, soft     Assessment/Plan     Encounter Diagnoses   Name Primary?     Supervision of high risk pregnancy, antepartum Yes     Nonimmune to hepatitis B virus      Maternal varicella, non-immune      Vitamin D deficiency      Chemical dependency (H)      Moderate episode of recurrent major depressive disorder (H)      Positive GBS test      Smoker      Personal history of self-harm      Cannabis dependence (H)      No orders of the defined types were placed in this encounter.    - Reviewed postdates testing including BPP => 41 weeks and rationale for induction of labor based on results.   Patient desires induction.   - Reviewed why/how to contact provider if headache/visual changes/RUQ or epigastric pain, decreased fetal movement, vaginal bleeding, leakage of fluid or strong/regular contractions.   Patient education/orders or handouts today:  Sign/symptoms of labor and When to call for labor or other concerns    Return to clinic in 1 week and prn if questions or concerns.   SHAJI Simmons CNM    "

## 2020-09-14 ENCOUNTER — HOSPITAL ENCOUNTER (OUTPATIENT)
Facility: CLINIC | Age: 20
Discharge: HOME | End: 2020-09-14
Attending: ADVANCED PRACTICE MIDWIFE | Admitting: ADVANCED PRACTICE MIDWIFE
Payer: COMMERCIAL

## 2020-09-14 ENCOUNTER — TELEPHONE (OUTPATIENT)
Dept: OBGYN | Facility: CLINIC | Age: 20
End: 2020-09-14

## 2020-09-14 VITALS
HEART RATE: 102 BPM | RESPIRATION RATE: 18 BRPM | DIASTOLIC BLOOD PRESSURE: 68 MMHG | SYSTOLIC BLOOD PRESSURE: 117 MMHG | TEMPERATURE: 98.4 F

## 2020-09-14 PROBLEM — Z36.89 ENCOUNTER FOR TRIAGE IN PREGNANT PATIENT: Status: ACTIVE | Noted: 2020-09-14

## 2020-09-14 LAB
ALBUMIN UR-MCNC: NEGATIVE MG/DL
AMPHETAMINES UR QL SCN: NEGATIVE
APPEARANCE UR: CLEAR
BACTERIA #/AREA URNS HPF: ABNORMAL /HPF
BILIRUB UR QL STRIP: NEGATIVE
CANNABINOIDS UR QL: NEGATIVE
COCAINE UR QL: NEGATIVE
COLOR UR AUTO: YELLOW
GLUCOSE UR STRIP-MCNC: 70 MG/DL
HGB UR QL STRIP: NEGATIVE
KETONES UR STRIP-MCNC: NEGATIVE MG/DL
LEUKOCYTE ESTERASE UR QL STRIP: ABNORMAL
MUCOUS THREADS #/AREA URNS LPF: PRESENT /LPF
NITRATE UR QL: NEGATIVE
OPIATES UR QL SCN: NEGATIVE
PCP UR QL SCN: NEGATIVE
PH UR STRIP: 6 PH (ref 5–7)
RBC #/AREA URNS AUTO: 1 /HPF (ref 0–2)
RUPTURE OF FETAL MEMBRANES BY ROM PLUS: NEGATIVE
SOURCE: ABNORMAL
SP GR UR STRIP: 1.01 (ref 1–1.03)
SQUAMOUS #/AREA URNS AUTO: 4 /HPF (ref 0–1)
UROBILINOGEN UR STRIP-MCNC: NORMAL MG/DL (ref 0–2)
WBC #/AREA URNS AUTO: 11 /HPF (ref 0–5)

## 2020-09-14 PROCEDURE — G0463 HOSPITAL OUTPT CLINIC VISIT: HCPCS | Mod: 25

## 2020-09-14 PROCEDURE — 87086 URINE CULTURE/COLONY COUNT: CPT | Performed by: ADVANCED PRACTICE MIDWIFE

## 2020-09-14 PROCEDURE — 25000132 ZZH RX MED GY IP 250 OP 250 PS 637: Performed by: ADVANCED PRACTICE MIDWIFE

## 2020-09-14 PROCEDURE — 80307 DRUG TEST PRSMV CHEM ANLYZR: CPT | Performed by: ADVANCED PRACTICE MIDWIFE

## 2020-09-14 PROCEDURE — 84112 EVAL AMNIOTIC FLUID PROTEIN: CPT | Performed by: ADVANCED PRACTICE MIDWIFE

## 2020-09-14 PROCEDURE — 59025 FETAL NON-STRESS TEST: CPT

## 2020-09-14 PROCEDURE — 81001 URINALYSIS AUTO W/SCOPE: CPT | Performed by: ADVANCED PRACTICE MIDWIFE

## 2020-09-14 PROCEDURE — 87088 URINE BACTERIA CULTURE: CPT | Performed by: ADVANCED PRACTICE MIDWIFE

## 2020-09-14 RX ORDER — ONDANSETRON 2 MG/ML
4 INJECTION INTRAMUSCULAR; INTRAVENOUS EVERY 6 HOURS PRN
Status: DISCONTINUED | OUTPATIENT
Start: 2020-09-14 | End: 2020-09-14 | Stop reason: HOSPADM

## 2020-09-14 RX ORDER — ACETAMINOPHEN 325 MG/1
975 TABLET ORAL ONCE
Status: COMPLETED | OUTPATIENT
Start: 2020-09-14 | End: 2020-09-14

## 2020-09-14 RX ORDER — ACETAMINOPHEN 325 MG/1
650 TABLET ORAL EVERY 4 HOURS PRN
Status: DISCONTINUED | OUTPATIENT
Start: 2020-09-14 | End: 2020-09-14 | Stop reason: HOSPADM

## 2020-09-14 RX ORDER — HYDROXYZINE HYDROCHLORIDE 50 MG/1
100 TABLET, FILM COATED ORAL ONCE
Status: COMPLETED | OUTPATIENT
Start: 2020-09-14 | End: 2020-09-14

## 2020-09-14 RX ADMIN — HYDROXYZINE HYDROCHLORIDE 100 MG: 50 TABLET, FILM COATED ORAL at 20:17

## 2020-09-14 RX ADMIN — ACETAMINOPHEN 975 MG: 325 TABLET, FILM COATED ORAL at 20:18

## 2020-09-14 NOTE — PLAN OF CARE
Data: Patient presented to [facility] at 1609.   Reason for maternal/fetal assessment per patient is Rule out rupture of membranes  .  Patient is a . Prenatal record reviewed.      OB History    Para Term  AB Living   1 0 0 0 0 0   SAB TAB Ectopic Multiple Live Births   0 0 0 0 0      # Outcome Date GA Lbr Marlo/2nd Weight Sex Delivery Anes PTL Lv   1 Current            . Medical history:   Past Medical History:   Diagnosis Date     Anxiety     has used mirazapine      Depression      Heroin overdose (H)     x2     Substance abuse (H)      Suicide attempt (H)     age 14 and age 17    Gestational Age 39w3d. VSS. Fetal movement present. Patient denies cramping, vaginal discharge, pelvic pressure, UTI symptoms, GI problems, bloody show, vaginal bleeding, edema, headache, visual disturbances, epigastric or URQ pain, abdominal pain.  C/O rupture of membranes. Support persons are not present.  Action: Verbal consent for EFM. Triage assessment completed. EFM and TOCO applied for fetal and uterine assessment.  Response: Dr. BRIAN BENTLEYM informed of arrival.

## 2020-09-14 NOTE — TELEPHONE ENCOUNTER
Telephone from pt watery discharge and continue to leak water since 11 am  Denies bleeding or spotting and denies contractions  Back pain  Baby's movement decreased today all day- moving once in hour.  Informed she needs to go now to the birthplace for eval  States she can go now to the birth place - boyfriend or mom will drive  Charge nurse in birthplace called and notified that Fee is on her way now for evaluation of possible SROM and also decreased FM

## 2020-09-15 ENCOUNTER — TELEPHONE (OUTPATIENT)
Dept: OBGYN | Facility: CLINIC | Age: 20
End: 2020-09-15

## 2020-09-15 ENCOUNTER — ANESTHESIA (OUTPATIENT)
Dept: OBGYN | Facility: CLINIC | Age: 20
End: 2020-09-15
Payer: COMMERCIAL

## 2020-09-15 ENCOUNTER — ANESTHESIA EVENT (OUTPATIENT)
Dept: OBGYN | Facility: CLINIC | Age: 20
End: 2020-09-15
Payer: COMMERCIAL

## 2020-09-15 ENCOUNTER — HOSPITAL ENCOUNTER (INPATIENT)
Facility: CLINIC | Age: 20
LOS: 3 days | Discharge: HOME | End: 2020-09-18
Attending: OBSTETRICS & GYNECOLOGY | Admitting: MIDWIFE
Payer: COMMERCIAL

## 2020-09-15 DIAGNOSIS — Z98.891 S/P CESAREAN SECTION: Primary | ICD-10-CM

## 2020-09-15 LAB
ABO + RH BLD: NORMAL
ABO + RH BLD: NORMAL
ALT SERPL W P-5'-P-CCNC: 19 U/L (ref 0–50)
AMPHETAMINES UR QL SCN: NEGATIVE
ANION GAP SERPL CALCULATED.3IONS-SCNC: 8 MMOL/L (ref 3–14)
AST SERPL W P-5'-P-CCNC: 19 U/L (ref 0–45)
BACTERIA SPEC CULT: ABNORMAL
BASOPHILS # BLD AUTO: 0.1 10E9/L (ref 0–0.2)
BASOPHILS NFR BLD AUTO: 0.5 %
BLD GP AB SCN SERPL QL: NORMAL
BLOOD BANK CMNT PATIENT-IMP: NORMAL
BUN SERPL-MCNC: 10 MG/DL (ref 7–30)
CALCIUM SERPL-MCNC: 8.2 MG/DL (ref 8.5–10.1)
CANNABINOIDS UR QL: NEGATIVE
CHLORIDE SERPL-SCNC: 108 MMOL/L (ref 94–109)
CO2 SERPL-SCNC: 21 MMOL/L (ref 20–32)
COCAINE UR QL: NEGATIVE
CREAT SERPL-MCNC: 0.87 MG/DL (ref 0.52–1.04)
CREAT UR-MCNC: 44 MG/DL
DIFFERENTIAL METHOD BLD: ABNORMAL
EOSINOPHIL # BLD AUTO: 0.1 10E9/L (ref 0–0.7)
EOSINOPHIL NFR BLD AUTO: 0.7 %
ERYTHROCYTE [DISTWIDTH] IN BLOOD BY AUTOMATED COUNT: 12.6 % (ref 10–15)
ERYTHROCYTE [DISTWIDTH] IN BLOOD BY AUTOMATED COUNT: 12.8 % (ref 10–15)
GFR SERPL CREATININE-BSD FRML MDRD: >90 ML/MIN/{1.73_M2}
GLUCOSE SERPL-MCNC: 80 MG/DL (ref 70–99)
HCT VFR BLD AUTO: 34.8 % (ref 35–47)
HCT VFR BLD AUTO: 35.9 % (ref 35–47)
HGB BLD-MCNC: 11.6 G/DL (ref 11.7–15.7)
HGB BLD-MCNC: 12 G/DL (ref 11.7–15.7)
IMM GRANULOCYTES # BLD: 0.1 10E9/L (ref 0–0.4)
IMM GRANULOCYTES NFR BLD: 1.3 %
LABORATORY COMMENT REPORT: NORMAL
LYMPHOCYTES # BLD AUTO: 1.9 10E9/L (ref 0.8–5.3)
LYMPHOCYTES NFR BLD AUTO: 17.9 %
Lab: ABNORMAL
MCH RBC QN AUTO: 30.6 PG (ref 26.5–33)
MCH RBC QN AUTO: 31.3 PG (ref 26.5–33)
MCHC RBC AUTO-ENTMCNC: 33.3 G/DL (ref 31.5–36.5)
MCHC RBC AUTO-ENTMCNC: 33.4 G/DL (ref 31.5–36.5)
MCV RBC AUTO: 92 FL (ref 78–100)
MCV RBC AUTO: 94 FL (ref 78–100)
MONOCYTES # BLD AUTO: 0.9 10E9/L (ref 0–1.3)
MONOCYTES NFR BLD AUTO: 8.6 %
NEUTROPHILS # BLD AUTO: 7.4 10E9/L (ref 1.6–8.3)
NEUTROPHILS NFR BLD AUTO: 71 %
NRBC # BLD AUTO: 0 10*3/UL
NRBC BLD AUTO-RTO: 0 /100
OPIATES UR QL SCN: NEGATIVE
PCP UR QL SCN: NEGATIVE
PLATELET # BLD AUTO: 281 10E9/L (ref 150–450)
PLATELET # BLD AUTO: 297 10E9/L (ref 150–450)
POTASSIUM SERPL-SCNC: 3.7 MMOL/L (ref 3.4–5.3)
PROT UR-MCNC: <0.05 G/L
PROT/CREAT 24H UR: NORMAL G/G CR (ref 0–0.2)
RBC # BLD AUTO: 3.79 10E12/L (ref 3.8–5.2)
RBC # BLD AUTO: 3.83 10E12/L (ref 3.8–5.2)
SARS-COV-2 RNA SPEC QL NAA+PROBE: NEGATIVE
SARS-COV-2 RNA SPEC QL NAA+PROBE: NORMAL
SODIUM SERPL-SCNC: 137 MMOL/L (ref 133–144)
SPECIMEN EXP DATE BLD: NORMAL
SPECIMEN SOURCE: ABNORMAL
SPECIMEN SOURCE: NORMAL
SPECIMEN SOURCE: NORMAL
T PALLIDUM AB SER QL: NONREACTIVE
WBC # BLD AUTO: 10.5 10E9/L (ref 4–11)
WBC # BLD AUTO: 17.4 10E9/L (ref 4–11)

## 2020-09-15 PROCEDURE — 84450 TRANSFERASE (AST) (SGOT): CPT | Performed by: ADVANCED PRACTICE MIDWIFE

## 2020-09-15 PROCEDURE — 84156 ASSAY OF PROTEIN URINE: CPT | Performed by: ADVANCED PRACTICE MIDWIFE

## 2020-09-15 PROCEDURE — 86780 TREPONEMA PALLIDUM: CPT | Performed by: MIDWIFE

## 2020-09-15 PROCEDURE — C1765 ADHESION BARRIER: HCPCS | Performed by: OBSTETRICS & GYNECOLOGY

## 2020-09-15 PROCEDURE — 88307 TISSUE EXAM BY PATHOLOGIST: CPT | Performed by: STUDENT IN AN ORGANIZED HEALTH CARE EDUCATION/TRAINING PROGRAM

## 2020-09-15 PROCEDURE — 00HU33Z INSERTION OF INFUSION DEVICE INTO SPINAL CANAL, PERCUTANEOUS APPROACH: ICD-10-PCS | Performed by: ANESTHESIOLOGY

## 2020-09-15 PROCEDURE — 25000128 H RX IP 250 OP 636: Performed by: MIDWIFE

## 2020-09-15 PROCEDURE — 37000008 ZZH ANESTHESIA TECHNICAL FEE, 1ST 30 MIN: Performed by: OBSTETRICS & GYNECOLOGY

## 2020-09-15 PROCEDURE — 25000128 H RX IP 250 OP 636: Performed by: ANESTHESIOLOGY

## 2020-09-15 PROCEDURE — 71000014 ZZH RECOVERY PHASE 1 LEVEL 2 FIRST HR: Performed by: OBSTETRICS & GYNECOLOGY

## 2020-09-15 PROCEDURE — 25000125 ZZHC RX 250: Performed by: STUDENT IN AN ORGANIZED HEALTH CARE EDUCATION/TRAINING PROGRAM

## 2020-09-15 PROCEDURE — 27110028 ZZH OR GENERAL SUPPLY NON-STERILE: Performed by: OBSTETRICS & GYNECOLOGY

## 2020-09-15 PROCEDURE — 84460 ALANINE AMINO (ALT) (SGPT): CPT | Performed by: ADVANCED PRACTICE MIDWIFE

## 2020-09-15 PROCEDURE — 80048 BASIC METABOLIC PNL TOTAL CA: CPT | Performed by: ADVANCED PRACTICE MIDWIFE

## 2020-09-15 PROCEDURE — 3E0R3BZ INTRODUCTION OF ANESTHETIC AGENT INTO SPINAL CANAL, PERCUTANEOUS APPROACH: ICD-10-PCS | Performed by: ANESTHESIOLOGY

## 2020-09-15 PROCEDURE — 86901 BLOOD TYPING SEROLOGIC RH(D): CPT | Performed by: MIDWIFE

## 2020-09-15 PROCEDURE — 25000128 H RX IP 250 OP 636: Performed by: STUDENT IN AN ORGANIZED HEALTH CARE EDUCATION/TRAINING PROGRAM

## 2020-09-15 PROCEDURE — 12000001 ZZH R&B MED SURG/OB UMMC

## 2020-09-15 PROCEDURE — 25000125 ZZHC RX 250: Performed by: OBSTETRICS & GYNECOLOGY

## 2020-09-15 PROCEDURE — 25000125 ZZHC RX 250: Performed by: MIDWIFE

## 2020-09-15 PROCEDURE — 25800030 ZZH RX IP 258 OP 636: Performed by: STUDENT IN AN ORGANIZED HEALTH CARE EDUCATION/TRAINING PROGRAM

## 2020-09-15 PROCEDURE — 37000009 ZZH ANESTHESIA TECHNICAL FEE, EACH ADDTL 15 MIN: Performed by: OBSTETRICS & GYNECOLOGY

## 2020-09-15 PROCEDURE — 86900 BLOOD TYPING SEROLOGIC ABO: CPT | Performed by: MIDWIFE

## 2020-09-15 PROCEDURE — 71000015 ZZH RECOVERY PHASE 1 LEVEL 2 EA ADDTL HR: Performed by: OBSTETRICS & GYNECOLOGY

## 2020-09-15 PROCEDURE — 36000059 ZZH SURGERY LEVEL 3 EA 15 ADDTL MIN UMMC: Performed by: OBSTETRICS & GYNECOLOGY

## 2020-09-15 PROCEDURE — 27210794 ZZH OR GENERAL SUPPLY STERILE: Performed by: OBSTETRICS & GYNECOLOGY

## 2020-09-15 PROCEDURE — 40000170 ZZH STATISTIC PRE-PROCEDURE ASSESSMENT II: Performed by: OBSTETRICS & GYNECOLOGY

## 2020-09-15 PROCEDURE — 25800030 ZZH RX IP 258 OP 636: Performed by: MIDWIFE

## 2020-09-15 PROCEDURE — 36415 COLL VENOUS BLD VENIPUNCTURE: CPT | Performed by: ADVANCED PRACTICE MIDWIFE

## 2020-09-15 PROCEDURE — 85025 COMPLETE CBC W/AUTO DIFF WBC: CPT | Performed by: MIDWIFE

## 2020-09-15 PROCEDURE — 86850 RBC ANTIBODY SCREEN: CPT | Performed by: MIDWIFE

## 2020-09-15 PROCEDURE — 80307 DRUG TEST PRSMV CHEM ANLYZR: CPT | Performed by: MIDWIFE

## 2020-09-15 PROCEDURE — 85027 COMPLETE CBC AUTOMATED: CPT | Performed by: ADVANCED PRACTICE MIDWIFE

## 2020-09-15 PROCEDURE — U0003 INFECTIOUS AGENT DETECTION BY NUCLEIC ACID (DNA OR RNA); SEVERE ACUTE RESPIRATORY SYNDROME CORONAVIRUS 2 (SARS-COV-2) (CORONAVIRUS DISEASE [COVID-19]), AMPLIFIED PROBE TECHNIQUE, MAKING USE OF HIGH THROUGHPUT TECHNOLOGIES AS DESCRIBED BY CMS-2020-01-R: HCPCS | Performed by: MIDWIFE

## 2020-09-15 PROCEDURE — 36000057 ZZH SURGERY LEVEL 3 1ST 30 MIN - UMMC: Performed by: OBSTETRICS & GYNECOLOGY

## 2020-09-15 PROCEDURE — 25000132 ZZH RX MED GY IP 250 OP 250 PS 637: Performed by: MIDWIFE

## 2020-09-15 PROCEDURE — C9803 HOPD COVID-19 SPEC COLLECT: HCPCS

## 2020-09-15 RX ORDER — NALBUPHINE HYDROCHLORIDE 20 MG/ML
2.5-5 INJECTION, SOLUTION INTRAMUSCULAR; INTRAVENOUS; SUBCUTANEOUS EVERY 6 HOURS PRN
Status: DISCONTINUED | OUTPATIENT
Start: 2020-09-15 | End: 2020-09-16

## 2020-09-15 RX ORDER — ONDANSETRON 4 MG/1
4 TABLET, ORALLY DISINTEGRATING ORAL EVERY 6 HOURS PRN
Status: DISCONTINUED | OUTPATIENT
Start: 2020-09-15 | End: 2020-09-16

## 2020-09-15 RX ORDER — CEFAZOLIN SODIUM 1 G/3ML
1 INJECTION, POWDER, FOR SOLUTION INTRAMUSCULAR; INTRAVENOUS SEE ADMIN INSTRUCTIONS
Status: DISCONTINUED | OUTPATIENT
Start: 2020-09-15 | End: 2020-09-16

## 2020-09-15 RX ORDER — FENTANYL CITRATE 50 UG/ML
INJECTION, SOLUTION INTRAMUSCULAR; INTRAVENOUS PRN
Status: DISCONTINUED | OUTPATIENT
Start: 2020-09-15 | End: 2020-09-16

## 2020-09-15 RX ORDER — LIDOCAINE HYDROCHLORIDE AND EPINEPHRINE 15; 5 MG/ML; UG/ML
INJECTION, SOLUTION EPIDURAL PRN
Status: DISCONTINUED | OUTPATIENT
Start: 2020-09-15 | End: 2020-09-16

## 2020-09-15 RX ORDER — ONDANSETRON 2 MG/ML
4 INJECTION INTRAMUSCULAR; INTRAVENOUS EVERY 6 HOURS PRN
Status: DISCONTINUED | OUTPATIENT
Start: 2020-09-15 | End: 2020-09-16

## 2020-09-15 RX ORDER — SODIUM CHLORIDE, SODIUM LACTATE, POTASSIUM CHLORIDE, CALCIUM CHLORIDE 600; 310; 30; 20 MG/100ML; MG/100ML; MG/100ML; MG/100ML
INJECTION, SOLUTION INTRAVENOUS CONTINUOUS
Status: DISCONTINUED | OUTPATIENT
Start: 2020-09-15 | End: 2020-09-16

## 2020-09-15 RX ORDER — MISOPROSTOL 200 UG/1
TABLET ORAL
Status: DISCONTINUED
Start: 2020-09-15 | End: 2020-09-15 | Stop reason: HOSPADM

## 2020-09-15 RX ORDER — CEFAZOLIN SODIUM 1 G/3ML
1 INJECTION, POWDER, FOR SOLUTION INTRAMUSCULAR; INTRAVENOUS SEE ADMIN INSTRUCTIONS
Status: CANCELLED | OUTPATIENT
Start: 2020-09-15

## 2020-09-15 RX ORDER — FENTANYL/BUPIVACAINE/NS/PF 2-1250MCG
PLASTIC BAG, INJECTION (ML) INJECTION
Status: COMPLETED
Start: 2020-09-15 | End: 2020-09-15

## 2020-09-15 RX ORDER — LIDOCAINE HYDROCHLORIDE 10 MG/ML
INJECTION, SOLUTION EPIDURAL; INFILTRATION; INTRACAUDAL; PERINEURAL
Status: DISCONTINUED
Start: 2020-09-15 | End: 2020-09-15 | Stop reason: HOSPADM

## 2020-09-15 RX ORDER — OXYTOCIN/0.9 % SODIUM CHLORIDE 30/500 ML
PLASTIC BAG, INJECTION (ML) INTRAVENOUS
Status: DISCONTINUED
Start: 2020-09-15 | End: 2020-09-15 | Stop reason: HOSPADM

## 2020-09-15 RX ORDER — EPHEDRINE SULFATE 50 MG/ML
INJECTION, SOLUTION INTRAMUSCULAR; INTRAVENOUS; SUBCUTANEOUS
Status: DISCONTINUED
Start: 2020-09-15 | End: 2020-09-15 | Stop reason: HOSPADM

## 2020-09-15 RX ORDER — ONDANSETRON 2 MG/ML
INJECTION INTRAMUSCULAR; INTRAVENOUS PRN
Status: DISCONTINUED | OUTPATIENT
Start: 2020-09-15 | End: 2020-09-16

## 2020-09-15 RX ORDER — SODIUM CHLORIDE, SODIUM LACTATE, POTASSIUM CHLORIDE, CALCIUM CHLORIDE 600; 310; 30; 20 MG/100ML; MG/100ML; MG/100ML; MG/100ML
INJECTION, SOLUTION INTRAVENOUS CONTINUOUS PRN
Status: DISCONTINUED | OUTPATIENT
Start: 2020-09-15 | End: 2020-09-16

## 2020-09-15 RX ORDER — CEFAZOLIN SODIUM 2 G/100ML
2 INJECTION, SOLUTION INTRAVENOUS
Status: CANCELLED | OUTPATIENT
Start: 2020-09-15

## 2020-09-15 RX ORDER — LIDOCAINE HYDROCHLORIDE 20 MG/ML
INJECTION, SOLUTION EPIDURAL; INFILTRATION; INTRACAUDAL; PERINEURAL PRN
Status: DISCONTINUED | OUTPATIENT
Start: 2020-09-15 | End: 2020-09-16

## 2020-09-15 RX ORDER — FENTANYL CITRATE-0.9 % NACL/PF 10 MCG/ML
PLASTIC BAG, INJECTION (ML) INTRAVENOUS CONTINUOUS PRN
Status: DISCONTINUED | OUTPATIENT
Start: 2020-09-15 | End: 2020-09-16

## 2020-09-15 RX ORDER — FENTANYL CITRATE 50 UG/ML
INJECTION, SOLUTION INTRAMUSCULAR; INTRAVENOUS PRN
Status: DISCONTINUED | OUTPATIENT
Start: 2020-09-15 | End: 2020-09-15

## 2020-09-15 RX ORDER — OXYTOCIN/0.9 % SODIUM CHLORIDE 30/500 ML
100-340 PLASTIC BAG, INJECTION (ML) INTRAVENOUS CONTINUOUS PRN
Status: COMPLETED | OUTPATIENT
Start: 2020-09-15 | End: 2020-09-15

## 2020-09-15 RX ORDER — CARBOPROST TROMETHAMINE 250 UG/ML
250 INJECTION, SOLUTION INTRAMUSCULAR
Status: DISCONTINUED | OUTPATIENT
Start: 2020-09-15 | End: 2020-09-16

## 2020-09-15 RX ORDER — EPHEDRINE SULFATE 50 MG/ML
5 INJECTION, SOLUTION INTRAMUSCULAR; INTRAVENOUS; SUBCUTANEOUS
Status: DISCONTINUED | OUTPATIENT
Start: 2020-09-15 | End: 2020-09-16

## 2020-09-15 RX ORDER — KETAMINE HYDROCHLORIDE 10 MG/ML
INJECTION INTRAMUSCULAR; INTRAVENOUS PRN
Status: DISCONTINUED | OUTPATIENT
Start: 2020-09-15 | End: 2020-09-16

## 2020-09-15 RX ORDER — AZITHROMYCIN 500 MG/5ML
500 INJECTION, POWDER, LYOPHILIZED, FOR SOLUTION INTRAVENOUS
Status: COMPLETED | OUTPATIENT
Start: 2020-09-15 | End: 2020-09-15

## 2020-09-15 RX ORDER — MISOPROSTOL 200 UG/1
TABLET ORAL
Status: DISCONTINUED
Start: 2020-09-15 | End: 2020-09-16 | Stop reason: HOSPADM

## 2020-09-15 RX ORDER — OXYCODONE AND ACETAMINOPHEN 5; 325 MG/1; MG/1
1 TABLET ORAL
Status: DISCONTINUED | OUTPATIENT
Start: 2020-09-15 | End: 2020-09-16

## 2020-09-15 RX ORDER — MAGNESIUM HYDROXIDE 1200 MG/15ML
LIQUID ORAL PRN
Status: DISCONTINUED | OUTPATIENT
Start: 2020-09-15 | End: 2020-09-18 | Stop reason: HOSPADM

## 2020-09-15 RX ORDER — PROPOFOL 10 MG/ML
INJECTION, EMULSION INTRAVENOUS PRN
Status: DISCONTINUED | OUTPATIENT
Start: 2020-09-15 | End: 2020-09-16

## 2020-09-15 RX ORDER — NALOXONE HYDROCHLORIDE 0.4 MG/ML
.1-.4 INJECTION, SOLUTION INTRAMUSCULAR; INTRAVENOUS; SUBCUTANEOUS
Status: DISCONTINUED | OUTPATIENT
Start: 2020-09-15 | End: 2020-09-16

## 2020-09-15 RX ORDER — AMPICILLIN 2 G/1
2 INJECTION, POWDER, FOR SOLUTION INTRAVENOUS EVERY 6 HOURS
Status: DISCONTINUED | OUTPATIENT
Start: 2020-09-15 | End: 2020-09-16

## 2020-09-15 RX ORDER — OXYTOCIN 10 [USP'U]/ML
INJECTION, SOLUTION INTRAMUSCULAR; INTRAVENOUS
Status: DISCONTINUED
Start: 2020-09-15 | End: 2020-09-15 | Stop reason: HOSPADM

## 2020-09-15 RX ORDER — ACETAMINOPHEN 325 MG/1
650 TABLET ORAL EVERY 4 HOURS PRN
Status: DISCONTINUED | OUTPATIENT
Start: 2020-09-15 | End: 2020-09-16

## 2020-09-15 RX ORDER — CITRIC ACID/SODIUM CITRATE 334-500MG
30 SOLUTION, ORAL ORAL
Status: DISCONTINUED | OUTPATIENT
Start: 2020-09-15 | End: 2020-09-16

## 2020-09-15 RX ORDER — CLINDAMYCIN PHOSPHATE 900 MG/50ML
900 INJECTION, SOLUTION INTRAVENOUS EVERY 8 HOURS
Status: COMPLETED | OUTPATIENT
Start: 2020-09-15 | End: 2020-09-16

## 2020-09-15 RX ORDER — PENICILLIN G POTASSIUM 5000000 [IU]/1
5 INJECTION, POWDER, FOR SOLUTION INTRAMUSCULAR; INTRAVENOUS ONCE
Status: COMPLETED | OUTPATIENT
Start: 2020-09-15 | End: 2020-09-15

## 2020-09-15 RX ORDER — IBUPROFEN 800 MG/1
800 TABLET, FILM COATED ORAL
Status: DISCONTINUED | OUTPATIENT
Start: 2020-09-15 | End: 2020-09-16

## 2020-09-15 RX ORDER — KETOROLAC TROMETHAMINE 30 MG/ML
INJECTION, SOLUTION INTRAMUSCULAR; INTRAVENOUS PRN
Status: DISCONTINUED | OUTPATIENT
Start: 2020-09-15 | End: 2020-09-16

## 2020-09-15 RX ORDER — MORPHINE SULFATE 1 MG/ML
INJECTION, SOLUTION EPIDURAL; INTRATHECAL; INTRAVENOUS PRN
Status: DISCONTINUED | OUTPATIENT
Start: 2020-09-15 | End: 2020-09-16

## 2020-09-15 RX ORDER — LIDOCAINE 40 MG/G
CREAM TOPICAL
Status: DISCONTINUED | OUTPATIENT
Start: 2020-09-15 | End: 2020-09-16

## 2020-09-15 RX ORDER — BUPIVACAINE HYDROCHLORIDE 2.5 MG/ML
INJECTION, SOLUTION EPIDURAL; INFILTRATION; INTRACAUDAL PRN
Status: DISCONTINUED | OUTPATIENT
Start: 2020-09-15 | End: 2020-09-16

## 2020-09-15 RX ORDER — METHYLERGONOVINE MALEATE 0.2 MG/ML
200 INJECTION INTRAVENOUS
Status: COMPLETED | OUTPATIENT
Start: 2020-09-15 | End: 2020-09-15

## 2020-09-15 RX ORDER — OXYTOCIN 10 [USP'U]/ML
10 INJECTION, SOLUTION INTRAMUSCULAR; INTRAVENOUS
Status: DISCONTINUED | OUTPATIENT
Start: 2020-09-15 | End: 2020-09-16

## 2020-09-15 RX ORDER — CEFAZOLIN SODIUM 2 G/100ML
2 INJECTION, SOLUTION INTRAVENOUS
Status: COMPLETED | OUTPATIENT
Start: 2020-09-15 | End: 2020-09-15

## 2020-09-15 RX ADMIN — PROPOFOL 20 MG: 10 INJECTION, EMULSION INTRAVENOUS at 23:12

## 2020-09-15 RX ADMIN — KETOROLAC TROMETHAMINE 30 MG: 30 INJECTION, SOLUTION INTRAMUSCULAR at 23:45

## 2020-09-15 RX ADMIN — SODIUM CHLORIDE, POTASSIUM CHLORIDE, SODIUM LACTATE AND CALCIUM CHLORIDE: 600; 310; 30; 20 INJECTION, SOLUTION INTRAVENOUS at 18:58

## 2020-09-15 RX ADMIN — PROPOFOL 50 MG: 10 INJECTION, EMULSION INTRAVENOUS at 23:02

## 2020-09-15 RX ADMIN — SODIUM CHLORIDE 2.5 MILLION UNITS: 9 INJECTION, SOLUTION INTRAVENOUS at 11:06

## 2020-09-15 RX ADMIN — GENTAMICIN SULFATE 120 MG: 40 INJECTION, SOLUTION INTRAMUSCULAR; INTRAVENOUS at 23:26

## 2020-09-15 RX ADMIN — PROPOFOL 20 MG: 10 INJECTION, EMULSION INTRAVENOUS at 23:34

## 2020-09-15 RX ADMIN — SODIUM CHLORIDE, POTASSIUM CHLORIDE, SODIUM LACTATE AND CALCIUM CHLORIDE: 600; 310; 30; 20 INJECTION, SOLUTION INTRAVENOUS at 22:29

## 2020-09-15 RX ADMIN — BUPIVACAINE HYDROCHLORIDE 8 ML: 2.5 INJECTION, SOLUTION EPIDURAL; INFILTRATION; INTRACAUDAL at 21:06

## 2020-09-15 RX ADMIN — PHENYLEPHRINE HYDROCHLORIDE 200 MCG: 10 INJECTION INTRAVENOUS at 23:07

## 2020-09-15 RX ADMIN — PROPOFOL 20 MG: 10 INJECTION, EMULSION INTRAVENOUS at 23:26

## 2020-09-15 RX ADMIN — FENTANYL CITRATE 100 MCG: 50 INJECTION, SOLUTION INTRAMUSCULAR; INTRAVENOUS at 22:58

## 2020-09-15 RX ADMIN — PROPOFOL 20 MG: 10 INJECTION, EMULSION INTRAVENOUS at 23:45

## 2020-09-15 RX ADMIN — LIDOCAINE HYDROCHLORIDE,EPINEPHRINE BITARTRATE 3 ML: 15; .005 INJECTION, SOLUTION EPIDURAL; INFILTRATION; INTRACAUDAL; PERINEURAL at 21:00

## 2020-09-15 RX ADMIN — PENICILLIN G POTASSIUM 5 MILLION UNITS: 5000000 POWDER, FOR SOLUTION INTRAMUSCULAR; INTRAPLEURAL; INTRATHECAL; INTRAVENOUS at 06:24

## 2020-09-15 RX ADMIN — FENTANYL CITRATE 50 MCG: 50 INJECTION, SOLUTION INTRAMUSCULAR; INTRAVENOUS at 23:08

## 2020-09-15 RX ADMIN — HYDROMORPHONE HYDROCHLORIDE 0.5 MG: 1 INJECTION, SOLUTION INTRAMUSCULAR; INTRAVENOUS; SUBCUTANEOUS at 23:14

## 2020-09-15 RX ADMIN — Medication 20 MG: at 23:08

## 2020-09-15 RX ADMIN — PHENYLEPHRINE HYDROCHLORIDE 200 MCG: 10 INJECTION INTRAVENOUS at 22:39

## 2020-09-15 RX ADMIN — SODIUM CHLORIDE 2.5 MILLION UNITS: 9 INJECTION, SOLUTION INTRAVENOUS at 14:47

## 2020-09-15 RX ADMIN — Medication 10 MG: at 23:40

## 2020-09-15 RX ADMIN — Medication 15 ML/HR: at 11:42

## 2020-09-15 RX ADMIN — LIDOCAINE HYDROCHLORIDE 3 ML: 20 INJECTION, SOLUTION EPIDURAL; INFILTRATION; INTRACAUDAL; PERINEURAL at 22:53

## 2020-09-15 RX ADMIN — LIDOCAINE HYDROCHLORIDE 5 ML: 20 INJECTION, SOLUTION EPIDURAL; INFILTRATION; INTRACAUDAL; PERINEURAL at 22:29

## 2020-09-15 RX ADMIN — ACETAMINOPHEN 650 MG: 325 TABLET, FILM COATED ORAL at 07:22

## 2020-09-15 RX ADMIN — SODIUM CHLORIDE, POTASSIUM CHLORIDE, SODIUM LACTATE AND CALCIUM CHLORIDE: 600; 310; 30; 20 INJECTION, SOLUTION INTRAVENOUS at 11:19

## 2020-09-15 RX ADMIN — SODIUM CHLORIDE, POTASSIUM CHLORIDE, SODIUM LACTATE AND CALCIUM CHLORIDE 500 ML: 600; 310; 30; 20 INJECTION, SOLUTION INTRAVENOUS at 21:38

## 2020-09-15 RX ADMIN — PROPOFOL 20 MG: 10 INJECTION, EMULSION INTRAVENOUS at 23:46

## 2020-09-15 RX ADMIN — LIDOCAINE HYDROCHLORIDE 5 ML: 20 INJECTION, SOLUTION EPIDURAL; INFILTRATION; INTRACAUDAL; PERINEURAL at 22:25

## 2020-09-15 RX ADMIN — Medication: at 12:06

## 2020-09-15 RX ADMIN — SODIUM CHLORIDE, POTASSIUM CHLORIDE, SODIUM LACTATE AND CALCIUM CHLORIDE: 600; 310; 30; 20 INJECTION, SOLUTION INTRAVENOUS at 23:30

## 2020-09-15 RX ADMIN — Medication 500 MG: at 22:40

## 2020-09-15 RX ADMIN — SODIUM CHLORIDE, POTASSIUM CHLORIDE, SODIUM LACTATE AND CALCIUM CHLORIDE: 600; 310; 30; 20 INJECTION, SOLUTION INTRAVENOUS at 06:22

## 2020-09-15 RX ADMIN — SODIUM CHLORIDE 2.5 MILLION UNITS: 9 INJECTION, SOLUTION INTRAVENOUS at 18:58

## 2020-09-15 RX ADMIN — ONDANSETRON 4 MG: 2 INJECTION INTRAMUSCULAR; INTRAVENOUS at 23:00

## 2020-09-15 RX ADMIN — Medication 2 G: at 22:40

## 2020-09-15 RX ADMIN — NALBUPHINE HYDROCHLORIDE 2.5 MG: 20 INJECTION, SOLUTION INTRAMUSCULAR; INTRAVENOUS; SUBCUTANEOUS at 17:04

## 2020-09-15 RX ADMIN — METHYLERGONOVINE MALEATE 200 MCG: 0.2 INJECTION INTRAMUSCULAR; INTRAVENOUS at 23:08

## 2020-09-15 RX ADMIN — Medication: at 17:06

## 2020-09-15 RX ADMIN — PROPOFOL 30 MG: 10 INJECTION, EMULSION INTRAVENOUS at 23:18

## 2020-09-15 RX ADMIN — PHENYLEPHRINE HYDROCHLORIDE 200 MCG: 10 INJECTION INTRAVENOUS at 22:48

## 2020-09-15 RX ADMIN — Medication 10 MG: at 23:18

## 2020-09-15 RX ADMIN — OXYTOCIN-SODIUM CHLORIDE 0.9% IV SOLN 30 UNIT/500ML 600 ML/HR: 30-0.9/5 SOLUTION at 22:58

## 2020-09-15 RX ADMIN — LIDOCAINE HYDROCHLORIDE 2 ML: 20 INJECTION, SOLUTION EPIDURAL; INFILTRATION; INTRACAUDAL; PERINEURAL at 23:04

## 2020-09-15 RX ADMIN — PROPOFOL 30 MG: 10 INJECTION, EMULSION INTRAVENOUS at 23:58

## 2020-09-15 RX ADMIN — Medication 50 MCG/MIN: at 22:40

## 2020-09-15 RX ADMIN — MORPHINE SULFATE 2 MG: 1 INJECTION, SOLUTION EPIDURAL; INTRATHECAL; INTRAVENOUS at 23:50

## 2020-09-15 RX ADMIN — LIDOCAINE HYDROCHLORIDE,EPINEPHRINE BITARTRATE 3 ML: 15; .005 INJECTION, SOLUTION EPIDURAL; INFILTRATION; INTRACAUDAL; PERINEURAL at 11:32

## 2020-09-15 RX ADMIN — SODIUM BICARBONATE 1 MEQ: 84 INJECTION INTRAVENOUS at 22:25

## 2020-09-15 ASSESSMENT — ACTIVITIES OF DAILY LIVING (ADL)
DRESS: 0-->INDEPENDENT
RETIRED_COMMUNICATION: 0-->UNDERSTANDS/COMMUNICATES WITHOUT DIFFICULTY
RETIRED_EATING: 0-->INDEPENDENT
SWALLOWING: 0-->SWALLOWS FOODS/LIQUIDS WITHOUT DIFFICULTY
AMBULATION: 0-->INDEPENDENT
BATHING: 0-->INDEPENDENT
COGNITION: 0 - NO COGNITION ISSUES REPORTED
TOILETING: 0-->INDEPENDENT
TRANSFERRING: 0-->INDEPENDENT
FALL_HISTORY_WITHIN_LAST_SIX_MONTHS: NO

## 2020-09-15 ASSESSMENT — MIFFLIN-ST. JEOR: SCORE: 1475.76

## 2020-09-15 NOTE — PLAN OF CARE
Fee is comfortable after her epidural. She has been repositioned and using the peanut ball. Jolynn De Souza CNM in to assess pt and she is 4/80/-1. AROM of clear fluid. Baby with moderate variability and accelerations. Will continue to monitor.

## 2020-09-15 NOTE — PLAN OF CARE
Data: Patient admitted to room 458 at 0600. Patient is a . Prenatal record reviewed.   OB History    Para Term  AB Living   1 0 0 0 0 0   SAB TAB Ectopic Multiple Live Births   0 0 0 0 0      # Outcome Date GA Lbr Marlo/2nd Weight Sex Delivery Anes PTL Lv   1 Current            .  Medical History:   Past Medical History:   Diagnosis Date     Anxiety     has used mirazapine      Depression      Depressive disorder      Heroin overdose (H)     x2     Substance abuse (H)      Suicide attempt (H)     age 14 and age 17    .  Gestational age 39w4d. Vital signs per doc flowsheet. Fetal movement present. Patient reports Laboring   as reason for admission. FOB Shmuel present.  Action: Verbal consent for EFM, external fetal monitors applied. Admission assessment completed. Patient and support persons educated on labor process. Patient instructed to report change in fetal movement, contractions, vaginal leaking of fluid or bleeding, abdominal pain, or any concerns related to the pregnancy to her nurse/physician. Patient oriented to room, call light in reach.   Response: Yina Callaway CNM informed of pt arrival and status. Plan per provider is admit to L&D. Patient verbalized understanding of education and verbalized agreement with plan. Patient coping with labor via emotional support.

## 2020-09-15 NOTE — PROGRESS NOTES
"Labor progress note    S:  Pt received epidural placement  Is now resting and much more comfortable.      O:  Blood pressure 117/68, temperature 97.6  F (36.4  C), temperature source Oral, resp. rate 19, height 1.575 m (5' 2\"), weight 75.8 kg (167 lb), last menstrual period 2019, not currently breastfeeding.  General appearance: comfortable.with epidural   Contractions: Every 2-4 minutes. 60 seconds duration.  Palpate: moderate.  FHT: Baseline 130 with mos  variability. Accelerations are present. No  decelerations present.  ROM: not ruptured.   Pelvic exam: deferred declined   Pitocin- none,  Antibiotics- PCN       A:  IUP @ 39w4d active labor   Fetal Heart rate tracing Category category one  GBS- positive  Patient Active Problem List   Diagnosis     Polysubstance (excluding opioids) dependence (H)     Maternal varicella, non-immune     Nonimmune to hepatitis B virus     Vitamin D deficiency     Supervision of high risk pregnancy, antepartum     Heroin abuse affecting pregnancy in second trimester (H)     Chemical dependency (H)     Accidental overdose of heroin (H)     Personal history of self-harm     Cannabis dependence (H)     Moderate episode of recurrent major depressive disorder (H)     Smoker     Positive GBS test     Encounter for triage in pregnant patient     Labor and delivery, indication for care         P:  Pain medication epidural now with good relief   Anticipate   MD consultant on call Dr Robles / available prn  reevaluate in 2-4 hours/PRN     SHAJI Prado CNM  "

## 2020-09-15 NOTE — TELEPHONE ENCOUNTER
Call from patient who reports contractions that have increased in intensity and are 5 minutes apart for the last 3 hours. Lost some cervical mucous. No LOF or decreased fetal movement. Pt advised to come to L&D for evaluation. Pt agrees and states she lives ~30 minutes away.     SHAJI Simmons, PAULA

## 2020-09-15 NOTE — PROGRESS NOTES
"Labor progress note    S:  Patient getting some more relief following labor epidural re-bolus.  Denies feeling increased pelvic pressure at this time.    O:  Blood pressure 119/73, pulse 72, temperature 98.4  F (36.9  C), temperature source Oral, resp. rate 16, height 1.575 m (5' 2\"), weight 75.8 kg (167 lb), last menstrual period 2019, SpO2 96 %, not currently breastfeeding.  General appearance: comfortable.  Contractions: Every 1-4 minutes. 60-90 seconds duration.  Palpate: moderate and strong.  FHT: Baseline 135 with moderate variability. Accelerations are not present. Early decelerations present.  ROM: clear fluid. Membranes have been ruptured for 4 hours.  Pelvic exam: deferred  Pitocin- none,  Antibiotics- PCN    A:  IUP @ 39w4d active labor   Fetal Heart rate tracing Category one  GBS- positive  Patient Active Problem List   Diagnosis     Polysubstance (excluding opioids) dependence (H)     Maternal varicella, non-immune     Nonimmune to hepatitis B virus     Vitamin D deficiency     Supervision of high risk pregnancy, antepartum     Heroin abuse affecting pregnancy in second trimester (H)     Chemical dependency (H)     Accidental overdose of heroin (H)     Personal history of self-harm     Cannabis dependence (H)     Moderate episode of recurrent major depressive disorder (H)     Smoker     Positive GBS test     Encounter for triage in pregnant patient     Labor and delivery, indication for care         P:  Anticipate   reevaluate in 2-4 hours/PRN   SHAJI Fernando CNM  "

## 2020-09-15 NOTE — PROVIDER NOTIFICATION
09/14/20 1943   Provider Notification   Provider Name/Title PAULA Faulkner   Method of Notification At Bedside   SVE per provider unchanged since last check. Option given to patient to stay or discharge. Most likely would not choose to augment labor if patient chooses to stay. Educated about signs and symptoms of early labor vs. Active labor and questions answered and encouraged. Option for tylenol and vistaril given to patient to help with discomfort and to facilitate sleep. Patient desires to discharge home at this time with tylenol and vistaril at this time. Discharge instructions reviewed with patient including when to call or come back and education about kick counts given. Discharged ambulatory at 2028.

## 2020-09-15 NOTE — ANESTHESIA PROCEDURE NOTES
Epidural Procedure Note  Staff -   Anesthesiologist:  Kurtis Marquez MD      Performed By: anesthesiologist          Location: OB     Procedure start time:  9/15/2020 11:21 AM     Procedure end time:  9/15/2020 11:42 AM   Pre-procedure checklist:   patient identified, IV checked, risks and benefits discussed, informed consent, monitors and equipment checked and pre-op evaluation      Correct Patient: Yes      Correct Position: Yes      Correct Site: Yes      Correct Procedure: Yes      Correct Laterality:  N/A    Site Marked:  N/A  Procedure:     Procedure:  Epidural catheter    ASA:  2    Diagnosis:  Labor pain    Position:  Sitting    Sterile Prep: chloraprep      Insertion site:  L3-4    Local skin infiltration:  1% lidocaine    amount (mL):  3    Approach:  Midline    Needle gauge (G):  17    Needle Length (in):  3.5    Block Needle Type:  [place]lyn    Injection Technique:  LORT saline    KAREN at (cm):  7    Attempts:  1    Redirects:  0    Catheter gauge (G):  20    Catheter threaded easily: Yes      Threaded to cm at skin:  12    Threaded in epidural space (cm):  5    Paresthesias:  No    Aspiration negative for Heme or CSF: Yes      Test dose (mL):  3     Local anesthetic:  Lidocaine 1.5% w/ 1:200,000 epinephrine    Test dose time:  11:32    Test dose negative for signs of intravascular, subdural or intrathecal injection: Yes

## 2020-09-15 NOTE — PLAN OF CARE
Mare is starting to feel more uncomfortable after her bath and is requesting an epidural. Contractions are every 2-5 minutes apart, baby with moderate variability and accelerations.  IV bolus started. Pt is using nitrous oxide to help with pain relief before MDA arrives. (MDA busy with another case).  Mare tolerated the epidural placement well and is currently comfortable and resting in bed.

## 2020-09-15 NOTE — PROVIDER NOTIFICATION
09/15/20 0552   Provider Notification   Provider Name/Title Yina Callaway CNM   Method of Notification In Department   Request Evaluate in Person   Notification Reason Patient Arrived     Pt arrived, looking uncomfortable with ctx.

## 2020-09-15 NOTE — PROGRESS NOTES
"Labor progress note    S:  Pt is comfortable and resting with epidural  Requesting cervical assessment   Denies pressure  After cervix check pt consented to AROM to facilitate progress      O:  Blood pressure 108/56, pulse 72, temperature 98.1  F (36.7  C), temperature source Oral, resp. rate 18, height 1.575 m (5' 2\"), weight 75.8 kg (167 lb), last menstrual period 2019, SpO2 95 %, not currently breastfeeding.2-5  General appearance: comfortable.  Contractions: Every 2-4 minutes. 60-70 seconds duration.  Palpate: moderate.  FHT: Baseline 135 with mod  variability. Accelerations are present. No  decelerations present.  ROM: clear fluidAROM with exam   Pelvic exam: 4.5/ 80%/ Mid/ soft/ -1  Pitocin- none,  Antibiotics- PCN    A:  IUP @ 39w4d early labor   Fetal Heart rate tracing Category category one  GBS- positive  Patient Active Problem List   Diagnosis     Polysubstance (excluding opioids) dependence (H)     Maternal varicella, non-immune     Nonimmune to hepatitis B virus     Vitamin D deficiency     Supervision of high risk pregnancy, antepartum     Heroin abuse affecting pregnancy in second trimester (H)     Chemical dependency (H)     Accidental overdose of heroin (H)     Personal history of self-harm     Cannabis dependence (H)     Moderate episode of recurrent major depressive disorder (H)     Smoker     Positive GBS test     Encounter for triage in pregnant patient     Labor and delivery, indication for care         P:  Anticipate MARTIN CONTI consultant on call Dr Robles/ available prn  Labor augmentation with AROM   reevaluate in 2-4 hours/PRN     SHAJI Prado CNM  "

## 2020-09-15 NOTE — PROGRESS NOTES
HOSPITAL TRIAGE NOTE  ===================    CHIEF COMPLAINT  ========================  Sophia Stiles is a 20 year old patient presenting today at 39w3d for evaluation of R/O ROM.    Patient's last menstrual period was 2019.  Estimated Date of Delivery: Sep 18, 2020       HPI  ==================   Patient presented to L&D for rule out rupture and mild contractions.  Prenatal record and labs reviewed from [facility], through [employer] EMR.    CONTRACTIONS: every 4-6 minutes  ABDOMINAL PAIN: cramping, mild and intermittent low back pain  FETAL MOVEMENT: active    VAGINAL BLEEDING: none  RUPTURE OF MEMBRANES: no  PELVIC PAIN: none    PREGNANCY COMPLICATIONS:   1. Polysubstance abuse - relapse of heroin in current pregnancy. Not on subutex.  2. Varicella non-immune  3. Hep B non-immune    # Pain Assessment:  Current Pain Score 2020   Patient currently in pain? yes   Pain score (0-10) -   Pain descriptors Cramping;Discomfort   Some encounter information is confidential and restricted. Go to Review Flowsheets activity to see all data.   - Sophia is experiencing pain due to contractions. Pain management was discussed with Sophia and her significant other and the plan was created in a collaborative fashion.  Sophia's response to the current recommendations: engaged  - Non-pharmacologic adjuvants: early labor comfort measures        REVIEW OF SYSTEMS  =====================  C: NEGATIVE for fever, chills  I: NEGATIVE for worrisome rashes, moles or lesions  E: NEGATIVE for vision changes or irritation  R: NEGATIVE for significant cough or SOB  CV: NEGATIVE for chest pain, palpitations or varicosities  GI: NEGATIVE for nausea, abdominal pain, heartburn, or change in bowel habits  : NEGATIVE for frequency, dysuria, or hematuria  M: NEGATIVE for significant arthralgias or myalgia  N: NEGATIVE for headache, weakness, dizziness or paresthesias  P: NEGATIVE for changes in mood or  affect    PROBLEM LIST  ===============  Patient Active Problem List    Diagnosis Date Noted     Encounter for triage in pregnant patient 09/14/2020     Priority: Medium     Labor and delivery, indication for care 09/14/2020     Priority: Medium     Positive GBS test 09/06/2020     Priority: Medium     Smoker 07/15/2020     Priority: Medium     7/15/20: states she has weaned down to 3 cigs/day       Accidental overdose of heroin (H) 05/01/2020     Priority: Medium     Twice in 2019       Chemical dependency (H) 04/01/2020     Priority: Medium     7/15/20: Sober since march 20, 2020.       Heroin abuse affecting pregnancy in second trimester (H) 03/24/2020     Priority: Medium     Relapse per pt at 14 wks after 90 days sober  Kicked out of sober house presented to ED 3/21   Completed Rule 25, at [address]  Is referred to [facility] waiting to hear in next 1-2 days for inpt    Considering subutex      + THC at ob intake prior to 3/21        Maternal varicella, non-immune 03/12/2020     Priority: Medium     Vaccinate postpartum       Nonimmune to hepatitis B virus 03/12/2020     Priority: Medium     First dose 7/15/20  2nd dose: 8/24/20       Vitamin D deficiency 03/12/2020     Priority: Medium     3/10/20- Vit D 22. Discuss supplementation next visit___       Supervision of high risk pregnancy, antepartum 03/12/2020     Priority: Medium     4/21/2020: level 2 us, placenta anterior  [facility] CNM pt  Partner's name: Williams  [ x] Entered on Epic list  [ ] NOB folder  [ ] Dating  [ ] First tri screen ordered; declined  [ ] QS/AFP ordered declined  [ ] Fetal anatomy US ordered  [ ] Rubella immune  x[ ] Hep B /nonimmune  [ ] Pap  [ ] Started ASA   [x ] YES plan utox in labor   _____________________________________  [ ] EOB folder  [ ] PP Contraception plan: If tubal,consent date:  [ ] Labor plans:  [ ] :  [x ] Infant feeding plan-breast  [ ] FLU shot  [x ] TDAP given  [na ] Rhogam if needed, date:  [na ] TOLAC consent  done  [X] Waterbirth declines  [x ] GCT, passed  ________________________________________  [ X] GBS pos  [x ] OTC PP meds sent  [ N/A] Planning CS-ERAS pkt       Polysubstance (excluding opioids) dependence (H) 02/11/2019     Priority: Medium     Personal history of self-harm 08/19/2018     Priority: Medium     Cannabis dependence (H) 08/19/2018     Priority: Medium     Moderate episode of recurrent major depressive disorder (H) 08/19/2018     Priority: Medium       HISTORIES  ==============  ALLERGIES:    No Known Allergies  PAST MEDICAL HISTORY  Past Medical History:   Diagnosis Date     Anxiety     has used mirazapine      Depression      Heroin overdose (H)     x2     Substance abuse (H)      Suicide attempt (H)     age 14 and age 17      SOCIAL HISTORY  Social History     Socioeconomic History     Marital status: Single     Spouse name: Oskar other: Williams     Number of children: Not on file     Years of education: 11.5     Highest education level: 11th grade   Occupational History     Occupation: unemployed   Social Needs     Financial resource strain: Not very hard     Food insecurity     Worry: Never true     Inability: Never true     Transportation needs     Medical: No     Non-medical: No   Tobacco Use     Smoking status: Current Every Day Smoker     Packs/day: 0.50     Years: 5.00     Pack years: 2.50     Types: Cigarettes, Vaping Device     Start date: 1/7/2015     Smokeless tobacco: Current User     Tobacco comment: Patient utilizing nicotine lozenges    Substance and Sexual Activity     Alcohol use: Not Currently     Frequency: Never     Binge frequency: Never     Comment: Went to treatment at [place], currently in remission     Drug use: Not Currently     Types: Cocaine, Marijuana, Opiates     Comment: + heroin. last smoked marijuana 2/10, has not used any other substance since 12/18/2019     Sexual activity: Yes     Partners: Male   Lifestyle     Physical activity     Days per week: 1 day     Minutes  per session: 60 min     Stress: Very much   Relationships     Social connections     Talks on phone: More than three times a week     Gets together: More than three times a week     Attends Jew service: Never     Active member of club or organization: No     Attends meetings of clubs or organizations: Never     Relationship status: Never      Intimate partner violence     Fear of current or ex partner: No     Emotionally abused: No     Physically abused: No     Forced sexual activity: No   Other Topics Concern     Parent/sibling w/ CABG, MI or angioplasty before 65F 55M? Not Asked   Social History Narrative     Not on file     OB HISTORY  OB History    Para Term  AB Living   1 0 0 0 0 0   SAB TAB Ectopic Multiple Live Births   0 0 0 0 0      # Outcome Date GA Lbr Marlo/2nd Weight Sex Delivery Anes PTL Lv   1 Current              Prenatal Labs:   Lab Results   Component Value Date    ABO A 03/10/2020    RH Pos 03/10/2020    AS Neg 03/10/2020    HEPBANG Nonreactive 03/10/2020    RUQIGG 29 03/10/2020    HGB 11.7 2020     Rubella- immune    ULTRASOUND(s) reviewed: WNL    EXAM  ============  /68   Pulse 102   Temp 98.4  F (36.9  C) (Oral)   Resp 18   LMP 2019   GENERAL APPEARANCE: healthy, alert and no distress  RESP: lungs clear to auscultation - no rales, rhonchi or wheezes  CV: regular rates and rhythm, normal S1 S2, no S3 or S4 and no murmur,and no varicosities  ABDOMEN:  soft, nontender, no epigastric pain  SKIN: no suspicious lesions or rashes  NEURO: Denies headache, blurred vision, other vision changes  PSYCH: mentation appears normal. and affect normal/bright  MS/ LEGS: Reflexes normal bilaterally    CONTRACTIONS: every 4-6 minutes   FETAL HEART TONES: continuous EFM- baseline 125 with moderate variability and positive accelerations. No decelerations.  NST: REACTIVE    PELVIC EXAM: 3/ 50%/-2 from previous CNM. Recheck: 2-3/50/-2   PRESENTATION: VERTEX  BLOOD: small  amount of brown blood on glove after exam  DISCHARGE: none    ROM: no  ROMPLUS: negative    LABS: UTox negative, ROM+ negative, UA possibly indicative of UTI, will await culture.    DIAGNOSIS  ============  39w3d seen on the Birthplace Triage for labor evaluation- possible early labor. Cervix unchanged over 2 hour period  NST: REACTIVE  Fetal Heart rate tracing:category one    PLAN  ============  Discussed possible early labor pattern with patient and partner. Offered patient to stay longer for further observation since she perceives contractions to be slightly stronger, but patient prefers to discharge home.   Plan for tylenol and vistaril prior to discharge.  Discharge to home with labor instuctions per discharge instruction form  Call or return to the Birthplace with contractions, cramping, abdominal or pelvic pain, vaginal bleeding, leaking fluid or decreased fetal movement.  Margarita Faulkner CNM

## 2020-09-15 NOTE — PROGRESS NOTES
"Labor progress note    S:  Pt is just out of tub  Helpful now sitting upright in bed  Feeling contractions are getting stronger and considering epidural soon. Reviewed procedure and consent process with anesthesia  Pt will call when ready to proceed  Partner supportive     O:  Blood pressure 117/68, temperature 97.6  F (36.4  C), temperature source Oral, resp. rate 19, height 1.575 m (5' 2\"), weight 75.8 kg (167 lb), last menstrual period 2019, not currently breastfeeding.  General appearance: uncomfortable with contractions.  Contractions: Every 2-4 minutes. 60  seconds duration.  Palpate: moderate.  FHT: Baseline 145 with mod  variability. Accelerations are present.   No  decelerations present.  ROM: not ruptured.  Pelvic exam:deferred    Pitocin- none,  Antibiotics- PCN      A:  IUP @ 39w4d early labor   Fetal Heart rate tracing Category  one  GBS- positive  Patient Active Problem List   Diagnosis     Polysubstance (excluding opioids) dependence (H)     Maternal varicella, non-immune     Nonimmune to hepatitis B virus     Vitamin D deficiency     Supervision of high risk pregnancy, antepartum     Heroin abuse affecting pregnancy in second trimester (H)     Chemical dependency (H)     Accidental overdose of heroin (H)     Personal history of self-harm     Cannabis dependence (H)     Moderate episode of recurrent major depressive disorder (H)     Smoker     Positive GBS test     Encounter for triage in pregnant patient     Labor and delivery, indication for care         P:  comfort measures prn   Pain medication planning epidural shortly   Anticipate   MD consultant on call / available prn     SHAJI Prado CNM  "

## 2020-09-15 NOTE — PLAN OF CARE
Afebrile, VSS. Pt denies LOF, bldg. Having mild ctx, pt describes as strong cramps in her back and lower abdomen. Using hot packs and position changes for relief, but will eventually want an epidural. FHR reactive, no decels. Pt getting PCN for GBS+ status. Continue plan of care, contact CNM with questions/concerns.

## 2020-09-15 NOTE — PROVIDER NOTIFICATION
09/14/20 1935   Provider Notification   Provider Name/Title PAULA Faulkner   Method of Notification In Department   Request Evaluate - Remote   Notification Reason Status Update   Reviewed strip with provider in department. Plan to recheck cervix and assess for change. Possible discharge if cervix is unchanged. Will continue to monitor closely.

## 2020-09-15 NOTE — H&P
"ADMIT NOTE  =================  39w4d    Sophia Stiles is a 20 year old female with an Patient's last menstrual period was 2019. and Estimated Date of Delivery: Sep 18, 2020 [facility] at 6:00 AM with in early labor.     HPI  ================  Fee reports that contractions became stronger and every 5 minutes since 0200. [facility] with Williams, her partner.     Denies known contact to any with and symptoms of COVID. Is agreeable to testing today.     Contractions- reported as strong  Fetal movement- active  ROM- no   Vaginal bleeding- none  GBS- positive- not treated until active labor  FOB- is involved, [place]   Other labor support- none    Weight gain- 167 - 133 lbs, Total weight gain- 34 lbs  Height- 61\"  BMI- 25  First prenatal visit at 12 weeks, Total visits- 9    PROBLEM LIST  =================  Patient Active Problem List    Diagnosis Date Noted     Encounter for triage in pregnant patient 2020     Priority: Medium     Labor and delivery, indication for care 2020     Priority: Medium     Positive GBS test 2020     Priority: Medium     Smoker 07/15/2020     Priority: Medium     7/15/20: states she has weaned down to 3 cigs/day       Accidental overdose of heroin (H) 2020     Priority: Medium     Twice in 2019       Chemical dependency (H) 2020     Priority: Medium     7/15/20: Sober since 2020.       Heroin abuse affecting pregnancy in second trimester (H) 2020     Priority: Medium     Relapse per pt at 14 wks after 90 days sober  Kicked out of sober house presented to ED 3/21   Completed Rule 25, at [address]  Is referred to [facility] waiting to hear in next 1-2 days for inpt    Considering subutex      + THC at ob intake prior to 3/21        Maternal varicella, non-immune 2020     Priority: Medium     Vaccinate postpartum       Nonimmune to hepatitis B virus 2020     Priority: " Medium     First dose 7/15/20  2nd dose: 8/24/20       Vitamin D deficiency 03/12/2020     Priority: Medium     3/10/20- Vit D 22. Discuss supplementation next visit___       Supervision of high risk pregnancy, antepartum 03/12/2020     Priority: Medium     4/21/2020: level 2 us, placenta anterior  [facility] CNM pt  Partner's name: Williams  [ x] Entered on Epic list  [ ] NOB folder  [ ] Dating  [ ] First tri screen ordered; declined  [ ] QS/AFP ordered declined  [ ] Fetal anatomy US ordered  [ ] Rubella immune  x[ ] Hep B /nonimmune  [ ] Pap  [ ] Started ASA   [x ] YES plan utox in labor   _____________________________________  [ ] EOB folder  [ ] PP Contraception plan: If tubal,consent date:  [ ] Labor plans:  [ ] :  [x ] Infant feeding plan-breast  [ ] FLU shot  [x ] TDAP given  [na ] Rhogam if needed, date:  [na ] TOLAC consent done  [X] Waterbirth declines  [x ] GCT, passed  ________________________________________  [ X] GBS pos  [x ] OTC PP meds sent  [ N/A] Planning CS-ERAS pkt       Polysubstance (excluding opioids) dependence (H) 02/11/2019     Priority: Medium     Personal history of self-harm 08/19/2018     Priority: Medium     Cannabis dependence (H) 08/19/2018     Priority: Medium     Moderate episode of recurrent major depressive disorder (H) 08/19/2018     Priority: Medium       HISTORIES  ============  No Known Allergies  Past Medical History:   Diagnosis Date     Anxiety     has used mirazapine      Depression      Depressive disorder      Heroin overdose (H)     x2     Substance abuse (H)      Suicide attempt (H)     age 14 and age 17      History reviewed. No pertinent surgical history..  History reviewed. No pertinent family history.  Social History     Tobacco Use     Smoking status: Current Every Day Smoker     Packs/day: 0.50     Years: 5.00     Pack years: 2.50     Types: Cigarettes, Vaping Device     Start date: 1/7/2015     Smokeless tobacco: Current User     Tobacco comment: Patient  utilizing nicotine lozenges    Substance Use Topics     Alcohol use: Not Currently     Frequency: Never     Binge frequency: Never     Comment: Went to treatment at [facility], currently in remission     OB History    Para Term  AB Living   1 0 0 0 0 0   SAB TAB Ectopic Multiple Live Births   0 0 0 0 0      # Outcome Date GA Lbr Marlo/2nd Weight Sex Delivery Anes PTL Lv   1 Current                   ULTRASOUND:   20: normal fetal scan, anterior placenta, 3vc  8/10/20: EFW = 3,082 grams, 85 % for 34 weeks.     LABS:   ===========  Prenatal Labs:  Rhogam not indicated   Lab Results   Component Value Date    ABO A 03/10/2020    RH Pos 03/10/2020    AS Neg 03/10/2020    HEPBANG Nonreactive 03/10/2020    HGB 11.7 2020     Rubella Immune   Lab Results   Component Value Date    GBS Positive 2020     Other labs:  Results for orders placed or performed during the hospital encounter of 20 (from the past 24 hour(s))   Rupture of Fetal Membranes by ROM Plus   Result Value Ref Range    Rupture of Fetal Membranes by ROM Plus Negative NEG^Negative   UA with Microscopic reflex to Culture    Specimen: Random Urine; Midstream Urine   Result Value Ref Range    Color Urine Yellow     Appearance Urine Clear     Glucose Urine 70 (A) NEG^Negative mg/dL    Bilirubin Urine Negative NEG^Negative    Ketones Urine Negative NEG^Negative mg/dL    Specific Gravity Urine 1.013 1.003 - 1.035    Blood Urine Negative NEG^Negative    pH Urine 6.0 5.0 - 7.0 pH    Protein Albumin Urine Negative NEG^Negative mg/dL    Urobilinogen mg/dL Normal 0.0 - 2.0 mg/dL    Nitrite Urine Negative NEG^Negative    Leukocyte Esterase Urine Moderate (A) NEG^Negative    Source Midstream Urine     WBC Urine 11 (H) 0 - 5 /HPF    RBC Urine 1 0 - 2 /HPF    Bacteria Urine Few (A) NEG^Negative /HPF    Squamous Epithelial /HPF Urine 4 (H) 0 - 1 /HPF    Mucous Urine Present (A) NEG^Negative /LPF   Drug abuse scrn 7 UR (/) (RH, SH,  "UR)   Result Value Ref Range    Amphetamine Qual Urine Negative NEG^Negative    Cannabinoids Qual Urine Negative NEG^Negative    Cocaine Qual Urine Negative NEG^Negative    Opiates Qualitative Urine Negative NEG^Negative    Pcp Qual Urine Negative NEG^Negative   Urine Culture Aerobic Bacterial    Specimen: Midstream Urine   Result Value Ref Range    Specimen Description Midstream Urine     Special Requests Specimen received in preservative     Culture Micro PENDING        ROS  =========  Pt denies significant respiratory, cardiovacular, GI, or muscular/skeletalcomplaints.    See RN data base ROS.       PHYSICAL EXAM:  ===============  Temp 98.4  F (36.9  C) (Oral)   Resp 18   Ht 1.575 m (5' 2\")   Wt 75.8 kg (167 lb)   LMP 12/13/2019   BMI 30.54 kg/m    General appearance: uncomfortable with contractions  GENERAL APPEARANCE: healthy, alert and no distress  RESP: lungs clear to auscultation - no rales, rhonchi or wheezes  CV: regular rates and rhythm, normal S1 S2, no S3 or S4 and no murmur,and no varicosities  ABDOMEN:  soft, nontender, no epigastric pain  SKIN: no suspicious lesions or rashes  NEURO: Denies headache, blurred vision, other vision changes  PSYCH: mentation appears normal. and affect normal/bright  Legs: No edema     Abdomen: gravid, vertex fetus per Leopold's, non-tender between contractions.   Cephalic presentation confirmed by transverse abdominal BSUS  EFW-  7.75 lbs.   CONTACTIONS: every 3 minutes, lasting  seconds   FETAL HEART TONES: continuous EFM- baseline 140 with moderate variability and positive accelerations. No decelerations.  PELVIC EXAM: 3.5/ 70%/ Mid/ soft/ -1   RODRIGUEZ SCORE: 9  BLOODY SHOW: no   ROM:no  FLUID: none  ROMPlus: negative yesterday in triage, no done on admission     ASSESSMENT:  ==============  IUP @ 39w4d admitted in early labor   NST REACTIVE  Fetal Heart Rate - category one  GBS- positive    Patient Active Problem List   Diagnosis     Polysubstance " (excluding opioids) dependence (H)     Maternal varicella, non-immune     Nonimmune to hepatitis B virus     Vitamin D deficiency     Supervision of high risk pregnancy, antepartum     Heroin abuse affecting pregnancy in second trimester (H)     Chemical dependency (H)     Accidental overdose of heroin (H)     Personal history of self-harm     Cannabis dependence (H)     Moderate episode of recurrent major depressive disorder (H)     Smoker     Positive GBS test     Encounter for triage in pregnant patient     Labor and delivery, indication for care        PLAN:  ===========  Admit - see IP orders  Reviewed admission serum labs, COVID swab, and IV start. Pt is agreeable to this.   Pain medication options reviewed. Pt is interested in an epidural, but is willing to wait to start this. Reviewed options for comfort measures. Pt interested in getting in bathtub when able.   Prophylactic IV antibiotic for positive GBS status reviewed with pt. Agreeable to PCN. Will plan to start after IV placement.     SHAJI Simmons CNM    =============

## 2020-09-15 NOTE — PROGRESS NOTES
"Labor progress note    S:  Late entry pt feeling shaky requesting cervical exam      O:  Blood pressure 119/73, pulse 72, temperature 98.4  F (36.9  C), temperature source Oral, resp. rate 16, height 1.575 m (5' 2\"), weight 75.8 kg (167 lb), last menstrual period 2019, SpO2 96 %, not currently breastfeeding.  General appearan  ce: uncomfortable with contractions.  Contractions: Every 2-3    minutes. 60 seconds duration.  Palpate: strong.  FHT: Baseline 135   with mod variability. Accelerations arepresent. no decelerations present.  ROM: clear fluid. Membranes have been ruptured for    hours.  Pelvic exam: 5.5/ 75%/ Mid/ soft/ -1  Pitocin- none,  Antibiotics- PCN  \    A:  IUP @ 39w4d active labor   Fetal Heart rate tracing Category category one  GBS- positive  Patient Active Problem List   Diagnosis     Polysubstance (excluding opioids) dependence (H)     Maternal varicella, non-immune     Nonimmune to hepatitis B virus     Vitamin D deficiency     Supervision of high risk pregnancy, antepartum     Heroin abuse affecting pregnancy in second trimester (H)     Chemical dependency (H)     Accidental overdose of heroin (H)     Personal history of self-harm     Cannabis dependence (H)     Moderate episode of recurrent major depressive disorder (H)     Smoker     Positive GBS test     Encounter for triage in pregnant patient     Labor and delivery, indication for care         P:  comfort measures prn   Pain medication anesthesia called to rebolus epidural   Anticipate   MD consultant on call / available prn  reevaluate in 2-4 hours/PRN     SHAJI Prado CNM  "

## 2020-09-16 LAB
ERYTHROCYTE [DISTWIDTH] IN BLOOD BY AUTOMATED COUNT: 12.7 % (ref 10–15)
HCT VFR BLD AUTO: 31.7 % (ref 35–47)
HGB BLD-MCNC: 10.5 G/DL (ref 11.7–15.7)
MCH RBC QN AUTO: 30.6 PG (ref 26.5–33)
MCHC RBC AUTO-ENTMCNC: 33.1 G/DL (ref 31.5–36.5)
MCV RBC AUTO: 92 FL (ref 78–100)
PLATELET # BLD AUTO: 245 10E9/L (ref 150–450)
RBC # BLD AUTO: 3.43 10E12/L (ref 3.8–5.2)
WBC # BLD AUTO: 15.4 10E9/L (ref 4–11)

## 2020-09-16 PROCEDURE — 85027 COMPLETE CBC AUTOMATED: CPT | Performed by: STUDENT IN AN ORGANIZED HEALTH CARE EDUCATION/TRAINING PROGRAM

## 2020-09-16 PROCEDURE — 25000125 ZZHC RX 250: Performed by: STUDENT IN AN ORGANIZED HEALTH CARE EDUCATION/TRAINING PROGRAM

## 2020-09-16 PROCEDURE — 12000001 ZZH R&B MED SURG/OB UMMC

## 2020-09-16 PROCEDURE — 36415 COLL VENOUS BLD VENIPUNCTURE: CPT | Performed by: STUDENT IN AN ORGANIZED HEALTH CARE EDUCATION/TRAINING PROGRAM

## 2020-09-16 PROCEDURE — 25000128 H RX IP 250 OP 636: Performed by: STUDENT IN AN ORGANIZED HEALTH CARE EDUCATION/TRAINING PROGRAM

## 2020-09-16 PROCEDURE — C9290 INJ, BUPIVACAINE LIPOSOME: HCPCS | Performed by: STUDENT IN AN ORGANIZED HEALTH CARE EDUCATION/TRAINING PROGRAM

## 2020-09-16 PROCEDURE — 25800030 ZZH RX IP 258 OP 636: Performed by: STUDENT IN AN ORGANIZED HEALTH CARE EDUCATION/TRAINING PROGRAM

## 2020-09-16 PROCEDURE — 25000132 ZZH RX MED GY IP 250 OP 250 PS 637: Performed by: STUDENT IN AN ORGANIZED HEALTH CARE EDUCATION/TRAINING PROGRAM

## 2020-09-16 RX ORDER — AMOXICILLIN 250 MG
2 CAPSULE ORAL 2 TIMES DAILY
Status: DISCONTINUED | OUTPATIENT
Start: 2020-09-16 | End: 2020-09-18 | Stop reason: HOSPADM

## 2020-09-16 RX ORDER — KETOROLAC TROMETHAMINE 30 MG/ML
30 INJECTION, SOLUTION INTRAMUSCULAR; INTRAVENOUS EVERY 6 HOURS
Status: COMPLETED | OUTPATIENT
Start: 2020-09-16 | End: 2020-09-16

## 2020-09-16 RX ORDER — NALOXONE HYDROCHLORIDE 0.4 MG/ML
.1-.4 INJECTION, SOLUTION INTRAMUSCULAR; INTRAVENOUS; SUBCUTANEOUS
Status: DISCONTINUED | OUTPATIENT
Start: 2020-09-16 | End: 2020-09-18 | Stop reason: HOSPADM

## 2020-09-16 RX ORDER — MODIFIED LANOLIN
OINTMENT (GRAM) TOPICAL
Status: DISCONTINUED | OUTPATIENT
Start: 2020-09-16 | End: 2020-09-18 | Stop reason: HOSPADM

## 2020-09-16 RX ORDER — ONDANSETRON 2 MG/ML
4 INJECTION INTRAMUSCULAR; INTRAVENOUS EVERY 6 HOURS PRN
Status: DISCONTINUED | OUTPATIENT
Start: 2020-09-16 | End: 2020-09-18 | Stop reason: HOSPADM

## 2020-09-16 RX ORDER — DIPHENHYDRAMINE HYDROCHLORIDE 50 MG/ML
25 INJECTION INTRAMUSCULAR; INTRAVENOUS EVERY 6 HOURS PRN
Status: DISCONTINUED | OUTPATIENT
Start: 2020-09-16 | End: 2020-09-18 | Stop reason: HOSPADM

## 2020-09-16 RX ORDER — OXYTOCIN/0.9 % SODIUM CHLORIDE 30/500 ML
100 PLASTIC BAG, INJECTION (ML) INTRAVENOUS CONTINUOUS
Status: DISCONTINUED | OUTPATIENT
Start: 2020-09-16 | End: 2020-09-18 | Stop reason: HOSPADM

## 2020-09-16 RX ORDER — BISACODYL 10 MG
10 SUPPOSITORY, RECTAL RECTAL DAILY PRN
Status: DISCONTINUED | OUTPATIENT
Start: 2020-09-18 | End: 2020-09-18 | Stop reason: HOSPADM

## 2020-09-16 RX ORDER — OXYCODONE HYDROCHLORIDE 5 MG/1
5 TABLET ORAL EVERY 4 HOURS PRN
Status: DISCONTINUED | OUTPATIENT
Start: 2020-09-16 | End: 2020-09-18 | Stop reason: HOSPADM

## 2020-09-16 RX ORDER — AMOXICILLIN 250 MG
1 CAPSULE ORAL 2 TIMES DAILY
Status: DISCONTINUED | OUTPATIENT
Start: 2020-09-16 | End: 2020-09-18 | Stop reason: HOSPADM

## 2020-09-16 RX ORDER — CARBOPROST TROMETHAMINE 250 UG/ML
250 INJECTION, SOLUTION INTRAMUSCULAR
Status: DISCONTINUED | OUTPATIENT
Start: 2020-09-16 | End: 2020-09-18 | Stop reason: HOSPADM

## 2020-09-16 RX ORDER — ACETAMINOPHEN 325 MG/1
975 TABLET ORAL EVERY 6 HOURS
Status: DISCONTINUED | OUTPATIENT
Start: 2020-09-16 | End: 2020-09-18 | Stop reason: HOSPADM

## 2020-09-16 RX ORDER — MISOPROSTOL 200 UG/1
800 TABLET ORAL
Status: DISCONTINUED | OUTPATIENT
Start: 2020-09-16 | End: 2020-09-18 | Stop reason: HOSPADM

## 2020-09-16 RX ORDER — BUPIVACAINE HYDROCHLORIDE 2.5 MG/ML
INJECTION, SOLUTION EPIDURAL; INFILTRATION; INTRACAUDAL PRN
Status: DISCONTINUED | OUTPATIENT
Start: 2020-09-16 | End: 2020-09-16

## 2020-09-16 RX ORDER — IBUPROFEN 800 MG/1
800 TABLET, FILM COATED ORAL EVERY 6 HOURS
Status: DISCONTINUED | OUTPATIENT
Start: 2020-09-17 | End: 2020-09-18 | Stop reason: HOSPADM

## 2020-09-16 RX ORDER — AMPICILLIN 2 G/1
2 INJECTION, POWDER, FOR SOLUTION INTRAVENOUS EVERY 6 HOURS
Status: COMPLETED | OUTPATIENT
Start: 2020-09-16 | End: 2020-09-17

## 2020-09-16 RX ORDER — DEXTROSE, SODIUM CHLORIDE, SODIUM LACTATE, POTASSIUM CHLORIDE, AND CALCIUM CHLORIDE 5; .6; .31; .03; .02 G/100ML; G/100ML; G/100ML; G/100ML; G/100ML
INJECTION, SOLUTION INTRAVENOUS CONTINUOUS
Status: DISCONTINUED | OUTPATIENT
Start: 2020-09-16 | End: 2020-09-18 | Stop reason: HOSPADM

## 2020-09-16 RX ORDER — OXYTOCIN/0.9 % SODIUM CHLORIDE 30/500 ML
340 PLASTIC BAG, INJECTION (ML) INTRAVENOUS CONTINUOUS PRN
Status: DISCONTINUED | OUTPATIENT
Start: 2020-09-16 | End: 2020-09-18 | Stop reason: HOSPADM

## 2020-09-16 RX ORDER — OXYTOCIN 10 [USP'U]/ML
10 INJECTION, SOLUTION INTRAMUSCULAR; INTRAVENOUS
Status: DISCONTINUED | OUTPATIENT
Start: 2020-09-16 | End: 2020-09-18 | Stop reason: HOSPADM

## 2020-09-16 RX ORDER — LIDOCAINE 40 MG/G
CREAM TOPICAL
Status: DISCONTINUED | OUTPATIENT
Start: 2020-09-16 | End: 2020-09-18 | Stop reason: HOSPADM

## 2020-09-16 RX ORDER — SIMETHICONE 80 MG
80 TABLET,CHEWABLE ORAL 4 TIMES DAILY PRN
Status: DISCONTINUED | OUTPATIENT
Start: 2020-09-16 | End: 2020-09-18 | Stop reason: HOSPADM

## 2020-09-16 RX ORDER — DIPHENHYDRAMINE HCL 25 MG
25 CAPSULE ORAL EVERY 6 HOURS PRN
Status: DISCONTINUED | OUTPATIENT
Start: 2020-09-16 | End: 2020-09-18 | Stop reason: HOSPADM

## 2020-09-16 RX ORDER — HYDROCORTISONE 2.5 %
CREAM (GRAM) TOPICAL 3 TIMES DAILY PRN
Status: DISCONTINUED | OUTPATIENT
Start: 2020-09-16 | End: 2020-09-18 | Stop reason: HOSPADM

## 2020-09-16 RX ORDER — ONDANSETRON 4 MG/1
4 TABLET, FILM COATED ORAL EVERY 6 HOURS PRN
Status: DISCONTINUED | OUTPATIENT
Start: 2020-09-16 | End: 2020-09-18 | Stop reason: HOSPADM

## 2020-09-16 RX ADMIN — Medication 100 ML/HR: at 00:30

## 2020-09-16 RX ADMIN — ONDANSETRON HYDROCHLORIDE 4 MG: 4 TABLET, FILM COATED ORAL at 10:26

## 2020-09-16 RX ADMIN — SIMETHICONE 80 MG: 80 TABLET, CHEWABLE ORAL at 08:07

## 2020-09-16 RX ADMIN — AMPICILLIN 2 G: 2 INJECTION, POWDER, FOR SOLUTION INTRAVENOUS at 04:59

## 2020-09-16 RX ADMIN — ACETAMINOPHEN 975 MG: 325 TABLET, FILM COATED ORAL at 13:56

## 2020-09-16 RX ADMIN — DOCUSATE SODIUM 50 MG AND SENNOSIDES 8.6 MG 1 TABLET: 8.6; 5 TABLET, FILM COATED ORAL at 19:52

## 2020-09-16 RX ADMIN — DOCUSATE SODIUM 50 MG AND SENNOSIDES 8.6 MG 2 TABLET: 8.6; 5 TABLET, FILM COATED ORAL at 11:19

## 2020-09-16 RX ADMIN — DIPHENHYDRAMINE HYDROCHLORIDE 25 MG: 25 CAPSULE ORAL at 05:02

## 2020-09-16 RX ADMIN — GENTAMICIN SULFATE 90 MG: 40 INJECTION, SOLUTION INTRAMUSCULAR; INTRAVENOUS at 18:03

## 2020-09-16 RX ADMIN — CLINDAMYCIN IN 5 PERCENT DEXTROSE 900 MG: 18 INJECTION, SOLUTION INTRAVENOUS at 08:07

## 2020-09-16 RX ADMIN — BUPIVACAINE 20 ML: 13.3 INJECTION, SUSPENSION, LIPOSOMAL INFILTRATION at 00:10

## 2020-09-16 RX ADMIN — ACETAMINOPHEN 975 MG: 325 TABLET, FILM COATED ORAL at 01:55

## 2020-09-16 RX ADMIN — ACETAMINOPHEN 975 MG: 325 TABLET, FILM COATED ORAL at 19:52

## 2020-09-16 RX ADMIN — CLINDAMYCIN IN 5 PERCENT DEXTROSE 900 MG: 18 INJECTION, SOLUTION INTRAVENOUS at 16:54

## 2020-09-16 RX ADMIN — ACETAMINOPHEN 975 MG: 325 TABLET, FILM COATED ORAL at 08:07

## 2020-09-16 RX ADMIN — BUPIVACAINE HYDROCHLORIDE 20 ML: 2.5 INJECTION, SOLUTION EPIDURAL; INFILTRATION; INTRACAUDAL at 00:10

## 2020-09-16 RX ADMIN — AMPICILLIN 2 G: 2 INJECTION, POWDER, FOR SOLUTION INTRAVENOUS at 15:52

## 2020-09-16 RX ADMIN — KETOROLAC TROMETHAMINE 30 MG: 30 INJECTION, SOLUTION INTRAMUSCULAR at 16:54

## 2020-09-16 RX ADMIN — GENTAMICIN SULFATE 90 MG: 40 INJECTION, SOLUTION INTRAMUSCULAR; INTRAVENOUS at 08:50

## 2020-09-16 RX ADMIN — CLINDAMYCIN IN 5 PERCENT DEXTROSE 900 MG: 18 INJECTION, SOLUTION INTRAVENOUS at 00:31

## 2020-09-16 RX ADMIN — SIMETHICONE 80 MG: 80 TABLET, CHEWABLE ORAL at 13:56

## 2020-09-16 RX ADMIN — AMPICILLIN 2 G: 2 INJECTION, POWDER, FOR SOLUTION INTRAVENOUS at 10:26

## 2020-09-16 RX ADMIN — KETOROLAC TROMETHAMINE 30 MG: 30 INJECTION, SOLUTION INTRAMUSCULAR at 11:19

## 2020-09-16 RX ADMIN — KETOROLAC TROMETHAMINE 30 MG: 30 INJECTION, SOLUTION INTRAMUSCULAR at 05:50

## 2020-09-16 NOTE — DISCHARGE SUMMARY
[facility] Discharge Summary    Sophia Stiles MRN# [id number]   Age: 20 year old YOB: 2000     Date of Admission:  9/15/2020  Date of Discharge::  2020          Admission Diagnoses:   Intrauterine pregnancy at 39w4d  Polysubstance use disorder: Heroin, tobacco, alcohol, THC  History of heroin overdose  Anxiety/depression   History of suicide attempt  Hepatitis B nonimmune   Varicella nonimmune   Group B strep positive status          Discharge Diagnosis:     Same, except below  Triple I         Procedures:   Primary  section via Pfannestiel skin incision and low transverse uterine incision with double layer uterine closure  Epidural catheter placement  Spinal           Medications Prior to Admission:     Medications Prior to Admission   Medication Sig Dispense Refill Last Dose     ascorbic acid (VITAMIN C) 250 MG CHEW chewable tablet Take 1 tablet (250 mg) by mouth daily 90 tablet 0      aspirin (ASPIRIN 81) 81 MG chewable tablet Take 1 tablet (81 mg) by mouth daily 90 tablet 3      cyanocobalamin (VITAMIN B-12) 1000 MCG tablet Take 1 tablet (1,000 mcg) by mouth daily 60 tablet 3      ferrous sulfate (FE TABS) 325 (65 Fe) MG EC tablet Take 1 tablet (325 mg) by mouth daily 60 tablet 3      folic acid (FOLVITE) 1 MG tablet Take 1 mg by mouth daily        ibuprofen (ADVIL/MOTRIN) 600 MG tablet Take 1 tablet (600 mg) by mouth every 6 hours as needed for moderate pain Start after delivery 60 tablet 0      Prenatal MV-Min-Fe Fum-FA-DHA (PRENATAL 1 PO)         senna-docusate (SENOKOT-S/PERICOLACE) 8.6-50 MG tablet Take 1 tablet by mouth daily Start after delivery. 100 tablet 0           Discharge Medications:        Review of your medicines      UNREVIEWED medicines. Ask your doctor about these medicines      Dose / Directions   ascorbic acid 250 MG Chew chewable tablet  Commonly known as:  vitamin C  Used for:  Supervision of high risk pregnancy in second trimester, Heroin abuse  affecting pregnancy in second trimester (H)      Dose:  250 mg  Take 1 tablet (250 mg) by mouth daily  Quantity:  90 tablet  Refills:  0     aspirin 81 MG chewable tablet  Commonly known as:  Aspirin 81  Used for:  Supervision of high risk pregnancy, antepartum      Dose:  81 mg  Take 1 tablet (81 mg) by mouth daily  Quantity:  90 tablet  Refills:  3     cyanocobalamin 1000 MCG tablet  Commonly known as:  VITAMIN B-12  Used for:  Supervision of high risk pregnancy in second trimester, Heroin abuse affecting pregnancy in second trimester (H)      Dose:  1,000 mcg  Take 1 tablet (1,000 mcg) by mouth daily  Quantity:  60 tablet  Refills:  3     ferrous sulfate 325 (65 Fe) MG EC tablet  Commonly known as:  FE TABS  Used for:  Supervision of high risk pregnancy in second trimester, Heroin abuse affecting pregnancy in second trimester (H)      Dose:  325 mg  Take 1 tablet (325 mg) by mouth daily  Quantity:  60 tablet  Refills:  3     folic acid 1 MG tablet  Commonly known as:  FOLVITE      Dose:  1 mg  Take 1 mg by mouth daily  Refills:  0     ibuprofen 600 MG tablet  Commonly known as:  ADVIL/MOTRIN  Used for:  Supervision of high risk pregnancy, antepartum      Dose:  600 mg  Take 1 tablet (600 mg) by mouth every 6 hours as needed for moderate pain Start after delivery  Quantity:  60 tablet  Refills:  0     PRENATAL 1 PO      Refills:  0     senna-docusate 8.6-50 MG tablet  Commonly known as:  SENOKOT-S/PERICOLACE  Used for:  Supervision of high risk pregnancy, antepartum      Dose:  1 tablet  Take 1 tablet by mouth daily Start after delivery.  Quantity:  100 tablet  Refills:  0                  Consultations:   Anesthesia          Brief Admission History:   Sophia Stiles, 20 year old  39w4d by 10w6d US, initially presented to L&D for early latent labor under CN service.  Her pregnancy was complicated by GBS positive status, polysubstance use disorder (heroin, tobacco, THC), hepatitis/varicella nonimmune, group  B strep bacteriuria, anxiety, and depression.   Intraoperative course   Sophia Stiles, 20 year old  39w4d by 10w6d US, initially presented to L&D for latent labor under [facility] service.  Her pregnancy was complicated by GBS positive status, polysubstance use disorder (heroin, tobacco, THC), hepatitis/varicella nonimmune, group B strep bacteriuria, anxiety, and depression. Her cervix was initially at 3.5/70/-1.  Her labor was augmented with AROM with clear fluid.  She did not receive any misoprostol or IV Pitocin. For pain control, patient received epidural, which was re-bolused 3 times without success. Patient's GBS status was treated adequately with  penicillin. Patient's cervix progressed to 6 cm. The fetal heart tracing became category II due to minimal variability, fetal tachycardia, and recurrent late decelerations.  Triple I was diagnosed with maternal fever up to 103.2, leukocytosis at 17.4, and fetal tachycardia. Urgent  section was called for category 2 fetal heart tracing and remote from delivery. The risks, benefits, and alternatives of  section were discussed with the patient including bleeding, infection, injury to surrounding structures (nerves, blood vessels, uterus, cervix, tubes, ovaries, bladder, bowel, rarely baby), and she agreed to proceed. Written consent obtained.    Findings:  1. Clear amniotic fluid  2. Liveborn infant in occiput posterior presentation. Apgars 2 at 1 minute, 6 at 5 minutes, and 8 at 10 mins. Weight pending    The procedure was complicated thermal injury to descending colon that did not require repair per phone consult with pediatric surgery. EBL 1010 mL.  See operative report for details.        Postpartum Course   The patient's hospital course was unremarkable.  She recovered as anticipated and experienced no post-operative complications. On discharge, her pain was well controlled. Vaginal bleeding is similar to peak menstrual flow.  Voiding without  difficulty.  Ambulating well and tolerating a normal diet.  No fever or significant wound drainage.  Breastfeeding well.  Infant is stable.  Had passed gas and had a bowel movement.  She was discharged on post-partum day #3.    Post-partum hemoglobin:   Hemoglobin   Date Value Ref Range Status   09/15/2020 12.0 11.7 - 15.7 g/dL Final             Discharge Instructions and Follow-Up:     Discharge diet: Regular   Discharge activity: No lifting greater than 20 lbs, pushing, pulling, or other strenuous activity for 6 weeks. Pelvic rest for 6 weeks including no sexual intercourse, tampons, or douching. No driving until you can slam on the breaks without pain or while on narcotic pain medications.    Discharge follow-up: Follow up with primary OB for routine postpartum visit in 6 weeks  1 week blood pressure check   Wound care: Keep incision clean and dry           Discharge Disposition:     Discharged to home in good condition      Rubio Espana MD  OB/GYN PGY-2  09/18/20 6:44 AM

## 2020-09-16 NOTE — ANESTHESIA POSTPROCEDURE EVALUATION
Anesthesia POST Procedure Evaluation    Patient: Sophia Stiles   MRN:     [id number] Gender:   female   Age:    20 year old :      2000        Preoperative Diagnosis: * No pre-op diagnosis entered *   Procedure(s):   SECTION   Postop Comments: No value filed.     Anesthesia Type: No value filed.          Postop Pain Control: Uneventful            Sign Out: Well controlled pain   PONV: No   Neuro/Psych: Uneventful            Sign Out: Acceptable/Baseline neuro status   Airway/Respiratory: Uneventful            Sign Out: Acceptable/Baseline resp. status   CV/Hemodynamics: Uneventful            Sign Out: Acceptable CV status   Other NRE: NONE   DID A NON-ROUTINE EVENT OCCUR? No         Last Anesthesia Record Vitals:  CRNA VITALS  9/15/2020 2341 - 2020 0041      2020             EKG:  Sinus rhythm          Last PACU Vitals:  Vitals Value Taken Time   BP     Temp     Pulse 93 2020  1:05 AM   Resp 9 2020  1:05 AM   SpO2 98 % 2020  1:05 AM   Temp src     NIBP     Pulse     SpO2     Resp     Temp     Ht Rate     Temp 2     Vitals shown include unvalidated device data.      Electronically Signed By: Ermias Orta DO, 2020, 1:06 AM

## 2020-09-16 NOTE — BRIEF OP NOTE
[facility] OB/GYN Brief Operative Note  Name: Sophia Stiles   MRN: [id number]   : 2000   Procedure date: 09/15/20     Pre-operative diagnosis: category II FHT; triple I  Post-operative diagnosis: Same, s/p below procedure    Procedure: Primary  section via Pfannestiel skin incision and low transverse uterine incision with double layer uterine closure  Anesthesia: spinal anesthesia     Surgeon: Marilin Trammell MD   Assistant(s): Rubio Espana MD PGY-2; Angelita Blair MS3    Indications: Sophia Stiles, 20 year old  39w4d by 10w6d US, initially presented to L&D for latent labor under CN service.  Her pregnancy was complicated by GBS positive status, polysubstance use disorder (heroin, tobacco, THC), hepatitis/varicella nonimmune, group B strep bacteriuria, anxiety, and depression. Her cervix was initially at 3.5/70/-1.  Her labor was augmented with AROM with clear fluid.  She did not receive any misoprostol or IV Pitocin. For pain control, patient received epidural, which was re-bolused 3 times without success. Patient's GBS status was treated adequately with  penicillin. Patient's cervix progressed to 6 cm. The fetal heart tracing became category II due to minimal variability, fetal tachycardia, and recurrent late decelerations.  Triple I was diagnosed with maternal fever up to 103.2, leukocytosis at 17.4, and fetal tachycardia. Urgent  section was called for category 2 fetal heart tracing and remote from delivery. The risks, benefits, and alternatives of  section were discussed with the patient including bleeding, infection, injury to surrounding structures (nerves, blood vessels, uterus, cervix, tubes, ovaries, bladder, bowel, rarely baby), and she agreed to proceed. Written consent obtained.    Estimated blood loss: 1010 mL  Total IV fluids: 1300 mL, Lactated Ringer  Total urine output: 700mL, blood tinged urine  Drains: Singh catheter  Specimens: Cord blood,  cord segment, placenta, cord gases  Findings:  1. Clear amniotic fluid  2. Liveborn infant in occiput posterior presentation. Apgars 2 at 1 minute, 6 at 5 minutes, and 8 at 10 mins. Weight pending    Results for RICARDO DUMONT (MRN [id number]) as of 9/15/2020 23:52   Ref. Range 9/15/2020 22:56   Base Deficit Art Latest Ref Range: 0.0 - 9.6 mmol/L 8.3   Base Deficit Venous Latest Ref Range: 0.0 - 8.1 mmol/L 7.2   Ph Cord Arterial Latest Ref Range: 7.16 - 7.39 pH 7.15 (LL)   PCO2 Cord Arterial Latest Ref Range: 35 - 71 mm Hg 62   PO2 Cord Arterial Latest Ref Range: 3 - 33 mm Hg <10   Bicarbonate Cord Arterial Latest Ref Range: 16 - 24 mmol/L 22   Ph Cord Blood Venous Latest Ref Range: 7.21 - 7.45 pH 7.26   PCO2 Cord Venous Latest Ref Range: 27 - 57 mm Hg 45   PO2 Cord Venous Latest Ref Range: 21 - 37 mm Hg 13 (L)   Bicarbonate Cord Venous Latest Ref Range: 16 - 24 mmol/L 20     3. Normal uterus, fallopian tubes, and ovaries.     Complications: thermal injury to descending colon that did not require repair per phone Pediatric Surgery recommendations  Condition: Patient taken to recovery in stable condition.    Rubio Espana MD  OB/GYN PGY-2  09/15/20 11:56 PM

## 2020-09-16 NOTE — PROVIDER NOTIFICATION
09/15/20 7800   Provider Notification   Provider Name/Title ALLISON Delgadillo CNM   Method of Notification At Bedside     CNM at bedside for IUPC insertion, SVE unchanged. Plan for 500mL IV bolus, CNM to discuss FHTs with Dr. Trammell, recent temp 100.1. RN to retake temp in 30min. RN continue to monitor.

## 2020-09-16 NOTE — OP NOTE
[facility]  Full Operative Progress Note     Surgery Date:  9/15/2020    Surgeon:  Marilin Trammell MD    Assistants:  Rubio Espana MD PGY-2    Angelita Blair MS3    Pre-op Diagnosis:    - Intrauterine pregnancy at 39w4d  - Category II fetal heart tracing (FHT) remote from delivery  - Triple I (intrauterine infection and/or inflammation)     Post-op Diagnosis:    - Same   - Liveborn male infant     Procedure: Primary low-transverse  section with double layer uterine closure via pfannenstiel incision    Anesthesia: Spinal  EBL: 1010 cc  IVF: 1300 mL crystalloid  UOP: 700 mL blood tinged urine at the end of the case  Drains: Singh Catheter   Specimens: Cord blood, cord segment, placenta, cord gases  Complications: small thermal injury to descending colon serosa (non-translumenal) that did not require repair     Indications:   Sophia Stiles, 20 year old  39w4d by 10w6d US, initially presented to L&D for latent labor under CN service.  Her pregnancy was complicated by GBS positive status, history of polysubstance use disorder (heroin, tobacco, THC), hepatitis/varicella nonimmune, group B strep bacteriuria, anxiety, and depression. Her cervix was initially at 3.5/70/-1.  Her labor was augmented with AROM with clear fluid. For pain control, patient received an epidural, which was re-bolused 3 times without success. Patient's GBS status was treated adequately with  penicillin. Patient's cervix progressed to 6 cm. The fetal heart tracing became category II due to minimal variability, fetal tachycardia, and recurrent late decelerations.  Triple I was diagnosed with maternal fever up to 103.2, leukocytosis at 17.4, and fetal tachycardia. Urgent  section was called for category 2 fetal heart tracing and remote from delivery. The risks, benefits, and alternatives of  section were discussed with the patient including bleeding, infection, injury to surrounding structures  (nerves, blood vessels, uterus, cervix, tubes, ovaries, bladder, bowel, rarely baby), and she agreed to proceed. Written consent obtained.    Findings:   1. Clear amniotic fluid  2. Liveborn infant in occiput posterior presentation. Apgars 2 at 1 minute, 6 at 5 minutes, and 8 at 10 mins. Weight pending  3. Normal uterus, fallopian tubes, and ovaries.     Results for DUMONT, MALE-DAVIDICITJIM (MRN [id number]) as of 9/15/2020 23:52    Ref. Range 9/15/2020 22:56   Base Deficit Art Latest Ref Range: 0.0 - 9.6 mmol/L 8.3   Base Deficit Venous Latest Ref Range: 0.0 - 8.1 mmol/L 7.2   Ph Cord Arterial Latest Ref Range: 7.16 - 7.39 pH 7.15 (LL)   PCO2 Cord Arterial Latest Ref Range: 35 - 71 mm Hg 62   PO2 Cord Arterial Latest Ref Range: 3 - 33 mm Hg <10   Bicarbonate Cord Arterial Latest Ref Range: 16 - 24 mmol/L 22   Ph Cord Blood Venous Latest Ref Range: 7.21 - 7.45 pH 7.26   PCO2 Cord Venous Latest Ref Range: 27 - 57 mm Hg 45   PO2 Cord Venous Latest Ref Range: 21 - 37 mm Hg 13 (L)   Bicarbonate Cord Venous Latest Ref Range: 16 - 24 mmol/L        Procedure Details:   The patient was brought to the OR, where her epidural bolus was completed.  She was placed in the dorsal lithotomy position with a slight leftward tilt. A moody catheter was placed into her bladder.  She was prepped and draped in the usual sterile fashion. A surgical time out was performed. A pfannenstiel skin incision was made with the scalpel, and carried down to the underlying fascia with sharp and blunt dissection. The fascia was incised in the midline, and the incision was extended laterally with the Antonio scissors. The superior aspect of the fascia was grasped with the Kocher clamps and dissected off of the underlying rectus muscles with blunt and sharp dissection. Attention was then turned to the inferior aspect of the fascia, which was similarly dissected off of the underlying rectus muscles. The rectus muscles were  in the midline, and the  peritoneum was entered bluntly, and the opening was extended with digital pressure and electrosurgery. At this point in time, a small, <2 mm thermal injury to the descending colon serosa was noted. The injury was not translumenal.  The bladder blade was placed. The vesicouterine peritoneum was incised in the midline, and the incision was extended laterally with the Metzenbaum scissors. A bladder flap was created digitally and the bladder blade was replaced. A transverse hysterotomy was made with the scalpel in the lower uterine segment, and the incision was extended with digital pressure. The infant was noted to be in the occiput posterior position, and was delivered atraumatically with the assistance of a vaginal push from her CNM who was scrubbed a present. The shoulders delivered easily. No nuchal cord was noted. The cord was doubly clamped and cut after 30 seconds as the infant was noted to have poor tone.  The infant was handed off to the awaiting NICU staff. A segment of cord was cut and collected for gasses. The placenta was delivered with gentle traction on the umbilical cord and uterine massage. The uterus was exteriorized and cleared of all clots and debris. Uterine tone was noted to be moderately firm with pitocin given through the running IV and uterine massage. Given her increased risk of bleeding and moderate tone, IM methergine was given. The hysterotomy was closed with a running locked suture of 0 Vicryl.  The hysterotomy was then imbricated using an 0 Vicryl suture. The hysterotomy was noted to be hemostatic. The posterior cul-de-sac was cleared of all clots and debris. The uterus was returned to the abdomen. Attention was returned to the serosal injury of the colon.  The area at this time was barely visible with no dirk or blanching and clearly not transluminal.  A phone consultation with the pediatric surgeon on call was made and a picture of the area sent to them.  It was determined that the area  did not need to be repaired.  The pericolic gutters were  cleared of all clots and debris. The hysterotomy was reexamined and noted to be hemostatic.  A piece of Seprafilm was placed over the hysterotomy.  The fascia and rectus muscles were examined and noted to be hemostatic.  A piece of Seprafilm was placed over the rectus muscles.  The fascia was closed with a running 0 Vicryl suture. The subcutaneous tissue was irrigated and areas of oozing were controlled with electrocautery. The subcutaneous tissue was closed with horizontal mattress sutures of 3-0 Vicryl. The skin was closed with 4-0 Monocryl and covered with a sterile dressing.    All sponge, needle, and instrument counts were correct. The patient tolerated the procedure well, and was transferred to recovery in stable condition. Dr. Trammell was present and scrubbed for the entirety of the procedure.     Rubio Espana MD  OB/GYN PGY-2  09/16/20 1:23 AM    I was scrubbed and present for the entire procedure.  I have reviewed and edited the above note.    Marilin Trammell MD, FACOG

## 2020-09-16 NOTE — PROGRESS NOTES
"Post  Anesthesia Follow Up Note    Patient: Sophia Stiles    Patient location: Postpartum floor.    Chief complaint: Acute postoperative pain management s/p intrathecal morphine administration     Procedure(s) Performed:  Procedure(s):   SECTION    Anesthesia type: MAC and Spinal Block    Subjective:     Pain Control: 0/10 at rest and 0/10 with ambulation    Additional ROS:  She does complain of mild pruritis at this time. She denies weakness, denies paresthesia, denies difficulties breathing or voiding, denies nausea or vomiting. She is able to ambulate and tolerates regular diet.    Objective:    Respiratory Function (RR / SpO2 / Airway Patency): Satisfactory    Cardiac Function (HR / Rhythm / BP): Satisfactory    Strength and sensation lower extremities: Normal    Site of spinal/epidural insertion: No signs of infection or inflammation.     Last Vitals: BP 99/58   Pulse 67   Temp 36.7  C (98  F) (Oral)   Resp 16   Ht 1.575 m (5' 2\")   Wt 75.8 kg (167 lb)   LMP 2019   SpO2 99%   Breastfeeding Unknown   BMI 30.54 kg/m      Assessment and plan:   Sophia Stiles is a 20 year old female  POD #2 s/p   with spinal block and single shot TAP nerve block injections with 20 mL bupivacaine 0.25% and 20mL liposome bupivacaine (Exparel) long-acting 1.3% given in the PACU for postoperative analgesia.  Pt is ambulating without difficulty, no weakness or paresthesias.  There is no evidence of adverse side effects associated with spinal and nerve block injections.  The patient is receiving adequate incisional pain control at this time and anticipate up to 72 hours of incisional pain control.  However, we further anticipate that the patient will require opioid/nonopioid analgesics for visceral and muscle pain that is not controlled with local anesthetic.      In brief summary, her post-operative analgesia is adequate today. Further interventional analgesic strategies would be " of little utility at this time. Thus, we recommend proceeding with PO analgesics including staggered dosing of NSAIDs (ibuprofen) and acetaminophen, with a taper of oxycodone.     Thank you for including us in the care for this patient.    Trevor Cabrera DO  Anesthesia Resident, PGY2

## 2020-09-16 NOTE — ANESTHESIA CARE TRANSFER NOTE
Patient: Sophia Stiles    Procedure(s):   SECTION    Diagnosis: * No pre-op diagnosis entered *  Diagnosis Additional Information: No value filed.    Anesthesia Type:   No value filed.     Note:  Airway :Room Air  Patient transferred to:PACU  Comments: VSS. Breathing spontaneously at a regular rate with adequate tidal volumes and maintaining O2 sats on room air. Denies nausea or pain. Required sedation with propofol and ketamine, as well as adjunct analgesia with IV fentanyl and hydromorphone. No apparent complications from anesthesia. TAP blocks done in OR post-op.     Adma Mcclellan MD  Anesthesiology Resident, CA-2    Handoff Report: Identifed the Patient, Identified the Reponsible Provider, Reviewed the pertinent medical history, Discussed the surgical course, Reviewed Intra-OP anesthesia mangement and issues during anesthesia, Set expectations for post-procedure period and Allowed opportunity for questions and acknowledgement of understanding      Vitals: (Last set prior to Anesthesia Care Transfer)    CRNA VITALS  9/15/2020 2341 - 2020 0031      2020             EKG:  Sinus rhythm                Electronically Signed By: Adam Mcclellan MD  2020  12:31 AM

## 2020-09-16 NOTE — ANESTHESIA PROCEDURE NOTES
Epidural Procedure Note  Staff -   Anesthesiologist:  Ermias Orta DO  Resident/Fellow: Adam Mcclellan MD    Performed By: resident          Location: OB   Pre-procedure checklist:   patient identified, IV checked, site marked, risks and benefits discussed, informed consent, monitors and equipment checked, pre-op evaluation, at physician/surgeon's request and post-op pain management      Correct Patient: Yes      Correct Position: Yes      Correct Site: Yes      Correct Procedure: Yes      Correct Laterality:  Yes    Site Marked:  Yes  Procedure:     Procedure:  Epidural catheter    ASA:  2    Diagnosis:  Intrauterine pregancy    Position:  Sitting    Sterile Prep: chloraprep      Insertion site:  L3-4    Local skin infiltration:  1% lidocaine    amount (mL):  3    Approach:  Midline    Needle gauge (G):  17    Needle Length (in):  5    Block Needle Type:  [place]mu[place]lyn    Injection Technique:  LORT saline    KAREN at (cm):  5    Attempts:  1    Redirects:  0    Catheter gauge (G):  19    Catheter threaded easily: Yes      Threaded to cm at skin:  10    Threaded in epidural space (cm):  5    Paresthesias:  No    Aspiration negative for Heme or CSF: Yes      Test dose negative for signs of intravascular, subdural or intrathecal injection: Yes

## 2020-09-16 NOTE — PLAN OF CARE
"Mare is starting to feel uncomfortable on her upper abdomen and back. She was repositioned to her left side and started to feel better and fall asleep. After a half hour she called with lower back pain, hip pain, and lower abdominal pain. She did not feel anyrelief after using her PCEA. Jolynn Liriano CNM in room, pt's cervix is 6/80/0, bladder was emptied. Notified anesthesia resident, who came into assess. Pt was re-bolused. Mare continued to feel lower back and hip pain except only on her left side. Mare was repositioned to her left lateral and currently feels \"much better\".  Baby has had moderate variability, accels, and occasionally early decels. Continue to monitor and assess.   "

## 2020-09-16 NOTE — PROGRESS NOTES
Post Partum Progress Note    Subjective:  Patient is doing well, pain well controlled. Has some itchiness. Tolerating PO and lochia within normal limits. Singh in place, has not ambulated or passed flatus. Denies any fever, chills, SOB, chest pain, N/V, headache, dizziness. Doing well with breast pumping. Would like nexplanon for birth control.     Objective:  Patient Vitals for the past 24 hrs:   BP Temp Temp src Pulse Resp SpO2   09/16/20 0800 99/58 98  F (36.7  C) Oral 67 -- 99 %   09/16/20 0638 101/48 -- -- 75 16 99 %   09/16/20 0542 103/76 98.1  F (36.7  C) Oral 76 16 99 %   09/16/20 0451 101/50 -- -- 85 16 98 %   09/16/20 0350 106/76 98.3  F (36.8  C) Oral 86 16 98 %   09/16/20 0312 106/56 98.5  F (36.9  C) Oral 84 16 98 %   09/16/20 0247 117/67 98.9  F (37.2  C) Oral 80 16 98 %   09/16/20 0205 116/73 -- -- 82 16 98 %   09/16/20 0150 114/70 98.3  F (36.8  C) Oral 80 15 97 %   09/16/20 0135 121/75 -- -- 86 12 97 %   09/16/20 0120 123/69 -- -- 93 11 97 %   09/16/20 0106 119/70 -- -- 96 18 98 %   09/16/20 0100 -- -- -- 86 12 --   09/16/20 0045 121/78 -- -- 87 12 98 %   09/16/20 0030 121/79 -- -- 93 16 96 %   09/16/20 0015 125/78 98.8  F (37.1  C) Oral 86 16 96 %   09/15/20 2225 116/79 -- -- -- -- 96 %   09/15/20 2223 -- 103.2  F (39.6  C) Oral -- -- --   09/15/20 2204 131/66 -- -- -- -- --   09/15/20 2200 118/73 -- -- -- -- 97 %   09/15/20 2141 116/75 100.1  F (37.8  C) Oral -- -- 96 %   09/15/20 2131 114/70 -- -- -- -- --   09/15/20 2114 116/74 -- -- -- -- --   09/15/20 2110 133/61 -- -- -- -- 98 %   09/15/20 2051 -- 98.9  F (37.2  C) Oral -- -- --   09/15/20 2044 (!) 137/95 -- -- -- -- 98 %   09/15/20 2038 133/77 -- -- -- -- 96 %   09/15/20 2036 133/77 -- -- -- -- --   09/15/20 2030 (!) 150/95 -- -- -- -- 96 %   09/15/20 1954 123/79 -- -- -- -- 97 %   09/15/20 1920 114/78 -- -- -- 16 96 %   09/15/20 1914 130/74 -- -- -- 16 96 %   09/15/20 1908 132/68 -- -- -- 16 96 %   09/15/20 1906 132/66 -- -- -- 16 96 %    09/15/20 1904 114/83 -- -- -- 16 97 %   09/15/20 1903 126/72 -- -- -- 16 97 %   09/15/20 1900 (!) 125/91 -- -- -- 16 97 %   09/15/20 1858 118/89 -- -- -- 16 97 %   09/15/20 1851 (!) 140/86 -- -- -- 16 97 %   09/15/20 1848 136/66 -- -- -- 16 97 %   09/15/20 1846 131/78 -- -- -- -- --   09/15/20 1845 127/76 -- -- -- 16 96 %   09/15/20 1842 110/67 -- -- -- 16 97 %   09/15/20 1840 117/77 -- -- -- 16 97 %   09/15/20 1838 106/68 -- -- -- 16 97 %   09/15/20 1836 116/71 -- -- -- 16 --   09/15/20 1834 116/74 -- -- -- 16 --   09/15/20 1800 115/71 98.4  F (36.9  C) Oral -- -- 97 %   09/15/20 1700 119/73 -- -- -- 16 96 %   09/15/20 1600 116/72 98.4  F (36.9  C) Oral -- 16 95 %   09/15/20 1500 99/54 -- -- -- -- 95 %   09/15/20 1350 108/56 -- -- -- -- 95 %   09/15/20 1335 111/63 -- -- -- -- 95 %   09/15/20 1323 -- -- -- -- -- 94 %   09/15/20 1320 105/58 -- -- -- -- --   09/15/20 1305 111/64 -- -- -- -- --   09/15/20 1250 120/68 -- -- -- -- 96 %   09/15/20 1243 -- -- -- -- -- 97 %   09/15/20 1241 122/77 -- -- -- -- --   09/15/20 1238 -- -- -- -- -- 98 %   09/15/20 1236 107/65 -- -- -- -- --   09/15/20 1233 -- -- -- -- -- 96 %   09/15/20 1232 113/77 -- -- -- -- --   09/15/20 1226 118/74 -- -- -- -- 97 %   09/15/20 1223 -- -- -- -- -- 97 %   09/15/20 1222 115/80 -- -- -- -- --   09/15/20 1218 -- -- -- -- -- 96 %   09/15/20 1216 117/72 -- -- -- -- --   09/15/20 1208 -- -- -- -- -- 96 %   09/15/20 1207 119/66 -- -- -- -- --   09/15/20 1203 -- -- -- -- -- 97 %   09/15/20 1201 116/69 -- -- -- -- --   09/15/20 1156 127/70 -- -- 72 18 --   09/15/20 1150 106/66 98.1  F (36.7  C) Oral -- -- 98 %   09/15/20 1146 111/70 -- -- -- -- --   09/15/20 1144 110/70 -- -- -- -- --   09/15/20 1142 104/65 -- -- -- -- --   09/15/20 1140 104/58 -- -- -- -- 99 %   09/15/20 1138 109/63 -- -- -- -- 99 %   09/15/20 1136 111/64 -- -- -- -- --   09/15/20 1134 109/72 -- -- -- -- --   09/15/20 1125 113/74 -- -- -- 21 100 %       General: NAD, resting  comfortably  Resp: Normal rate and effort on room air  Cv: well perfused  Abd:  Soft, nontender, mild distension, tympanic with percussion, fundus firm below the umbilicus  Incision: bandage clean/dry/intact  Ext:  No edema in bilateral LE      Assessment and Plan:  20 year old old  POD#1 s/p PLTCS for cat II, triple I. Operation was complicated by small serosal thermal injury to descending bowel (non-translumenal) that did not require repair. Doing well, starting to meet post-op goals. Vital signs wnl though she did have mild range BP <4 hours apart. Not meeting criteria for pregnancy associated HTN. Will continue monitoring.     # Postpartum Care  - Pain: Tylenol, ibuprofen, oxycodone PRN.  - Heme: Hgb 12.0 > QBL 1010, s/p methergine>10.5  - GI: Regular diet. Bowel regimen. Antiemetics PRN. Tolerating PO  - : Singh removed this morning, awaiting spontaneous void  - Rh positive, Rubella immune, varicella non-immune (vaccine ordered); hep B non-immune (s/p 2/3; next dose 2021)  - breast pumping; baby in NICU  - nexplanon for birth control    # triple I  - Tmax/last 103.2 ( 0027)  - WBC 10.5>17.4> 15.4  - continue amp/gent/clinda for 24hr  - vital signs stable    # PDS (heroin, tobacco, THC, alcohol)  # Anxiety/depression  - 9/15 UDS neg  - SW consulted    Anticipate discharge to home POD#2-3    Rubio Espana MD  OB/GYN PGY-2  20 6:12 AM    Staff MD Note    I appreciate the note by Dr. Espana.  Any necessary changes have been made by me.  I saw and evaluated the patient and agree with the findings and plan of care as documented in the note.    Elizabeth Garcia MD

## 2020-09-16 NOTE — PROVIDER NOTIFICATION
09/16/20 0926   Provider Notification   Provider Name/Title Dr. Henriquez   Method of Notification Electronic Page   Request Evaluate-Remote   Notification Reason Medication Request   F.B. my apologies, IV Zofran is ordered, ok to give that? if not then need order for ODT. Thanks, [place] [phone]    Page returned, order placed for oral zofran.

## 2020-09-16 NOTE — PROGRESS NOTES
Called to assess fetal HR tracing-fetal HR tracing baseline 170 with minimal variability, not improved with resuscitative measures over the last hour.  Cervix was examined by me, / with caput to +1, suspect OP.  Delivery by  section was recommended 2/2 category II FHR tracing remote from delivery.  We reviewed the risks/benefits and consent was obtained.  Anesthesia is aware, will move to the OR for an urgent delivery.    Marilin Trammell MD, FACOG

## 2020-09-16 NOTE — ANESTHESIA PREPROCEDURE EVALUATION
"Anesthesia Pre-Procedure Evaluation    Patient: Sophia Stiles   MRN:     [id number] Gender:   female   Age:    20 year old :      2000        Preoperative Diagnosis: * No pre-op diagnosis entered *   Procedure(s):   SECTION     LABS:  CBC:   Lab Results   Component Value Date    WBC 17.4 (H) 09/15/2020    WBC 10.5 09/15/2020    HGB 12.0 09/15/2020    HGB 11.6 (L) 09/15/2020    HCT 35.9 09/15/2020    HCT 34.8 (L) 09/15/2020     09/15/2020     09/15/2020     BMP:   Lab Results   Component Value Date     09/15/2020     2020    POTASSIUM 3.7 09/15/2020    POTASSIUM 3.7 2020    CHLORIDE 108 09/15/2020    CHLORIDE 109 2020    CO2 21 09/15/2020    CO2 24 2020    BUN 10 09/15/2020    BUN 8 2020    CR 0.87 09/15/2020    CR 0.49 (L) 2020    GLC 80 09/15/2020    GLC 75 2020     COAGS: No results found for: PTT, INR, FIBR  POC:   Lab Results   Component Value Date    HCG Negative 2019     OTHER:   Lab Results   Component Value Date    NANI 8.2 (L) 09/15/2020    ALBUMIN 3.7 2020    PROTTOTAL 6.8 2020    ALT 19 09/15/2020    AST 19 09/15/2020    GGT 20 2019    ALKPHOS 39 (L) 2020    BILITOTAL 0.2 2020    TSH 0.54 2019        Preop Vitals    BP Readings from Last 3 Encounters:   20 121/78   20 117/68   09/10/20 111/72    Pulse Readings from Last 3 Encounters:   20 87   20 102   09/10/20 84      Resp Readings from Last 3 Encounters:   20 12   20 18   20 16    SpO2 Readings from Last 3 Encounters:   20 98%   20 97%   20 100%      Temp Readings from Last 1 Encounters:   20 37.1  C (98.8  F) (Oral)    Ht Readings from Last 1 Encounters:   09/15/20 1.575 m (5' 2\")      Wt Readings from Last 1 Encounters:   09/15/20 75.8 kg (167 lb)    Estimated body mass index is 30.54 kg/m  as calculated from the following:    Height as of this encounter: 1.575 " "m (5' 2\").    Weight as of this encounter: 75.8 kg (167 lb).     LDA:  Peripheral IV 09/15/20 Right Lower forearm (Active)   Site Assessment WDL 09/16/20 0015   Line Status Infusing 09/16/20 0015   Phlebitis Scale 0-->no symptoms 09/16/20 0015   Infiltration Scale 0 09/16/20 0015   Number of days: 1       Urethral Catheter Latex 16 fr (Active)   Tube Description Positional 09/16/20 0015   Catheter Care Catheter wipes;Done 09/16/20 0015   Collection Container Standard 09/16/20 0015   Securement Method Securing device (Describe) 09/16/20 0015   Rationale for Continued Use Anesthesia 09/16/20 0015   Number of days: 1        Past Medical History:   Diagnosis Date     Anxiety     has used mirazapine      Depression      Depressive disorder      Heroin overdose (H)     x2     Substance abuse (H)      Suicide attempt (H)     age 14 and age 17       History reviewed. No pertinent surgical history.   No Known Allergies     Anesthesia Evaluation     .             ROS/MED HX    ENT/Pulmonary:  - neg pulmonary ROS     Neurologic:  - neg neurologic ROS     Cardiovascular:         METS/Exercise Tolerance:     Hematologic:  - neg hematologic  ROS       Musculoskeletal:  - neg musculoskeletal ROS       GI/Hepatic:         Renal/Genitourinary:  - ROS Renal section negative       Endo:  - neg endo ROS       Psychiatric:         Infectious Disease:  - neg infectious disease ROS       Malignancy:      - no malignancy   Other:    - neg other ROS                     PHYSICAL EXAM:   Mental Status/Neuro: A/A/O   Airway: Facies: Feasible  Mallampati: I  Mouth/Opening: Full  TM distance: > 6 cm  Neck ROM: Full   Respiratory: Auscultation: CTAB     Resp. Rate: Normal     Resp. Effort: Normal      CV: Rhythm: Regular  Rate: Age appropriate  Heart: Normal Sounds  Edema: None   Comments:      Dental: Normal Dentition                Assessment:   ASA SCORE: 2 emergent   H&P: History and physical reviewed and following examination; no interval " change.   Smoking Status:  Non-Smoker/Unknown   NPO Status: NPO Appropriate     Plan:   Anes. Type:  MAC; Peripheral Nerve Block; For Post-op pain in coordination with surgeon; Epidural     Epidural Details:  Catheter; Lumbar     Block Details: TAP; Exparel; Single Shot   Pre-Medication: None   Induction:  N/a   Airway: Native Airway   Access/Monitoring: PIV   Maintenance: N/a     Postop Plan:   Postop Pain: Regional  Postop Sedation/Airway: Not planned  Disposition: Inpatient/Admit     PONV Management:   Adult Risk Factors: Female, Non-Smoker   Prevention: Ondansetron     CONSENT: Direct conversation   Plan and risks discussed with: Patient   Blood Products: Consented (ALL Blood Products)                   Ermias Orta DO

## 2020-09-16 NOTE — OR NURSING
OR to PACU Transfer Note  Data: Pt to OB PACU at 0015 via cart. PIV infusing with oxytocin 100 mL/hr. without complications, moody with pale yellow urine to gravity, temp 98.8, vss, pt does not complain of pain and/or nausea.   Interventions: IV to pump, monitors and alarms on, SCD on.  Response: stable.  Plan: Patient instructed to notify RN for pain or nausea, routine post op cares, initiate breastfeeding/pumping as soon as patient/infant able.

## 2020-09-16 NOTE — ANESTHESIA PROCEDURE NOTES
Peripheral Nerve Block Procedure Note  Staff -   Anesthesiologist:  Ermias Orta DO  Resident/Fellow: Adam Mcclellan MD    Performed By: resident  Procedure performed by resident/CRNA in presence of a teaching physician.        Location: OB and OR AFTER induction  Procedure Start/Stop TImes:     Correct Patient: Yes      Correct Position: Yes      Correct Site: Yes      Correct Procedure: Yes      Correct Laterality:  Yes    Site Marked:  Yes  Procedure details:     Procedure:  TAP    ASA:  3 and Emergent    Laterality:  Bilateral    Position:  Supine    Sterile Prep: chloraprep, mask and sterile gloves      Local skin infiltration:  None    Needle:  Insulated    Needle gauge:  21    Needle length (inches):  3.1    Ultrasound: Yes      Ultrasound used to identify targeted nerve, plexus, or vascular structure and placed a needle adjacent to it      Permanent Image entered into patiient's record      Abnormal pain on injection: No      Blood Aspirated: No      Paresthesias:  No    Bleeding at site: No      Bolus via:  Needle    Infusion Method:  Single Shot    Complications:  None

## 2020-09-16 NOTE — PROVIDER NOTIFICATION
09/15/20 2015   Provider Notification   Provider Name/Title Leona MITCHELL   Method of Notification Electronic Page   Request Evaluate in Person   Notification Reason Other (Comment)  (RN requesting CNM at bedside to check cervix )

## 2020-09-16 NOTE — PROGRESS NOTES
CNM Courtesy Round       S: Sophia Stiles is a 20 year old  at 39w4d who delivered a viable baby boy by PLTCS 9/15/20 @ 2256 for fetal intolerance.  Patient reports she is doing well, was able to get out of bed and ambulate to the bathroom with minimal assistance.  Feeling tired today, was not able to get much sleep last night.  Baby sola Kramer is in the NICU for r/o sepsis, patient reports he is doing well.     O: Patient Vitals for the past 24 hrs:    Vitals:    20 0451 20 0542 20 0638 20 0800   BP: 101/50 103/76 101/48 99/58   Pulse: 85 76 75 67   Resp:     Temp:  98.1  F (36.7  C)  98  F (36.7  C)   TempSrc:  Oral  Oral   SpO2: 98% 99% 99% 99%   Weight:       Height:         Gen:      Resting comfortably, NAD     Assessment/Plan:  20 year old  on POD#1 s/p PLTCS.  Discussed that CNM team is available for any questions or concerns she may have.     I, Elizabeth Esqueda, am serving as a scribe; to document services personally performed by  SAHJI Guerra CNM based on data collection and the provider's statements to me.     Elizabeth Esqueda., RN, SNM    The encounter was performed by me and scribed by the SNM. The scribed note accurately reflects my personal services and decisions made by me.     SHAJI Guerra CNM

## 2020-09-16 NOTE — PLAN OF CARE
VSS. TAPS block managing pain, tylenol and toradol given for adjuvant pain management. Up to bathroom voided x2 after moody catheter removal. Visited  in NICU. Pumping.

## 2020-09-16 NOTE — PROGRESS NOTES
Patient arrived to [facility] unit via zoom cart at 0240,with belongings, accompanied by spouse/ significant other, with infant in NICU. Received report from Thania and checked bands. Unit and room orientation completd. Call light given and within arms reach; no concerns present at this time. Continue with plan of care.

## 2020-09-16 NOTE — CONSULTS
"[facility]DR[facility]  MATERNAL CHILD HEALTH   INITIAL NICU PSYCHOSOCIAL ASSESSMENT     DATA:     Presenting Information: Mom is a  who delivered an infant male \"Shmuel Greene\" on 9/15/20 at 39w4d gestation. Baby was admitted to the NICU for evaluation and treatment of respiratory failure and concern for sepsis.  SW was consulted to meet with this family per NICU admission of infant and for psychosocial assessment due to mother's history of chemical dependency.     Living Situation: Parents are Felicitee \"Mare\" and Shmuel who are not , but live together with Williams's mother in [place], [place].     Social Support: Per Mare, her main support person is Shmuel. They have been together for approximately 9 months. Mare and Shmuel identify social support from both of their mothers. Mare's mother lives an hour away. Mare also has two brothers (ages 19 and 23).     Education/Employment: Mare is not currently working. Shmuel works full-time doing concrete work and he also does snow plowing in the winter months. He is taking two weeks off of work. Mare and Shmuel are in the process of working out if Mare will work or not. They would need to place Shmuel Call in  which Mare says they aren't sure makes fi nancial sense. Mare has had jobs in the past as a  which she stated she enjoyed and would go back to.     Insurance: Mare is covered under [facility] and is going to add Shmuel Call to the plan.     Pediatrician: Mare is the in process of locating a pediatrician near home through a [facility].     Source of Financial Support: Shmuel works full-time     Mental Health History: Mare endorsed history with depression. She stated that she attempted suicide a number of years ago (per chart review, attempts were in 2015 and 2018) but has not had thoughts of self-harm or suicidal ideation sense. Mare stated that when she is depressed, she tends to stay home, in bed, and feels withdrawn. She is " "not currently on medication, but has a prescription at the pharmacy that she is going to  upon discharge. Per Mare, she has a psychiatrist that follows with her (Dr. Hudson through [facility]). Mare endorsed her current mood as \"good\" and stated that she is very informed about postpartum mood disorders, as her and Shmuel have been reading about them.  Mare stated that she has additional coping mechanisms which are crafting and being creative.     Chemical Health History: Mare informed that she completed inpatient treatment in June 2020. She was inpatient at [facility] for 28 days (starting March 2020) and afterward was inpatient at [facility]. She chose not to continue with outpatient treatment. She stated that she feels really good about being sober for 6 months and feels stable and strong in her journey.     Per chart review, Mare accidentally overdosed on heroin twice in 2019, and her last use was in March 2020 prior to starting treatment. Also per chart review, Mare's last reported use of THC was 2/10/20 which she tested positive for on 3/10/20.     Mare stated that in the past, she would use when she felt depressed and would \"run away\" and use. She stated that after going through treatment, she would be more open to herself and the people in her life if she felt symptoms arising. Mare stated she would feel comfortable and confident to reach out for help so that she can continue to be sober. She feels even more motivated now upon the birth of her son who relies on her.     INTERVENTION:       Chart review    Collaboration with team: charge RN, bedside RN    Conducted Psychosocial Assessment    Introduction to Maternal Child Health SW role and scope of practice    Orientation to the NICU (parking, lodging, meals, visitation)    Validated emotions and provided supportive listening    Provided resources and referrals    Pregnancy and Postpartum Support [place] brochure and resources shared with " couple    Provided psychoeducation on  mood disorders.    Provided SW contact info    ASSESSMENT:     Coping: Mare and Shmuel are coping well with Shmuel bowie's admission to the NICU. Mare stated that he is doing really well today and is working on feeding. She stated that she anticipates discharging on Friday and it might be possible for Shmuel Bowie to discharge then as well. Mare and Shmuel stated that their mood is good, but that they are tired due to the past 24 hour's events.     Assessment of parental risk for PMAD: Higher than average risk, considering mother's past experience with mental health and chemical dependency.     Risk Factors: history of chemical dependency. Currently sober 6 months post- inpatient treatment. Mare stated that she is nervous to experience post-partum depression but feels equipped and knows the signs/symptoms, and has her prescription which she will be filling. She is also followed by outpatient psychiatry.     Resiliency Factors & Strengths: Mare is motivated to continue on her path of sobriety. She has strong social support via Shmuel and their parents.     PLAN:     SW will continue to follow throughout pt's Maternal-Child Health Journey as needs arise. SW will continue to collaborate with the multidisciplinary team.    ABELARDO Richards [facility]  - Maternal Child Health   Phone: [phone]

## 2020-09-16 NOTE — PLAN OF CARE
Data: Sophia Stiles transferred to [place] [address] via wheelchair at 0240 after visit to NICU. Baby in NICU  Action: Receiving unit notified of transfer: Yes. Patient and family notified of room change. Report given to MARCELLE Mueller at 0200. Belongings sent to receiving unit. Accompanied by Registered Nurse. Oriented patient to surroundings. Call light within reach.   Response: Patient tolerated transfer and is stable.

## 2020-09-16 NOTE — PLAN OF CARE
VSS. Afebrile - antibiotics given as ordered. Fundus firm, midline at U/U. Lochia rubra and scant. Singh draining adequate amounts of clear yellow urine - removed at 0700. Tolerating regular diet. Ambulated to bathroom with standby assist and tolerated. Incision covered with dressing and CDI. Reports sore, stingy pain on abdomen and around incision site - toradol, tylenol and ice pack used for pain. Pt does not want oxycodone due to risk of relapse. Itching reported - benadryl with results of intervention pending. Pumped and hand expressed x1 and got 12 mL of colostrum. Speaks positively about infant. Continue current plan of care.

## 2020-09-16 NOTE — PROVIDER NOTIFICATION
09/15/20 2037   Provider Notification   Provider Name/Title ALLISON Delgadillo CNM   Method of Notification At Bedside       ALLISON Delgadillo CNM at bedside. Pt getting more uncomfortable, tearful, and unable to sit still d/t pain. Anesthesia at bedside to re-bolus. SVE 5-6  And per CNM cervix edematous. BP elevated to 150/90s, CNM to order Pre-E labs, pt denies symptoms, elevated BP likely d/t pain. RN continue to monitor. Anesthesia to re-evaluate pt after bolus

## 2020-09-16 NOTE — PROGRESS NOTES
"Labor progress note    S:  Patient more uncomfortable, sobbing in pain.  Epidural re-bolus x3 without success.  Patient consents to cervical exam a this time.      O:  Blood pressure 123/79, pulse 72, temperature 98.4  F (36.9  C), temperature source Oral, resp. rate 16, height 1.575 m (5' 2\"), weight 75.8 kg (167 lb), last menstrual period 12/13/2019, SpO2 97 %, not currently breastfeeding.  Blood pressure noted to have increased 150's over 90.    General appearance: uncomfortable with contractions.  Contractions: difficult to trace due to patient writhing in pain strong.  FHT: Baseline recent change to 170 with moderate variability. Accelerations are not present. No decelerations present.  ROM: clear fluid.   Pelvic exam: 5-6 very edematous all around entire cervix.  Significant amount of caput noted, -1 station with caput to 0 station.   Pitocin- none,  Antibiotics- PCN      A:  IUP @ 39w4d active labor   Elevated blood pressure  Fetal Heart rate tracing Category two  GBS- positive  Patient Active Problem List   Diagnosis     Polysubstance (excluding opioids) dependence (H)     Maternal varicella, non-immune     Nonimmune to hepatitis B virus     Vitamin D deficiency     Supervision of high risk pregnancy, antepartum     Heroin abuse affecting pregnancy in second trimester (H)     Chemical dependency (H)     Accidental overdose of heroin (H)     Personal history of self-harm     Cannabis dependence (H)     Moderate episode of recurrent major depressive disorder (H)     Smoker     Positive GBS test     Encounter for triage in pregnant patient     Labor and delivery, indication for care         P:  Anesthesia team in room to discuss pain relief, plan to replace epidural at this time   Labs ordered due to elevated blood pressure, but suspect elevated blood pressure due to pain, will send urine from catheterized specimen.    Consider IUPC when patient comfortable.  SHAJI Fernando VERONICA    Addendum " 2144  Patient now comfortable with epidural.  Consents to SVE and IUPC placement.  SVE 5-6/80% significant caput, but cervix less edematous.  IUPC placed with ease.    FHT's continue to be tachycardic with baseline 180 minimal variability, occasional accels.  IV fluid bolus given.  Maternal temp now 100.1.    Dr. Trammell notified of patient status.   SHAJI Fernando CNM

## 2020-09-16 NOTE — PROVIDER NOTIFICATION
09/16/20 0923   Provider Notification   Provider Name/Title Dr. Henriquez   Method of Notification Electronic Page   Request Evaluate-Remote   Notification Reason Medication Request     F.B. c/s POD#1, uncomfortable due to itching, benadryl did not help. Thanks, Madison [phone]     Page returned, Dr. Henriquez states to give zofran for itching.

## 2020-09-16 NOTE — PROVIDER NOTIFICATION
09/16/20 0828   Provider Notification   Provider Name/Title Dr. Thomas   Method of Notification Electronic Page   Request Evaluate-Remote   Notification Reason Medication Request   F.B. POD#1 c/s, itching not relieved with Benadryl. Sugar?  Thanks, Madison [phone]

## 2020-09-17 PROCEDURE — 25000128 H RX IP 250 OP 636: Performed by: STUDENT IN AN ORGANIZED HEALTH CARE EDUCATION/TRAINING PROGRAM

## 2020-09-17 PROCEDURE — 25000132 ZZH RX MED GY IP 250 OP 250 PS 637: Performed by: STUDENT IN AN ORGANIZED HEALTH CARE EDUCATION/TRAINING PROGRAM

## 2020-09-17 PROCEDURE — 12000001 ZZH R&B MED SURG/OB UMMC

## 2020-09-17 PROCEDURE — 25000132 ZZH RX MED GY IP 250 OP 250 PS 637: Performed by: OBSTETRICS & GYNECOLOGY

## 2020-09-17 PROCEDURE — 25800030 ZZH RX IP 258 OP 636: Performed by: STUDENT IN AN ORGANIZED HEALTH CARE EDUCATION/TRAINING PROGRAM

## 2020-09-17 RX ORDER — AMOXICILLIN 250 MG
1 CAPSULE ORAL DAILY
Qty: 100 TABLET | Refills: 0 | Status: SHIPPED | OUTPATIENT
Start: 2020-09-17 | End: 2020-09-17

## 2020-09-17 RX ORDER — AMOXICILLIN 250 MG
1 CAPSULE ORAL DAILY
Qty: 100 TABLET | Refills: 0 | Status: SHIPPED | OUTPATIENT
Start: 2020-09-17 | End: 2020-11-05

## 2020-09-17 RX ORDER — LIDOCAINE 4 G/G
1 PATCH TOPICAL
Status: DISCONTINUED | OUTPATIENT
Start: 2020-09-17 | End: 2020-09-18 | Stop reason: HOSPADM

## 2020-09-17 RX ORDER — IBUPROFEN 600 MG/1
600 TABLET, FILM COATED ORAL EVERY 6 HOURS PRN
Qty: 60 TABLET | Refills: 0 | Status: SHIPPED | OUTPATIENT
Start: 2020-09-17 | End: 2020-11-05

## 2020-09-17 RX ORDER — ACETAMINOPHEN 325 MG/1
650 TABLET ORAL EVERY 6 HOURS PRN
Qty: 100 TABLET | Refills: 0 | Status: SHIPPED | OUTPATIENT
Start: 2020-09-17 | End: 2020-11-05

## 2020-09-17 RX ADMIN — ACETAMINOPHEN 975 MG: 325 TABLET, FILM COATED ORAL at 01:51

## 2020-09-17 RX ADMIN — ACETAMINOPHEN 975 MG: 325 TABLET, FILM COATED ORAL at 22:11

## 2020-09-17 RX ADMIN — DOCUSATE SODIUM 50 MG AND SENNOSIDES 8.6 MG 1 TABLET: 8.6; 5 TABLET, FILM COATED ORAL at 19:54

## 2020-09-17 RX ADMIN — GENTAMICIN SULFATE 90 MG: 40 INJECTION, SOLUTION INTRAMUSCULAR; INTRAVENOUS at 00:32

## 2020-09-17 RX ADMIN — DOCUSATE SODIUM 50 MG AND SENNOSIDES 8.6 MG 1 TABLET: 8.6; 5 TABLET, FILM COATED ORAL at 09:26

## 2020-09-17 RX ADMIN — IBUPROFEN 800 MG: 800 TABLET ORAL at 01:51

## 2020-09-17 RX ADMIN — IBUPROFEN 800 MG: 800 TABLET ORAL at 22:11

## 2020-09-17 RX ADMIN — LIDOCAINE 1 PATCH: 560 PATCH PERCUTANEOUS; TOPICAL; TRANSDERMAL at 13:17

## 2020-09-17 RX ADMIN — AMPICILLIN 2 G: 2 INJECTION, POWDER, FOR SOLUTION INTRAVENOUS at 00:19

## 2020-09-17 RX ADMIN — SIMETHICONE 80 MG: 80 TABLET, CHEWABLE ORAL at 15:11

## 2020-09-17 RX ADMIN — ACETAMINOPHEN 975 MG: 325 TABLET, FILM COATED ORAL at 15:11

## 2020-09-17 RX ADMIN — IBUPROFEN 800 MG: 800 TABLET ORAL at 15:11

## 2020-09-17 RX ADMIN — IBUPROFEN 800 MG: 800 TABLET ORAL at 09:26

## 2020-09-17 RX ADMIN — ACETAMINOPHEN 975 MG: 325 TABLET, FILM COATED ORAL at 09:26

## 2020-09-17 RX ADMIN — SIMETHICONE 80 MG: 80 TABLET, CHEWABLE ORAL at 11:22

## 2020-09-17 NOTE — PLAN OF CARE
Data: Vital signs within normal limits. Postpartum checks within normal limits - see flow record. Patient eating and drinking normally. Patient able to empty bladder independently and is up ambulating. No apparent signs of infection. Incision healing well. Patient performing self cares and is able to care for infant.  Action: Patient medicated during the shift for pain. See MAR. Patient reassessed within 1 hour after each medication and pain was improved - patient stated she was comfortable. Patient education done about pain control, pumping, incision care. See flow record.  Response: Positive attachment behaviors observed with infant. Support persons are present.   Plan: Anticipate discharge on POD # 2-3.

## 2020-09-17 NOTE — PROVIDER NOTIFICATION
09/17/20 1153   Provider Notification   Provider Name/Title Dr Marmolejo   Method of Notification Electronic Page   Request Evaluate in Person   Notification Reason Status Update   Dried drainage in the incision line, can't tell if edges are approximated. Area of edema below incision. Could you please examine when you have time?

## 2020-09-17 NOTE — PROGRESS NOTES
Post Partum Progress Note    Subjective:  Patient is doing well, pain well controlled. Tolerating PO, lochia within normal limits, voiding spontaneously, ambulating without issues, has passed flatus. Denies any fever, chills, SOB, chest pain, N/V, headache, dizziness. Doing well with breast pumping.     Objective:  Patient Vitals for the past 24 hrs:   BP Temp Temp src Pulse Resp SpO2   20 0000 92/49 97.7  F (36.5  C) Oral 74 14 100 %   20 2145 99/56 98  F (36.7  C) Oral 97 16 100 %   20 1700 92/72 97.7  F (36.5  C) Oral 70 16 100 %   20 1355 99/52 97.7  F (36.5  C) Oral -- -- 100 %   20 1120 97/53 97.9  F (36.6  C) Oral 79 16 100 %   20 0800 99/58 98  F (36.7  C) Oral 67 -- 99 %       General: NAD, resting comfortably  Resp: Normal rate and effort on room air  Cv: well perfused  Abd:  Soft, nontender, mild distension, tympanic with percussion, fundus firm below the umbilicus  Incision: clean/dry/intact  Ext:  No edema in bilateral LE      Assessment and Plan:  20 year old old  POD#2 s/p PLTCS for cat II, triple I. Operation was complicated by small serosal thermal injury to descending bowel (non-translumenal) that did not require repair. Doing well, starting to meet post-op goals. Vital signs wnl though she did have mild range BP <4 hours apart. Not meeting criteria for pregnancy associated HTN. Will continue monitoring.     # Postpartum Care  - Pain: Tylenol, ibuprofen, oxycodone PRN.  - Heme: Hgb 12.0 > QBL 1010, s/p methergine>10.5  - GI: Regular diet. Bowel regimen. Antiemetics PRN. Tolerating PO  - : voiding spontaneously  - Rh positive, Rubella immune, varicella non-immune (vaccine ordered); hep B non-immune (s/p 2/3; next dose 2021)  - breast pumping; baby in NICU  - nexplanon for birth control    # triple I  - Tmax/last 103.2 (27)  - WBC 10.5>17.4> 15.4  - continue amp/gent/clinda for 24hr  - vital signs wnl    # PSA (heroin, tobacco, THC, alcohol)  #  Anxiety/depression  - 9/15 UDS neg  - s/p SW consult    Anticipate discharge to home POD#3   Will monitor for any signs of peritonitis from minimal bowel injury incurred during .     Rubio Espana MD  OB/GYN PGY-2  20 7:57 AM    I have seen and examined the patient without the resident. I have reviewed, edited, and agree with the note.   My findings are: My findings are: Appears well.   Abdomen: soft, NT, ND.   Incision CDI   LE without significant edema or erythema bilaterally  Hemoglobin   Date Value Ref Range Status   2020 10.5 (L) 11.7 - 15.7 g/dL Final   09/15/2020 12.0 11.7 - 15.7 g/dL Final     Madison Bullard MD

## 2020-09-17 NOTE — PLAN OF CARE
VSS and postpartum assessment WDL. She is up ad mars, voiding, pain managed with ibuprofen, tylenol and lidocaine patch. Incision appears to be healing well with no s/s infection. Uterus firm and midline. Scant lochia rubra. No breast or nipple pain; pumping independently. No BM and passing flatus. Support person, ART Kramer, present at bedside; patient goes to visit baby often in NICU. Education provided on plan of care and self-care. Baby may be returning to unit this evening per parents. Continue with plan of care.

## 2020-09-17 NOTE — PROVIDER NOTIFICATION
09/17/20 0947   Provider Notification   Provider Name/Title Dr Bullard   Method of Notification Electronic Page   Request Evaluate-Remote   Notification Reason Medication Request   would [id number] be able to get a lidocaine patch for incisional pain?

## 2020-09-17 NOTE — PLAN OF CARE
VSS and postpartum assessment WDL. She is up ad mars, voiding, pain managed with ibuprofen and tylenol. Incision is covered and unable to assess. Uterus firm and midline. Scant lochia rubra. No breast or nipple pain; pumping independently. passing flatus. Support person present at bedside. Education provided on plan of care and self-care. Continue with plan of care.

## 2020-09-17 NOTE — PLAN OF CARE
Patient is stable and voiding spontaneously. Pain is comfortable with motrin and tylenol. She is happy as baby is already up here. Assisted with breastfeeding and encouraged to call for breastfeeding help.Will continue with plan of care.

## 2020-09-17 NOTE — PROGRESS NOTES
Post Partum Progress Note    Subjective:  Patient is doing well, pain well controlled. Tolerating PO, lochia within normal limits, voiding spontaneously, ambulating without issues, has passed flatus and had a bowel movement. Denies any fever, chills, SOB, chest pain, N/V, headache, dizziness. Doing well with breast pumping.     Objective:  Patient Vitals for the past 24 hrs:   BP Temp Temp src Pulse Resp   20 0010 115/64 98.1  F (36.7  C) Oral 87 16   20 1510 108/60 98.5  F (36.9  C) Oral 73 16   20 0930 106/72 98  F (36.7  C) Oral 81 --       General: NAD, resting comfortably  Resp: Normal rate and effort on room air  Cv: well perfused  Abd:  Soft, nontender, mild distension, tympanic with percussion, fundus firm below the umbilicus  Incision: clean/dry/intact  Ext:  No edema in bilateral LE      Assessment and Plan:  20 year old old  POD#3 s/p PLTCS for cat II, triple I. Operation was complicated by small serosal thermal injury to descending bowel (non-translumenal) that did not require repair. Doing well, meeting goals for discharge. Vital signs wnl though she did have mild range BP <4 hours apart. Not meeting criteria for pregnancy associated HTN. Do not suspect complications from bowel injury.    # Postpartum Care  - Pain: Tylenol, ibuprofen, oxycodone PRN.  - Heme: Hgb 12.0 > QBL 1010, s/p methergine>10.5  - GI: Regular diet. Bowel regimen. Antiemetics PRN. Tolerating PO  - : voiding spontaneously  - Rh positive, Rubella immune, varicella non-immune (vaccine ordered); hep B non-immune (s/p /3; next dose 2021)  - breast pumping; baby in NICU  - nexplanon for birth control at 6 weeks    # triple I  - Tmax/last 103.2 ( 0027)  - WBC 10.5>17.4> 15.4  - continue amp/gent/clinda for 24hr  - vital signs wnl    # PSA (heroin, tobacco, THC, alcohol)  # Anxiety/depression  - 9/15 UDS neg  - s/p SW consult    Anticipate discharge to home POD#3     Rubio Jovi, MD  OB/GYN  PGY-2  09/18/20 6:42 AM    Patient doing well. Leukocytosis resolved. Tolerating diet, pain well controlled. Ready to discharge home.   Asymptomatic from acute blood loss.   Agree with note by Dr. Espana.   I discussed warning signs regarding postpartum depression, preeclampsia, bleeding and infection. All questions were answered.   D\C home     Catherine Robles MD 9/23/2020 1:47 PM

## 2020-09-18 VITALS
OXYGEN SATURATION: 100 % | HEIGHT: 62 IN | BODY MASS INDEX: 30.73 KG/M2 | SYSTOLIC BLOOD PRESSURE: 107 MMHG | DIASTOLIC BLOOD PRESSURE: 69 MMHG | RESPIRATION RATE: 18 BRPM | HEART RATE: 74 BPM | WEIGHT: 167 LBS | TEMPERATURE: 97.7 F

## 2020-09-18 LAB — WBC # BLD AUTO: 10.6 10E9/L (ref 4–11)

## 2020-09-18 PROCEDURE — 25000132 ZZH RX MED GY IP 250 OP 250 PS 637: Performed by: STUDENT IN AN ORGANIZED HEALTH CARE EDUCATION/TRAINING PROGRAM

## 2020-09-18 PROCEDURE — 25000132 ZZH RX MED GY IP 250 OP 250 PS 637: Performed by: OBSTETRICS & GYNECOLOGY

## 2020-09-18 PROCEDURE — 90716 VAR VACCINE LIVE SUBQ: CPT | Performed by: STUDENT IN AN ORGANIZED HEALTH CARE EDUCATION/TRAINING PROGRAM

## 2020-09-18 PROCEDURE — 25000581 ZZH RX MED A9270 GY (STAT IND- M) 250: Performed by: STUDENT IN AN ORGANIZED HEALTH CARE EDUCATION/TRAINING PROGRAM

## 2020-09-18 PROCEDURE — 85048 AUTOMATED LEUKOCYTE COUNT: CPT | Performed by: OBSTETRICS & GYNECOLOGY

## 2020-09-18 PROCEDURE — 36415 COLL VENOUS BLD VENIPUNCTURE: CPT | Performed by: OBSTETRICS & GYNECOLOGY

## 2020-09-18 RX ADMIN — IBUPROFEN 800 MG: 800 TABLET ORAL at 04:34

## 2020-09-18 RX ADMIN — IBUPROFEN 800 MG: 800 TABLET ORAL at 10:20

## 2020-09-18 RX ADMIN — DOCUSATE SODIUM 50 MG AND SENNOSIDES 8.6 MG 1 TABLET: 8.6; 5 TABLET, FILM COATED ORAL at 09:58

## 2020-09-18 RX ADMIN — VARICELLA VIRUS VACCINE LIVE 0.5 ML: 1350 INJECTION, POWDER, LYOPHILIZED, FOR SUSPENSION SUBCUTANEOUS at 09:59

## 2020-09-18 RX ADMIN — ACETAMINOPHEN 975 MG: 325 TABLET, FILM COATED ORAL at 10:20

## 2020-09-18 RX ADMIN — ACETAMINOPHEN 975 MG: 325 TABLET, FILM COATED ORAL at 04:34

## 2020-09-18 RX ADMIN — LIDOCAINE 1 PATCH: 560 PATCH PERCUTANEOUS; TOPICAL; TRANSDERMAL at 13:16

## 2020-09-18 RX ADMIN — SIMETHICONE 80 MG: 80 TABLET, CHEWABLE ORAL at 00:18

## 2020-09-18 NOTE — PLAN OF CARE
Data: Vital signs within normal limits; patient remained afebrile. Postpartum checks within normal limits - fundus firm at U/1. Patient eating and drinking normally. Patient able to empty bladder independently and is up ambulating. No apparent signs of infection. Incision healing well. Patient performing self cares and is able to care for infant.  Action: Patient medicated during the shift for pain. See MAR. Patient stated she was comfortable.   Response: Positive attachment behaviors observed with infant. Support person, significant other, present.   Plan: Continue with plan of care.

## 2020-09-18 NOTE — CONSULTS
FABIANA acknowledges this consult. Please SW from 9/16/20 which includes full psychosocial assessment. FABIANA spoke with Mare during visit on 9/16/20 about mental health, postpartum depression, and coping. Mare has the mental health resources she needs, as well as prescription for medication she will be picking up upon discharge. She follows with an outpatient psychiatrist as well. No FABIANA concerns at this time.     ABELARDO Richards [facility]  - Maternal Child Health   Phone: [phone]

## 2020-09-18 NOTE — LACTATION NOTE
This note was copied from a baby's chart.  Brief visit with family as they were discharging. RN shares mom is doing well with feedings, milk is in today and a little engorged getting more out with pumping. Mom shares feedings are going well, denies pain with latch but some discomfort with dguan breasts today. Discussed tips for preventing engorgement and resources for after discharge. Encouraged follow up with Timi CARSON's within a week of discharge and orient to [email or web address] and other phone, online support options. Reviewed breastfeeding log and how to tell if getting enough, when and who to call if not meeting goals or concerns whether getting enough. Parents express understanding, offered support and encouragement and answered questions. Did not get to do exam on mom or baby, already in car seat and transport arrived during visit, waited a few minutes while we finished discussion.

## 2020-09-18 NOTE — PLAN OF CARE
VSS and postpartum assessments WNL. Incision approximated with steri-strips; no drainage noted. Pain adequately managed with tylenol and ibuprofen. Breastfeeding independently with good latch. Bonding well with  and responsive to cues. Expresses readiness for transition to home. Follow-up instructions reviewed, discharge education completed and questions answered, patient verbalizes understanding. Home medications given and instructions reviewed. Discharged to home with baby in car seat.

## 2020-09-18 NOTE — PROVIDER NOTIFICATION
09/18/20 1029   Provider Notification   Provider Name/Title Dr. Henriquez   Method of Notification Electronic Page   Request Evaluate-Remote   Notification Reason Other   FYI Leander score was 10. Question 10 was 0.

## 2020-10-09 LAB — COPATH REPORT: NORMAL

## 2020-10-15 ENCOUNTER — TELEPHONE (OUTPATIENT)
Dept: OBGYN | Facility: CLINIC | Age: 20
End: 2020-10-15

## 2020-10-15 NOTE — TELEPHONE ENCOUNTER
Call back to patient to discuss symptoms.     Pt states she is experiencing mild cramping and has passed a small amount of dark red/brown discharge with small clots from her vagina. Denies incision site concerns. Denies fever, denies foul odor.     Nurse advised sounds like normal post partum waxing and waning of lochia. Informed old blood is brown in coloration and nothing to cause concern. With absence of other concerning symptoms or signs of infection, advised to continue to monitor and call if any changes. Advised to present to ED if develops heavy bleeding, fever, or severe cramping pain.    Pt expressed understanding and agrees with plan, has no further questions at this time.

## 2020-10-15 NOTE — TELEPHONE ENCOUNTER
----- Message from Chioma Kumar RN sent at 10/15/2020  1:11 PM CDT -----  Regarding: infection?  Patient calling, she is 4 weeks post c/s.  She is wondering if she has an infection.  [phone].

## 2020-10-17 ENCOUNTER — TELEPHONE (OUTPATIENT)
Dept: OBGYN | Facility: CLINIC | Age: 20
End: 2020-10-17

## 2020-10-17 NOTE — TELEPHONE ENCOUNTER
Pt paged CNM on-call to discuss postpartum bleeding. Pt states her bleeding stopped a few days ago.  She had intercourse at midnight last night and went to sleep without a pad in place. At about 0600 she woke with bright red blood on her sheets. Since then she has changed her pad 2 times with moderate saturation, bright red blood without clots and cramping and no odor noted. She believes that the bleeding is decreasing now.    Advised to continue monitoring  Bleeding and/or signs of infection to come to the  ER to be assessed if bleeding does not resolve or becomes heavier, 1 or more pads per hour.   Encouraged rest and increased fluids. Abstain from intercourse over next 2 wks until pp exam in clinic.   Patient has appointment scheduled for 11/9/2020. Sent [place] message after phone conversation confirming appointment.    ISophy SNM am serving as a scribe; to document services personally performed by  Nakita Walters CNM based on data collection and the provider's statements to me.     MANOLO Mccabe    I agree with the PFSH and ROS as completed by the student, except for changes made by me. The remainder of the encounter was performed by me and scribed by the student. The scribed note accurately reflects my personal services and decisions made by me.  Radha Walters, KRISTAL, PAULA, APRN    Radha Walters CNM

## 2020-11-05 ENCOUNTER — OFFICE VISIT (OUTPATIENT)
Dept: OBGYN | Facility: CLINIC | Age: 20
End: 2020-11-05
Attending: ADVANCED PRACTICE MIDWIFE
Payer: COMMERCIAL

## 2020-11-05 VITALS
WEIGHT: 133 LBS | DIASTOLIC BLOOD PRESSURE: 70 MMHG | SYSTOLIC BLOOD PRESSURE: 115 MMHG | HEIGHT: 62 IN | HEART RATE: 67 BPM | BODY MASS INDEX: 24.48 KG/M2

## 2020-11-05 DIAGNOSIS — F11.11 HX OF OPIOID ABUSE (H): ICD-10-CM

## 2020-11-05 DIAGNOSIS — Z30.017 NEXPLANON INSERTION: ICD-10-CM

## 2020-11-05 DIAGNOSIS — Z91.89 AT RISK FOR POSTPARTUM DEPRESSION: ICD-10-CM

## 2020-11-05 DIAGNOSIS — Z23 NEED FOR PROPHYLACTIC VACCINATION AND INOCULATION AGAINST INFLUENZA: ICD-10-CM

## 2020-11-05 DIAGNOSIS — Z98.891 S/P CESAREAN SECTION: ICD-10-CM

## 2020-11-05 DIAGNOSIS — Z86.59 HISTORY OF DEPRESSION: ICD-10-CM

## 2020-11-05 PROBLEM — B95.1 POSITIVE GBS TEST: Status: RESOLVED | Noted: 2020-09-06 | Resolved: 2020-11-05

## 2020-11-05 PROBLEM — Z36.89 ENCOUNTER FOR TRIAGE IN PREGNANT PATIENT: Status: RESOLVED | Noted: 2020-09-14 | Resolved: 2020-11-05

## 2020-11-05 PROCEDURE — 11981 INSERTION DRUG DLVR IMPLANT: CPT | Performed by: ADVANCED PRACTICE MIDWIFE

## 2020-11-05 PROCEDURE — 90686 IIV4 VACC NO PRSV 0.5 ML IM: CPT

## 2020-11-05 PROCEDURE — 99024 POSTOP FOLLOW-UP VISIT: CPT | Performed by: ADVANCED PRACTICE MIDWIFE

## 2020-11-05 PROCEDURE — G0008 ADMIN INFLUENZA VIRUS VAC: HCPCS

## 2020-11-05 PROCEDURE — G0463 HOSPITAL OUTPT CLINIC VISIT: HCPCS | Mod: 25

## 2020-11-05 PROCEDURE — 250N000011 HC RX IP 250 OP 636: Performed by: ADVANCED PRACTICE MIDWIFE

## 2020-11-05 PROCEDURE — 250N000011 HC RX IP 250 OP 636

## 2020-11-05 PROCEDURE — G0145 SCR C/V CYTO,THINLAYER,RESCR: HCPCS | Performed by: ADVANCED PRACTICE MIDWIFE

## 2020-11-05 RX ADMIN — ETONOGESTREL 68 MG: 68 IMPLANT SUBCUTANEOUS at 13:15

## 2020-11-05 ASSESSMENT — ANXIETY QUESTIONNAIRES
3. WORRYING TOO MUCH ABOUT DIFFERENT THINGS: SEVERAL DAYS
7. FEELING AFRAID AS IF SOMETHING AWFUL MIGHT HAPPEN: SEVERAL DAYS
1. FEELING NERVOUS, ANXIOUS, OR ON EDGE: NOT AT ALL
5. BEING SO RESTLESS THAT IT IS HARD TO SIT STILL: NOT AT ALL
6. BECOMING EASILY ANNOYED OR IRRITABLE: NEARLY EVERY DAY
GAD7 TOTAL SCORE: 6
2. NOT BEING ABLE TO STOP OR CONTROL WORRYING: SEVERAL DAYS

## 2020-11-05 ASSESSMENT — PAIN SCALES - GENERAL: PAINLEVEL: NO PAIN (0)

## 2020-11-05 ASSESSMENT — PATIENT HEALTH QUESTIONNAIRE - PHQ9
5. POOR APPETITE OR OVEREATING: NOT AT ALL
SUM OF ALL RESPONSES TO PHQ QUESTIONS 1-9: 9

## 2020-11-05 ASSESSMENT — MIFFLIN-ST. JEOR: SCORE: 1321.66

## 2020-11-05 NOTE — LETTER
2020       RE: Sophia Stiles  [address] [place] [address]     Dear Colleague,    Thank you for referring your patient, Sophia Stiles, to the [facility] [place] at [facility]. Please see a copy of my visit note below.    Nursing Notes:   Shelia Soto LPN  2020 11:36 AM  Signed  SUBJECTIVE:   Sophia Stiles is here for her 6-week postpartum checkup.     PHQ-9 score:   Hx of Abuse:  No    Delivery Date: 9-15-[birth year].    Delivering provider:  Marilin Trammell MD.    Type of delivery:  .     Delivery complications: fetal intoerlance  And infection for mom  Infant gender:  Vince Mi, weight 8 pounds 11 oz.  Feeding Method:   and Bottlefed.  Complications reported with feeding:  none, infant thriving .    Bleeding:  None.  Duration:  5 weeks.  Menses resumed:  Yes 20  Bowel/Urinary problems:  Yes     Contraception Planned:  Nexplanon  She  has had intercourse since delivery and experienced  trace discomfort.  .      Shelia Soto LPN  2020  1:16 PM  Signed  Clinic Administered Medication Documentation      Intrauterine/Implant Insertion Documentation    Device was placed by provider (please see MAR for given by information). Please see MAR and medication order for additional information.     Type: Nexplanon  Remove/Replace by: Jennifer Edward    Expiration Date:  11-         ================================================================  -Fee is here in good spirits. Her son is doing well and growing.  He's sleeping well. She is understanding about her c/section and just happy he's here safely.  -Has had sex since delivery - no pain.  Is aware possibilty of early pregnancy even with negative UPT today, Is agreeable to Nexplanon and follow up UPT. Has had Nexplanon in the past.   -No constipation or diarrhea,  -No stress or urge incontinence.  -Bleeding completely stopped.  Has her first period that  "started on .   -Patient reports she quit breastfeeding 3 days ago.  Has noted increased irritability, anger, and frustration.  Denies thoughts of harming self or others.  -Declines depression medication, reports drug relapse after starting depression medication in the past.  Open to therapy referral.  Open to using midwifery team to help access cares prn.  - Due for her first pap today, is agreeable.      ROS: 10 point ROS neg other than the symptoms noted above in the HPI.     EXAM:  /70   Pulse 67   Ht 1.575 m (5' 2.01\")   Wt 60.3 kg (133 lb)   LMP 2019   BMI 24.32 kg/m      General: healthy, alert and no distress  Psych: NEGATIVE. Has history of depression, opioid use disorder.  Coping overall.   Last PHQ-9 score on record= 9  Breasts:  Lactating, Nipples intact with no lesions, Non-tender and No S/S of yeast or mastitis  Abdomen: Benign, Soft, flat, non-tender, No masses, organomegaly and Diastasis less than 1-2 FB  Incision:  well healed   Vulva:  Normal genitalia and Bartholin's, Urethra, [place]'s normal  Vagina:  + blood in vault c/w menses.  No lesions or discharge.   Cervix:  no lesions and pink, moist, closed, without lesion or CMT.    Uterus:  fully involuted and non-tender    Adnexa:  Within normal limits and No masses, nodularity, tenderness  Recto-vaginal:   anus normal    Procedure note:    Patient presents for placement of Nexplanon for contraception.  She has had been counseled on side effects, risks, benefits and alternatives.  Patient desires to proceed.  Reports she has had unprotected intercourse since delivery, no unprotected intercourse in the last week.      Verification of Procedure:  Just before the procedure begans through verbal and active participation of team members, I verified:     Initials   Patient Name SB/KDC   Patient  SB/KDC   Procedure to be performed SB/KD     Consent:  Risks, benefits of treatment, and alternative options for contraception were " discussed.  Patient's questions were elicited and answered.  Written consent was obtained and scanned into medical record.     Procedure: Nexplanon Placement into L Arm  Indication:  Desired contraception    Pt. in supine position with L arm bent at 90 degrees.  A time out was performed.   Position for insertion identified on L upper arm 8cm from medial epicondyl. Marks at insertion site and 4cm beyond placed.  Area prepped with Betadine which was allowed to dry. 2cc of 1% lidocaine injected just under the skin along the planned insertion tunnel. Nexplanon LOT # [id number], Exp date 2025  placed subdermally under sterile conditions without difficulty. Reji in place and confirmed via palpation by provider and patient.  Pressure held, sterile bandage placed.  Small amount of bleeding noted at insertion site and slight bruising noted along track of Nexplanon.   L upper arm wrapped in pressure dressing.  Patient tolerated procedure well.   Complications:  none  EBL:  < 1 mL    Written and verbal instructions provided to patient.          ASSESSMENT:   Encounter Diagnoses   Name Primary?     Routine postpartum follow-up Yes     Nexplanon insertion      Need for prophylactic vaccination and inoculation against influenza      S/P  section      Hx of opioid abuse (H)      History of depression      At risk for postpartum depression       Normal postpartum exam after c/s for abnormal FHT  Pregnancy was complicated by:  Anxiety, depression, substance abuse.      PLAN:  Orders Placed This Encounter   Procedures     Insert Nexplanon [85927]     Obtaining, preparing and conveyance of cervical or vaginal smear to laboratory.     FLU VAC PRESRV FREE QUAD SPLIT VIR 3+YRS IM     Pap imaged thin layer screen only - recommended age 21 - 24 years     hCG qual urine POCT      Orders Placed This Encounter   Medications     etonogestrel (NEXPLANON) subdermal implant 68 mg      - Nexplanon insertion   - Routine return precautions  (bleeding, redness, pain, concern for infection) discussed.  Pt. given card with Lot #, date of insertion, date of removal by 11/5/2025.   - Pt counseled to use back up method of contraception for 7 days.    - Encouraged condom use for STI prevention.   - May take Ibuprofen/Tylenol prn for discomfort.    - RTO prn if questions or concerns    Congratulated patient on continued sobriety!  Recommended Flu Vaccine.  Flu Vaccine Given  Recommended repeat UPT in 2-3 weeks.  Referral placed for therapy, business cards provided for [facility]ik[facility] Amaya and San[facility] Solo.  Risks and benefits of prescribed medications discussed.  Medication instructions reviewed.  Encouraged to quit smoking, declined medication.   Follow up in 1 year and prn    I, Elizabeth Esqueda, am serving as a scribe; to document services personally performed by  SHAJI Lutz, PAULA based on data collection and the provider's statements to me.     Elizabeth Esqueda RN, SNM    The encounter was performed by me and scribed by the SNM. The scribed note accurately reflects my personal services and decisions made by me.  SHAJI Mera CNM    .

## 2020-11-05 NOTE — PROGRESS NOTES
Nursing Notes:   Shelia Soto LPN  2020 11:36 AM  Signed  SUBJECTIVE:   Sophia Stiles is here for her 6-week postpartum checkup.     PHQ-9 score:   Hx of Abuse:  No    Delivery Date: 9-15-[birth year].    Delivering provider:  Marilin Trammell MD.    Type of delivery:  .     Delivery complications: fetal intoerlance  And infection for mom  Infant gender:  Vince Mi, weight 8 pounds 11 oz.  Feeding Method:   and Bottlefed.  Complications reported with feeding:  none, infant thriving .    Bleeding:  None.  Duration:  5 weeks.  Menses resumed:  Yes 20  Bowel/Urinary problems:  Yes     Contraception Planned:  Nexplanon  She  has had intercourse since delivery and experienced  trace discomfort.  .      Shelia Soto LPN  2020  1:16 PM  Signed  Clinic Administered Medication Documentation      Intrauterine/Implant Insertion Documentation    Device was placed by provider (please see MAR for given by information). Please see MAR and medication order for additional information.     Type: Nexplanon  Remove/Replace by: Jennifer Edward    Expiration Date:  11-         ================================================================  -Fee is here in good spirits. Her son is doing well and growing.  He's sleeping well. She is understanding about her c/section and just happy he's here safely.  -Has had sex since delivery - no pain.  Is aware possibilty of early pregnancy even with negative UPT today, Is agreeable to Nexplanon and follow up UPT. Has had Nexplanon in the past.   -No constipation or diarrhea,  -No stress or urge incontinence.  -Bleeding completely stopped.  Has her first period that started on .   -Patient reports she quit breastfeeding 3 days ago.  Has noted increased irritability, anger, and frustration.  Denies thoughts of harming self or others.  -Declines depression medication, reports drug relapse after starting depression medication in the past.  Open to therapy referral.   "Open to using midwifery team to help access cares prn.  - Due for her first pap today, is agreeable.      ROS: 10 point ROS neg other than the symptoms noted above in the HPI.     EXAM:  /70   Pulse 67   Ht 1.575 m (5' 2.01\")   Wt 60.3 kg (133 lb)   LMP 2019   BMI 24.32 kg/m      General: healthy, alert and no distress  Psych: NEGATIVE. Has history of depression, opioid use disorder.  Coping overall.   Last PHQ-9 score on record= 9  Breasts:  Lactating, Nipples intact with no lesions, Non-tender and No S/S of yeast or mastitis  Abdomen: Benign, Soft, flat, non-tender, No masses, organomegaly and Diastasis less than 1-2 FB  Incision:  well healed   Vulva:  Normal genitalia and Bartholin's, Urethra, [place]'s normal  Vagina:  + blood in vault c/w menses.  No lesions or discharge.   Cervix:  no lesions and pink, moist, closed, without lesion or CMT.    Uterus:  fully involuted and non-tender    Adnexa:  Within normal limits and No masses, nodularity, tenderness  Recto-vaginal:   anus normal    Procedure note:    Patient presents for placement of Nexplanon for contraception.  She has had been counseled on side effects, risks, benefits and alternatives.  Patient desires to proceed.  Reports she has had unprotected intercourse since delivery, no unprotected intercourse in the last week.      Verification of Procedure:  Just before the procedure begans through verbal and active participation of team members, I verified:     Initials   Patient Name SB/KD   Patient  SB/[facility]   Procedure to be performed SB/[facility]     Consent:  Risks, benefits of treatment, and alternative options for contraception were discussed.  Patient's questions were elicited and answered.  Written consent was obtained and scanned into medical record.     Procedure: Nexplanon Placement into L Arm  Indication:  Desired contraception    Pt. in supine position with L arm bent at 90 degrees.  A time out was performed.   Position for insertion " identified on L upper arm 8cm from medial epicondyl. Marks at insertion site and 4cm beyond placed.  Area prepped with Betadine which was allowed to dry. 2cc of 1% lidocaine injected just under the skin along the planned insertion tunnel. Nexplanon LOT # [id number], Exp date 2025  placed subdermally under sterile conditions without difficulty. Reji in place and confirmed via palpation by provider and patient.  Pressure held, sterile bandage placed.  Small amount of bleeding noted at insertion site and slight bruising noted along track of Nexplanon.   L upper arm wrapped in pressure dressing.  Patient tolerated procedure well.   Complications:  none  EBL:  < 1 mL    Written and verbal instructions provided to patient.          ASSESSMENT:   Encounter Diagnoses   Name Primary?     Routine postpartum follow-up Yes     Nexplanon insertion      Need for prophylactic vaccination and inoculation against influenza      S/P  section      Hx of opioid abuse (H)      History of depression      At risk for postpartum depression       Normal postpartum exam after c/s for abnormal FHT  Pregnancy was complicated by:  Anxiety, depression, substance abuse.      PLAN:  Orders Placed This Encounter   Procedures     Insert Nexplanon [[id number]]     Obtaining, preparing and conveyance of cervical or vaginal smear to laboratory.     FLU VAC PRESRV FREE QUAD SPLIT VIR 3+YRS IM     Pap imaged thin layer screen only - recommended age 21 - 24 years     hCG qual urine POCT      Orders Placed This Encounter   Medications     etonogestrel (NEXPLANON) subdermal implant 68 mg      - Nexplanon insertion   - Routine return precautions (bleeding, redness, pain, concern for infection) discussed.  Pt. given card with Lot #, date of insertion, date of removal by 2025.   - Pt counseled to use back up method of contraception for 7 days.    - Encouraged condom use for STI prevention.   - May take Ibuprofen/Tylenol prn for discomfort.    - RTO prn  if questions or concerns    Congratulated patient on continued sobriety!  Recommended Flu Vaccine.  Flu Vaccine Given  Recommended repeat UPT in 2-3 weeks.  Referral placed for therapy, business cards provided for [facility] Amaya and [facility] Solo.  Risks and benefits of prescribed medications discussed.  Medication instructions reviewed.  Encouraged to quit smoking, declined medication.   Follow up in 1 year and prn    I, Elizabeth Esqueda, am serving as a scribe; to document services personally performed by  SHAJI Lutz, PAULA based on data collection and the provider's statements to me.     Elizabeth Esqueda, RN, SNM    The encounter was performed by me and scribed by the SNM. The scribed note accurately reflects my personal services and decisions made by me.  SHAJI Mera CNM    .

## 2020-11-05 NOTE — NURSING NOTE
SUBJECTIVE:   Sophia Stiles is here for her 6-week postpartum checkup.     PHQ-9 score:   Hx of Abuse:  No    Delivery Date: 9-15-[birth year].    Delivering provider:  Marilin Trammell MD.    Type of delivery:  .     Delivery complications: fetal intoerlance  And infection for mom  Infant gender:  Vince Mi, weight 8 pounds 11 oz.  Feeding Method:   and Bottlefed.  Complications reported with feeding:  none, infant thriving .    Bleeding:  None.  Duration:  5 weeks.  Menses resumed:  Yes 20  Bowel/Urinary problems:  Yes     Contraception Planned:  Nexplanon  She  has had intercourse since delivery and experienced  trace discomfort.  .

## 2020-11-05 NOTE — NURSING NOTE
Clinic Administered Medication Documentation      Intrauterine/Implant Insertion Documentation    Device was placed by provider (please see MAR for given by information). Please see MAR and medication order for additional information.     Type: Nexplanon  Remove/Replace by: Jennifer Edward    Expiration Date:  11-

## 2020-11-06 ASSESSMENT — ANXIETY QUESTIONNAIRES: GAD7 TOTAL SCORE: 6

## 2020-11-08 PROBLEM — O09.899 MATERNAL VARICELLA, NON-IMMUNE: Status: RESOLVED | Noted: 2020-03-12 | Resolved: 2020-11-08

## 2020-11-08 PROBLEM — O09.90 SUPERVISION OF HIGH RISK PREGNANCY, ANTEPARTUM: Status: RESOLVED | Noted: 2020-03-12 | Resolved: 2020-11-08

## 2020-11-08 PROBLEM — Z28.39 MATERNAL VARICELLA, NON-IMMUNE: Status: RESOLVED | Noted: 2020-03-12 | Resolved: 2020-11-08

## 2020-11-09 LAB
COPATH REPORT: NORMAL
PAP: NORMAL
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
## 2020-11-30 ENCOUNTER — TELEPHONE (OUTPATIENT)
Dept: OBGYN | Facility: CLINIC | Age: 20
End: 2020-11-30

## 2020-11-30 NOTE — TELEPHONE ENCOUNTER
----- Message from Radha Edge RN sent at 11/25/2020  2:29 PM CST -----  Regarding: question  Has a question, no further details

## 2021-04-24 ENCOUNTER — HEALTH MAINTENANCE LETTER (OUTPATIENT)
Age: 21
End: 2021-04-24

## 2021-10-04 ENCOUNTER — HEALTH MAINTENANCE LETTER (OUTPATIENT)
Age: 21
End: 2021-10-04

## 2022-05-15 ENCOUNTER — HEALTH MAINTENANCE LETTER (OUTPATIENT)
Age: 22
End: 2022-05-15

## 2022-09-11 ENCOUNTER — HEALTH MAINTENANCE LETTER (OUTPATIENT)
Age: 22
End: 2022-09-11

## 2023-02-20 NOTE — TELEPHONE ENCOUNTER
Call to pt but reached voicemail. Left message to call triage. Advised if severe pain or fever, should be seen emergently.   [FreeTextEntry1] : 1.  Continue medical regimen as outlined above.\par \par 2.  The patient needs to undergo her follow-up surveillance CAT scan of the chest.  This will be compared to prior studies.\par \par 3.  Follow-up with myself in 6 months with a wellness evaluation.\par \par 4.  A carotid duplex study has been ordered by her cardiologist.  I will await the results of the study as well.

## 2023-06-03 ENCOUNTER — HEALTH MAINTENANCE LETTER (OUTPATIENT)
Age: 23
End: 2023-06-03

## 2023-09-25 ENCOUNTER — MYC MEDICAL ADVICE (OUTPATIENT)
Dept: OBGYN | Facility: CLINIC | Age: 23
End: 2023-09-25
Payer: COMMERCIAL

## 2023-09-25 ENCOUNTER — TELEPHONE (OUTPATIENT)
Dept: OBGYN | Facility: CLINIC | Age: 23
End: 2023-09-25
Payer: COMMERCIAL

## 2023-09-25 DIAGNOSIS — Z32.01 PREGNANCY TEST POSITIVE: Primary | ICD-10-CM

## 2023-09-25 NOTE — TELEPHONE ENCOUNTER
Patient scheduled for OBI with RN on 9/26. No US or CNM visit scheduled. US ordered per protocol. Routed to  staff for scheduling.

## 2023-09-25 NOTE — TELEPHONE ENCOUNTER
Sent the patient a [employer] message due to her phone numbers that are listed are not in service.    I let her know to call [phone] to schedule her phone OBI, US, and NOB with a midwife for her pregnancy.

## 2023-09-26 ENCOUNTER — VIRTUAL VISIT (OUTPATIENT)
Dept: OBGYN | Facility: CLINIC | Age: 23
End: 2023-09-26

## 2023-09-26 DIAGNOSIS — F19.10 POLYSUBSTANCE ABUSE (H): Primary | ICD-10-CM

## 2023-09-26 DIAGNOSIS — Z34.91 CURRENTLY PREGNANT IN FIRST TRIMESTER WITH UNKNOWN GESTATIONAL AGE: ICD-10-CM

## 2023-09-26 PROCEDURE — 99207 PR NO BILLABLE SERVICE THIS VISIT: CPT

## 2023-09-26 RX ORDER — DEXTROMETHORPHAN HYDROBROMIDE, GUAIFENESIN 10; 100 MG/5ML; MG/5ML
LIQUID ORAL
COMMUNITY
End: 2023-09-26

## 2023-09-26 RX ORDER — ALBUTEROL SULFATE 90 UG/1
2 AEROSOL, METERED RESPIRATORY (INHALATION) EVERY 4 HOURS PRN
COMMUNITY
End: 2023-10-18

## 2023-09-26 RX ORDER — DESOGESTREL AND ETHINYL ESTRADIOL 0.15-0.03
1 KIT ORAL
COMMUNITY
Start: 2023-05-28 | End: 2023-09-26

## 2023-09-26 RX ORDER — PRENATAL VIT/IRON FUM/FOLIC AC 27MG-0.8MG
1 TABLET ORAL DAILY
COMMUNITY

## 2023-09-26 RX ORDER — PREDNISONE 20 MG/1
TABLET ORAL
COMMUNITY
Start: 2023-03-22 | End: 2023-09-26

## 2023-09-26 RX ORDER — CITALOPRAM HYDROBROMIDE 40 MG/1
1 TABLET ORAL EVERY MORNING
COMMUNITY
Start: 2023-09-25

## 2023-09-26 RX ORDER — HYDROXYZINE HYDROCHLORIDE 25 MG/1
TABLET, FILM COATED ORAL
COMMUNITY
Start: 2023-07-27 | End: 2023-10-18

## 2023-09-26 NOTE — PATIENT INSTRUCTIONS
Learning About Pregnancy  Your Care Instructions     Your health in the early weeks of your pregnancy is particularly important for your baby's health. Take good care of yourself. Anything you do that harms your body can also harm your baby.  Make sure to go to all of your doctor appointments. Regular checkups will help keep you and your baby healthy.  How can you care for yourself at home?  Diet    Eat a balanced diet. Make sure your diet includes plenty of beans, peas, and leafy green vegetables.     Do not skip meals or go for many hours without eating. If you are nauseated, try to eat a small, healthy snack every 2 to 3 hours.     Do not eat fish that has a high level of mercury, such as shark, swordfish, or mackerel. Do not eat more than one can of tuna each week.     Drink plenty of fluids. If you have kidney, heart, or liver disease and have to limit fluids, talk with your doctor before you increase the amount of fluids you drink.     Cut down on caffeine, such as coffee, tea, and cola.     Do not drink alcohol, such as beer, wine, or hard liquor.     Take a multivitamin that contains at least 400 micrograms (mcg) of folic acid to help prevent birth defects. Fortified cereal and whole wheat bread are good additional sources of folic acid.     Increase the calcium in your diet. Try to drink a quart of skim milk each day. You may also take calcium supplements and choose foods such as cheese and yogurt.   Lifestyle    Make sure you go to your follow-up appointments.     Get plenty of rest. You may be unusually tired while you are pregnant.     Get at least 30 minutes of exercise on most days of the week. Walking is a good choice. If you have not exercised in the past, start out slowly. Take several short walks each day.     Do not smoke. If you need help quitting, talk to your doctor about stop-smoking programs. These can increase your chances of quitting for good.     Do not touch cat feces or litter boxes.  Also, wash your hands after you handle raw meat, and fully cook all meat before you eat it. Wear gloves when you work in the yard or garden, and wash your hands well when you are done. Cat feces, raw or undercooked meat, and contaminated dirt can cause an infection that may harm your baby or lead to a miscarriage.     Avoid things that can make your body too hot and may be harmful to your baby, such as a hot tub or sauna. Or talk with your doctor before doing anything that raises your body temperature. Your doctor can tell you if it's safe.     Avoid chemical fumes, paint fumes, or poisons.     Do not use illegal drugs, marijuana, or alcohol.   Medicines    Review all of your medicines with your doctor. Some of your routine medicines may need to be changed to protect your baby.     Use acetaminophen (Tylenol) to relieve minor problems, such as a mild headache or backache or a mild fever with cold symptoms. Do not use nonsteroidal anti-inflammatory drugs (NSAIDs), such as ibuprofen (Advil, Motrin) or naproxen (Aleve), unless your doctor says it is okay.     Do not take two or more pain medicines at the same time unless the doctor told you to. Many pain medicines have acetaminophen, which is Tylenol. Too much acetaminophen (Tylenol) can be harmful.     Take your medicines exactly as prescribed. Call your doctor if you think you are having a problem with your medicine.   To manage morning sickness    If you feel sick when you first wake up, try eating a small snack (such as crackers) before you get out of bed. Allow some time to digest the snack, and then get out of bed slowly.     Do not skip meals or go for long periods without eating. An empty stomach can make nausea worse.     Eat small, frequent meals instead of three large meals each day.     Drink plenty of fluids.     Eat foods that are high in protein but low in fat.     If you are taking iron supplements, ask your doctor if they are necessary. Iron can make  "nausea worse.     Avoid any smells, such as coffee, that make you feel sick.     Get lots of rest. Morning sickness may be worse when you are tired.   Follow-up care is a key part of your treatment and safety. Be sure to make and go to all appointments, and call your doctor if you are having problems. It's also a good idea to know your test results and keep a list of the medicines you take.  Where can you learn more?  Go to https://www.[employer].net/patiented  Enter E868 in the search box to learn more about \"Learning About Pregnancy.\"  Current as of: November 9, 2022               Content Version: 13.7    2850-6650 [employer].   Care instructions adapted under license by your healthcare professional. If you have questions about a medical condition or this instruction, always ask your healthcare professional. [employer] disclaims any warranty or liability for your use of this information.      Weeks 6 to 10 of Your Pregnancy: Care Instructions  During these weeks of pregnancy, your body goes through many changes. You may start to feel different, both in your body and your emotions. Each pregnancy is different, so there's no \"right\" way to feel. These early weeks are a time to make healthy choices for you and your pregnancy.    Take a daily prenatal vitamin. Choose one with folic acid in it.   Avoid alcohol, tobacco, and drugs (including marijuana). If you need help quitting, talk to your doctor.     Drink plenty of liquids.  Be sure to drink enough water. And limit sodas, other sweetened drinks, and caffeine.     Choose foods that are good sources of calcium, iron, and folate.  You can try dairy products, dark leafy greens, fortified orange juice and cereals, almonds, broccoli, dried fruit, and beans.     Avoid foods that may be harmful.  Don't eat raw meat, deli meat, raw seafood, or raw eggs. Avoid soft cheese and unpasteurized dairy, like Brie and blue cheese. And don't eat fish that " "contains a lot of mercury, like shark and swordfish.     Don't touch juvenal litter or cat poop.  They can cause an infection that could be harmful during pregnancy.     Avoid things that can make your body too hot.  For example, avoid hot tubs and saunas.     Soothe morning sickness.  Try eating 5 or 6 small meals a day, getting some fresh air, or using natacha to control symptoms.     Ask your doctor about flu and COVID-19 shots.  Getting them can help protect against infection.   Follow-up care is a key part of your treatment and safety. Be sure to make and go to all appointments, and call your doctor if you are having problems. It's also a good idea to know your test results and keep a list of the medicines you take.  Where can you learn more?  Go to https://www.[employer]in[employer].eyeOS/patiented  Enter G112 in the search box to learn more about \"Weeks 6 to 10 of Your Pregnancy: Care Instructions.\"  Current as of: November 9, 2022               Content Version: 13.7 2006-2023 [employer].   Care instructions adapted under license by your healthcare professional. If you have questions about a medical condition or this instruction, always ask your healthcare professional. [employer] disclaims any warranty or liability for your use of this information.         Managing Morning Sickness (01:55)  Your health professional recommends that you watch this short online health video.  Learn tips for dealing with morning sickness, no matter what time of day you have it.  Purpose:  Gives tips for managing morning sickness, including eating small low-fat meals and avoiding caffeine and spicy food.  Goal:  The user will learn tips for dealing with morning sickness during pregnancy.     How to watch the video    Scan the QR code   OR Visit the website    [email or web address]   Current as of: November 9, 2022               Content Version: 13.7 2006-2023 [employer].   Care instructions " adapted under license by your healthcare professional. If you have questions about a medical condition or this instruction, always ask your healthcare professional. [employer], [employer] disclaims any warranty or liability for your use of this information.      Pregnancy and Heartburn: Care Instructions  Overview     Heartburn is a common problem during pregnancy.  Heartburn happens when stomach acid backs up into the tube that carries food to the stomach. This tube is called the esophagus. Early in pregnancy, heartburn is caused by hormone changes that slow down digestion. Later on, it's also caused by the large uterus pushing up on the stomach.  Even though you can't fix the cause, there are things you can do to get relief. Treating heartburn during pregnancy focuses first on making lifestyle changes, like changing what and how you eat, and on taking medicines.  Heartburn usually improves or goes away after childbirth.  Follow-up care is a key part of your treatment and safety. Be sure to make and go to all appointments, and call your doctor if you are having problems. It's also a good idea to know your test results and keep a list of the medicines you take.  How can you care for yourself at home?  Eat small, frequent meals.  Avoid foods that make your symptoms worse, such as chocolate, peppermint, and spicy foods. Avoid drinks with caffeine, such as coffee, tea, and sodas.  Avoid bending over or lying down after meals.  Take a short walk after you eat.  If heartburn is a problem at night, do not eat for 2 hours before bedtime.  Take antacids like Mylanta, Maalox, Rolaids, or Tums. Do not take antacids that have sodium bicarbonate, magnesium trisilicate, or aspirin. Be careful when you take over-the-counter antacid medicines. Many of these medicines have aspirin in them. While you are pregnant, do not take aspirin or medicines that contain aspirin unless your doctor says it is okay.  If you're not getting  "relief, talk to your doctor. You may be able to take a stronger acid-reducing medicine.  When should you call for help?   Call your doctor now or seek immediate medical care if:    You have new or worse belly pain.     You are vomiting.   Watch closely for changes in your health, and be sure to contact your doctor if:    You have new or worse symptoms of reflux.     You are losing weight.     You have trouble or pain swallowing.     You do not get better as expected.   Where can you learn more?  Go to https://www.[employer].net/patiented  Enter U946 in the search box to learn more about \"Pregnancy and Heartburn: Care Instructions.\"  Current as of: November 9, 2022               Content Version: 13.7 2006-2023 [employer].   Care instructions adapted under license by your healthcare professional. If you have questions about a medical condition or this instruction, always ask your healthcare professional. [employer] disclaims any warranty or liability for your use of this information.      Constipation: Care Instructions  Overview     Constipation means that you have a hard time passing stools (bowel movements). People pass stools from 3 times a day to once every 3 days. What is normal for you may be different. Constipation may occur with pain in the rectum and cramping. The pain may get worse when you try to pass stools. Sometimes there are small amounts of bright red blood on toilet paper or the surface of stools. This is because of enlarged veins near the rectum (hemorrhoids).  A few changes in your diet and lifestyle may help you avoid ongoing constipation. Your doctor may also prescribe medicine to help loosen your stool.  Some medicines can cause constipation. These include pain medicines and antidepressants. Tell your doctor about all the medicines you take. Your doctor may want to make a medicine change to ease your symptoms.  Follow-up care is a key part of your treatment and " "safety. Be sure to make and go to all appointments, and call your doctor if you are having problems. It's also a good idea to know your test results and keep a list of the medicines you take.  How can you care for yourself at home?  Drink plenty of fluids. If you have kidney, heart, or liver disease and have to limit fluids, talk with your doctor before you increase the amount of fluids you drink.  Include high-fiber foods in your diet each day. These include fruits, vegetables, beans, and whole grains.  Get at least 30 minutes of exercise on most days of the week. Walking is a good choice. You also may want to do other activities, such as running, swimming, cycling, or playing tennis or team sports.  Take a fiber supplement, such as Citrucel or Metamucil, every day. Read and follow all instructions on the label.  Schedule time each day for a bowel movement. A daily routine may help. Take your time having a bowel movement, but don't sit for more than 10 minutes at a time. And don't strain too much.  Support your feet with a small step stool when you sit on the toilet. This helps flex your hips and places your pelvis in a squatting position.  Your doctor may recommend an over-the-counter laxative to relieve your constipation. Examples are Milk of Magnesia and MiraLax. Read and follow all instructions on the label. Do not use laxatives on a long-term basis.  When should you call for help?   Call your doctor now or seek immediate medical care if:    You have new or worse belly pain.     You have new or worse nausea or vomiting.     You have blood in your stools.   Watch closely for changes in your health, and be sure to contact your doctor if:    Your constipation is getting worse.     You do not get better as expected.   Where can you learn more?  Go to https://www.healthwise.net/patiented  Enter P343 in the search box to learn more about \"Constipation: Care Instructions.\"  Current as of: March 22, " "2023               Content Version: 13.7 2006-2023 [employer].   Care instructions adapted under license by your healthcare professional. If you have questions about a medical condition or this instruction, always ask your healthcare professional. [employer] disclaims any warranty or liability for your use of this information.      Learning About High-Iron Foods  What foods are high in iron?     The foods you eat contain nutrients, such as vitamins and minerals. Iron is a nutrient. Your body needs the right amount to stay healthy and work as it should. You can use the list below to help you make choices about which foods to eat.  Here are some foods that contain iron. They have 1 to 2 milligrams of iron per serving.  Fruits  Figs (dried), 5 figs  Vegetables  Asparagus (canned), 6 flores  Philippe, beet, Swiss chard, or turnip greens, 1 cup  Dried peas, cooked,   cup  Seaweed, spirulina (dried),   cup  Spinach, (cooked)   cup or (raw) 1 cup  Grains  Cereals, fortified with iron, 1 cup  Grits (instant, cooked), fortified with iron,   cup  Meats and other protein foods  Beans (kidney, lima, navy, white), canned or cooked,   cup  Beef or lamb, 3 oz  Chicken giblets, 3 oz  Chickpeas (garbanzo beans),   cup  Liver of beef, lamb, or pork, 3 oz  Oysters (cooked), 3 oz  Sardines (canned), 3 oz  Soybeans (boiled),   cup  Tofu (firm),   cup  Work with your doctor to find out how much of this nutrient you need. Depending on your health, you may need more or less of it in your diet.  Where can you learn more?  Go to https://www.healthPlaceIQ.net/patiented  Enter R005 in the search box to learn more about \"Learning About High-Iron Foods.\"  Current as of: March 1, 2023               Content Version: 13.7 2006-2023 [employer].   Care instructions adapted under license by your healthcare professional. If you have questions about a medical condition or this instruction, always ask your " "healthcare professional. [employer], [facility] disclaims any warranty or liability for your use of this information.      Rh Antibodies Screening During Pregnancy: About This Test  What is it?     The Rh antibodies screening test is a blood test. It checks your blood for Rh antibodies. If you have Rh-negative blood and have been exposed to Rh-positive blood, your immune system may make antibodies to attack the Rh-positive blood. When a pregnant woman has these antibodies, it is called Rh sensitization.  Why is this test done?  The Rh antibodies screening test is done during pregnancy to find out if your baby is at risk for Rh disease. This can happen if you have Rh-negative blood and your baby has Rh-positive blood. If your Rh-negative blood mixes with Rh-positive blood, your immune system will make antibodies to attack the Rh-positive blood.  During pregnancy, these antibodies could attach to the baby's red blood cells. This can cause your baby to have serious health problems. The results of this test will help your doctor know how to best care for you and your baby during your pregnancy.  How do you prepare for the test?  In general, there's nothing you have to do before this test, unless your doctor tells you to.  How is the test done?  A health professional uses a needle to take a blood sample, usually from the arm.  What happens after the test?  You will probably be able to go home right away. It depends on the reason for the test.  You can go back to your usual activities right away.  Follow-up care is a key part of your treatment and safety. Be sure to make and go to all appointments, and call your doctor if you are having problems. It's also a good idea to keep a list of the medicines you take. Ask your doctor when you can expect to have your test results.  Where can you learn more?  Go to https://www.[employer].net/patiented  Enter P722 in the search box to learn more about \"Rh Antibodies Screening " "During Pregnancy: About This Test.\"  Current as of: 2022               Content Version: 13.7    6749-3969 [employer].   Care instructions adapted under license by your healthcare professional. If you have questions about a medical condition or this instruction, always ask your healthcare professional. [employer] disclaims any warranty or liability for your use of this information.      Learning About Preventing Rh Disease  What is Rh disease?     Rh disease can be a serious problem in pregnancy. It happens when substances called antibodies in the mother's blood cause red blood cells in her baby's blood to be destroyed. This can occur when the blood types of a mother and her baby do not match.  All blood has an Rh factor. This is what makes a blood type positive or negative. When you are Rh-negative, your baby may be Rh-negative or Rh-positive. If your baby has Rh-positive blood and it mixes with yours, your body will make antibodies. This is called Rh sensitization.  Most of the time, this is not a problem in a first pregnancy. But in future pregnancies, it could cause Rh disease.  A  with Rh disease has mild anemia and may have jaundice. In severe cases, anemia, jaundice, and swelling can be very dangerous or fatal. Some babies need to be delivered early. Some need special care in the NICU. A very sick baby will need a blood transfusion before or after birth.  Fortunately, Rh sensitization is usually easy to prevent.  That's why it's important to get your Rh status checked in your first trimester. It doesn't cause any warning signs. A blood test is the only way to know if you are Rh-sensitive or are at risk for it.  How can you prevent Rh disease?  If you are Rh-negative, your doctor gives you an Rh immune globulin shot (such as RhoGAM). It helps prevent your body from making the antibodies that attack your baby's red blood cells.  Timing is important. You need the " "shot at certain times during your pregnancy. And you need one anytime there is a chance that your baby's blood might mix with yours. That can happen with certain prenatal tests or when you have pregnancy bleeding, such as:  Right after any pregnancy loss, amniocentesis, or CVS testing.  After turning of a breech baby.  Before and maybe after childbirth. Your doctor gives you a shot around week 28. If your  is Rh-positive, you will have another shot.  Follow-up care is a key part of your treatment and safety. Be sure to make and go to all appointments, and call your doctor if you are having problems. It's also a good idea to know your test results and keep a list of the medicines you take.  Where can you learn more?  Go to https://www.[employer]om[employer]/patiented  Enter W177 in the search box to learn more about \"Learning About Preventing Rh Disease.\"  Current as of: 2022               Content Version: 13.7    3728-4645 [employer].   Care instructions adapted under license by your healthcare professional. If you have questions about a medical condition or this instruction, always ask your healthcare professional. [employer] disclaims any warranty or liability for your use of this information.      Learning About Rh Immunoglobulin Shots  Introduction     An Rh immunoglobulin shot is given to pregnant women who have Rh-negative blood.  You may have Rh-negative blood, and your baby may have Rh-positive blood. If the two types of blood mix, your body will make antibodies. This is called Rh sensitization. Most of the time, this is not a problem the first time you're pregnant. But it could cause problems in future pregnancies.  This shot keeps your body from making the antibodies. You get the shot around 28 weeks of pregnancy. After the birth, your baby's blood is tested. If the blood is Rh positive, you will get another shot. You may also get the shot if you have vaginal bleeding " "while you are pregnant or if you have a miscarriage. These shots protect future pregnancies.  Women with Rh negative blood will need this shot each time they get pregnant.  Example  Rh immunoglobulin (HypRho-D, MICRhoGAM, and RhoGAM)  Possible side effects  Rare side effects may include:  Some mild pain where you got the shot.  A slight fever.  An allergic reaction.  You may have other side effects not listed here. Check the information that comes with your medicine.  What to know about taking this medicine  You may need more than one shot. You may need the shot again:  After amniocentesis, fetal blood sampling, or chorionic villus sampling tests.  If you have bleeding in your second or third trimester.  After turning of a breech baby.  After an injury to the belly while you are pregnant.  After a miscarriage or an .  Before or right after treatment for an ectopic or a partial molar pregnancy.  Tell your doctor if you have any allergies or have had a bad response to medicines in the past.  If you get this shot within 3 months of getting a live-virus vaccine, the vaccine may not work. Your doctor will tell you if you need more vaccine.  Check with your doctor or pharmacist before you use any other medicines. This includes over-the-counter medicines. Make sure your doctor knows all of the medicines, vitamins, herbs, and supplements you take. Taking some medicines at the same time can cause problems.  Where can you learn more?  Go to https://www.[employer].net/patiented  Enter V615 in the search box to learn more about \"Learning About Rh Immunoglobulin Shots.\"  Current as of: 2022               Content Version: 13.7    0410-6893 [employer].   Care instructions adapted under license by your healthcare professional. If you have questions about a medical condition or this instruction, always ask your healthcare professional. [employer] disclaims any warranty or liability for " your use of this information.      Rubella (Swiss Measles): Care Instructions  Overview  Rubella, also called Swiss measles or 3-day measles, is a disease caused by a virus. It spreads by coughs, sneezes, and close contact. Rubella usually is mild and does not cause long-term problems. But if you are pregnant and get it, you can give the disease to your unborn baby. This can cause serious birth defects.  While you have rubella, you may get a rash and a mild fever, and the lymph glands in your neck may swell. Older children often have a fever, eye pain, a sore throat, and body aches. You can relieve most symptoms with care at home. Avoid being around others, especially pregnant people, until your rash has been gone for at least 4 days. People who have not had this disease before or have not had the vaccine have the greatest chance of getting the virus.  Follow-up care is a key part of your treatment and safety. Be sure to make and go to all appointments, and call your doctor if you are having problems. It's also a good idea to know your test results and keep a list of the medicines you take.  How can you care for yourself at home?  Drink plenty of fluids. If you have kidney, heart, or liver disease and have to limit fluids, talk with your doctor before you increase the amount of fluids you drink.  Get plenty of rest to help your body heal.  Take an over-the-counter pain medicine, such as acetaminophen (Tylenol), ibuprofen (Advil, Motrin), or naproxen (Aleve), to reduce fever and discomfort. Read and follow all instructions on the label. Do not give aspirin to anyone younger than 20. It has been linked to Reye syndrome, a serious illness.  Do not take two or more pain medicines at the same time unless the doctor told you to. Many pain medicines have acetaminophen, which is Tylenol. Too much acetaminophen (Tylenol) can be harmful.  Try not to scratch the rash. Put cold, wet cloths on the rash to reduce itching.  Do  "not smoke. Smoking can make your symptoms worse. If you need help quitting, talk to your doctor about stop-smoking programs and medicines. These can increase your chances of quitting for good.  Avoid contact with people who have never had rubella and who have not been immunized.  When should you call for help?   Call your doctor now or seek immediate medical care if:    You have a fever with a stiff neck or a severe headache.     You are sensitive to light or feel very sleepy or confused.   Watch closely for changes in your health, and be sure to contact your doctor if:    You do not get better as expected.   Where can you learn more?  Go to https://www.Kaai.net/patiented  Enter B812 in the search box to learn more about \"Rubella ([place] Measles): Care Instructions.\"  Current as of: 2022               Content Version: 13.7    2596-9497 [employer].   Care instructions adapted under license by your healthcare professional. If you have questions about a medical condition or this instruction, always ask your healthcare professional. [employer] disclaims any warranty or liability for your use of this information.      Gonorrhea and Chlamydia: About These Tests  What is it?  These tests use a sample of urine or other body fluid to look for the bacteria that cause these sexually transmitted infections (STIs). The fluid sample can come from the cervix, vagina, rectum, throat, or eyes.  Why is this test done?  These tests may be done to:  Find out if symptoms are caused by gonorrhea or chlamydia.  Check people who are at high risk of being infected with gonorrhea or chlamydia.  Retest people several months after they have been treated for gonorrhea or chlamydia.  Check for infection in your  if you had a gonorrhea or chlamydia infection at the time of delivery.  How can you prepare for the test?  If you are going to have a urine test, do not urinate for at least 1 hour " "before the test.  If you think you may have chlamydia or gonorrhea, don't have sexual intercourse until you get your test results. And you may want to have tests for other STIs, such as HIV.  How is the test done?  For a direct sample, a swab is used to collect body fluid from the cervix, vagina, rectum, throat, or eyes. Your doctor may collect the sample. Or you may be given instructions on how to collect your own sample.  For a urine sample, you will collect the urine that comes out when you first start to urinate. Don't wipe the genital area clean before you urinate.  How long does the test take?  The test will take a few minutes.  What happens after the test?  You will be able to go home right away.  You can go back to your usual activities right away.  If you do have an infection, don't have sexual intercourse for 7 days after you start treatment. And your sex partner(s) should also be treated.  Follow-up care is a key part of your treatment and safety. Be sure to make and go to all appointments, and call your doctor if you are having problems. It's also a good idea to keep a list of the medicines you take. Ask your doctor when you can expect to have your test results.  Where can you learn more?  Go to [email or web address].net/patiented  Enter K976 in the search box to learn more about \"Gonorrhea and Chlamydia: About These Tests.\"  Current as of: August 2, 2022               Content Version: 13.7    5646-5192 [employer].   Care instructions adapted under license by your healthcare professional. If you have questions about a medical condition or this instruction, always ask your healthcare professional. [employer] disclaims any warranty or liability for your use of this information.      Trichomoniasis: About This Test  What is it?     This test uses a sample of urine or other body fluid to look for the tiny parasite that causes trichomoniasis (also called trich). The fluid sample " can come from the vagina, cervix, or urethra. Your doctor may choose to use one or more of many available tests.  Why is it done?  A trich test may be done to:  Find out if symptoms are caused by trich.  Check people who are at high risk for being infected with trich.  Check after treatment to make sure that the infection is gone.  How do you prepare for the test?  If you are going to have a urine test, do not urinate for at least 1 hour before the test.  How is the test done?  For a direct sample, a swab is used to collect body fluid from the cervix, vagina, or urethra. Your doctor may collect the sample. Or you may be given instructions on how to collect your own sample.  For a urine sample, you will collect the urine that comes out when you first start to urinate. Don't wipe the area clean before you urinate.  How long does the test take?  It will take a few minutes to collect a sample.  What happens after the test?  You can go home right away.  You can go back to your usual activities right away.  You may get the test results the same day or several days later. It depends on the test used.  If you do have an infection, don't have sexual intercourse for 7 days after you start treatment. Your sex partner(s) should also be treated.  Follow-up care is a key part of your treatment and safety. Be sure to make and go to all appointments, and call your doctor if you are having problems. Ask your doctor when you can expect to have your test results.  Current as of: August 2, 2022               Content Version: 13.7    2582-5428 [employer].   Care instructions adapted under license by your healthcare professional. If you have questions about a medical condition or this instruction, always ask your healthcare professional. [employer] disclaims any warranty or liability for your use of this information.      HIV Testing: Care Instructions  Overview     You can get tested for the human  "immunodeficiency virus (HIV). This test checks for HIV antibodies and antigens in your blood. If they are found, the test is positive.  If HIV antibodies or antigens are not found, you may need a repeat test to be sure the results are correct. Or your doctor may want to do a different test.  Follow-up care is a key part of your treatment and safety. Be sure to make and go to all appointments, and call your doctor if you are having problems. It's also a good idea to know your test results and keep a list of the medicines you take.  What do the results mean?  Normal result  A normal result means that no HIV antibodies or antigens were found in your blood. And if you had a test that checked for HIV RNA or DNA, none was found. Normal results are called negative.  You may need more testing to be sure the test results are correct.  Uncertain result  Test results don't clearly show whether you have an HIV infection. This is usually called an indeterminate result. This may happen before HIV antibodies or antigens develop. Or it may happen when some other type of antibody or antigen interferes with the results. If this occurs, you will probably have another test right away.  Abnormal result  An abnormal result means that you have HIV antibodies or antigens in your blood. These results are called positive.  A positive test will be confirmed by another type of test. This is because some tests can cause false-positive results. No one is considered HIV-positive until the result is confirmed by a test that shows HIV RNA or DNA in the person's blood.  If your test result is positive, your doctor will talk to you about starting treatment.  Where can you learn more?  Go to https://www.[employer]xet.net/patiented  Enter T792 in the search box to learn more about \"HIV Testing: Care Instructions.\"  Current as of: October 31, 2022               Content Version: 13.7    0602-5327 [employer].   Care instructions adapted under " license by your healthcare professional. If you have questions about a medical condition or this instruction, always ask your healthcare professional. [employer] disclaims any warranty or liability for your use of this information.      Hepatitis C Virus Tests: About These Tests  What are they?     Hepatitis C virus tests are blood tests that check for substances in the blood that show whether you have hepatitis C now or had it in the past. The tests can also tell you what type of hepatitis C you have and how severe the disease is. This can help your doctor with treatment.  If the tests show that you have long-term hepatitis C, you need to take steps to prevent spreading the disease.  Why are these tests done?  You may need these tests if:  You have symptoms of hepatitis.  You may have been exposed to the virus. You are more likely to have been exposed to the virus if you inject drugs or are exposed to body fluids (such as if you are a health care worker).  You've had other tests that show you have liver problems.  You are 18 to 79 years old.  You have an HIV infection.  The tests also are done to help your doctor decide about your treatment and see how well it works.  How do you prepare for the test?  In general, there's nothing you have to do before this test, unless your doctor tells you to.  How is the test done?  A health professional uses a needle to take a blood sample, usually from the arm.  What happens after these tests?  You will probably be able to go home right away.  You can go back to your usual activities right away.  Follow-up care is a key part of your treatment and safety. Be sure to make and go to all appointments, and call your doctor if you are having problems. It's also a good idea to keep a list of the medicines you take. Ask your doctor when you can expect to have your test results.  Where can you learn more?  Go to https://www.[employer].net/patiented  Enter W551 in the search  "box to learn more about \"Hepatitis C Virus Tests: About These Tests.\"  Current as of: October 31, 2022               Content Version: 13.7    [id number] [employer].   Care instructions adapted under license by your healthcare professional. If you have questions about a medical condition or this instruction, always ask your healthcare professional. [employer] disclaims any warranty or liability for your use of this information.      Learning About Fetal Ultrasound Results  What is a fetal ultrasound?     Fetal ultrasound is a test that lets your doctor see an image of your baby. Your doctor learns information about your baby from this picture. You may find out, for example, if you are having a boy or a girl. But the main reason you have this test is to get information about your baby's health.  (You may hear your baby called a fetus. This is a common medical term for a baby that's growing in the mother's uterus.)  What kind of information can you learn from this test?  The findings of an ultrasound fall into two categories, normal and abnormal.  Normal  The fetus is the right size for its age.  The placenta is the expected size and does not cover the cervix.  There is enough amniotic fluid in the uterus.  No birth defects can be seen.  Abnormal  The fetus is small or large for its age.  The placenta covers the cervix.  There is too much or too little amniotic fluid in the uterus.  The fetus may have a birth defect.  What does an abnormal result mean?  Abnormal seems to imply that something is wrong with your baby. But what it means is that the test has shown something the doctor wants to take a closer look at.  And that's what happens next. Your doctor will talk to you about what further test or tests you may need.  What do the results mean?  Some of the things your doctor may see on an abnormal ultrasound include:  Echogenic bowel.  The bowel looks very bright on the screen. This could " mean that there's blood in the bowel. Or it could mean that something is blocking the small bowel.  Increased nuchal translucency.  The ultrasound measures the thickness at the back of the baby's neck. An increase in thickness is sometimes an early sign of Down syndrome.  Increased or decreased amniotic fluid.  The doctor will look for a reason for the level of amniotic fluid and will watch the pregnancy closely as it progresses.  Large ventricles.  Ventricles in the brain look larger than they should. Your doctor may take a closer look at the brain.  Renal pyelectasis/hydronephrosis.  The ultrasound measures the fluid around the kidney. If there is more fluid than expected, there is a chance of urinary tract or kidney problems.  Short long bones.  The ultrasound measures certain arm and leg bones. A long bone (humerus or femur) that is shorter than average could be a sign of Down syndrome.  Subchorionic hemorrhage.  An ultrasound can show bleeding under one of the membranes that surrounds the fetus. Some women don't have symptoms of bleeding. The ultrasound can find this problem when women are not bleeding from their vagina. Women who have this condition have a slightly higher chance of miscarriage.  What do you do now?  Take a deep breath, and let it out. Keep in mind that an abnormal finding on an ultrasound, after it's coupled with more information, may:  Turn out to be nothing.  Turn out to be something mild that won't affect the baby.  Turn out to be something more serious. But if this happens, early diagnosis helps you and your doctor plan treatment options sooner rather than later.  Your medical team is there for you. So are your family and friends. Ask questions, and get the help and support you need.  Follow-up care is a key part of your treatment and safety. Be sure to make and go to all appointments, and call your doctor if you are having problems. It's also a good idea to know your test results and keep  "a list of the medicines you take.  Where can you learn more?  Go to https://www.PureWRX.net/patiented  Enter K451 in the search box to learn more about \"Learning About Fetal Ultrasound Results.\"  Current as of: November 9, 2022               Content Version: 13.7    [id number] [email or web address].   Care instructions adapted under license by your healthcare professional. If you have questions about a medical condition or this instruction, always ask your healthcare professional. [email or web address] disclaims any warranty or liability for your use of this information.      Learning About Prenatal Visits  Your Care Instructions     Regular prenatal visits are very important during any pregnancy. These quick office visits may seem simple and routine. But they can help you and your baby stay healthy. Your doctor is watching for problems that can only be found by regularly checking you and your baby. The visits also give you and your doctor time to build a good relationship.  Many women have prenatal visits every 4 to 6 weeks until week 28 of pregnancy. Then the visits become more frequent. This is often every 2 to 3 weeks through week 36 of pregnancy. In the final month of pregnancy, you likely will see your doctor every week. You may have a different schedule if you have a medical problem or are a teen.  At different times in your pregnancy, you will have exams and tests. Some are routine. Others are done only when there is a chance of a problem. Everything healthy you do for your body helps your growing baby. Rest when you need it. Eat well, drink plenty of water, and exercise regularly.  What happens during a prenatal visit?  You will have blood pressure checks, along with urine tests. You also may have blood tests. If you need to go to the bathroom while waiting for the doctor, tell the nurse. He or she will give you a sample cup so your urine can be tested.  You will be weighed and have your belly " measured.  Your doctor may listen to your baby's heartbeat with a special stethoscope.  In your second trimester, your doctor will check your blood sugar (glucose tolerance test) for diabetes that can occur during pregnancy. This is gestational diabetes, which can harm your baby.  You will have tests to check for infections that could harm your . These include group B streptococcus and hepatitis B.  Your doctor may do ultrasounds to check for problems. This also checks your baby's position. An ultrasound uses sound waves to produce a picture of your baby.  You may have other tests at any time during your pregnancy.  Use your visits to discuss with your doctor any concerns you have.  How can you care for yourself at home?  Get plenty of rest.  Exercise every day, if your doctor says it is okay. If you have not exercised in the past, start out slowly. Take many short walks each day.  Eat a balanced diet. Make sure your diet includes plenty of beans, peas, and leafy green vegetables.  Drink plenty of fluids. Cut down on drinks with caffeine, such as coffee, tea, and cola. If you have kidney, heart, or liver disease and have to limit fluids, talk with your doctor before you increase the amount of fluids you drink.  Avoid chemical fumes, paint fumes, and poisons. Do not use alcohol, marijuana, or illegal drugs. Do not smoke, vape, or use tobacco. If you need help quitting, talk to your doctor about stop-smoking programs and medicines. These can increase your chances of quitting for good.  Review all of your medicines with your doctor. Some of your routine medicines may need to be changed to protect your baby. Do not stop or start taking any medicines without talking to your doctor first.  Follow-up care is a key part of your treatment and safety. Be sure to make and go to all appointments, and call your doctor if you are having problems. It's also a good idea to know your test results and keep a list of the  "medicines you take.  Where can you learn more?  Go to https://www.[email or web address].net/patiented  Enter J502 in the search box to learn more about \"Learning About Prenatal Visits.\"  Current as of: November 9, 2022               Content Version: 13.7    8745-4311 [employer].   Care instructions adapted under license by your healthcare professional. If you have questions about a medical condition or this instruction, always ask your healthcare professional. [employer] disclaims any warranty or liability for your use of this information.      Domestic Abuse: Care Instructions  Overview     If you want to save this information but don't think it is safe to take it home, see if a trusted friend can keep it for you. Plan ahead. Know who you can call for help, and memorize the phone number.   Be careful online too. Your online activity may be seen by others. Do not use your personal computer or device to read about this topic. Use a safe computer such as one at work, a friend's house, or a library.    Domestic abuse is different from an argument now and then. It is a pattern of abuse that one person uses to control another person's behavior. It may start with threats and name-calling. Then, it may lead to more serious acts, like pushing and slapping. The abuse also may occur in other areas. For example, the abuser may withhold money or spend a partner's money without their knowledge.  Abuse can cause serious harm. You are more likely to have a long-term health problem from the injuries and stress of living in a violent relationship. People who are sexually abused by their partners have more sexually transmitted infections and unwanted pregnancies. Anyone can be abused in relationships. Anyone who is abused also faces emotional pain.  If you are pregnant, abuse can cause problems such as poor weight gain, infections, and bleeding. Abuse during this time may increase your baby's risk of low birth " "weight, premature birth, and death.  Follow-up care is a key part of your treatment and safety. Be sure to make and go to all appointments, and call your doctor if you are having problems. It's also a good idea to know your test results and keep a list of the medicines you take.  How can you care for yourself at home?  If you do not have a safe place to stay, discuss this with your doctor before you leave.  Have a plan for where to go, how to leave your house, and where to stay in case of an emergency. Do not tell your partner about your plan. Contact:  The National Domestic Violence Hotline toll-free at 1-516.284.8507. They can help you find resources in your area.  Your local police department, hospital, or clinic for information about shelters and safe homes near you.  Talk to a trusted friend or neighbor or a [employer] counselor. Do not feel that you have to hide what happened.  Teach your children how to call for help in an emergency.  Be alert to warning signs, such as threats, heavy alcohol use, or drug use. This can help you avoid danger.  If you can, make sure that there are no guns or other weapons in the house.  When should you call for help?   Call 911 anytime you think you may need emergency care. For example, call if:    You or someone else has just been abused.     You think you or someone else is in danger of being abused.   Watch closely for changes in your health, and be sure to contact your doctor if you have any problems.  Where can you learn more?  Go to https://www.[employer].net/patiented  Enter G282 in the search box to learn more about \"Domestic Abuse: Care Instructions.\"  Current as of: February 27, 2023               Content Version: 13.7    0825-4983 [employer].   Care instructions adapted under license by your healthcare professional. If you have questions about a medical condition or this instruction, always ask your healthcare professional. [employer], [employer] " disclaims any warranty or liability for your use of this information.      Domestic Violence Safety Instructions: Care Instructions  Overview     If you want to save this information but don't think it is safe to take it home, see if a trusted friend can keep it for you. Plan ahead. Know who you can call for help, and memorize the phone number.   Be careful online too. Your online activity may be seen by others. Do not use your personal computer or device to read about this topic. Use a safe computer such as one at work, a friend's house, or a library.    When you are abused by a spouse or partner, you can take actions to protect yourself and your children.  You can increase your safety whether you decide to stay with your spouse or partner or you decide to leave. You can also prepare an action plan and kit ahead of time. This will allow you to leave quickly when you decide to. Remember, you cannot stop your partner's abuse, but you can find help for you and your children. No one deserves to be abused.  Follow-up care is a key part of your treatment and safety. Be sure to make and go to all appointments, and call your doctor if you are having problems. It's also a good idea to know your test results and keep a list of the medicines you take.  How can you care for yourself at home?  Make a plan for your safety   If you decide to stay with your abusive spouse or partner, you can do the following to increase your safety:  Decide what works best to keep you safe in an emergency.  Decide who you can call to help you in an emergency.  Decide if you will call the police if you get hurt again. If you can, agree on a signal with your children or neighbor to call the police for you if you need help. You can flash lights or hang something out of a window.  Choose a place to go for a short time if you need to leave home. Memorize the address and phone number.  Learn escape routes out of your home in case you need to leave in a  hurry. Teach your children different ways to get out of the house quickly if they need to.  Take objects that can be used as weapons (guns, knives, hammers) and hide them or lock them up.  Learn the number of a domestic violence shelter. Talk to the people there about how they can help.  Find out about other community resources that can help you.  Take pictures of bruises or other injuries if you can. You can also take pictures of things your abuser has broken.  Teach your children that violence is never okay. Tell them that they do not deserve to be hurt.  Pack a bag   Prepare a kit with things you will need if you leave the house suddenly. You can try to hide this in your house, or you can leave it with a friend or relative you can trust. You should include the following items in the kit:  A set of keys to your house and car.  Emergency phone numbers and addresses. You might also want to have a map and a small flashlight in case you need to leave in the night.  Money such as cash or checks. You can also ask a trusted friend or family member to hold money for you.  Copies of legal documents such as house and car titles or rent receipts, birth certificates, Social Security card, voter registration, marriage and 's licenses, and your children's health records.  Personal items you would need for a few days, such as clothes, a toothbrush, toothpaste, and any medicines you or your children need.  A favorite toy or book for your child or children.  Diapers and bottles, if you have very young children.  Pictures that show signs of abuse and violence. You may also add pictures of your abuser.  If you leave   If you decide to leave, you can take the following steps:  Go to the emergency room at a hospital if you have been hurt.  Ask the police to be with you as you leave. They can protect you as you leave the house.  If you decide to leave secretly, remember that activities can be tracked. Your abuser may still have  "access to your cell phone, email, and credit cards. It may be possible for these to be traced. Always be aware of your surroundings.  Take the kit you have prepared. If this is an emergency, do not worry about gathering up anything. Just leave--your safety is most important.  If your abuser moves out, change the locks on the doors. If you have a security system, change the access code.  When should you call for help?   Call 911 anytime you think you may need emergency care. For example, call if:    You or someone else has just been abused.     You think you or someone else is in danger of being abused.   Watch closely for changes in your health, and be sure to contact your doctor if you have any problems.  Where can you learn more?  Go to https://www.[employer].net/patiented  Enter A752 in the search box to learn more about \"Domestic Violence Safety Instructions: Care Instructions.\"  Current as of: October 20, 2022               Content Version: 13.7    5998-1203 [employer].   Care instructions adapted under license by your healthcare professional. If you have questions about a medical condition or this instruction, always ask your healthcare professional. [employer] disclaims any warranty or liability for your use of this information.      Learning About Domestic Abuse  What is domestic abuse?  Domestic abuse is threats or violent behavior in a personal relationship. It can happen between past or current partners or spouses. It's also called domestic violence or intimate partner violence.  Domestic abuse can affect people of any ethnic group, race, or Congregational. It can affect teens, adults, or the elderly. And it can happen to people of any sexual identity or social status. But most abuse victims are women.  Abusers use fear, bullying, and threats to control their partners. They control what their partners do, where they go, or who they see. They may act jealous, controlling, or " possessive. These early signs of abuse may happen soon after the start of the relationship. Sometimes it can be hard to notice abuse at first. But after the relationship becomes more serious, the abuse may get worse.  If you are being abused in your relationship, it's important to get help. The abuse is not your fault, and you don't have to face it alone.  Be careful  It may not be safe to take home domestic abuse information like this handout. Some people ask a trusted friend to keep it for them. It's also important to plan ahead and to memorize the phone number of places you can go for help. If you are concerned about your safety, do not use your computer, smartphone, or tablet to read about domestic abuse.   What are the types of domestic abuse?  Abuse can be emotional, physical, or sexual.  Emotional abuse  Emotional abuse is a pattern of threats, insults, or controlling behavior. It includes verbal abuse. It goes beyond healthy disagreements in a relationship. It's a sign of an unhealthy relationship, and it may be against the law.  Do you feel threatened, intimidated, or controlled? Does your partner threaten your children, other family members, or pets?  Does your partner:  Use jokes meant to embarrass or shame you?  Call you names?  Tell you that you are a bad parent or threaten to take away your children?  Threaten to have you or your family members deported?  Control your access to money or other basic needs?  Control what you do, who you see or talk to, or where you go?  Another form of emotional abuse is denying that it is happening. Or the abuser may act like the abuse is no big deal or is your fault.  Sexual abuse  With sexual abuse, abusers may try to convince or force you to have sex. They may force you into sex acts you're not comfortable with. Or they may sexually assault you. Sexual abuse can happen even if you are in a committed relationship.  Physical abuse  Physical abuse means that a partner  hits, kicks, or physically hurts you. Physical abuse that starts with a slap might lead to kicking, shoving, and choking over time. The abuser may also threaten to hurt or kill you.  What problems can domestic abuse lead to?  Domestic abuse can be very dangerous. It can cause serious, repeated injury. It can even lead to death.  All forms of abuse can cause long-term health problems from the stress of a violent relationship. Verbal abuse can lead to sexual and physical abuse.  Abuse causes emotional pain, depression, anxiety, and post-traumatic stress. Sexual abuse can lead to sexually transmitted infections (including HIV/AIDS) and unplanned pregnancy.  Pregnancy can be a very dangerous time for people in abusive relationships. Pregnant people who are abused may have anemia, infections, bleeding, or poor weight gain. Abuse during this time may also increase your baby's risk of low birth weight, premature birth, and death.  It can be hard for some victims of domestic abuse to ask for help or to leave their relationship. You may feel scared, stuck, or not sure what steps to take. But it's important not to ignore abuse. Talking to someone could be the first step to ending the abuse and taking care of your own health and happiness again.  Where can you get help?  Talk to a trusted friend. Find a local advocacy group, or talk to your doctor about the abuse.  Contact the National Domestic Violence Hotline at 2-721-059-UGVE (1-257.883.1031) for more safety tips. They can guide you to groups in your area that can help. Or go to the National Coalition Against Domestic Violence website at www.thehotline.org to learn more.  Domestic violence groups or a counselor in your area can help you make a safety plan for yourself and your children.  When to call for help  Call 911 anytime you think you may need emergency care. For example, call if:  You think that you or someone you know is in danger of being abused.  You have been  "hurt and can't have someone safely take you to emergency care.  You have just been abused.  A family member has just been abused.  Where can you learn more?  Go to https://www.[employer].net/patiented  Enter S665 in the search box to learn more about \"Learning About Domestic Abuse.\"  Current as of: February 27, 2023               Content Version: 13.7    6849-9284 [employer].   Care instructions adapted under license by your healthcare professional. If you have questions about a medical condition or this instruction, always ask your healthcare professional. [employer] disclaims any warranty or liability for your use of this information.      Vaginal Bleeding During Pregnancy: Care Instructions  Overview     It's common to have some vaginal spotting when you are pregnant. In some cases, the bleeding isn't serious. And there aren't any more problems with the pregnancy.  But sometimes bleeding is a sign of a more serious problem. This is more common if the bleeding is heavy or painful. Examples of more serious problems include miscarriage, an ectopic pregnancy, and a problem with the placenta.  You may have to see your doctor again to be sure everything is okay. You may also need more tests to find the cause of the bleeding.  Home treatment may be all you need. But it depends on what is causing the bleeding. Be sure to tell your doctor if you have any new symptoms or if your symptoms get worse.  The doctor has checked you carefully, but problems can develop later. If you notice any problems or new symptoms, get medical treatment right away.  Follow-up care is a key part of your treatment and safety. Be sure to make and go to all appointments, and call your doctor if you are having problems. It's also a good idea to know your test results and keep a list of the medicines you take.  How can you care for yourself at home?  If your doctor prescribed medicines, take them exactly as directed. Call " "your doctor if you think you are having a problem with your medicine.  Do not have vaginal sex until your doctor says it's okay.  Do not put anything in your vagina until your doctor says it's okay.  Ask your doctor about other activities you can or can't do.  Get a lot of rest. Being pregnant can make you tired.  Do not use nonsteroidal anti-inflammatory drugs (NSAIDs), such as ibuprofen (Advil, Motrin), naproxen (Aleve), or aspirin, unless your doctor says it is okay.  When should you call for help?   Call 911 anytime you think you may need emergency care. For example, call if:    You passed out (lost consciousness).     You have severe vaginal bleeding. This means you are soaking through a pad each hour for 2 or more hours.     You have sudden, severe pain in your belly or pelvis.   Call your doctor now or seek immediate medical care if:    You have new or worse vaginal bleeding.     You are dizzy or lightheaded, or you feel like you may faint.     You have pain in your belly, pelvis, or lower back.     You think that you are in labor.     You have a sudden release of fluid from your vagina.     You've been having regular contractions for an hour. This means that you've had at least 8 contractions within 1 hour or at least 4 contractions within 20 minutes, even after you change your position and drink fluids.     You notice that your baby has stopped moving or is moving much less than normal.   Watch closely for changes in your health, and be sure to contact your doctor if you have any problems.  Where can you learn more?  Go to https://www.[employer].net/patiented  Enter N829 in the search box to learn more about \"Vaginal Bleeding During Pregnancy: Care Instructions.\"  Current as of: November 9, 2022               Content Version: 13.7    9895-6735 [employer].   Care instructions adapted under license by your healthcare professional. If you have questions about a medical condition or this " instruction, always ask your healthcare professional. [employer], [employer] disclaims any warranty or liability for your use of this information.      Weeks 10 to 14 of Your Pregnancy: Care Instructions  It's now possible to hear the fetus's heartbeat with a special ultrasound device. And the fetus's organs are developing.    Decide about tests to check for birth defects. Think about your age, your chance of passing on a family disease, your need to know about any problems, and what you might do after you have the test results.   It's okay to exercise. Try activities such as walking or swimming. Check with your doctor before starting a new program.     You may feel more tired than usual.  Taking naps during the day may help.     You may feel emotional.  It might help to talk to someone.     You may have headaches.  Try lying down and putting a cool cloth over your forehead.     You can use acetaminophen (Tylenol) for pain relief.  Don't take any anti-inflammatory medicines (such as Advil, Motrin, Aleve), unless your doctor says it's okay.     You may feel a fullness or aching in your lower belly.  This can feel like the kind of cramps you might get before a period. A back rub may help.     You may need to urinate more.  Your growing uterus and changing hormones can affect your bladder.     You may feel sick to your stomach (morning sickness).  Try avoiding food and smells that make you feel sick.     Your breasts may feel different.  They may feel tender or get bigger. Your nipples may get darker. Try a bra that gives you good support.     Avoid alcohol, tobacco, and drugs (including marijuana).  If you need help quitting, talk to your doctor.     Take a daily prenatal vitamin.  Choose one with folic acid.   Follow-up care is a key part of your treatment and safety. Be sure to make and go to all appointments, and call your doctor if you are having problems. It's also a good idea to know your test results and keep  "a list of the medicines you take.  Where can you learn more?  Go to https://www.[employer].net/patiented  Enter E090 in the search box to learn more about \"Weeks 10 to 14 of Your Pregnancy: Care Instructions.\"  Current as of: November 9, 2022               Content Version: 13.7    7419-7907 [employer].   Care instructions adapted under license by your healthcare professional. If you have questions about a medical condition or this instruction, always ask your healthcare professional. [employer] disclaims any warranty or liability for your use of this information.      Understanding Alpha and Beta Thalassemia  What is thalassemia?  Thalassemia [trung-michael-SEE-ryann-uh] is a lifelong blood disorder. It is caused by mutations (changes) in the genes that make hemoglobin.   Hemoglobin is a protein in red blood cells that carries oxygen. Hemoglobin is made of 4 smaller protein chains: 2 blocks of alpha chains and 2 blocks of beta chains (see Figure 1). Each block has heme (iron) in the center. Oxygen sticks to the heme, allowing your body to carry it through the bloodstream. The oxygen is delivered to your whole body.  When you have alpha thalassemia, your alpha blocks are smaller or are missing one or both alpha chains. (See Figure 2 for an example.) For beta thalassemia, the beta blocks are smaller or fewer in number.   With either disorder, your body makes smaller hemoglobin and possibly fewer red blood cells to hold hemoglobin (anemia). You may feel very tired or have other problems as a result.  How do you get thalassemia?  There are 4 genes for alpha thalassemia and 2 genes for beta thalassemia. For you to have either alpha or beta thalassemia, both of your parents must carry a gene mutation for the disease and pass it down to you.   It's possible to have more severe or less severe symptoms than your parents. If you get a mutation from only one parent, for example, you won't have symptoms of " thalassemia (thalassemia trait)--but you could still pass the mutation to your children.  Types of thalassemias  Some types of thalassemia have fewer symptoms than others. How severe the thalassemia is depends on how many mutated genes you have inherited and how many alpha or beta blocks are affected.   Alpha Thalassemia  Silent carrier (1 alpha mutation): People with this type have no symptoms and often do not know they have thalassemia.  Alpha thalassemia trait (2 alpha mutations): Some people with this trait may have mild anemia, but they often feel well.  Hemoglobin H disease (3 alpha mutations): People with this disorder have anemia more often. They sometimes need blood transfusions, but can have a normal life span.  Hemoglobin Barts (4 alpha mutations):  With this type, no alpha blocks are made. Severe problems occur during the mother's pregnancy and newborns rarely survive.  Beta Thalassemia  Beta thalassemia trait (1 beta mutation): People with this trait (also called beta thalassemia minor) have red blood cells that are small. Mild anemia can occur, but it is rare.  Beta thalassemia intermedia (2 beta mutations): In this type, both beta genes are abnormal, but these mutations may be mild. More severe types sometimes need blood transfusions to treat anemia and medicine to treat iron buildup. Patients have normal life spans.  Beta thalassemia major (2 severe beta mutations): This is the most severe form of beta thalassemia. Both beta genes are abnormal, causing little to no beta blocks to be made. Patients often need medicine to reduce iron buildup, as well as monthly blood transfusions to stay healthy. The only cure at this time is a bone marrow transplant. More options are still being studied.  Treatment and outcomes  Mild types: People with a mild type of alpha or beta thalassemia rarely need treatment. Some need folic acid to help make more red blood cells. Most have normal life spans.  Moderate to severe  types: Patients with these types have a higher risk for reduced growth, early bone thinning and pregnancy problems.  Most severe types: These patients often need regular blood transfusions, even if not sick. It is common to have iron build up in your body, so medicine may be needed to reduce the buildup. If left untreated, too much iron, especially in the liver and heart, can lead to premature death.  Outcomes for patients with alpha or beta thalassemia are usually very good in developed countries like the [place]. Survival rates higher than 90% have been reported for children.   For informational purposes only. Not to replace the advice of your health care provider. Copyright   2021 [facility]. All rights reserved. Clinically reviewed by Trevor Mattson MD, Hematology. [employer] 220212 - REV 08/21.    Nutrition During Pregnancy: Care Instructions  Overview     Healthy eating when you are pregnant is important for you and your baby. It can help you feel well and have a successful pregnancy and delivery. During pregnancy your nutrition needs increase. Even if you have excellent eating habits, your doctor may recommend a multivitamin to make sure you get enough iron and folic acid.  You may wonder how much weight you should gain. In general, if you were at a healthy weight before you became pregnant, then you should gain between 25 and 35 pounds. If you were overweight before pregnancy, then you'll likely be advised to gain 15 to 25 pounds. If you were underweight before pregnancy, then you'll probably be advised to gain 28 to 40 pounds. Your doctor will work with you to set a weight goal that is right for you. Gaining a healthy amount of weight helps you have a healthy baby.  Follow-up care is a key part of your treatment and safety. Be sure to make and go to all appointments, and call your doctor if you are having problems. It's also a good idea to know your test results and keep a list of  the medicines you take.  How can you care for yourself at home?  Eat plenty of fruits and vegetables. Include a variety of orange, yellow, and leafy dark-green vegetables every day.  Choose whole-grain bread, cereal, and pasta. Good choices include whole wheat bread, whole wheat pasta, brown rice, and oatmeal.  Get 4 or more servings of milk and milk products each day. Good choices include nonfat or low-fat milk, yogurt, and cheese. If you cannot eat milk products, you can get calcium from calcium-fortified products such as orange juice, soy milk, and tofu. Other non-milk sources of calcium include leafy green vegetables, such as broccoli, kale, mustard greens, turnip greens, bok nabeel, and brussels sprouts.  If you eat meat, pick lower-fat types. Good choices include lean cuts of meat and chicken or turkey without the skin.  Do not eat shark, swordfish, shruti mackerel, or tilefish. They have high levels of mercury, which is dangerous to your baby. You can eat up to 12 ounces a week of fish or shellfish that have low mercury levels. Good choices include shrimp, wild salmon, pollock, and catfish. Limit some other types of fish, such as white (albacore) tuna, to 4 oz (0.1 kg) a week.  Heat lunch meats (such as turkey, ham, or bologna) to 165 F before you eat them. This reduces your risk of getting sick from a kind of bacteria that can be found in lunch meats.  Do not eat unpasteurized soft cheeses, such as brie, feta, fresh mozzarella, and blue cheese. They have a bacteria that could harm your baby.  Limit caffeine. If you drink coffee or tea, have no more than 1 cup a day. Caffeine is also found in bridget.  Do not drink any alcohol. No amount of alcohol has been found to be safe during pregnancy.  Do not diet or try to lose weight. For example, do not follow a low-carbohydrate diet. If you are overweight at the start of your pregnancy, your doctor will work with you to manage your weight gain.  Tell your doctor about  "all vitamins and supplements you take.  When should you call for help?  Watch closely for changes in your health, and be sure to contact your doctor if you have any problems.  Where can you learn more?  Go to https://www.[employer].net/patiented  Enter Y785 in the search box to learn more about \"Nutrition During Pregnancy: Care Instructions.\"  Current as of: March 1, 2023               Content Version: 13.7    5952-2316 [employer].   Care instructions adapted under license by your healthcare professional. If you have questions about a medical condition or this instruction, always ask your healthcare professional. [employer] disclaims any warranty or liability for your use of this information.      Exercise During Pregnancy: Care Instructions  Your Care Instructions     Exercise is good for healthy pregnant women. It can relieve back pain, swelling, and other discomforts of pregnancy. It also prepares your muscles for childbirth. And exercise can improve your energy level and help you sleep better.  If your doctor recommends it, get more exercise. Walking is a good choice. Bit by bit, increase the amount you walk every day. Try for at least 30 minutes on most days of the week. But if you do not already exercise, be sure to talk with your doctor before you start a new exercise program. Try exercise classes for pregnant women. Doctors do not recommend contact sports during pregnancy.  Follow-up care is a key part of your treatment and safety. Be sure to make and go to all appointments, and call your doctor if you are having problems. It's also a good idea to know your test results and keep a list of the medicines you take.  How can you care for yourself at home?  Talk with your doctor about the right kind of exercise for each stage of pregnancy.  Listen to your body to know if your exercise is at a safe level.  Do not become overheated while you exercise. High body temperature can be harmful " "to your baby. Avoid activities that can make your body too hot.  If you feel tired, take it easy. You might walk instead of run.  If you are used to strenuous exercise, pay attention to changes in your body that mean it is time to slow down.  If you exercised before getting pregnant, you should be able to keep up your routine early in your pregnancy. That might include running and aerobics. Later, you may want to switch to swimming or walking.  Eat a small snack or drink juice 15 to 30 minutes before you exercise.  Eat a healthy diet. Make sure it includes plenty of beans, peas, and leafy green vegetables. You may need to increase how much you eat to get extra energy for exercise.  Drink plenty of fluids before, during, and after exercise.  Avoid contact sports, such as soccer and basketball. Also avoid scuba diving, exercise in high altitude (above 6,000 feet), and horseback riding.  Do not get overtired while you exercise. You should be able to talk while you work out.  After your fourth month of pregnancy, avoid exercises (such as sit-ups and some yoga poses) that require you to lie flat on your back on a hard surface.  Try swimming and brisk walking during all your pregnancy.  Get plenty of rest. You may be very tired while you are pregnant.  Where can you learn more?  Go to https://www.[employer].net/patiented  Enter S801 in the search box to learn more about \"Exercise During Pregnancy: Care Instructions.\"  Current as of: November 9, 2022               Content Version: 13.7    0123-9671 [employer].   Care instructions adapted under license by your healthcare professional. If you have questions about a medical condition or this instruction, always ask your healthcare professional. [employer] disclaims any warranty or liability for your use of this information.      Learning About Pregnancy and Obesity  How does your weight affect your pregnancy?     The basics of prenatal care are the " "same for everyone, regardless of size. You'll get what you need to have a healthy baby.  But your size can make a difference in a few things. You and your doctor will have to watch your pregnancy weight. Your weight may affect your labor and delivery.  You may have some extra doctor visits and tests. And you may have some tests earlier in your pregnancy. You'll need to pay close attention to things like blood pressure and the chance of getting gestational diabetes. (This is a type of diabetes that sometimes happens during pregnancy.) And close attention will be given to your developing baby.  Work with your doctor to get the care you need. Go to all your doctor visits, and follow your doctor's advice about what to do and what to avoid during pregnancy.  How much weight gain is healthy?  If you are very overweight (obese), experts recommend that you gain between 11 and 20 pounds. Your doctor will work with you to set a weight goal that's right for you. In some cases, your doctor may recommend that you not gain any weight.  How much extra food do you need to eat?  Although you may joke that you're \"eating for two\" during pregnancy, you don't need to eat twice as much food. How much you can eat depends on:  Your height.  How much you weigh when you get pregnant.  How active you are.  How many babies you're carrying.  In the first trimester, you'll probably need the same amount of calories as you did before you were pregnant. In general, in your second trimester, you need to eat about 340 extra calories a day. In your third trimester, you need to eat about 450 extra calories a day.  You can get about 340 calories in a peanut butter sandwich. Having a cup of 1% milk with a peanut butter sandwich is about 450 calories.  What can you do to have a healthy pregnancy?  The best things you can do for you and your baby are to eat healthy foods, get regular exercise, avoid alcohol and smoking, and go to your doctor visits.  Eat " "a variety of foods from all the food groups. Make sure to get enough calcium and folic acid.  You may want to work with a dietitian to help you plan healthy meals to get the right amount of calories for you.  If you didn't exercise much before you got pregnant, talk to your doctor about how you can slowly get more active. Your doctor may want to set up an exercise program with you.  Where can you learn more?  Go to https://www.[employer].net/patiented  Enter B644 in the search box to learn more about \"Learning About Pregnancy and Obesity.\"  Current as of: November 9, 2022               Content Version: 13.7    5773-4579 [employer].   Care instructions adapted under license by your healthcare professional. If you have questions about a medical condition or this instruction, always ask your healthcare professional. [employer] disclaims any warranty or liability for your use of this information.      You have been provided the CDC Warning Signs in Pregnancy document.    Additional copies can be found here: www.[employer].com/455414.pdf  "

## 2023-09-26 NOTE — PROGRESS NOTES
KAREEM RN Prenatal Intake note  Subjective     23 year old female newly pregnant.  LMP: 23.    unsure had recently stopped OCPs  Pregnancy is unplanned but welcomed.    Partner/support person name and relationship - Williams-.        Symptoms since LMP include cramping.   OB History    Para Term  AB Living   2 1 1 0 0 1   SAB IAB Ectopic Multiple Live Births   0 0 0 0 1      # Outcome Date GA Lbr Marlo/2nd Weight Sex Delivery Anes PTL Lv   2 Current            1 Term 09/15/20 39w4d   M CS-LTranv EPI, Nitrous, Spinal N MARIANA      Complications: GBS, Chorioamnionitis      Name: RICA DUMONT-FELICITEE      Apgar1: 2  Apgar5: 6      Obstetric Comments    chorioamnionitis, GBS            OB COMPLICATIONS  First Pregnancy No  GDM No  HTNNo  Preeclampsia No   labor No   birth No   birth Yes: chorioamnionitis  PP hemorrhage No  Retained placenta No  PP mood disorder Yes: that is when she was started on celexa  Fetal anomaly No  FGR No  Macrosomia  No  3rd or 4th degree laceration  No  Shoulder dystocia No  NICU admit Yes: for 2 days for antibiotic tx       PERSONAL/SOCIAL HISTORY  *updated all tabs in history    lives with their family.  Employment: Full time as a / at a [employer].  Job involves moderate activity and heavy activity .  Her partner works as a [employer].   MENTAL HEALTH HISTORY:  anxiety, depression, current medication, and past therapist      - Current Medications    Current Outpatient Medications   Medication Sig Dispense Refill    citalopram (CELEXA) 40 MG tablet Take 1 tablet by mouth every morning      Prenatal Vit-Fe Fumarate-FA (PRENATAL MULTIVITAMIN W/IRON) 27-0.8 MG tablet Take 1 tablet by mouth daily      hydrOXYzine (ATARAX) 25 MG tablet  (Patient not taking: Reported on 2023)      VENTOLIN  (90 Base) MCG/ACT inhaler 2 puffs every 4 hours as needed for shortness of breath or wheezing (Patient not taking: Reported on  2023)             - Co-morbids    Past Medical History:   Diagnosis Date    Accidental overdose of heroin (H) 2020    Twice in 2019    Anxiety     has used mirazapine     Cannabis dependence (H) 2018    Chemical dependency (H) 2020    7/15/20: Sober since 2020.    Depression     Depressive disorder     Heroin overdose (H)     x2    Moderate episode of recurrent major depressive disorder (H) 2018    Polysubstance (excluding opioids) dependence (H) 2019    Positive GBS test 2020    S/P  section 2020    Smoker 07/15/2020    7/15/20: states she has weaned down to 3 cigs/day    Substance abuse (H)     history    Suicide attempt (H)     age 14 and age 17     Vitamin D deficiency 2020    3/10/20- Vit D 22. Discuss supplementation next visit___       - Genetic/Infection questionnaire completed, risks include cousin with autism. Discussed genetic screening options, patient does desire first trimester genetic screening  Pt  does not have a recent known exposure to Parvo or CMV so IgG/IgM testing WILL NOT be ordered.   Flu Vaccine.  Patient declined, will consider next visit  COVID Vaccine: Hx of COVID illness  (details below) in , without complications.  Had one dose of J&J vaccine  - Discussed expectations for routine prenatal care and scheduling.  -Discussed highlights from The Expectant Family booklet on warning signs, safe pregnancy and prevention of infections diseases, nutrition and exercise.  - Patient was encouraged to start prenatal vitamins as tolerated, if experiencing nausea and vomiting then OK to switch to folic acid only at this time.    -Additional items: Has been given information regarding WIC  Was given the phone number for [facility]  -Reconciled and reviewed problem list  Not scheduled yet for initial OB visit.Based on LMP, she is  only about 4 weeks pregnant.  Encouraged BHCG's to confirm.  She reports that she had a period  around 7/26 when she stopped OCPs, then spotting for 3 days around 8/29.    Chioma Kumar RN

## 2023-09-27 ENCOUNTER — LAB (OUTPATIENT)
Dept: LAB | Facility: CLINIC | Age: 23
End: 2023-09-27
Payer: COMMERCIAL

## 2023-09-27 DIAGNOSIS — Z34.91 CURRENTLY PREGNANT IN FIRST TRIMESTER WITH UNKNOWN GESTATIONAL AGE: ICD-10-CM

## 2023-09-27 LAB — HCG INTACT+B SERPL-ACNC: 231 MIU/ML

## 2023-09-27 PROCEDURE — 36415 COLL VENOUS BLD VENIPUNCTURE: CPT

## 2023-09-27 PROCEDURE — 84702 CHORIONIC GONADOTROPIN TEST: CPT

## 2023-09-29 ENCOUNTER — LAB (OUTPATIENT)
Dept: LAB | Facility: CLINIC | Age: 23
End: 2023-09-29
Payer: COMMERCIAL

## 2023-09-29 ENCOUNTER — TELEPHONE (OUTPATIENT)
Dept: OBGYN | Facility: CLINIC | Age: 23
End: 2023-09-29

## 2023-09-29 DIAGNOSIS — Z34.91 CURRENTLY PREGNANT IN FIRST TRIMESTER WITH UNKNOWN GESTATIONAL AGE: ICD-10-CM

## 2023-09-29 DIAGNOSIS — Z32.01 PREGNANCY TEST POSITIVE: Primary | ICD-10-CM

## 2023-09-29 LAB — HCG INTACT+B SERPL-ACNC: 538 MIU/ML

## 2023-09-29 PROCEDURE — 36415 COLL VENOUS BLD VENIPUNCTURE: CPT

## 2023-09-29 PROCEDURE — 84702 CHORIONIC GONADOTROPIN TEST: CPT

## 2023-09-29 NOTE — TELEPHONE ENCOUNTER
Attempted to call patient regarding appriately rising HCG. Left voice message requesting call back to schedule US (1-2 weeks out) and first provider OB visit.

## 2023-10-03 DIAGNOSIS — Z32.01 PREGNANCY TEST POSITIVE: Primary | ICD-10-CM

## 2023-10-03 PROBLEM — O99.322: Status: ACTIVE | Noted: 2020-03-24

## 2023-10-03 PROBLEM — O09.70 SUPERVISION OF HIGH RISK PREGNANCY DUE TO SOCIAL PROBLEMS, ANTEPARTUM: Status: ACTIVE | Noted: 2023-10-03

## 2023-10-03 PROBLEM — F11.10: Status: ACTIVE | Noted: 2020-03-24

## 2023-10-03 PROBLEM — F19.20 POLYSUBSTANCE (EXCLUDING OPIOIDS) DEPENDENCE (H): Status: ACTIVE | Noted: 2019-02-11

## 2023-10-03 PROBLEM — T40.601A OPIATE OVERDOSE (H): Status: ACTIVE | Noted: 2019-07-27

## 2023-10-04 ENCOUNTER — ANCILLARY PROCEDURE (OUTPATIENT)
Dept: ULTRASOUND IMAGING | Facility: CLINIC | Age: 23
End: 2023-10-04
Attending: MIDWIFE
Payer: MEDICAID

## 2023-10-04 DIAGNOSIS — Z34.90 EARLY STAGE OF PREGNANCY: Primary | ICD-10-CM

## 2023-10-04 DIAGNOSIS — Z32.01 PREGNANCY TEST POSITIVE: ICD-10-CM

## 2023-10-04 PROCEDURE — 76817 TRANSVAGINAL US OBSTETRIC: CPT

## 2023-10-04 PROCEDURE — 76817 TRANSVAGINAL US OBSTETRIC: CPT | Mod: 26 | Performed by: OBSTETRICS & GYNECOLOGY

## 2023-10-17 PROBLEM — F19.10 POLYSUBSTANCE ABUSE (H): Status: RESOLVED | Noted: 2023-09-26 | Resolved: 2023-10-17

## 2023-10-17 PROBLEM — F11.11 HISTORY OF OPIOID ABUSE (H): Status: ACTIVE | Noted: 2023-10-17

## 2023-10-17 PROBLEM — F19.20 POLYSUBSTANCE (EXCLUDING OPIOIDS) DEPENDENCE (H): Status: RESOLVED | Noted: 2019-02-11 | Resolved: 2023-10-17

## 2023-10-17 PROBLEM — T40.1X1A ACCIDENTAL OVERDOSE OF HEROIN (H): Status: RESOLVED | Noted: 2020-05-01 | Resolved: 2023-10-17

## 2023-10-17 PROBLEM — F11.10: Status: RESOLVED | Noted: 2020-03-24 | Resolved: 2023-10-17

## 2023-10-17 PROBLEM — O99.322: Status: RESOLVED | Noted: 2020-03-24 | Resolved: 2023-10-17

## 2023-10-17 PROBLEM — T40.601A OPIATE OVERDOSE (H): Status: RESOLVED | Noted: 2019-07-27 | Resolved: 2023-10-17

## 2023-10-17 LAB
ABO/RH(D): NORMAL
ANTIBODY SCREEN: NEGATIVE
SPECIMEN EXPIRATION DATE: NORMAL

## 2023-10-18 ENCOUNTER — TRANSCRIBE ORDERS (OUTPATIENT)
Dept: MATERNAL FETAL MEDICINE | Facility: CLINIC | Age: 23
End: 2023-10-18

## 2023-10-18 ENCOUNTER — PRENATAL OFFICE VISIT (OUTPATIENT)
Dept: OBGYN | Facility: CLINIC | Age: 23
End: 2023-10-18
Attending: ADVANCED PRACTICE MIDWIFE
Payer: MEDICAID

## 2023-10-18 ENCOUNTER — APPOINTMENT (OUTPATIENT)
Dept: LAB | Facility: CLINIC | Age: 23
End: 2023-10-18
Attending: ADVANCED PRACTICE MIDWIFE
Payer: MEDICAID

## 2023-10-18 ENCOUNTER — MYC MEDICAL ADVICE (OUTPATIENT)
Dept: OBGYN | Facility: CLINIC | Age: 23
End: 2023-10-18

## 2023-10-18 VITALS
DIASTOLIC BLOOD PRESSURE: 61 MMHG | HEIGHT: 62 IN | HEART RATE: 76 BPM | BODY MASS INDEX: 24.16 KG/M2 | WEIGHT: 131.3 LBS | SYSTOLIC BLOOD PRESSURE: 95 MMHG

## 2023-10-18 DIAGNOSIS — K64.4 EXTERNAL HEMORRHOIDS: ICD-10-CM

## 2023-10-18 DIAGNOSIS — K59.01 SLOW TRANSIT CONSTIPATION: ICD-10-CM

## 2023-10-18 DIAGNOSIS — B96.89 BV (BACTERIAL VAGINOSIS): Primary | ICD-10-CM

## 2023-10-18 DIAGNOSIS — F17.290 VAPING NICOTINE DEPENDENCE, TOBACCO PRODUCT: ICD-10-CM

## 2023-10-18 DIAGNOSIS — O09.91 SUPERVISION OF HIGH RISK PREGNANCY IN FIRST TRIMESTER: Primary | ICD-10-CM

## 2023-10-18 DIAGNOSIS — O09.70 SUPERVISION OF HIGH RISK PREGNANCY DUE TO SOCIAL PROBLEMS, ANTEPARTUM: ICD-10-CM

## 2023-10-18 DIAGNOSIS — O26.90 PREGNANCY RELATED CONDITION, ANTEPARTUM: Primary | ICD-10-CM

## 2023-10-18 DIAGNOSIS — F11.11 HISTORY OF OPIOID ABUSE (H): ICD-10-CM

## 2023-10-18 DIAGNOSIS — Z91.51 HISTORY OF SUICIDE ATTEMPT: ICD-10-CM

## 2023-10-18 DIAGNOSIS — N76.0 BV (BACTERIAL VAGINOSIS): Primary | ICD-10-CM

## 2023-10-18 LAB
AMPHETAMINES UR QL SCN: ABNORMAL
BACTERIAL VAGINOSIS VAG-IMP: POSITIVE
BARBITURATES UR QL SCN: ABNORMAL
BENZODIAZ UR QL SCN: ABNORMAL
BZE UR QL SCN: ABNORMAL
CANDIDA DNA VAG QL NAA+PROBE: NOT DETECTED
CANDIDA GLABRATA / CANDIDA KRUSEI DNA: NOT DETECTED
CANNABINOIDS UR QL SCN: ABNORMAL
CREAT UR-MCNC: 147 MG/DL
ERYTHROCYTE [DISTWIDTH] IN BLOOD BY AUTOMATED COUNT: 12 % (ref 10–15)
FENTANYL UR QL: ABNORMAL
HBV SURFACE AB SERPL IA-ACNC: 16.39 M[IU]/ML
HBV SURFACE AB SERPL IA-ACNC: REACTIVE M[IU]/ML
HBV SURFACE AG SERPL QL IA: NONREACTIVE
HCT VFR BLD AUTO: 36.6 % (ref 35–47)
HCV AB SERPL QL IA: NONREACTIVE
HGB BLD-MCNC: 12.8 G/DL (ref 11.7–15.7)
HIV 1+2 AB+HIV1 P24 AG SERPL QL IA: NONREACTIVE
MCH RBC QN AUTO: 31.8 PG (ref 26.5–33)
MCHC RBC AUTO-ENTMCNC: 35 G/DL (ref 31.5–36.5)
MCV RBC AUTO: 91 FL (ref 78–100)
OPIATES UR QL SCN: ABNORMAL
PCP QUAL URINE (ROCHE): ABNORMAL
PLATELET # BLD AUTO: 217 10E3/UL (ref 150–450)
RBC # BLD AUTO: 4.02 10E6/UL (ref 3.8–5.2)
RUBV IGG SERPL QL IA: 3.81 INDEX
RUBV IGG SERPL QL IA: POSITIVE
T PALLIDUM AB SER QL: NONREACTIVE
T VAGINALIS DNA VAG QL NAA+PROBE: NOT DETECTED
VIT D+METAB SERPL-MCNC: 36 NG/ML (ref 20–50)
VZV IGG SER QL IA: 234 INDEX
VZV IGG SER QL IA: POSITIVE
WBC # BLD AUTO: 8.6 10E3/UL (ref 4–11)

## 2023-10-18 PROCEDURE — 86803 HEPATITIS C AB TEST: CPT | Performed by: ADVANCED PRACTICE MIDWIFE

## 2023-10-18 PROCEDURE — 87389 HIV-1 AG W/HIV-1&-2 AB AG IA: CPT | Performed by: ADVANCED PRACTICE MIDWIFE

## 2023-10-18 PROCEDURE — 99207 PR PRENATAL VISIT: CPT | Performed by: ADVANCED PRACTICE MIDWIFE

## 2023-10-18 PROCEDURE — 90686 IIV4 VACC NO PRSV 0.5 ML IM: CPT

## 2023-10-18 PROCEDURE — 0352U MULTIPLEX VAGINAL PANEL BY PCR: CPT | Performed by: ADVANCED PRACTICE MIDWIFE

## 2023-10-18 PROCEDURE — 87591 N.GONORRHOEAE DNA AMP PROB: CPT | Performed by: ADVANCED PRACTICE MIDWIFE

## 2023-10-18 PROCEDURE — 87491 CHLMYD TRACH DNA AMP PROBE: CPT | Performed by: ADVANCED PRACTICE MIDWIFE

## 2023-10-18 PROCEDURE — 86787 VARICELLA-ZOSTER ANTIBODY: CPT | Performed by: ADVANCED PRACTICE MIDWIFE

## 2023-10-18 PROCEDURE — 82306 VITAMIN D 25 HYDROXY: CPT | Performed by: ADVANCED PRACTICE MIDWIFE

## 2023-10-18 PROCEDURE — 86762 RUBELLA ANTIBODY: CPT | Performed by: ADVANCED PRACTICE MIDWIFE

## 2023-10-18 PROCEDURE — 36415 COLL VENOUS BLD VENIPUNCTURE: CPT | Performed by: ADVANCED PRACTICE MIDWIFE

## 2023-10-18 PROCEDURE — 87086 URINE CULTURE/COLONY COUNT: CPT | Performed by: ADVANCED PRACTICE MIDWIFE

## 2023-10-18 PROCEDURE — 99213 OFFICE O/P EST LOW 20 MIN: CPT | Mod: 25 | Performed by: ADVANCED PRACTICE MIDWIFE

## 2023-10-18 PROCEDURE — 86706 HEP B SURFACE ANTIBODY: CPT | Performed by: ADVANCED PRACTICE MIDWIFE

## 2023-10-18 PROCEDURE — 86780 TREPONEMA PALLIDUM: CPT | Performed by: ADVANCED PRACTICE MIDWIFE

## 2023-10-18 PROCEDURE — G0008 ADMIN INFLUENZA VIRUS VAC: HCPCS

## 2023-10-18 PROCEDURE — 80349 CANNABINOIDS NATURAL: CPT | Performed by: ADVANCED PRACTICE MIDWIFE

## 2023-10-18 PROCEDURE — 86901 BLOOD TYPING SEROLOGIC RH(D): CPT | Performed by: ADVANCED PRACTICE MIDWIFE

## 2023-10-18 PROCEDURE — 80307 DRUG TEST PRSMV CHEM ANLYZR: CPT | Performed by: ADVANCED PRACTICE MIDWIFE

## 2023-10-18 PROCEDURE — 87340 HEPATITIS B SURFACE AG IA: CPT | Performed by: ADVANCED PRACTICE MIDWIFE

## 2023-10-18 PROCEDURE — 250N000011 HC RX IP 250 OP 636

## 2023-10-18 PROCEDURE — 85027 COMPLETE CBC AUTOMATED: CPT | Performed by: ADVANCED PRACTICE MIDWIFE

## 2023-10-18 RX ORDER — PYRIDOXINE HCL (VITAMIN B6) 25 MG
25 TABLET ORAL 3 TIMES DAILY
Qty: 90 TABLET | Refills: 2 | Status: SHIPPED | OUTPATIENT
Start: 2023-10-18 | End: 2024-04-10

## 2023-10-18 RX ORDER — METAPROTERENOL SULFATE 10 MG
1000 TABLET ORAL DAILY
Qty: 90 CAPSULE | Refills: 3 | Status: SHIPPED | OUTPATIENT
Start: 2023-10-18 | End: 2024-08-20

## 2023-10-18 RX ORDER — MULTIVIT-MIN/IRON/FOLIC ACID/K 18-600-40
1 CAPSULE ORAL DAILY
Qty: 90 CAPSULE | Refills: 3 | Status: SHIPPED | OUTPATIENT
Start: 2023-10-18 | End: 2024-08-20

## 2023-10-18 RX ORDER — HYDROCORTISONE 25 MG/G
CREAM TOPICAL 2 TIMES DAILY PRN
Qty: 30 G | Refills: 1 | Status: SHIPPED | OUTPATIENT
Start: 2023-10-18 | End: 2024-08-20

## 2023-10-18 RX ORDER — ASPIRIN 81 MG
100 TABLET, DELAYED RELEASE (ENTERIC COATED) ORAL DAILY
Qty: 60 TABLET | Refills: 1 | Status: SHIPPED | OUTPATIENT
Start: 2023-10-18 | End: 2024-08-20

## 2023-10-18 RX ORDER — PRENATAL WITH FERROUS FUM AND FOLIC ACID 3080; 920; 120; 400; 22; 1.84; 3; 20; 10; 1; 12; 200; 27; 25; 2 [IU]/1; [IU]/1; MG/1; [IU]/1; MG/1; MG/1; MG/1; MG/1; MG/1; MG/1; UG/1; MG/1; MG/1; MG/1; MG/1
1 TABLET ORAL
COMMUNITY
Start: 2023-09-25

## 2023-10-18 RX ORDER — CITALOPRAM HYDROBROMIDE 20 MG/1
TABLET ORAL
COMMUNITY
Start: 2023-07-24 | End: 2023-10-18 | Stop reason: DRUGHIGH

## 2023-10-18 NOTE — PROGRESS NOTES
"S Provider New OB Note    Reviewed RN intake note.    Sophia is a 23 year old female who presents to clinic for a new OB visit.   at 7w0d with Estimated Date of Delivery: 2024 based on LMP - had US but was too early and needs f/u. Feels well. Has started PNV.   LMP: 23, 7/1 weeks, EDB 24    US: 10/4/23: 5/2 weeks, EDB 23  Follow up ultrasound scheduled for 23    Unplanned pregnancy but is excited  Morning sickness, was having emesis daily, starting taking natacha lozenges 2 days ago and is feeling a little better, has not thrown up since. Still feeling nauseous though  Constipation/hemorrhoids - since pregnancy.   Cramping - pelvic cramping intermittently. Urinary pain / itchiness, denies odor, agreeable to vaginal infection check today    She has not had bleeding since her LMP.   She has had mild nausea. Weight loss has not occurred.   This was not a planned pregnancy.   Partner is involved, Williams     OB hx 9/15/20, 39/4 weeks, PLTCS, GBS, chorioamnionitis, Apgars 2, 6 NICU admit \"Shmuel Jr\"   Polysubstance use disorder: Heroin, tobacco, alcohol, THC.   - uncomplicated first pregnancy. Started on citalopram postpartum -> PPD.    - upped celexa dose to 40mg, when she stopped the hydroxyzine.    - not in therapy but has before, feels she is ok currently     Hx of heroin use overdose  History of suicide attempt at least two times    Fee has been Sober 4 years, Shmuel has also been sober 4 years,  Both of their families live close by and are supportive. Fee clarifies that \"her family\" is chosen, she does not talk with her biological family    Substances - denies stronger substance use, current THC/vape of tobacco. Desires nicotine gum to reduce vaping    Presented in early latent labor 3.5 cm, miso pitocin, epidural, rebolused three times without success. Developed Cat II RFD, Triple I with maternal fever and leukocytosis, urgent CS   The procedure was complicated thermal injury " to descending colon that did not require repair per phone consult with pediatric surgery.     -Interested in  but undecided and nervous about it, will continue to think about it    PERSONAL/SOCIAL HISTORY  *updated all tabs in history    lives with their family.  Employment: Full time as a / at a hotel.  Job involves moderate activity and heavy activity .  Her partner works as a [employer] marce.     MENTAL HEALTH HISTORY:  anxiety, depression, current medication Celexa 40mg and past therapist    PSYCHIATRIC: Has History of depression, anxiety, and post partum depression.   Taking increased dose of Celexa, not currently in therapy. Knows how to monitor mood and will let us know if she needs more resources    PHQ-9 score:        2023    11:32 AM   PHQ-9 SCORE   PHQ-9 Total Score MyChart 7 (Mild depression)   PHQ-9 Total Score 7         2020     8:04 AM 2020    12:32 PM 2023    11:30 AM   YELITZA-7 SCORE   Total Score   7 (mild anxiety)   Total Score 4 6 7       Past History:  Her past medical history   Past Medical History:   Diagnosis Date    Accidental overdose of heroin (H) 2020    Twice in 2019    Anxiety     has used mirazapine     Cannabis dependence (H) 2018    Chemical dependency (H) 2020    7/15/20: Sober since 2020.    Depression     Depressive disorder     Heroin overdose (H)     x2    Moderate episode of recurrent major depressive disorder (H) 2018    Polysubstance (excluding opioids) dependence (H) 2019    Positive GBS test 2020    S/P  section 2020    Smoker 07/15/2020    7/15/20: states she has weaned down to 3 cigs/day    Substance abuse (H)     history    Suicide attempt (H)     age 14 and age 17     Vitamin D deficiency 2020    3/10/20- Vit D 22. Discuss supplementation next visit___   .   Her past pregnancies have been uncomplicated - delivery complicated by infection leading to urgent CS &  PPD  Since her last LMP she denies use of alcohol, tobacco and street drugs and admits to the use of THC and vape / tobacco .  HISTORY:  Family History   Problem Relation Age of Onset    No Known Problems Mother     No Known Problems Father     No Known Problems Maternal Grandmother     No Known Problems Maternal Grandfather     No Known Problems Paternal Grandmother     No Known Problems Paternal Grandfather     No Known Problems Son     No Known Problems Maternal Half-Brother     Interpersonal Violence Maternal Half-Brother         stabbed    No Known Problems Maternal Half-Sister     No Known Problems Maternal Half-Sister     No Known Problems Maternal Half-Sister     No Known Problems Maternal Half-Sister     No Known Problems Maternal Half-Sister     No Known Problems Paternal Half-Brother     No Known Problems Paternal Half-Sister      Social History     Socioeconomic History    Marital status: Single     Spouse name: Oskar other: Williams    Years of education: 11.5    Highest education level: 11th grade   Occupational History    Occupation: unemployed   Tobacco Use    Smoking status: Every Day     Packs/day: 0.50     Years: 5.00     Additional pack years: 0.00     Total pack years: 2.50     Types: Vaping Device, Cigarettes     Start date: 2015     Last attempt to quit:      Years since quittin.7    Smokeless tobacco: Former    Tobacco comments:     Patient utilizing nicotine lozenges (no longer using)   Vaping Use    Vaping Use: Every day    Substances: Nicotine, THC   Substance and Sexual Activity    Alcohol use: Not Currently     Comment: Went to treatment at [facility], currently in remission    Drug use: Not Currently     Types: Cocaine, Marijuana, Opiates, Methamphetamines     Comment: + heroin. last smoked marijuana 2/10, has not used any other substance since 2019    Sexual activity: Yes     Partners: Male     Social Determinants of Health     Financial Resource Strain: Low Risk   (3/10/2020)    Overall Financial Resource Strain (CARDIA)     Difficulty of Paying Living Expenses: Not very hard   Food Insecurity: No Food Insecurity (3/10/2020)    Hunger Vital Sign     Worried About Running Out of Food in the Last Year: Never true     Ran Out of Food in the Last Year: Never true   Transportation Needs: No Transportation Needs (3/10/2020)    PRAPARE - Transportation     Lack of Transportation (Medical): No     Lack of Transportation (Non-Medical): No   Physical Activity: Insufficiently Active (3/10/2020)    Exercise Vital Sign     Days of Exercise per Week: 1 day     Minutes of Exercise per Session: 60 min   Stress: Stress Concern Present (3/10/2020)    [employer] - Occupational Stress Questionnaire     Feeling of Stress : Very much   Social Connections: Socially Isolated (3/10/2020)    Social Connection and Isolation Panel [NHANES]     Frequency of Communication with Friends and Family: More than three times a week     Frequency of Social Gatherings with Friends and Family: More than three times a week     Attends Rastafari Services: Never     Active Member of Clubs or Organizations: No     Attends Club or Organization Meetings: Never     Marital Status: Never    Interpersonal Safety: Not At Risk (3/10/2020)    Humiliation, Afraid, Rape, and Kick questionnaire     Fear of Current or Ex-Partner: No     Emotionally Abused: No     Physically Abused: No     Sexually Abused: No     Current Outpatient Medications   Medication Sig    Cholecalciferol (VITAMIN D) 50 MCG (2000 UT) CAPS Take 1 capsule by mouth daily Take one capsule daily.    citalopram (CELEXA) 40 MG tablet Take 1 tablet by mouth every morning    docusate sodium (COLACE) 100 MG tablet Take 1 tablet (100 mg) by mouth daily    doxylamine (UNISOM) 25 MG TABS tablet Take 1 tablet (25 mg) by mouth at bedtime    hydrocortisone, Perianal, (HYDROCORTISONE) 2.5 % cream Place rectally 2 times daily as needed for  hemorrhoids Apply twice a day for hemorrhoids x 3-5 days    nicotine (NICORETTE) 2 MG gum Place 1 each (2 mg) inside cheek every hour as needed for smoking cessation    Omega-3 Fish Oil 500 MG capsule Take 2 capsules (1,000 mg) by mouth daily    Prenatal 27-1 MG TABS Take 1 tablet by mouth daily at 2 pm    Prenatal Vit-Fe Fumarate-FA (PRENATAL MULTIVITAMIN W/IRON) 27-0.8 MG tablet Take 1 tablet by mouth daily    pyridOXINE (VITAMIN B6) 25 MG tablet Take 1 tablet (25 mg) by mouth 3 times daily    citalopram (CELEXA) 20 MG tablet  (Patient not taking: Reported on 10/18/2023)    hydrOXYzine (ATARAX) 25 MG tablet  (Patient not taking: Reported on 2023)    VENTOLIN  (90 Base) MCG/ACT inhaler 2 puffs every 4 hours as needed for shortness of breath or wheezing (Patient not taking: Reported on 2023)     No current facility-administered medications for this visit.     Facility-Administered Medications Ordered in Other Visits   Medication    Self Administer Medications: Behavioral Services    Self Administer Medications: Behavioral Services    Self Administer Medications: Behavioral Services     No Known Allergies    ============================================  MEDICAL HISTORY  Past Medical History:   Diagnosis Date    Accidental overdose of heroin (H) 2020    Twice in 2019    Anxiety     has used mirazapine     Cannabis dependence (H) 2018    Chemical dependency (H) 2020    7/15/20: Sober since 2020.    Depression     Depressive disorder     Heroin overdose (H)     x2    Moderate episode of recurrent major depressive disorder (H) 2018    Polysubstance (excluding opioids) dependence (H) 2019    Positive GBS test 2020    S/P  section 2020    Smoker 07/15/2020    7/15/20: states she has weaned down to 3 cigs/day    Substance abuse (H)     history    Suicide attempt (H)     age 14 and age 17     Vitamin D deficiency 2020    3/10/20- Vit D 22.  "Discuss supplementation next visit___     Past Surgical History:   Procedure Laterality Date     SECTION N/A 9/15/2020    Procedure:  SECTION;  Surgeon: Marilin Trammell MD;  Location: [facility]       OB History    Para Term  AB Living   2 1 1 0 0 1   SAB IAB Ectopic Multiple Live Births   0 0 0 0 1      # Outcome Date GA Lbr Marlo/2nd Weight Sex Delivery Anes PTL Lv   2 Current            1 Term 09/15/20 39w4d   M CS-LTranv EPI, Nitrous, Spinal N MARIANA      Complications: GBS, Chorioamnionitis      Name: JULIA,MALE-FELICITEE      Apgar1: 2  Apgar5: 6      Obstetric Comments    chorioamnionitis, GBS          Reviewed genetic history form - interested in genetic screening    GYN History- Abnormal Pap Smears                        Cervical procedures: none     Next pap due later this year, ok to wait until PP                        History of STI: C & G 5y ago, treated    I personally reviewed the past social/family/medical and surgical history on the date of service.     OBJECTIVE:  BP 95/61   Pulse 76   Ht 1.575 m (5' 2.01\")   Wt 59.6 kg (131 lb 4.8 oz)   LMP 2023 (Approximate)   BMI 24.01 kg/m    ROS: 10 point ROS neg other than the symptoms noted above in the HPI.    EXAM:  BP 95/61   Pulse 76   Ht 1.575 m (5' 2.01\")   Wt 59.6 kg (131 lb 4.8 oz)   LMP 2023 (Approximate)   BMI 24.01 kg/m     EXAM:  GENERAL:  Pleasant pregnant female, alert, cooperative and well groomed.  SKIN:  Warm and dry, without lesions or rashes  HEAD: Symmetrical features.  NECK:  Thyroid without enlargement and nodules.  Lymph nodes not palpable.   LUNGS:  Clear to auscultation, equal bilaterally  BREAST:  No dominant, fixed or suspicious masses are noted.  No skin or nipple changes or axillary nodes.   Nipples everted.      HEART:  RRR without murmur.  MUSCULOSKELETAL:  Full range of motion  EXTREMITIES:  No edema. No significant varicosities.   PELVIC EXAM: Reviewed lack of " research supporting the utility of routine pelvic exams for people who are not experiencing bothersome symptoms or who are not at risk for certain gynecological conditions. The American College of Obstetricians and Gynecologists (ACOG) recommends that pelvic exams be offered only when a patient has symptoms or a medical history that would prompt an exam. Sophia Stiles is asymptomatic and defers an exam today.      Lab Results   Component Value Date    PAP NIL 2020      Recommended Flu Vaccine: agreeable, completed today    ASSESSMENT:  23 year old , 7w0d weeks of pregnancy with JOSE of 2024 by LMP  Intrauterine pregnancy 7w0d size  consistent with dates  Genetic Screening: First Trimester Screen    ICD-10-CM    1. Supervision of high risk pregnancy in first trimester  O09.91 Varicella Zoster Virus Antibody IgG     Urine Culture     Vitamin D Deficiency     Treponema Abs w Reflex to RPR and Titer     Rubella Antibody IgG     Hepatitis C antibody     Hepatitis B surface antigen     Hepatitis B Surface Antibody     HIV Antigen Antibody Combo Cascade     CBC with platelets     ABO/Rh type and screen     Neisseria gonorrhoeae PCR     Chlamydia trachomatis PCR     Mat Fetal Med Ctr Referral - Pregnancy     INFLUENZA VACCINE >6 MONTHS (AFLURIA/FLUZONE)     hydrocortisone, Perianal, (HYDROCORTISONE) 2.5 % cream      2. History of opioid abuse (H)  F11.11       3. History of suicide attempt  Z91.51       4. Supervision of high risk pregnancy due to social problems, antepartum  O09.70 Varicella Zoster Virus Antibody IgG     Urine Culture     Vitamin D Deficiency     Treponema Abs w Reflex to RPR and Titer     Rubella Antibody IgG     Hepatitis C antibody     Hepatitis B surface antigen     Hepatitis B Surface Antibody     HIV Antigen Antibody Combo Cascade     CBC with platelets     ABO/Rh type and screen     Neisseria gonorrhoeae PCR     Chlamydia trachomatis PCR     Mat Fetal Med Ctr Referral -  Pregnancy     docusate sodium (COLACE) 100 MG tablet     Multiplex Vaginal Panel by PCR     nicotine (NICORETTE) 2 MG gum     Urine Drug Screen Yes     pyridOXINE (VITAMIN B6) 25 MG tablet     doxylamine (UNISOM) 25 MG TABS tablet     Omega-3 Fish Oil 500 MG capsule     Cholecalciferol (VITAMIN D) 50 MCG (2000 UT) CAPS      5. Slow transit constipation  K59.01       6. External hemorrhoids  K64.4       7. Postpartum depression  F53.0         Fee was seen today for prenatal care.    Diagnoses and all orders for this visit:    Supervision of high risk pregnancy in first trimester  -     Varicella Zoster Virus Antibody IgG  -     Urine Culture  -     Vitamin D Deficiency  -     Treponema Abs w Reflex to RPR and Titer  -     Rubella Antibody IgG  -     Hepatitis C antibody  -     Hepatitis B surface antigen  -     Hepatitis B Surface Antibody  -     HIV Antigen Antibody Combo Cascade  -     CBC with platelets  -     ABO/Rh type and screen  -     Neisseria gonorrhoeae PCR  -     Chlamydia trachomatis PCR  -     Mat Fetal Med Ctr Referral - Pregnancy; Future  -     INFLUENZA VACCINE >6 MONTHS (AFLURIA/FLUZONE)  -     hydrocortisone, Perianal, (HYDROCORTISONE) 2.5 % cream; Place rectally 2 times daily as needed for hemorrhoids Apply twice a day for hemorrhoids x 3-5 days    History of opioid abuse (H)    History of suicide attempt    Supervision of high risk pregnancy due to social problems, antepartum  -     Varicella Zoster Virus Antibody IgG  -     Urine Culture  -     Vitamin D Deficiency  -     Treponema Abs w Reflex to RPR and Titer  -     Rubella Antibody IgG  -     Hepatitis C antibody  -     Hepatitis B surface antigen  -     Hepatitis B Surface Antibody  -     HIV Antigen Antibody Combo Cascade  -     CBC with platelets  -     ABO/Rh type and screen  -     Neisseria gonorrhoeae PCR  -     Chlamydia trachomatis PCR  -     Mat Fetal Med Ctr Referral - Pregnancy; Future  -     docusate sodium (COLACE) 100 MG tablet;  Take 1 tablet (100 mg) by mouth daily  -     Multiplex Vaginal Panel by PCR  -     nicotine (NICORETTE) 2 MG gum; Place 1 each (2 mg) inside cheek every hour as needed for smoking cessation  -     Urine Drug Screen Yes  -     pyridOXINE (VITAMIN B6) 25 MG tablet; Take 1 tablet (25 mg) by mouth 3 times daily  -     doxylamine (UNISOM) 25 MG TABS tablet; Take 1 tablet (25 mg) by mouth at bedtime  -     Omega-3 Fish Oil 500 MG capsule; Take 2 capsules (1,000 mg) by mouth daily  -     Cholecalciferol (VITAMIN D) 50 MCG (2000 UT) CAPS; Take 1 capsule by mouth daily Take one capsule daily.    Slow transit constipation    External hemorrhoids    Postpartum depression      Orders Placed This Encounter   Procedures    INFLUENZA VACCINE >6 MONTHS (AFLURIA/FLUZONE)    Varicella Zoster Virus Antibody IgG    Vitamin D Deficiency    Treponema Abs w Reflex to RPR and Titer    Rubella Antibody IgG    Hepatitis C antibody    Hepatitis B surface antigen    Hepatitis B Surface Antibody    HIV Antigen Antibody Combo Cascade    CBC with platelets    Mat Fetal Med Ctr Referral - Pregnancy    Adult Type and Screen    ABO/Rh type and screen    Urine Drug Screen Yes        PLAN:  - Discouraged the use of marijuana. Discussed that marijuana does cross the placenta and breastmilk. There is some data that smoking marijuana may increase her risk of stillbirth and  birth, it can affect the neurodevelopment of the baby's brain, and increases the risk of fetal growth restriction    - Discussed B6 and unisom for nausea, Rx sent, offered zofran if needed but will start there. Can continue ginger supplement  - Discussed diet/fiber/hydration for constipation, ordered stool softener additionally.  - Vaginitis testing and urine sample collected for cramping/urinary sx  -Anusol for hemorrhoids  - Reviewed recommendation against vaping/thc in pregnancy, order placed for nicotine gum and offered discussion on alternatives to thc    - Reviewed use of  triage nurse line and contacting the on-call provider after hours for an urgent need such as fever, vagina bleeding, bladder or vaginal infection, rupture of membranes,  or term labor.    -Ordered routine prenatal lab work.     - Reviewed best evidence for: weight gain for her weight and height for pregnancy:  Based on pre-pregnancy Body mass index is 24.01 kg/m . RECOMMENDED WEIGHT GAIN: 25-35 lbs.    - Reviewed healthy diet and foods to avoid, exercise and activity during pregnancy; avoiding exposure to toxoplasmosis; and maintenance of a generally healthy lifestyle.   - Discussed the harms, benefits, side effects and alternative therapies for current prescribed and OTC medications. Patient was encouraged to start/continue prenatal vitamins as tolerated.    - Oriented to Practice, types of care, and how to reach a provider.  Pt prefers CNM team    - Patient received 1st trimester new OB education packet complete with aide of The Expectant Family booklet including information on genetic screening test options.  - Patient desires 1st trimester screening and desires level II ultrasound which were ordered.    - Reviewed risk for gestational diabetes d/t No known risk factors for GDM  - Reviewed risk for Pre Eclampsia d/t No known risk factors of High risk for Pre E or meets two or more of the moderate risk factors including No moderate risk factors and is not a candidate to baby ASA  - The patient  does not have a history of spontaneous  birth so  WILL NOT consider serial transvaginal cervical length ultrasounds from 16-24 weeks.   -The patient does not have a history of immunosuppresion or HIV so Toxoplasma IgG/IgM WILL NOT be ordered.  -Assess risk for asymptomatic latent TB (prior infection, recent immigrant from epidemic areas, immunosuppression, living in overcrowded environment):   WILL NOT have PPD skin test or Quantiferon-TB Gold Plus blood draw. *both options valid*    - All pt's questions  discussed and answered.  Pt verbalized understanding of and agreement to plan of care.     - Continue scheduled prenatal care and prn if questions or concerns    I, Jazmine Alvarez, am serving as a scribe; to document services personally performed by  Rebecca Sanchez CNM, based on data collection and the provider's statements to me.     Jazmine Alvarez    I agree with past family and social history and review of systems completed by the Medical/Advanced Practice Provider student except for changes made by me. The remainder of the encounter was performed by me and scribed by the student. The scribed note accurately reflects personal services and decisions made by me.    SHAJI Jurado CNM

## 2023-10-18 NOTE — PATIENT INSTRUCTIONS
It was a pleasure to meet you today!    For optimal nutrition/health we recommend that you:  -Take a prenatal vitamin daily  -Take 1,000mg Fish Oil or 500mg DHA daily  -Take 2,000 IU vitamin D daily      -Try to eat small frequent meals with a focus on protein, ideally consuming 80-100g of protein/daily.   -Aim for 1,200mg Calcium daily     -Exercise more days out of the week than not with getting your heart rate up for at least 30 minutes.     -The recommended weight gain for your pregnancy is: 25-35      Recommendations to reduce Nausea of pregnancy:    -small frequent meals, focus on protein 80-100g/day. Avoid heavy, greasy meals  -fresh air/exercise more days out of the week than not, 30 min  -Rea or peppermint tea, lozenges, gum  -[employer]  -vitamin B6 25mg three times a day, with 25mg of Unisom at night (unisom makes you sleepy)  (May increase to vitamin B6 50mg three times a day, 50 mg of Unisom at night)    There are many options for childbirth education in the [facility]!     A few groups we work with a lot are:  Katy Puente, Enlightened Mama, Birth Ed, Flutterbybirth, Evidenced Based Birth classes     Everyday Miracles for free classes, yoga and doulas   https://www.everyday-miracles.org/    Evidenced Based Birth https://[email or web address] Mobile inc.com/classes/     specific resources:   class at Birth Ed  -The Thinking Woman's Guide to a Better Birth Book  -International  Awareness Network website: https://www.ican-online.org/    Podcasts that talk about birth/Pregnancy:    Birthful  Expectful  Birth Hour  The longest shortest time  Evidenced Based Birth      groups:     Find a  in your area: doulamatch.net or go to [facility] [place] www.otis.org     Our local  collective is www.childbirthcollective.org    [place] Doulas: www.midwestdoulas.com     Flutterbybirth: www.flutterbybirth.com     BirthED: www.birthedmn.com/    Welcome Baby Care for a postpartum :  [employer]     Radha www.[employer]    Radha [place] www.[employer]    Thank you for trusting us with your care!     If you need to contact us for questions about:  Symptoms, Scheduling & Medical Questions; Non-urgent (2-3 day response) Contreras message, Urgent (needing response today) [phone] (if after 3:30pm next day response)   Prescriptions: Please call your Pharmacy   Billing: [facility] [phone] or [facility]:[phone]

## 2023-10-19 LAB
C TRACH DNA SPEC QL NAA+PROBE: NEGATIVE
N GONORRHOEA DNA SPEC QL NAA+PROBE: NEGATIVE

## 2023-10-19 RX ORDER — METRONIDAZOLE 500 MG/1
500 TABLET ORAL 2 TIMES DAILY
Qty: 14 TABLET | Refills: 0 | Status: SHIPPED | OUTPATIENT
Start: 2023-10-19 | End: 2023-10-26

## 2023-10-19 NOTE — TELEPHONE ENCOUNTER
Routed a message to the midwives inquiring about an antibiotic for this patient due to her vaginal swab came back positive for BV.

## 2023-10-20 LAB — BACTERIA UR CULT: NORMAL

## 2023-10-27 LAB
CANNABINOIDS UR CFM-MCNC: NORMAL NG/ML
CARBOXYTHC/CREAT UR: NORMAL

## 2023-11-07 ENCOUNTER — MYC MEDICAL ADVICE (OUTPATIENT)
Dept: OBGYN | Facility: CLINIC | Age: 23
End: 2023-11-07
Payer: MEDICAID

## 2023-11-13 ENCOUNTER — ANCILLARY PROCEDURE (OUTPATIENT)
Dept: ULTRASOUND IMAGING | Facility: CLINIC | Age: 23
End: 2023-11-13
Attending: MIDWIFE
Payer: MEDICAID

## 2023-11-13 DIAGNOSIS — Z34.90 EARLY STAGE OF PREGNANCY: ICD-10-CM

## 2023-11-13 PROCEDURE — 76801 OB US < 14 WKS SINGLE FETUS: CPT

## 2023-11-13 PROCEDURE — 76801 OB US < 14 WKS SINGLE FETUS: CPT | Mod: 26 | Performed by: OBSTETRICS & GYNECOLOGY

## 2023-11-16 ENCOUNTER — PRENATAL OFFICE VISIT (OUTPATIENT)
Dept: OBGYN | Facility: CLINIC | Age: 23
End: 2023-11-16
Attending: ADVANCED PRACTICE MIDWIFE
Payer: MEDICAID

## 2023-11-16 VITALS
BODY MASS INDEX: 23.79 KG/M2 | DIASTOLIC BLOOD PRESSURE: 66 MMHG | WEIGHT: 129.3 LBS | HEIGHT: 62 IN | SYSTOLIC BLOOD PRESSURE: 97 MMHG | HEART RATE: 96 BPM

## 2023-11-16 DIAGNOSIS — F12.20 CANNABIS DEPENDENCE (H): ICD-10-CM

## 2023-11-16 DIAGNOSIS — Z98.891 S/P CESAREAN SECTION: ICD-10-CM

## 2023-11-16 DIAGNOSIS — F11.11 HISTORY OF OPIOID ABUSE (H): ICD-10-CM

## 2023-11-16 DIAGNOSIS — F17.290 VAPING NICOTINE DEPENDENCE, TOBACCO PRODUCT: Primary | ICD-10-CM

## 2023-11-16 DIAGNOSIS — O09.91 SUPERVISION OF HIGH RISK PREGNANCY IN FIRST TRIMESTER: ICD-10-CM

## 2023-11-16 DIAGNOSIS — K64.4 EXTERNAL HEMORRHOIDS: ICD-10-CM

## 2023-11-16 DIAGNOSIS — K59.01 SLOW TRANSIT CONSTIPATION: ICD-10-CM

## 2023-11-16 PROCEDURE — 99207 PR PRENATAL VISIT: CPT | Performed by: ADVANCED PRACTICE MIDWIFE

## 2023-11-16 PROCEDURE — 99213 OFFICE O/P EST LOW 20 MIN: CPT | Performed by: ADVANCED PRACTICE MIDWIFE

## 2023-11-16 PROCEDURE — G0463 HOSPITAL OUTPT CLINIC VISIT: HCPCS | Performed by: ADVANCED PRACTICE MIDWIFE

## 2023-11-16 NOTE — PROGRESS NOTES
"Subjective:      23 year old  at 11w2d presents for a routine prenatal appointment.       no vaginal bleeding or leakage of fluid.  no contractions or cramping.    no fetal movement.       No HA, visual changes, RUQ or epigastric pain.   The patient presents with the following concerns: none   Level II US  Scheduled.        Objective:  Vitals:    23 1300   BP: 97/66   Pulse: 96   Weight: 58.7 kg (129 lb 4.8 oz)   Height: 1.575 m (5' 2.01\")     See OB flowsheet    Assessment/Plan     Encounter Diagnoses   Name Primary?    Vaping nicotine dependence, tobacco product Yes    History of opioid abuse (H)     Slow transit constipation     External hemorrhoids     Postpartum depression     Supervision of high risk pregnancy in first trimester     Cannabis dependence (H)     S/P  section      No orders of the defined types were placed in this encounter.    No orders of the defined types were placed in this encounter.      - Reviewed total weight gain, encouraged continued healthy diet and exercise.      - Reviewed why/how to contact provider.    Patient education/orders or handouts today:  Fetal movement and Level 2 u/s scheduled   Return to clinic in 4 weeks and prn if questions or concerns.   Alejandra Rodríguez, APRN CNM                  "

## 2023-11-17 ENCOUNTER — PRE VISIT (OUTPATIENT)
Dept: MATERNAL FETAL MEDICINE | Facility: CLINIC | Age: 23
End: 2023-11-17
Payer: MEDICAID

## 2023-11-21 ENCOUNTER — OFFICE VISIT (OUTPATIENT)
Dept: MATERNAL FETAL MEDICINE | Facility: CLINIC | Age: 23
End: 2023-11-21
Attending: STUDENT IN AN ORGANIZED HEALTH CARE EDUCATION/TRAINING PROGRAM
Payer: MEDICAID

## 2023-11-21 ENCOUNTER — MEDICAL CORRESPONDENCE (OUTPATIENT)
Dept: HEALTH INFORMATION MANAGEMENT | Facility: CLINIC | Age: 23
End: 2023-11-21

## 2023-11-21 ENCOUNTER — HOSPITAL ENCOUNTER (OUTPATIENT)
Dept: ULTRASOUND IMAGING | Facility: CLINIC | Age: 23
Discharge: HOME | End: 2023-11-21
Attending: STUDENT IN AN ORGANIZED HEALTH CARE EDUCATION/TRAINING PROGRAM
Payer: MEDICAID

## 2023-11-21 ENCOUNTER — LAB (OUTPATIENT)
Dept: LAB | Facility: CLINIC | Age: 23
End: 2023-11-21
Attending: STUDENT IN AN ORGANIZED HEALTH CARE EDUCATION/TRAINING PROGRAM
Payer: MEDICAID

## 2023-11-21 DIAGNOSIS — O26.90 PREGNANCY RELATED CONDITION, ANTEPARTUM: ICD-10-CM

## 2023-11-21 DIAGNOSIS — Z31.438 ENCOUNTER FOR OTHER GENETIC TESTING OF FEMALE FOR PROCREATIVE MANAGEMENT: ICD-10-CM

## 2023-11-21 DIAGNOSIS — Z36.82 NUCHAL TRANSLUCENCY OF FETUS ON PRENATAL ULTRASOUND: Primary | ICD-10-CM

## 2023-11-21 DIAGNOSIS — Z36.0 ENCOUNTER FOR ANTENATAL SCREENING FOR CHROMOSOMAL ANOMALIES: ICD-10-CM

## 2023-11-21 DIAGNOSIS — Z31.438 ENCOUNTER FOR OTHER GENETIC TESTING OF FEMALE FOR PROCREATIVE MANAGEMENT: Primary | ICD-10-CM

## 2023-11-21 PROCEDURE — 76813 OB US NUCHAL MEAS 1 GEST: CPT | Mod: 26 | Performed by: STUDENT IN AN ORGANIZED HEALTH CARE EDUCATION/TRAINING PROGRAM

## 2023-11-21 PROCEDURE — 76813 OB US NUCHAL MEAS 1 GEST: CPT

## 2023-11-21 PROCEDURE — 36415 COLL VENOUS BLD VENIPUNCTURE: CPT

## 2023-11-21 PROCEDURE — 999N000069 HC STATISTIC GENETIC COUNSELING, < 16 MIN

## 2023-11-21 NOTE — PROGRESS NOTES
Please see the full imaging report from the [employer] program under the imaging tab.    Maisha Self MD  Maternal Fetal Medicine

## 2023-11-21 NOTE — PROGRESS NOTES
[facility]  Genetic Counseling Consult    Patient:  Sophia Stiles YOB: 2000   Date of Service:  11/21/23   MRN: [id number]    Fee was seen at the [facility] for genetic consultation. The indication for genetic counseling is desire to discuss options for genetic screening and diagnostics. The patient was unaccompanied to this visit.     The session was conducted in English.      IMPRESSION/ PLAN   1. Sophia has not had genetic screening in this pregnancy but elected to have screening today.     2. During today's [facility] visit, Sophia had a blood draw for expanded non-invasive prenatal testing (also called NIPT, NIPS, or cell-free DNA) through [employer]. The expanded NIPT screens for trisomy 21, 18, and 13 and 22q11.2 deletion syndrome. The patient opted to screen for sex chromosome aneuploidies, including reported fetal sex.  In accordance with current guidelines, other microdeletion syndromes and rare autosomal trisomies were not included, but reflex to include these conditions remains available with expanded NIPT if there is an indication later in the pregnancy. Results are expected in 7-14 days. The patient will be called with results and if they do not answer they requested a detailed message with results on their voicemail, NOT including the predicted fetal sex information. Patient was informed that results, including fetal sex, will be available in [facility]. Patient will obtain predicted fetal sex through my chart.      3. Since the patient chose aneuploidy screening via NIPT, quad screen is NOT recommended in the second trimester. If the patient desires screening for open neural tube defects, maternal serum AFP only is recommended, ideally between 16-18 weeks gestation.    4. Sophia had a nuchal translucency ultrasound today. Please see the ultrasound report for further details.    5. Further  "recommendations include a fetal anatomy level II ultrasound with MFM. The upcoming ultrasound has been scheduled for 2024.    PREGNANCY HISTORY   /Parity:       Sophia's pregnancy history is significant for:   Term 9/15/2020    CURRENT PREGNANCY   Current Age: 23 year old   Age at Delivery: 24 year old  JOSE: 2024, by Last Menstrual Period                                   Gestational Age: 12w0d  This pregnancy is a single gestation.   This pregnancy was conceived spontaneously.    MEDICAL HISTORY   Sophia s reported medical history is not expected to impact pregnancy management or risks to fetal development.       FAMILY HISTORY   A three-generation pedigree was obtained today and is scanned under the \"Media\" tab in Epic. The family history was reported by Sophia.    The following significant findings were reported today:   Mare has limited information about her side of the family  Mare's , Williams, is alive and well    Otherwise, the reported family history is unremarkable for multiple miscarriages, stillbirths, birth defects, intellectual disabilities, known genetic conditions, and consanguinity.       RISK ASSESSMENT FOR INHERITED CONDITIONS AND CARRIER SCREENING OPTIONS   Expanded carrier screening is available to screen for autosomal recessive conditions and X-linked conditions in a large list of genes. Carrier screening does not test the pregnancy but gives a risk assessment for the pregnancy and future pregnancies to have the condition. Expanded carrier screening is designed to identify carrier status for conditions that are primarily childhood or adolescent onset. Expanded carrier screening does not evaluate for adult-onset conditions such as hereditary cancer syndromes, dementia/ Alzheimer's disease, or cardiovascular disease risk factors. Additionally, expanded carrier screening is not comprehensive for all known genetic diseases or inherited conditions. Carrier " screening does not test for all genetic and health conditions or risk factors.     Autosomal recessive conditions happen when a mutation has been inherited from the egg and sperm and include conditions like cystic fibrosis, thalassemia, hearing loss, spinal muscular atrophy, and more. We reviewed that when both biological parents carry a harmful genetic change in a gene associated with autosomal recessive inheritance, each of their pregnancies has a 1 in 4 (25%) chance to be affected by that condition. X-linked conditions happen when a mutation has been inherited from the egg and include conditions like fragile X syndrome.With x-linked conditions, the specific risk generally depends on the chromosomal sex of the fetus, with XY individuals (generally male) being most severely affected.      screening was reviewed. About [place] Screening    The patient does NOT have a family history of known inherited conditions. This does NOT mean the patient and/or their partner is not a carrier of a condition. Approximately 90% of couples at an increased reproductive risk for an inherited condition have no family history of that condition.     The patient has not had carrier screening previously.     The patient elected to pursue carrier screening today. The screening will include 558 genes, including fragile X syndrome, through [employer].    Carrier screening is not meant to diagnose the patient with a condition, and generally carriers are asymptomatic. However, certain genes may confer increased risks for various health concerns in carriers (including, but not limited to: SAM, DMD, FMR1).  We discussed that carrier screening can have implications for other family members and that couples are encouraged to share positive results with siblings and other family members of reproductive age. Additionally, even if there is not a high reproductive risk for a condition, it is possible that carrier status can be  passed on to future generations.  We reviewed that there is a law in place, the Genetic Information Nondiscrimination Act (MIKEY), that protects patients from discrimination by health insurance companies and employers based on their genetic information. MIKEY does not protect against discrimination by life insurance companies or disability insurance.  We reviewed that carrier screening will report on variants classified by the lab as pathogenic or likely pathogenic. Although carrier status does not change over time, it is possible that a variant could be reclassified as more information about the variant is learned. If this occurs, the couple will be contacted and a new risk assessment will be provided.     RISK ASSESSMENT FOR CHROMOSOME CONDITIONS   We explained that the risk for fetal chromosome abnormalities increases with maternal age. We discussed specific features of common chromosome abnormalities, including Down syndrome, trisomy 13, trisomy 18, and sex chromosome trisomies.    At age 24 at midtrimester, the risk to have a baby with Down syndrome is 1 in 1040.  At age 24 at midtrimester, the risk to have a baby with any chromosome abnormality is 1 in 520.     Sophia has not had genetic screening in this pregnancy but elected to have screening today.      GENETIC TESTING OPTIONS   Genetic testing during a pregnancy includes screening and diagnostic procedures.      Screening tests are non-invasive which means no risk to the pregnancy and includes ultrasounds and blood work. The benefits and limitations of screening were reviewed. Screening tests provide a risk assessment (chance) specific to the pregnancy for certain fetal chromosome abnormalities but cannot definitively diagnose or exclude a fetal chromosome abnormality. Follow-up genetic counseling and consideration of diagnostic testing is recommended with any abnormal screening result. Diagnostic testing during a pregnancy is more certain and can test  for more conditions. However, the tests do have a risk of miscarriage that requires careful consideration. These tests can detect fetal chromosome abnormalities with greater than 99% certainty. Results can be compromised by maternal cell contamination or mosaicism and are limited by the resolution of current genetic testing technology.     There is no screening or diagnostic test that detects all forms of birth defects or intellectual disability.     We discussed the following screening options:     Non-invasive prenatal testing (NIPT)  Also called cell-free DNA screening because it detects chromosomes from the placenta in the pregnant person's blood  Can be done any time after 10 weeks gestation  Standard recommendation for NIPT screens for trisomy 21, trisomy 18, trisomy 13, with the option of adding sex chromosome aneuploidies, without or without predicted sex  Cannot screen for open neural tube defects, maternal serum AFP after 15 weeks is recommended  New NIPT options include screening for other trisomies, microdeletion syndromes, and in some cases fetal blood antigens. Guidelines do not recommend these conditions are included in standard screening. These options have limitations and should be discussed with a genetic counselor.   However, current (2023) ACMG guidelines do recommend that screening for one microdeletion syndrome, called 22q11.2 deletion syndrome be offered to all pregnant patients. 22q11.2 deletion syndrome has an estimated prevalence of 1 in 990 to 1 in 2148 (0.05-0.1%). Risk is not thought to increase with maternal age. Clinical features are variable but include congenital heart defects, cleft palate, developmental delays, immune system deficiencies, and hearing loss. Approximately 90% of cases are de vasu (a sporadic new change in a pregnancy). Cell-free DNA screening for 22q11.2 deletion syndrome is available (Expanded NIPS through [employer]). We discussed the limitations of  cell-free DNA screening in detecting microdeletions and the possiblity of false positives and false negatives. Expanded NIPS also allows for reflex to include other microdeletion conditions and rare autosomal trisomies if an indication would arise later in the pregnancy. The patient opted into 22q11.2 deletion syndrome screening as part of the expanded NIPT option.     AFP only  Blood work from arm for levels of AFP (alpha-fetoprotein)  Can be done between 14w1d and 23w6d gestation  Screens for open neural tube defects like spina bifida  Recommended for those that do not want genetic testing (for chromosome conditions), those who did screening other than the quad screen, and those with a personal or family history of neural tube defects.    We discussed the following ultrasound options:    Nuchal translucency (NT) ultrasound  Ultrasound between [id number] that includes nuchal translucency measurement and nasal bone assessments  Nuchal translucency refers to the space at the back of the neck where fluid builds up. All babies at this stage have fluid and there is only concern if there is too much fluid  Nasal bone refers to the small bone in the nose. There is concern for conditions like Down syndrome if the bone cannot be seen at all  This ultrasound can be done as part of first trimester screening, at the same time as another screen (NIPT), at the same time as a CVS, or if the patients does not want genetic screening.  Markers on ultrasound detects about 70% of pregnancies with aneuploidy  Abnormalities on NT ultrasound can also increase the risk for a birth defect, like a heart defect    Comprehensive level II ultrasound (Fetal Anatomy Ultrasound)  Ultrasound done between 18-20 weeks gestation  Screens for major birth defects and markers for aneuploidy (like trisomy 21 and trisomy 18)  Includes looking at the fetus/baby's growth, heart, organs (stomach, kidneys), placenta, and amniotic fluid    We discussed the  following diagnostic options:     Chorionic villus sampling (CVS)  Invasive diagnostic procedure done between 10w0d and 13w6d  The procedure collects a small sample from the placenta for the purpose of chromosomal testing and/or other genetic testing  Diagnostic result; more than 99% sensitivity for fetal chromosome abnormalities  Cannot screen for open neural tube defects, maternal serum AFP after 15 weeks is recommended    Amniocentesis  Invasive diagnostic procedure done after 15 weeks gestation  The procedure collects a small sample of amniotic fluid for the purpose of chromosomal testing and/or other genetic testing  Diagnostic result; more than 99% sensitivity for fetal chromosome abnormalities  Testing for AFP in the amniotic fluid can test for open neural tube defects    It was a pleasure to be involved with Felicitee s care. Face-to-face time of the meeting was 45 minutes.    LEVI WOLFE GC, MS, [facility]  Board Certified and [place] Licensed Genetic Counselor   [facility]  Maternal Fetal Medicine  Office: [phone]  [facility]: [phone]   Fax: [phone]  [facility]      Patient seen, evaluated and discussed with the Genetic Counseling  Intern. I have verified the content of the note and edited for accuracy, which reflects my assessment of the patient and the plan of care.    Supervising Genetic Counselor  Mary Joshi MS, [facility]  Maternal Fetal Medicine  [email or web address]  [phone]

## 2023-11-28 LAB — SCANNED LAB RESULT: NORMAL

## 2023-12-04 ENCOUNTER — TELEPHONE (OUTPATIENT)
Dept: MATERNAL FETAL MEDICINE | Facility: CLINIC | Age: 23
End: 2023-12-04
Payer: MEDICAID

## 2023-12-04 LAB — SCANNED LAB RESULT: NORMAL

## 2023-12-04 NOTE — TELEPHONE ENCOUNTER
2023    I spoke with Sophia regarding her NIPT and carrier results.     Noninvasive Parental Testing Results:    Results indicate NO ANEUPLOIDY DETECTED for chromosomes 21, 18, 13, or the sex chromosomes (XX) and low risk for 22q11.2 deletion syndrome.     This puts her current pregnancy at low risk for Down syndrome, trisomy 18, trisomy 13 and sex chromosome abnormalities. This test is reported to have the following sensitivities: Down syndrome- >99.9%, trisomy 18- >99.9%, and trisomy 13- >99.9%. Although these results are reassuring, this does not replace a standard chromosome analysis from a chorionic villus sampling or amniocentesis.       MSAFP is the appropriate second trimester screening test for open neural tube defects; the maternal quad screen is not recommended.    Carrier Screening Results:    Sophia screened negative for the 558 conditions assessed. Sophia did not screen positive for any of the x-linked conditions.  We discussed that since Sophia screened negative for the assessed conditions carrier screening is not necessary for her partner. However, if he would still like to have carrier screening I can help facilitate testing for him.     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 Pseudodeficiency alleles are NOT associated with carrier status or disease, but can exhibit false positive results on biochemical tests such as  screening.      Her results are available in her Epic chart for her primary OB to review.    LEVI WOLFE MS, [facility]  Genetic Counselor Intern  [facility]  Maternal Fetal Medicine  Office: [phone]   [facility]: [phone]   Fax: [phone]  [facility]

## 2023-12-12 NOTE — PATIENT INSTRUCTIONS

## 2023-12-13 ENCOUNTER — PRENATAL OFFICE VISIT (OUTPATIENT)
Dept: OBGYN | Facility: CLINIC | Age: 23
End: 2023-12-13
Attending: ADVANCED PRACTICE MIDWIFE
Payer: MEDICAID

## 2023-12-13 VITALS
HEART RATE: 96 BPM | SYSTOLIC BLOOD PRESSURE: 93 MMHG | BODY MASS INDEX: 23.19 KG/M2 | WEIGHT: 126 LBS | HEIGHT: 62 IN | DIASTOLIC BLOOD PRESSURE: 60 MMHG

## 2023-12-13 DIAGNOSIS — Z98.891 S/P CESAREAN SECTION: ICD-10-CM

## 2023-12-13 DIAGNOSIS — O21.0 HYPEREMESIS GRAVIDARUM: ICD-10-CM

## 2023-12-13 DIAGNOSIS — F11.11 HISTORY OF OPIOID ABUSE (H): ICD-10-CM

## 2023-12-13 DIAGNOSIS — K64.4 EXTERNAL HEMORRHOIDS: ICD-10-CM

## 2023-12-13 DIAGNOSIS — F33.1 MODERATE EPISODE OF RECURRENT MAJOR DEPRESSIVE DISORDER (H): ICD-10-CM

## 2023-12-13 DIAGNOSIS — F17.290 VAPING NICOTINE DEPENDENCE, TOBACCO PRODUCT: ICD-10-CM

## 2023-12-13 DIAGNOSIS — Z91.51 HISTORY OF SUICIDE ATTEMPT: ICD-10-CM

## 2023-12-13 DIAGNOSIS — O09.91 SUPERVISION OF HIGH RISK PREGNANCY IN FIRST TRIMESTER: Primary | ICD-10-CM

## 2023-12-13 DIAGNOSIS — K59.01 SLOW TRANSIT CONSTIPATION: ICD-10-CM

## 2023-12-13 PROCEDURE — 99207 PR PRENATAL VISIT: CPT | Performed by: ADVANCED PRACTICE MIDWIFE

## 2023-12-13 PROCEDURE — G0463 HOSPITAL OUTPT CLINIC VISIT: HCPCS | Performed by: ADVANCED PRACTICE MIDWIFE

## 2023-12-13 PROCEDURE — 99213 OFFICE O/P EST LOW 20 MIN: CPT | Performed by: ADVANCED PRACTICE MIDWIFE

## 2023-12-13 RX ORDER — ONDANSETRON 4 MG/1
4 TABLET, FILM COATED ORAL EVERY 8 HOURS PRN
Qty: 30 TABLET | Refills: 0 | Status: SHIPPED | OUTPATIENT
Start: 2023-12-13 | End: 2024-04-10

## 2023-12-13 RX ORDER — FAMOTIDINE 20 MG/1
20 TABLET, FILM COATED ORAL 2 TIMES DAILY
Qty: 90 TABLET | Refills: 1 | Status: SHIPPED | OUTPATIENT
Start: 2023-12-13 | End: 2024-08-20

## 2023-12-13 NOTE — PROGRESS NOTES
"Subjective:      23 year old  at 15w1d presents for a routine prenatal appointment.       n vaginal bleeding or leakage of fluid.  no contractions or cramping.    no fetal movement.       No HA, visual changes, RUQ or epigastric pain.   The patient presents with the following concerns:   Struggling w nausea and vomiting,. Can't keep food and fluid down, has lost more weight, total 5 #. Taking B6 and unisom with no improvement   Level II US  Scheduled.   Offered AFP-ordered     Objective:  Vitals:    23 1501   BP: 93/60   Pulse: 96   Weight: 57.2 kg (126 lb)   Height: 1.575 m (5' 2.01\")     See OB flowsheet    Assessment/Plan     Encounter Diagnoses   Name Primary?    Supervision of high risk pregnancy in first trimester Yes    Vaping nicotine dependence, tobacco product     Moderate episode of recurrent major depressive disorder (H)     S/P  section     History of opioid abuse (H)     History of suicide attempt     Slow transit constipation     External hemorrhoids     Postpartum depression     Hyperemesis gravidarum      Orders Placed This Encounter   Procedures    AFP - Alpha Fetoprotein Maternal Screen     Orders Placed This Encounter   Medications    famotidine (PEPCID) 20 MG tablet     Sig: Take 1 tablet (20 mg) by mouth 2 times daily     Dispense:  90 tablet     Refill:  1    ondansetron (ZOFRAN) 4 MG tablet     Sig: Take 1 tablet (4 mg) by mouth every 8 hours as needed for nausea     Dispense:  30 tablet     Refill:  0     Discussed hyperemesis and meds, Rx for zofran, discussed use, when to call  - Reviewed total weight gain, encouraged continued healthy diet and exercise.      - Reviewed why/how to contact provider.    Patient education/orders or handouts today:  AFP only and Fetal movement   Return to clinic in 4 weeks and prn if questions or concerns.   Alejandra Rodríguez, APRN CNM                  "

## 2024-01-08 NOTE — PATIENT INSTRUCTIONS
"Weeks 18 to 22 of Your Pregnancy: Care Instructions  At this stage you may find that your nausea and fatigue are gone. You may feel better overall and have more energy. But you might now also have some new discomforts, like sleep problems or leg cramps.    You may start to feel your baby move. These movements can feel like butterflies or bubbles.   Babies at this stage can now suck their thumbs.     Get some exercise every day.  And avoid caffeine late in the day.     Take a warm shower or bath before bed.  Try relaxation exercises to calm your mind and body.     Use extra pillows.  They can help you get comfortable.     Don't use sleeping pills or alcohol.  They could harm your baby.     For leg cramps, stretch and apply heat.  A warm bath, leg warmers, a heating pad, or a hot water bottle can help with muscle aches.   Stretches for leg cramps    Straighten your leg and bend your foot (flex your ankle) slowly upward, toward your knee. Bend your toes up and down.   Stand on a flat surface. Stretch your toes upward. For balance, hold on to the wall or something stable. If it feels okay, take small steps walking on your heels.   Follow-up care is a key part of your treatment and safety. Be sure to make and go to all appointments, and call your doctor if you are having problems. It's also a good idea to know your test results and keep a list of the medicines you take.  Where can you learn more?  Go to https://www.Cangrade.net/patiented  Enter W603 in the search box to learn more about \"Weeks 18 to 22 of Your Pregnancy: Care Instructions.\"  Current as of: July 11, 2023               Content Version: 13.8    7104-1714 [employer].   Care instructions adapted under license by your healthcare professional. If you have questions about a medical condition or this instruction, always ask your healthcare professional. [employer] disclaims any warranty or liability for your use of this " information.      Round Ligament Pain: Care Instructions  Your Care Instructions     Round ligament pain is a common pain during pregnancy. You may feel a sharp brief pain on one or both sides of your belly. It may go down into your groin. It's usually felt for the first time during the second trimester.  This pain is a normal part of pregnancy. It will go away as your pregnancy continues or after your baby is born.  Your uterus is supported by two ligaments that go from the top and sides of the uterus to the bones of the pelvis. These are the round ligaments. As your uterus grows, these ligaments stretch and tighten with your movements. This may be the cause of the pain. You may find that certain activities seem to cause pain. If you can, avoid those activities.  Your doctor can usually diagnose round ligament pain from your symptoms and an exam. If you have bleeding or other symptoms, your doctor may also do an imaging test, such as an ultrasound. Your doctor may suggest that you take an over-the-counter pain medicine, such as acetaminophen.  Follow-up care is a key part of your treatment and safety. Be sure to make and go to all appointments, and call your doctor if you are having problems. It's also a good idea to know your test results and keep a list of the medicines you take.  How can you care for yourself at home?  If certain movements seem to trigger belly pain, see if you can avoid them while you are pregnant.  Stay active. If your doctor says it's okay, try moderate exercise. Water exercise may be a good choice if you have belly pain. Examples are swimming and water aerobics.  Ask your doctor about taking acetaminophen for pain. Be safe with medicines. Read and follow all instructions on the label.  When should you call for help?   Call your doctor now or seek immediate medical care if:    You think you might be in labor.     You have new or worse pain.   Watch closely for changes in your health, and be  "sure to contact your doctor if you have any problems.  Where can you learn more?  Go to https://www.Dataslide.net/patiented  Enter R110 in the search box to learn more about \"Round Ligament Pain: Care Instructions.\"  Current as of: July 11, 2023               Content Version: 13.8    0060-8380 [employer].   Care instructions adapted under license by your healthcare professional. If you have questions about a medical condition or this instruction, always ask your healthcare professional. [employer] disclaims any warranty or liability for your use of this information.      Leg and Ankle Edema: Care Instructions  Your Care Instructions  Swelling in the legs, ankles, and feet is called edema. It is common after you sit or stand for a while. Long plane flights or car rides often cause swelling in the legs and feet. You may also have swelling if you have to stand for long periods of time at your job. Problems with the veins in the legs (varicose veins) and changes in hormones can also cause swelling. Sometimes the swelling in the ankles and feet is caused by a more serious problem, such as heart failure, infection, blood clots, or liver or kidney disease.  Follow-up care is a key part of your treatment and safety. Be sure to make and go to all appointments, and call your doctor if you are having problems. It's also a good idea to know your test results and keep a list of the medicines you take.  How can you care for yourself at home?  If your doctor gave you medicine, take it as prescribed. Call your doctor if you think you are having a problem with your medicine.  Whenever you are resting, raise your legs up. Try to keep the swollen area higher than the level of your heart.  Take breaks from standing or sitting in one position.  Walk around to increase the blood flow in your lower legs.  Move your feet and ankles often while you stand, or tighten and relax your leg muscles.  Wear support " "stockings. Put them on in the morning, before swelling gets worse.  Eat a balanced diet. Lose weight if you need to.  Limit the amount of salt (sodium) in your diet. Salt holds fluid in the body and may increase swelling.  When should you call for help?   Call 911 anytime you think you may need emergency care. For example, call if:    You have symptoms of a blood clot in your lung (called a pulmonary embolism). These may include:  Sudden chest pain.  Trouble breathing.  Coughing up blood.   Call your doctor now or seek immediate medical care if:    You have signs of a blood clot, such as:  Pain in your calf, back of the knee, thigh, or groin.  Redness and swelling in your leg or groin.     You have symptoms of infection, such as:  Increased pain, swelling, warmth, or redness.  Red streaks or pus.  A fever.   Watch closely for changes in your health, and be sure to contact your doctor if:    Your swelling is getting worse.     You have new or worsening pain in your legs.     You do not get better as expected.   Where can you learn more?  Go to https://www.Emprivo.net/patiented  Enter N696 in the search box to learn more about \"Leg and Ankle Edema: Care Instructions.\"  Current as of: November 13, 2022               Content Version: 13.8    1373-0220 [employer].   Care instructions adapted under license by your healthcare professional. If you have questions about a medical condition or this instruction, always ask your healthcare professional. [employer] disclaims any warranty or liability for your use of this information.      Back Pain During Pregnancy: Care Instructions  Overview     Back pain has many possible causes. It is often caused by problems with muscles and ligaments in your back. The extra weight during pregnancy can put stress on your back. Moving, lifting, standing, sitting, or sleeping in an awkward way also can strain your back. Back pain can also be a sign of labor. " "Although it may hurt a lot, back pain often improves on its own. Use good home treatment, and take care not to stress your back.  Follow-up care is a key part of your treatment and safety. Be sure to make and go to all appointments, and call your doctor if you are having problems. It's also a good idea to know your test results and keep a list of the medicines you take.  How can you care for yourself at home?  Ask your doctor about taking acetaminophen (Tylenol) for pain. Do not take aspirin, ibuprofen (Advil, Motrin), or naproxen (Aleve).  Do not take two or more pain medicines at the same time unless the doctor told you to. Many pain medicines have acetaminophen, which is Tylenol. Too much acetaminophen (Tylenol) can be harmful.  Lie on your side with your knees and hips bent and a pillow between your legs. This reduces stress on your back.  Put ice or cold packs on your back for 10 to 20 minutes at a time, several times a day. Put a thin cloth between the ice and your skin.  Warm baths may also help reduce pain.  Change positions every 30 minutes. Take breaks if you must sit for a long time. Get up and walk around.  Ask your doctor about how much exercise you can do. You may feel better taking short walks or doing gentle movements and stretching in a swimming pool.  Ask your doctor about exercises to stretch and strengthen your back.  When should you call for help?   Call your doctor now or seek immediate medical care if:    You think you are in labor.     You have new numbness in your buttocks, genital or rectal areas, or legs.     You have a new loss of bowel or bladder control.   Watch closely for changes in your health, and be sure to contact your doctor if:    You do not get better as expected.   Where can you learn more?  Go to https://www.healthwise.net/patiented  Enter C696 in the search box to learn more about \"Back Pain During Pregnancy: Care Instructions.\"  Current as of: July 11, 2023               " Content Version: 13.8    0491-7935 [employer].   Care instructions adapted under license by your healthcare professional. If you have questions about a medical condition or this instruction, always ask your healthcare professional. [employer] disclaims any warranty or liability for your use of this information.       Labor: Care Instructions  Overview      labor is the start of labor between 20 and 36 weeks of pregnancy. Most babies are born at 37 to 42 weeks of pregnancy. In labor, the uterus contracts to open the cervix. This is the first stage of childbirth.  labor can be caused by a problem with the baby, the mother, or both. Often the cause is not known.  In some cases, doctors use medicines to try to delay labor until 34 or more weeks of pregnancy. By this time, a baby has grown enough so that problems are not likely. In some cases--such as with a serious infection--it is healthier for the baby to be born early. Your treatment will depend on how far along you are in your pregnancy and on your health and your baby's health.  Follow-up care is a key part of your treatment and safety. Be sure to make and go to all appointments, and call your doctor if you are having problems. It's also a good idea to know your test results and keep a list of the medicines you take.  How can you care for yourself at home?  If your doctor prescribed medicines, take them exactly as directed. Call your doctor if you think you are having a problem with your medicine.  Rest until your doctor advises you about activity.  Do not have sexual intercourse unless your doctor says it is safe.  Use sanitary pads if you have vaginal bleeding. Using pads makes it easier to monitor your bleeding.  Do not smoke or allow others to smoke around you. If you need help quitting, talk to your doctor about stop-smoking programs and medicines. These can increase your chances of quitting for good.  When  "should you call for help?   Call 911  anytime you think you may need emergency care. For example, call if:    You passed out (lost consciousness).     You have a seizure.     You have severe vaginal bleeding.     You have severe pain in your belly or pelvis that doesn't get better between contractions.     You have had fluid gushing or leaking from your vagina and you know or think the umbilical cord is bulging into your vagina. If this happens, immediately get down on your knees so your rear end (buttocks) is higher than your head. This will decrease the pressure on the cord until help arrives.   Call your doctor now or seek immediate medical care if:    You have signs of preeclampsia, such as:  Sudden swelling of your face, hands, or feet.  New vision problems (such as dimness, blurring, or seeing spots).  A severe headache.     You have any vaginal bleeding.     You have belly pain or cramping.     You have a fever.     You have had regular contractions (with or without pain) for an hour. This means that you have 6 or more within 1 hour after you change your position and drink fluids.     You have a sudden release of fluid from the vagina.     You have low back pain or pelvic pressure that does not go away.     You notice that your baby has stopped moving or is moving much less than normal.   Watch closely for changes in your health, and be sure to contact your doctor if you have any problems.  Where can you learn more?  Go to https://www.De [email or web address].net/patiented  Enter Q400 in the search box to learn more about \" Labor: Care Instructions.\"  Current as of: 2023               Content Version: 13.8    8475-4362 [employer].   Care instructions adapted under license by your healthcare professional. If you have questions about a medical condition or this instruction, always ask your healthcare professional. [employer] disclaims any warranty or liability for your use of this " information.    Thank you for trusting us with your care!     If you need to contact us for questions about:  Symptoms, Scheduling & Medical Questions; Non-urgent (2-3 day response) Contreras message, Urgent (needing response today) [phone] (if after 3:30pm next day response)   Prescriptions: Please call your Pharmacy   Billing: [facility] [phone] or [facility]:[phone]

## 2024-01-09 ENCOUNTER — HOSPITAL ENCOUNTER (OUTPATIENT)
Dept: ULTRASOUND IMAGING | Facility: CLINIC | Age: 24
Discharge: HOME | End: 2024-01-09
Attending: OBSTETRICS & GYNECOLOGY
Payer: COMMERCIAL

## 2024-01-09 ENCOUNTER — OFFICE VISIT (OUTPATIENT)
Dept: MATERNAL FETAL MEDICINE | Facility: CLINIC | Age: 24
End: 2024-01-09
Attending: OBSTETRICS & GYNECOLOGY
Payer: COMMERCIAL

## 2024-01-09 ENCOUNTER — PRENATAL OFFICE VISIT (OUTPATIENT)
Dept: OBGYN | Facility: CLINIC | Age: 24
End: 2024-01-09
Attending: ADVANCED PRACTICE MIDWIFE
Payer: COMMERCIAL

## 2024-01-09 VITALS
HEIGHT: 62 IN | HEART RATE: 82 BPM | BODY MASS INDEX: 23.02 KG/M2 | DIASTOLIC BLOOD PRESSURE: 68 MMHG | WEIGHT: 125.1 LBS | SYSTOLIC BLOOD PRESSURE: 103 MMHG

## 2024-01-09 DIAGNOSIS — F32.A DEPRESSION, UNSPECIFIED DEPRESSION TYPE: ICD-10-CM

## 2024-01-09 DIAGNOSIS — O26.90 PREGNANCY RELATED CONDITION, ANTEPARTUM: ICD-10-CM

## 2024-01-09 DIAGNOSIS — Z36.3 ENCOUNTER FOR ROUTINE SCREENING FOR FETAL MALFORMATION USING ULTRASOUND: Primary | ICD-10-CM

## 2024-01-09 DIAGNOSIS — O09.92 HRP (HIGH RISK PREGNANCY), SECOND TRIMESTER: Primary | ICD-10-CM

## 2024-01-09 DIAGNOSIS — N76.0 VAGINITIS AND VULVOVAGINITIS: ICD-10-CM

## 2024-01-09 LAB
BACTERIAL VAGINOSIS VAG-IMP: POSITIVE
CANDIDA DNA VAG QL NAA+PROBE: DETECTED
CANDIDA GLABRATA / CANDIDA KRUSEI DNA: NOT DETECTED
T VAGINALIS DNA VAG QL NAA+PROBE: NOT DETECTED

## 2024-01-09 PROCEDURE — 76811 OB US DETAILED SNGL FETUS: CPT

## 2024-01-09 PROCEDURE — 0352U MULTIPLEX VAGINAL PANEL BY PCR: CPT | Performed by: ADVANCED PRACTICE MIDWIFE

## 2024-01-09 PROCEDURE — G0463 HOSPITAL OUTPT CLINIC VISIT: HCPCS | Mod: 25 | Performed by: ADVANCED PRACTICE MIDWIFE

## 2024-01-09 PROCEDURE — 99212 OFFICE O/P EST SF 10 MIN: CPT | Performed by: ADVANCED PRACTICE MIDWIFE

## 2024-01-09 PROCEDURE — 76805 OB US >/= 14 WKS SNGL FETUS: CPT

## 2024-01-09 PROCEDURE — 99207 PR PRENATAL VISIT: CPT | Performed by: ADVANCED PRACTICE MIDWIFE

## 2024-01-09 PROCEDURE — 76805 OB US >/= 14 WKS SNGL FETUS: CPT | Mod: 26 | Performed by: OBSTETRICS & GYNECOLOGY

## 2024-01-09 RX ORDER — MICONAZOLE NITRATE 2 %
1 CREAM WITH APPLICATOR VAGINAL AT BEDTIME
Qty: 0.7 G | Refills: 0 | Status: SHIPPED | OUTPATIENT
Start: 2024-01-09 | End: 2024-01-16

## 2024-01-09 RX ORDER — CITALOPRAM HYDROBROMIDE 40 MG/1
40 TABLET ORAL EVERY MORNING
Status: CANCELLED | OUTPATIENT
Start: 2024-01-09

## 2024-01-09 RX ORDER — CITALOPRAM HYDROBROMIDE 40 MG/1
40 TABLET ORAL DAILY
Qty: 90 TABLET | Refills: 3 | Status: SHIPPED | OUTPATIENT
Start: 2024-01-09 | End: 2024-08-20

## 2024-01-09 NOTE — PROGRESS NOTES
Please refer to ultrasound report under 'Imaging' Studies of 'Chart Review' tabs.    Koffi Bonds M.D.

## 2024-01-09 NOTE — NURSING NOTE
Fee is here today for a L2 ultrasound. Denies questions or concerns today. Medications and Ob care provider reviewed. Reviewed LMP dating - patient does think LMP was actually 8/31/23. States she had just gotten off of birth control pills and the cycle was not normal length. SBAR given to BRITNEY CONTI, see their not in Epic.

## 2024-01-09 NOTE — PROGRESS NOTES
"Subjective:      23 year old  at 19w0d presents for a routine prenatal appointment.       Denies vaginal bleeding or leakage of fluid.  Denies contractions or cramping.    Reports daily fetal movement.       No HA, visual changes, RUQ or epigastric pain.   The patient presents with the following concerns: Patient reports that she feels she may have a yeast infection.  Woke this morning with vaginal itching and thick white discharge.  Denies urinary symptoms.  Has had yeast infections in the past, symptoms feel similar.  Would like to self collect vaginitis panel.    Reviewed weight loss, patient shares that she is in stressful situation.  In process of leaving her  who is an alcoholic.  Feels safe and has a plan for new living arrangements.  Aware that she may reach out and ask for resources if she feels the need.   Needs a refill on her Celexa today.  Level II US  completed today, normal results reviewed. Anterior placenta.     Objective:  Vitals:    24 1541   BP: 103/68   Pulse: 82   Weight: 56.7 kg (125 lb 1.6 oz)   Height: 1.575 m (5' 2.01\")     See OB flowsheet    Assessment/Plan     HRP (high risk pregnancy), second trimester  Vaginitis and vulvovaginitis  Depression, unspecified depression type     Orders Placed This Encounter   Procedures    Multiplex Vaginal Panel by PCR     Orders Placed This Encounter   Medications    citalopram (CELEXA) 40 MG tablet     Sig: Take 1 tablet (40 mg) by mouth daily     Dispense:  90 tablet     Refill:  3     -Will send 7 day vaginal treatment for yeast while vaginitis panel results are in process.  -Rx sent to pharmacy to refill Celexa.  - Reviewed total weight gain, encouraged continued healthy diet and exercise.      - Reviewed why/how to contact provider.    Patient education/orders or handouts today:  PTL signs/symptoms and fetal movement   Return to clinic in 4 weeks and prn if questions or concerns.   SHAJI Fernando CNM               "

## 2024-01-10 ENCOUNTER — MYC MEDICAL ADVICE (OUTPATIENT)
Dept: OBGYN | Facility: CLINIC | Age: 24
End: 2024-01-10
Payer: COMMERCIAL

## 2024-01-10 DIAGNOSIS — B96.89 BV (BACTERIAL VAGINOSIS): ICD-10-CM

## 2024-01-10 DIAGNOSIS — N76.0 BV (BACTERIAL VAGINOSIS): ICD-10-CM

## 2024-01-10 DIAGNOSIS — B37.31 YEAST INFECTION OF THE VAGINA: Primary | ICD-10-CM

## 2024-01-10 DIAGNOSIS — B37.31 YEAST INFECTION OF THE VAGINA: ICD-10-CM

## 2024-01-10 RX ORDER — MICONAZOLE NITRATE 2 %
1 CREAM WITH APPLICATOR VAGINAL AT BEDTIME
Qty: 45 G | Refills: 1 | Status: SHIPPED | OUTPATIENT
Start: 2024-01-10 | End: 2024-04-10

## 2024-01-10 RX ORDER — METRONIDAZOLE 500 MG/1
500 TABLET ORAL 2 TIMES DAILY
Qty: 14 TABLET | Refills: 0 | Status: SHIPPED | OUTPATIENT
Start: 2024-01-10 | End: 2024-04-10

## 2024-01-10 RX ORDER — METRONIDAZOLE 500 MG/1
500 TABLET ORAL 2 TIMES DAILY
Qty: 14 TABLET | Refills: 0 | Status: SHIPPED | OUTPATIENT
Start: 2024-01-10 | End: 2024-01-10

## 2024-01-10 RX ORDER — MICONAZOLE NITRATE 2 %
1 CREAM WITH APPLICATOR VAGINAL AT BEDTIME
Qty: 45 G | Refills: 1 | Status: SHIPPED | OUTPATIENT
Start: 2024-01-10 | End: 2024-01-10

## 2024-02-05 NOTE — PATIENT INSTRUCTIONS
"Weeks 22 to 26 of Your Pregnancy: Care Instructions  Your baby's lungs are getting ready for breathing. Your baby may respond to your voice. Your baby likely turns less, and kicks or jerks more. Jerking may mean that your baby has hiccups.    Think about taking childbirth classes. And start to think about whether you want to have pain medicine during labor.   At your next doctor visit, you may be tested for anemia and for high blood sugar that first occurs during pregnancy (gestational diabetes). These conditions can cause problems for you and your baby.     To ease discomfort, such as back pain    Change your position often. Try not to sit or stand for too long.  Get some exercise. Things like walking or stretching may help.  Try using a heating pad or cold pack.    To ease or reduce swelling in your feet, ankles, hands, and fingers    Take off your rings.  Avoid high-sodium foods, such as potato chips.  Prop up your feet, and sleep with pillows under your feet.  Try to avoid standing for long periods of time.  Do not wear tight shoes.  Wear support stockings.  Kegel exercises to prevent urine from leaking    Squeeze your muscles as if you were trying not to pass gas. Your belly, legs, and buttocks shouldn't move. Hold the squeeze for 3 seconds, then relax for 5 to 10 seconds.    Add 1 second each week until you can squeeze for 10 seconds. Repeat the exercise 10 times a session. Do 3 to 8 sessions a day. If these exercises cause you pain, stop doing them and talk with your doctor.  Follow-up care is a key part of your treatment and safety. Be sure to make and go to all appointments, and call your doctor if you are having problems. It's also a good idea to know your test results and keep a list of the medicines you take.  Where can you learn more?  Go to https://www.healthwise.net/patiented  Enter G264 in the search box to learn more about \"Weeks 22 to 26 of Your Pregnancy: Care Instructions.\"  Current as of: July " 11, 2023               Content Version: 13.8    2691-4964 [employer].   Care instructions adapted under license by your healthcare professional. If you have questions about a medical condition or this instruction, always ask your healthcare professional. [employer] disclaims any warranty or liability for your use of this information.      Learning About Screening for Gestational Diabetes  What is gestational diabetes screening?     Screening for gestational diabetes is a way to look for high blood sugar during pregnancy. You drink some very sweet liquid. Then you have a blood test to see how your body uses sugar (glucose).  How is gestational diabetes screening done?  Screening for gestational diabetes may be done in a couple of ways.  Two-part screening.  Part one (glucose challenge test): A blood sample is taken after you drink a liquid that contains sugar (glucose). You don't need to stop eating or drinking before this test. If the test shows that you don't have a lot of sugar in your blood, you don't have gestational diabetes.  Part two (oral glucose tolerance test, or OGTT): If the first test shows a lot of sugar in your blood, then you may have an OGTT. You can't eat or drink for at least 8 hours before this test. A blood sample is taken, then you drink a sweet liquid. You have more blood tests after 1 to 3 hours. If the OGTT shows that you have a lot of sugar in your blood, you may have gestational diabetes.  One-part screening.  Sometimes doctors use the OGTT on its own. If the test shows that you don't have a lot of sugar in your blood, you don't have gestational diabetes. If you do have a lot of sugar in your blood, you may have the condition.  What are the risks of screening?  Your blood glucose level may drop very low toward the end of the test. If this happens, you may feel weak, hungry, and restless. Tell your doctor if you have these symptoms. The test usually will be  "stopped.  You may vomit after drinking the sweet liquid. If this happens, you may need to take the test at a later time.  Your doctor may do more glucose tests at other times during your pregnancy.  Follow-up care is a key part of your treatment and safety. Be sure to make and go to all appointments, and call your doctor if you are having problems. It's also a good idea to know your test results and keep a list of the medicines you take.  Where can you learn more?  Go to https://www.YongChe.net/patiented  Enter A472 in the search box to learn more about \"Learning About Screening for Gestational Diabetes.\"  Current as of: February 28, 2023               Content Version: 13.8    8725-5481 [employer].   Care instructions adapted under license by your healthcare professional. If you have questions about a medical condition or this instruction, always ask your healthcare professional. [employer] disclaims any warranty or liability for your use of this information.      You have been provided the My Labor and Birth Wishes document.  Please review at home and bring to your next prenatal visit. Bring this sheet to the hospital for your birth. Give copies to your care team members and support person.   Additional copies can be found here:  [email or web address]  "

## 2024-02-06 ENCOUNTER — PRENATAL OFFICE VISIT (OUTPATIENT)
Dept: OBGYN | Facility: CLINIC | Age: 24
End: 2024-02-06
Attending: ADVANCED PRACTICE MIDWIFE
Payer: COMMERCIAL

## 2024-02-06 VITALS
HEART RATE: 101 BPM | DIASTOLIC BLOOD PRESSURE: 55 MMHG | HEIGHT: 62 IN | WEIGHT: 132.4 LBS | SYSTOLIC BLOOD PRESSURE: 91 MMHG | BODY MASS INDEX: 24.37 KG/M2

## 2024-02-06 DIAGNOSIS — N89.8 VAGINAL DISCHARGE: ICD-10-CM

## 2024-02-06 DIAGNOSIS — O09.92 SUPERVISION OF HIGH RISK PREGNANCY IN SECOND TRIMESTER: Primary | ICD-10-CM

## 2024-02-06 PROCEDURE — 99207 PR PRENATAL VISIT: CPT | Performed by: ADVANCED PRACTICE MIDWIFE

## 2024-02-06 PROCEDURE — G0463 HOSPITAL OUTPT CLINIC VISIT: HCPCS | Performed by: ADVANCED PRACTICE MIDWIFE

## 2024-02-06 PROCEDURE — 0352U MULTIPLEX VAGINAL PANEL BY PCR: CPT | Performed by: ADVANCED PRACTICE MIDWIFE

## 2024-02-06 RX ORDER — NITROFURANTOIN 25; 75 MG/1; MG/1
CAPSULE ORAL
COMMUNITY
Start: 2023-12-05 | End: 2024-04-10

## 2024-02-06 RX ORDER — AMOXICILLIN 875 MG
TABLET ORAL
COMMUNITY
Start: 2024-01-11 | End: 2024-04-10

## 2024-02-06 NOTE — PROGRESS NOTES
"Subjective:     23 year old  at 23w0d presents for a routine prenatal appointment.      Denies leakage of fluid.  Denies contractions or cramping.  Daily fetal movement.         Patient concerns:   - Endorses occasional mild headaches, feels they are associated with increased stress. Endorses visual changes with increased blurriness for long distance vision. No RUQ or epigastric pain.   - Was treated for yeast and BV after last visit, reports taking full course of both medications.  Notes initial improvement after treatment, then symptoms returned. Reports vaginal itching and white discharge without odor. Denies any urinary symptoms.  Questions re: recurrent vaginitis.  - Reports significant stress as she is  from her partner. The stress is associated with decreased appetite and vomiting every other day. Endorses decreased motivation.  She vapes marijuana and smokes \"dabs\" 4x/day to cope with the stress.  Has cut down on marijuana use during her pregnancy. She currently feels safe at home and has support from ex-partner's family, her family, and friends.    - Reports one episode of light spotting 2 days ago, which she noticed when wiping. No additional spotting since.     Level II US  Results reviewed normal scan.      Objective:  Vitals:    24 1359   BP: 91/55   Pulse: 101   Weight: 60.1 kg (132 lb 6.4 oz)   Height: 1.575 m (5' 2.01\")   See OB flowsheet    Assessment/Plan:  Encounter Diagnoses   Name Primary?    Supervision of high risk pregnancy in second trimester Yes    Vaginal discharge      Orders Placed This Encounter   Procedures    Multiplex Vaginal Panel by PCR     Orders Placed This Encounter   Medications    nitroFURantoin macrocrystal-monohydrate (MACROBID) 100 MG capsule    Ferrous Sulfate (IRON PO)     Sig: Take 1 tablet by mouth daily    amoxicillin (AMOXIL) 875 MG tablet     - Reviewed thickened cervical mucus and increased vaginal discharge in pregnancy, may contribute to pH " changes and susceptibility to infection. Patient elected to self swab today.  Will treat based on results.  - Offered therapy d/t ongoing social situation and stress.  Pt decline at this time, but aware of option.   - Advised to continue decreasing marijuana use as able in pregnancy.  - Reviewed why/how to contact provider  - GCT and EOB education at next visit  - Return to clinic in 4 weeks and prn if questions or concerns.     I, Samantha Delgadillo, am serving as a scribe to document services personally performed by SHAJI Monk CNM  based on the provider's statements to me. -   Samantha Delgadillo, MS3    The encounter was performed by me and scribed by the student. The scribed note accurately reflects my personal services and decisions made by me. - SHAJI Monk CNM

## 2024-02-07 DIAGNOSIS — B37.31 YEAST INFECTION OF THE VAGINA: ICD-10-CM

## 2024-02-07 DIAGNOSIS — B96.89 BACTERIAL VAGINOSIS IN PREGNANCY: Primary | ICD-10-CM

## 2024-02-07 DIAGNOSIS — O23.599 BACTERIAL VAGINOSIS IN PREGNANCY: Primary | ICD-10-CM

## 2024-02-07 RX ORDER — TERCONAZOLE 0.4 %
1 CREAM WITH APPLICATOR VAGINAL AT BEDTIME
Qty: 45 G | Refills: 0 | Status: SHIPPED | OUTPATIENT
Start: 2024-02-07 | End: 2024-04-10

## 2024-02-07 RX ORDER — METRONIDAZOLE 500 MG/1
500 TABLET ORAL 2 TIMES DAILY
Qty: 14 TABLET | Refills: 0 | Status: SHIPPED | OUTPATIENT
Start: 2024-02-07 | End: 2024-02-14

## 2024-03-04 ENCOUNTER — NURSE TRIAGE (OUTPATIENT)
Dept: OBGYN | Facility: CLINIC | Age: 24
End: 2024-03-04
Payer: COMMERCIAL

## 2024-03-04 ENCOUNTER — MYC MEDICAL ADVICE (OUTPATIENT)
Dept: OBGYN | Facility: CLINIC | Age: 24
End: 2024-03-04
Payer: COMMERCIAL

## 2024-03-04 NOTE — TELEPHONE ENCOUNTER
"Connected with patient via emergency line for concerns of sivan an (see questions below). Of note, patient reports heavy activity the last few days (works a  and moved into a new place by herself).     Reason for Disposition   Contractions > 10 minutes apart that persist > 24 hours, and no improvement using Care Advice    Additional Information   Negative: Passed out (i.e., fainted, collapsed and was not responding)   Negative: Shock suspected (e.g., cold/pale/clammy skin, too weak to stand, low BP, rapid pulse)   Negative: Difficult to awaken or acting confused (e.g., disoriented, slurred speech)   Negative: SEVERE constant abdominal pain (e.g., excruciating) and present > 1 hour   Negative: SEVERE bleeding (e.g., continuous red blood from vagina, or large blood clots)   Negative: Umbilical cord hanging out of the vagina (shiny, white, curled appearance, \"like telephone cord\")   Negative: Uncontrollable urge to push (i.e., feels like baby is coming out now)   Negative: Can see baby   Negative: Sounds like a life-threatening emergency to the triager   Negative: Pregnant 37 or more weeks (i.e., term)   Negative: MODERATE-SEVERE abdominal pain   Negative: Contractions < 10 minutes apart for 1 hour (i.e., 6 or more contractions an hour)   Negative: Contractions and any vaginal bleeding (including: red blood, clots, spotting, or pink/brown mucous)   Negative: Vaginal bleeding or spotting  (Exception: Brief spotting after intercourse or pelvic exam.)   Negative: Leakage of fluid from vagina   Negative: Baby moving less today (e.g., kick count < 5 in 1 hour or < 10 in 2 hours) and 23 or more weeks pregnant   Negative: New blurred vision or vision changes   Negative: No movement of baby for 8 hours   Negative: Severe headache or headache that won't go away    Answer Assessment - Initial Assessment Questions  1. ONSET: \"When did the symptoms begin?\"         Yesterday morning  2. CONTRACTIONS: \"Describe the " "contractions that you are having.\" (e.g., duration, frequency, regularity, severity)      Coming every hour (sometimes more) and lasts about 30 seconds, unknown regularity, and rates 2/10 in severity  3. PREGNANCY: \"How many weeks pregnant are you?\"      26+6  4. JOSE: \"What date are you expecting to deliver?\"      2024  5. PARITY: \"Have you had a baby before?\" If Yes, ask: \"How long did the labor last?\"      Did not have this with last pregnancy  6. FETAL MOVEMENT: \"Has the baby's movement decreased or changed significantly from normal?\"      No change in movement  7. OTHER SYMPTOMS: \"Do you have any other symptoms?\" (e.g., leaking fluid from vagina, fever, hand/facial swelling)      Sharp pain through vagina 4 times in the last 24 hours, no other symptoms    Protocols used: Pregnancy - Labor - [id number]-SI      Reviewed care advice (increase hydration and rest). Encouraged patient to call back if she develops any spotting, bleeding, LOF, or vaginal pressure. Patient agreeable and verbalized understanding.   "

## 2024-03-04 NOTE — LETTER
March 5, 2024    To Whom It May Concern:    Sophia Stiles is currently receiving my professional care for their pregnancy. Due to a recent increase in pre-term contractions, I am recommending she be given more rest and hydration breaks during her work days.    Please take note of the following accommodations mandated by [place] state law concerning pregnant employees.    more frequent or longer restroom, food, and water breaks;   seating as desired; and   limit on lifting more than 20 pounds.    [place] state law allows pregnant employees to choose between temporary leave of absence or up to 12-weeks of unpaid leave of absence to attend to pregnancy-related health matters.     Additionally, employers have a duty to engage with their pregnant employees to identify reasonable accommodations during their pregnancy. Employers may not require employees to accept a specific accommodation or take a mandatory leave of absence.     Reasonable accommodations under [place] state law may include, but are not limited to:      Temporary transfer to a less strenuous or hazardous position;   Temporary leave of absence (e.g., to complete prenatal appointments);  Modification in work schedule or job assignments;  Seating;  More frequent or longer break periods; and   Limits to heavy lifting    The laws referenced in this letter are enforced by the [place] Department of Labor and Industry (DLI). For further information regarding employer responsibilities toward pregnant, nursing, and lactating employees in compliance with [place] state law, please contact (DLI) at [phone] or via email at [email or web address].     If you have any questions or concerns, please contact my office at [phone].    Sincerely,    Sue Delgadillo CNM

## 2024-03-05 NOTE — TELEPHONE ENCOUNTER
"Patient was transferred to RN triage line with concerns of increasing abdominal alexandria, sharp vaginal pain and x1 episode of spotting this AM (see triage notes from yesterday). Patient reports her contractions got better yesterday after increasing hydration and rest. Noticed her abdomen is continuous tight and contracted, and has felt what patient describes as \"true contraction\" x3 this past hour. During these \"true contractions\" patient describes feeling sharp vaginal pain. Of note, patient is at work this morning.      Sent the following page to on-call CNM: [facility] pt [phone],  at 27+0. Called yesterday with c/o contractions. Increased rest and hydration and got better until this AM. Is feeling her abd alexandria continuously and is having sharp vaginal pain and x1 spotting.     On-call CNM called and provided the following recommendations:  - rest and hydrate more today  - continue to monitor frequency and duration of contractions and go to L&D if meeting 6 in an hour  - call back with more spotting, LOF, vaginal pressure or incision pain    Called patient back and reviewed the above recommendations. Patient verbalized understanding. Patient requested work accommodations letter be sent via [employer] (sent).   "

## 2024-03-07 ENCOUNTER — LAB (OUTPATIENT)
Dept: LAB | Facility: CLINIC | Age: 24
End: 2024-03-07
Attending: ADVANCED PRACTICE MIDWIFE
Payer: COMMERCIAL

## 2024-03-07 ENCOUNTER — PRENATAL OFFICE VISIT (OUTPATIENT)
Dept: OBGYN | Facility: CLINIC | Age: 24
End: 2024-03-07
Attending: ADVANCED PRACTICE MIDWIFE
Payer: COMMERCIAL

## 2024-03-07 VITALS
HEIGHT: 62 IN | DIASTOLIC BLOOD PRESSURE: 70 MMHG | SYSTOLIC BLOOD PRESSURE: 103 MMHG | WEIGHT: 132.9 LBS | BODY MASS INDEX: 24.46 KG/M2 | HEART RATE: 97 BPM

## 2024-03-07 DIAGNOSIS — Z98.891 HISTORY OF C-SECTION: ICD-10-CM

## 2024-03-07 DIAGNOSIS — E55.9 VITAMIN D DEFICIENCY: ICD-10-CM

## 2024-03-07 DIAGNOSIS — R73.09 ABNORMAL GLUCOSE: ICD-10-CM

## 2024-03-07 DIAGNOSIS — O09.92 SUPERVISION OF HIGH RISK PREGNANCY IN SECOND TRIMESTER: ICD-10-CM

## 2024-03-07 DIAGNOSIS — R73.09 ABNORMAL GLUCOSE TOLERANCE TEST: ICD-10-CM

## 2024-03-07 DIAGNOSIS — O09.92 SUPERVISION OF HIGH RISK PREGNANCY IN SECOND TRIMESTER: Primary | ICD-10-CM

## 2024-03-07 PROBLEM — Z87.59 HISTORY OF POSTPARTUM DEPRESSION: Status: ACTIVE | Noted: 2023-10-18

## 2024-03-07 PROBLEM — Z86.59 HISTORY OF POSTPARTUM DEPRESSION: Status: ACTIVE | Noted: 2023-10-18

## 2024-03-07 LAB
BACTERIAL VAGINOSIS VAG-IMP: NEGATIVE
BASOPHILS # BLD AUTO: 0.1 10E3/UL (ref 0–0.2)
BASOPHILS NFR BLD AUTO: 0 %
CANDIDA DNA VAG QL NAA+PROBE: NOT DETECTED
CANDIDA GLABRATA / CANDIDA KRUSEI DNA: NOT DETECTED
EOSINOPHIL # BLD AUTO: 0.2 10E3/UL (ref 0–0.7)
EOSINOPHIL NFR BLD AUTO: 1 %
ERYTHROCYTE [DISTWIDTH] IN BLOOD BY AUTOMATED COUNT: 12.5 % (ref 10–15)
GLUCOSE 1H P 50 G GLC PO SERPL-MCNC: 150 MG/DL (ref 70–129)
HCT VFR BLD AUTO: 35.7 % (ref 35–47)
HGB BLD-MCNC: 12.3 G/DL (ref 11.7–15.7)
IMM GRANULOCYTES # BLD: 0.2 10E3/UL
IMM GRANULOCYTES NFR BLD: 1 %
LYMPHOCYTES # BLD AUTO: 1.8 10E3/UL (ref 0.8–5.3)
LYMPHOCYTES NFR BLD AUTO: 15 %
MCH RBC QN AUTO: 32.1 PG (ref 26.5–33)
MCHC RBC AUTO-ENTMCNC: 34.5 G/DL (ref 31.5–36.5)
MCV RBC AUTO: 93 FL (ref 78–100)
MONOCYTES # BLD AUTO: 0.5 10E3/UL (ref 0–1.3)
MONOCYTES NFR BLD AUTO: 4 %
NEUTROPHILS # BLD AUTO: 9.1 10E3/UL (ref 1.6–8.3)
NEUTROPHILS NFR BLD AUTO: 79 %
NRBC # BLD AUTO: 0 10E3/UL
NRBC BLD AUTO-RTO: 0 /100
PLATELET # BLD AUTO: 272 10E3/UL (ref 150–450)
RBC # BLD AUTO: 3.83 10E6/UL (ref 3.8–5.2)
T PALLIDUM AB SER QL: NONREACTIVE
T VAGINALIS DNA VAG QL NAA+PROBE: NOT DETECTED
VIT D+METAB SERPL-MCNC: 37 NG/ML (ref 20–50)
WBC # BLD AUTO: 11.7 10E3/UL (ref 4–11)

## 2024-03-07 PROCEDURE — 86780 TREPONEMA PALLIDUM: CPT

## 2024-03-07 PROCEDURE — 0352U MULTIPLEX VAGINAL PANEL BY PCR: CPT | Performed by: ADVANCED PRACTICE MIDWIFE

## 2024-03-07 PROCEDURE — 82306 VITAMIN D 25 HYDROXY: CPT

## 2024-03-07 PROCEDURE — 85025 COMPLETE CBC W/AUTO DIFF WBC: CPT

## 2024-03-07 PROCEDURE — 36415 COLL VENOUS BLD VENIPUNCTURE: CPT

## 2024-03-07 PROCEDURE — G0463 HOSPITAL OUTPT CLINIC VISIT: HCPCS | Performed by: ADVANCED PRACTICE MIDWIFE

## 2024-03-07 PROCEDURE — 99207 PR PRENATAL VISIT: CPT | Performed by: ADVANCED PRACTICE MIDWIFE

## 2024-03-07 PROCEDURE — 82950 GLUCOSE TEST: CPT

## 2024-03-07 NOTE — PROGRESS NOTES
"Subjective:  23 year old  at 27w2d presents for a routine prenatal appointment.   Denies leaking of fluid or vaginal bleeding. Reports normal fetal movement.   No HA, visual changes, RUQ or epigastric pain.     - Recently moved, did moving by herself, reports experiencing painful but irregular contractions during this. She reports never having more than 5 in an hour and they only occur when she is on her feet a lot. Have since improved. Reports 1 episode of vaginal spotting that she noticed a \"little blood\" when wiping also while she was moving. Denies bleeding since.  Positive fetal movement.   - Treated for BV and yeast last visit. Pt reports symptoms resolved, but would like to complete self-swab today.    - Reports increased stress and anxiety that is managed by her Celexa. Has used hydroxyzine in the past for anxiety.  Declines need for additional supports at this time.     Objective:  Vitals:    24 0839   BP: 103/70   Pulse: 97   Weight: 60.3 kg (132 lb 14.4 oz)   Height: 1.575 m (5' 2\")   See OB flowsheet    Education completed today includes breast feeding, [facility] hand out, contraception, signs of pre-term labor, when to present to birthplace, post partum depression, GBS, TOLAC, and labor induction.  Birth preferences reviewed: epidural, TOLAC  Labor support:   and/or Mom   Feeding plans : Breastfeeding,  son x 2 months and stopped due to latch issues.   Contraception planned:  undecided, considering vasectomy for partner, but would likely desire interim birth control  The following labs were ordered today: GCT, CBC w platelets, Vitamin d, Anti-treponema, multiplex vaginal PCR  Not eligible for water birth.    Blood type: A POS    ABO   Date Value Ref Range Status   09/15/2020 A  Final     RH(D)   Date Value Ref Range Status   09/15/2020 Pos  Final     Antibody Screen   Date Value Ref Range Status   10/18/2023 Negative Negative Final   09/15/2020 Neg  Final "   Rhogam is not indicated  TDAP at next visit.    A/P:  Encounter Diagnoses   Name Primary?    Supervision of high risk pregnancy in second trimester Yes    Vitamin D deficiency     History of       Orders Placed This Encounter   Procedures    Glucose 1 Hour    Vitamin D Deficiency    Treponema Abs w Reflex to RPR and Titer    Multiplex Vaginal Panel by PCR    CBC with Platelets & Differential     - Reviewed warning signs and when to call/present to Birthplace  - Reviewed use of hydroxizine PRN as needed for increased anxiety  - Discusseed use maternity belt for increased pelvic pressure  - TOLAC consent reviewed and signed  - Tdap next visit  - Return for next visit in 2 weeks    IDoyle SNM, am serving as a scribe; to document services personally performed by SHAJI Farrell CNM based on data collection and the provider's statements to me. - MANOLO Callahan  The encounter was performed by me and scribed by the student. The scribed note accurately reflects my personal services and decisions made by me. SHAJI Monk CNM

## 2024-03-07 NOTE — PATIENT INSTRUCTIONS
Weeks 26 to 30 of Your Pregnancy: Care Instructions  You're starting your last trimester. You'll probably feel your baby moving around more. Your back may ache as your body gets used to your baby's size and length. Take care of yourself, and pay attention to what your body needs.    Talk to your doctor about getting the Tdap shot. It will help protect your  against whooping cough (pertussis). Also ask your doctor about flu and COVID-19 shots if you haven't had them yet. If your blood type is Rh negative, you may be given a shot of Rh immune globulin (such as RhoGAM). It can help prevent problems for your baby.   You may have [place] contractions. They are single or several strong contractions without a pattern. These are practice contractions but not the start of labor.   Be kind to yourself.     Take breaks when you're tired.  Change positions often. Don't sit for too long or stand for too long.  At work, rest during breaks if you can. If you don't get breaks, talk to your doctor about writing a letter to your employer to request them.  Avoid fumes, chemicals, and tobacco smoke.  Be sexual if you want to.     You may be interested in sex, or you may not. Everyone is different.  Sex is okay unless your doctor tells you not to.  Your belly can make it hard to find good positions for sex. [place] and explore.  Watch for signs of  labor.    These signs include:   Menstrual-like cramps. Or you may have pain or pressure in your pelvis that happens in a pattern.  About 6 or more contractions in an hour (even after rest and a glass of water).  A low, dull backache that doesn't go away when you change positions.  An increase or change in vaginal discharge.  Light vaginal bleeding or spotting.  Your water breaking.  Know what to do if you think you are having contractions.     Drink 1 or 2 glasses of water.  Lie down on your left side for at least an hour.  While on your side, feel the top of your  "belly to see if it's tight.  Write down your contractions for an hour. Time how long it is from the start of one contraction to the start of the next.  Call your doctor if you have regular contractions.  Follow-up care is a key part of your treatment and safety. Be sure to make and go to all appointments, and call your doctor if you are having problems. It's also a good idea to know your test results and keep a list of the medicines you take.  Where can you learn more?  Go to https://www.[employer].net/patiented  Enter S999 in the search box to learn more about \"Weeks 26 to 30 of Your Pregnancy: Care Instructions.\"  Current as of: July 11, 2023               Content Version: 13.8    3686-6370 [employer].   Care instructions adapted under license by your healthcare professional. If you have questions about a medical condition or this instruction, always ask your healthcare professional. [employer] disclaims any warranty or liability for your use of this information.      Counting Your Baby's Kicks: Care Instructions  Overview     Counting your baby's kicks is one way your doctor can tell that your baby is healthy. You will probably feel your baby move for the first time between 16 and 22 weeks. The movement may feel like flutters rather than kicks. Your baby may move more at certain times of the day. When you are active, you may notice less kicking than when you are resting. At your prenatal visits, your doctor will ask whether the baby is active.  In your last trimester, your doctor may ask you to count the number of times you feel your baby move.  Follow-up care is a key part of your treatment and safety. Be sure to make and go to all appointments, and call your doctor if you are having problems. It's also a good idea to know your test results and keep a list of the medicines you take.  How do you count fetal kicks?  A common method of checking your baby's movement is to note the length " "of time it takes to count 10 movements (such as kicks, flutters, or rolls).  Pick your baby's most active time of day to count. This may be any time from morning to evening.  If you don't feel 10 movements in an hour, have something to eat or drink and count for another hour. If you don't feel at least 10 movements in the 2-hour period, call your doctor.  Do not use an at-home Doppler heart monitor in place of counting fetal movements.  When should you call for help?   Call your doctor now or seek immediate medical care if:    You feel fewer than 10 movements in a 2-hour period.     You noticed that your baby has stopped moving or is moving less than normal.   Watch closely for changes in your health, and be sure to contact your doctor if you have any problems.  Where can you learn more?  Go to https://www.[employer].net/patiented  Enter U048 in the search box to learn more about \"Counting Your Baby's Kicks: Care Instructions.\"  Current as of: July 11, 2023               Content Version: 13.8    5702-0773 [employer].   Care instructions adapted under license by your healthcare professional. If you have questions about a medical condition or this instruction, always ask your healthcare professional. [employer] disclaims any warranty or liability for your use of this information.        "

## 2024-03-07 NOTE — PROGRESS NOTES
"23 year old  at 27w2d presentst for a routine prenatal appointment.     no vaginal bleeding or leakage of fluid.  no contractions or cramping.    pos fetal movement.       No HA, visual changes, RUQ or epigastric pain.  Concerns: ***       Objective:  Vitals:    24 0839   BP: 103/70   Pulse: 97   Weight: 60.3 kg (132 lb 14.4 oz)   Height: 1.575 m (5' 2\")     See OB flowsheet  Blood type: A POS     EOB folder given    A/P:  Encounter Diagnoses   Name Primary?    Supervision of high risk pregnancy in second trimester Yes    Vitamin D deficiency      No orders of the defined types were placed in this encounter.    No orders of the defined types were placed in this encounter.    - Updated individualized prenatal care plan on the problem list for 24-28weeks    - Continue scheduled prenatal care    - TDAP in 2 weeks    Doyle RODRIGUEZ SNM, am serving as a scribe; to document services personally performed by SHAJI Farrell CNM based on data collection and the provider's statements to me.     MANOLO Callahan              "

## 2024-03-12 ENCOUNTER — MYC MEDICAL ADVICE (OUTPATIENT)
Dept: OBGYN | Facility: CLINIC | Age: 24
End: 2024-03-12
Payer: COMMERCIAL

## 2024-03-14 ENCOUNTER — LAB (OUTPATIENT)
Dept: LAB | Facility: CLINIC | Age: 24
End: 2024-03-14
Payer: COMMERCIAL

## 2024-03-14 DIAGNOSIS — R73.09 ABNORMAL GLUCOSE: ICD-10-CM

## 2024-03-14 LAB
GESTATIONAL GTT 1 HR POST DOSE: 140 MG/DL (ref 60–179)
GESTATIONAL GTT 2 HR POST DOSE: 89 MG/DL (ref 60–154)
GESTATIONAL GTT 3 HR POST DOSE: 92 MG/DL (ref 60–139)
GLUCOSE P FAST SERPL-MCNC: 68 MG/DL (ref 60–94)
HOLD SPECIMEN: NORMAL

## 2024-03-14 PROCEDURE — 82947 ASSAY GLUCOSE BLOOD QUANT: CPT

## 2024-03-14 PROCEDURE — 82951 GLUCOSE TOLERANCE TEST (GTT): CPT

## 2024-03-14 PROCEDURE — 36415 COLL VENOUS BLD VENIPUNCTURE: CPT

## 2024-03-14 PROCEDURE — 82950 GLUCOSE TEST: CPT

## 2024-03-15 ENCOUNTER — MYC MEDICAL ADVICE (OUTPATIENT)
Dept: OBGYN | Facility: CLINIC | Age: 24
End: 2024-03-15
Payer: COMMERCIAL

## 2024-03-24 ENCOUNTER — MYC MEDICAL ADVICE (OUTPATIENT)
Dept: OBGYN | Facility: CLINIC | Age: 24
End: 2024-03-24
Payer: COMMERCIAL

## 2024-03-25 ENCOUNTER — NURSE TRIAGE (OUTPATIENT)
Dept: OBGYN | Facility: CLINIC | Age: 24
End: 2024-03-25
Payer: COMMERCIAL

## 2024-03-25 ENCOUNTER — PRENATAL OFFICE VISIT (OUTPATIENT)
Dept: OBGYN | Facility: CLINIC | Age: 24
End: 2024-03-25
Attending: ADVANCED PRACTICE MIDWIFE
Payer: COMMERCIAL

## 2024-03-25 VITALS
HEIGHT: 62 IN | DIASTOLIC BLOOD PRESSURE: 66 MMHG | WEIGHT: 140.3 LBS | SYSTOLIC BLOOD PRESSURE: 104 MMHG | BODY MASS INDEX: 25.82 KG/M2 | HEART RATE: 98 BPM

## 2024-03-25 DIAGNOSIS — K59.01 SLOW TRANSIT CONSTIPATION: ICD-10-CM

## 2024-03-25 DIAGNOSIS — Z87.59 HISTORY OF POSTPARTUM DEPRESSION: ICD-10-CM

## 2024-03-25 DIAGNOSIS — O09.93 SUPERVISION OF HIGH RISK PREGNANCY IN THIRD TRIMESTER: Primary | ICD-10-CM

## 2024-03-25 DIAGNOSIS — F17.290 VAPING NICOTINE DEPENDENCE, TOBACCO PRODUCT: ICD-10-CM

## 2024-03-25 DIAGNOSIS — F33.1 MODERATE EPISODE OF RECURRENT MAJOR DEPRESSIVE DISORDER (H): ICD-10-CM

## 2024-03-25 DIAGNOSIS — F12.20 CANNABIS DEPENDENCE (H): ICD-10-CM

## 2024-03-25 DIAGNOSIS — K64.4 EXTERNAL HEMORRHOIDS: ICD-10-CM

## 2024-03-25 DIAGNOSIS — F11.11 HISTORY OF OPIOID ABUSE (H): ICD-10-CM

## 2024-03-25 DIAGNOSIS — Z98.891 HISTORY OF C-SECTION: ICD-10-CM

## 2024-03-25 DIAGNOSIS — Z86.59 HISTORY OF POSTPARTUM DEPRESSION: ICD-10-CM

## 2024-03-25 DIAGNOSIS — Z91.51 HISTORY OF SUICIDE ATTEMPT: ICD-10-CM

## 2024-03-25 PROCEDURE — 99207 PR PRENATAL VISIT: CPT | Performed by: ADVANCED PRACTICE MIDWIFE

## 2024-03-25 PROCEDURE — G0463 HOSPITAL OUTPT CLINIC VISIT: HCPCS | Mod: 25 | Performed by: ADVANCED PRACTICE MIDWIFE

## 2024-03-25 PROCEDURE — 90715 TDAP VACCINE 7 YRS/> IM: CPT

## 2024-03-25 PROCEDURE — 250N000011 HC RX IP 250 OP 636

## 2024-03-25 PROCEDURE — 90471 IMMUNIZATION ADMIN: CPT

## 2024-03-25 NOTE — PATIENT INSTRUCTIONS
Thank you for trusting us with your care!     If you need to contact us for questions about:  Symptoms, Scheduling & Medical Questions; Non-urgent (2-3 day response) [place] message, Urgent (needing response today) [phone] (if after 3:30pm next day response)   Prescriptions: Please call your Pharmacy   Billing: [facility] [phone] or [facility]:[phone]

## 2024-03-25 NOTE — TELEPHONE ENCOUNTER
S-(situation): patient sent a [employer] message stating:     I also have pressure in my butt and lower stomach right above my vagina. I have an appointment today so i plan to come to that     I keep getting a pretty terrible pain under my right boob/ribs. Just the right side. Consistent and feels like sharp/cramping feeling. Wondering if that s concerning     B-(background): patient is currently 29+6 weeks pregnant and this is her second pregnancy    A-(assessment): patient stated that this started last night and has continued this morning with the pressure in her bottom. Patient states that it feels like she needs to have a bowel movement, but is able to have them. I did inquire if the baby is moving and patient stated has not really paid attention to it this morning.     R-(recommendations): I did let her know to drink some water, lay down on her left side and do kick counts until I can call her back with a plan.     Paged the midwife on call to see if the patient can wait to be seen this afternoon or needs to be seen in L&D.     Spoke with Karly Avila CNM on call to go over the patients symptoms with her. Karly stated to have her come in early for her appointment and if she needs to come over to the birth place than just send her over. I did let Karly know that her appointment is at 2 pm, but I do have a 1:20. Karly stated to have her come in at 1:20.    I did call and speak with Mare to go over the plan and to inquire if she has been doing her kick counts. Mare stated that she has and has had one in 15 minutes. I did let her know to come in at 1:20 for her appointment and to continue with the kick counts until her appointment. If anything changes before her appointment to please call us.     All questions answered.                             Reason for Disposition   Increased pressure in pelvic area    Additional Information   Negative: Passed out (i.e., fainted, collapsed and was not responding)    "Negative: Shock suspected (e.g., cold/pale/clammy skin, too weak to stand, low BP, rapid pulse)   Negative: Difficult to awaken or acting confused (e.g., disoriented, slurred speech)   Negative: SEVERE constant abdominal pain (e.g., excruciating) and present > 1 hour   Negative: SEVERE bleeding (e.g., continuous red blood from vagina, or large blood clots)   Negative: Umbilical cord hanging out of the vagina (shiny, white, curled appearance, \"like telephone cord\")   Negative: Uncontrollable urge to push (i.e., feels like baby is coming out now)   Negative: Can see baby   Negative: Sounds like a life-threatening emergency to the triager   Negative: Pregnant 37 or more weeks (i.e., term)   Negative: Leakage of fluid from vagina   Negative: Vaginal bleeding or spotting  (Exception: Brief spotting after intercourse or pelvic exam.)   Negative: Contractions > 10 minutes apart that persist > 24 hours, and no improvement using Care Advice   Negative: Contractions and any vaginal bleeding (including: red blood, clots, spotting, or pink/brown mucous)   Negative: MODERATE-SEVERE abdominal pain   Negative: Contractions < 10 minutes apart for 1 hour (i.e., 6 or more contractions an hour)   Negative: Severe headache or headache that won't go away   Negative: New blurred vision or vision changes   Negative: No movement of baby for 8 hours   Negative: Pinkish or brownish mucous discharge   Negative: Patient sounds very sick or weak to the triager   Negative: Fever 100.4 F (38.0 C) or higher   Negative: Pain or burning with passing urine (urination)   Negative: Baby has dropped (belly bump appears lower)   Negative: Has a cerclage (i.e., a procedure where the obstetrician stitches shut the cervix)   Negative: Patient wants to be seen     Patient has an appointment later this afternoon   Negative: Increase in vaginal discharge   Negative: Diarrhea   Negative: Prior history of  labor   Negative: Lower back discomfort and not " "relieved by rest   Negative: Mild contractions > 10 minutes apart   Negative: Mild contractions that persist < 1 hour   Negative: Slight spotting after sexual intercourse or pelvic exam   Commented on: Baby moving less today (e.g., kick count < 5 in 1 hour or < 10 in 2 hours) and 23 or more weeks pregnant     Patient was not sure has not paid attention to movement this morning    Answer Assessment - Initial Assessment Questions  1. ONSET: \"When did the symptoms begin?\"         Last night  2. CONTRACTIONS: \"Describe the contractions that you are having.\" (e.g., duration, frequency, regularity, severity)      Not having any contractions  3. PREGNANCY: \"How many weeks pregnant are you?\"      29+6  4. JOSE: \"What date are you expecting to deliver?\"      24  5. PARITY: \"Have you had a baby before?\" If Yes, ask: \"How long did the labor last?\"      Yes  6. FETAL MOVEMENT: \"Has the baby's movement decreased or changed significantly from normal?\"      Not sure have not paid attention to them this morning  7. OTHER SYMPTOMS: \"Do you have any other symptoms?\" (e.g., leaking fluid from vagina, fever, hand/facial swelling)      none    Protocols used: Pregnancy - Labor - [id number]-A-YW    "

## 2024-03-25 NOTE — PROGRESS NOTES
"Subjective:      23 year old  at 29w6d presentst for a routine prenatal appointment.     No vaginal bleeding or leakage of fluid.  No concerning contractions or cramping.    Positive and regular fetal movement.       No HA, visual changes, or epigastric pain.  Patient concerns: Jeanneicitpatricia Stiles reports a sharp pain in her RUQ that comes inconsistently. She doesn't know anything to make it better or worse. She also reports increasing pelvic pressure that is worst with prolonged standing and walking  Denies  labor s/s    Objective:  Vitals:    24 1325   BP: 104/66   Pulse: 98   Weight: 63.6 kg (140 lb 4.8 oz)   Height: 1.575 m (5' 2\")     See ob flowsheet  Assessment/Plan     Encounter Diagnoses   Name Primary?    Supervision of high risk pregnancy in third trimester Yes    Cannabis dependence (H)     Moderate episode of recurrent major depressive disorder (H)     History of      Vaping nicotine dependence, tobacco product     History of opioid abuse (H)     History of suicide attempt     Slow transit constipation     External hemorrhoids     History of postpartum depression      Blood type: A POS     - Updated individualized prenatal care plan on the problem list for 3rd trimester  - Encouraged use of a maternity belt to support growing uterus and help with pelvic discomfort  - Educated to keep a food log to watch for correlations between RUQ pain and diet. Discussed s/s gallstones and watch high fat diet, call w continued concerns  - Return to clinic in 2 weeks and prn if questions or concerns.    I, MANOLO Callahan am serving as a scribe; to document services personally performed by SHAJI Pineda CNM  based on data collection and the provider's statements to me.     MANOLO Callahan  I agree with the PFSH and ROS as completed by the student, except for changes made by me. The remainder of the encounter scribed by the student. The scribed note accurately reflects my personal " services and decisions made by me.  Alejandra Rodríguez, KRISTAL, CNM, APRN      SHAJI GaleasM

## 2024-04-10 ENCOUNTER — PRENATAL OFFICE VISIT (OUTPATIENT)
Dept: OBGYN | Facility: CLINIC | Age: 24
End: 2024-04-10
Attending: MIDWIFE
Payer: COMMERCIAL

## 2024-04-10 VITALS
HEIGHT: 62 IN | DIASTOLIC BLOOD PRESSURE: 69 MMHG | WEIGHT: 137.8 LBS | BODY MASS INDEX: 25.36 KG/M2 | HEART RATE: 78 BPM | SYSTOLIC BLOOD PRESSURE: 107 MMHG

## 2024-04-10 DIAGNOSIS — O09.93 SUPERVISION OF HIGH RISK PREGNANCY IN THIRD TRIMESTER: Primary | ICD-10-CM

## 2024-04-10 DIAGNOSIS — K64.9 HEMORRHOIDS, UNSPECIFIED HEMORRHOID TYPE: ICD-10-CM

## 2024-04-10 DIAGNOSIS — Z87.59 HISTORY OF POSTPARTUM DEPRESSION: ICD-10-CM

## 2024-04-10 DIAGNOSIS — F17.290 VAPING NICOTINE DEPENDENCE, TOBACCO PRODUCT: ICD-10-CM

## 2024-04-10 DIAGNOSIS — Z91.51 HISTORY OF SUICIDE ATTEMPT: ICD-10-CM

## 2024-04-10 DIAGNOSIS — K59.00 CONSTIPATION, UNSPECIFIED CONSTIPATION TYPE: ICD-10-CM

## 2024-04-10 DIAGNOSIS — Z86.59 HISTORY OF POSTPARTUM DEPRESSION: ICD-10-CM

## 2024-04-10 DIAGNOSIS — K64.4 EXTERNAL HEMORRHOIDS: ICD-10-CM

## 2024-04-10 DIAGNOSIS — F11.11 HISTORY OF OPIOID ABUSE (H): ICD-10-CM

## 2024-04-10 DIAGNOSIS — K59.01 SLOW TRANSIT CONSTIPATION: ICD-10-CM

## 2024-04-10 PROCEDURE — G0463 HOSPITAL OUTPT CLINIC VISIT: HCPCS | Performed by: MIDWIFE

## 2024-04-10 PROCEDURE — 99207 PR PRENATAL VISIT: CPT | Performed by: MIDWIFE

## 2024-04-10 RX ORDER — HYDROCORTISONE 25 MG/G
CREAM TOPICAL 2 TIMES DAILY PRN
Qty: 30 G | Refills: 1 | Status: SHIPPED | OUTPATIENT
Start: 2024-04-10 | End: 2024-08-20

## 2024-04-10 NOTE — PROGRESS NOTES
"Subjective:      24 year old  at 32w1d presentst for a routine prenatal appointment.     no vaginal bleeding or leakage of fluid.  No concerning contractions or cramping.    Pos fetal movement.       No HA, visual changes, RUQ or epigastric pain.  Patient concerns: constipation and hemorrhoids      Has not used any stool softener  has miralax at home  Reviewed relief / prevention of constipation/ hemorrhoids    Objective:  Vitals:    04/10/24 1042   BP: 107/69   Pulse: 78   Weight: 62.5 kg (137 lb 12.8 oz)   Height: 1.575 m (5' 2\")   , see ob flowsheet  Assessment/Plan     Patient Active Problem List    Diagnosis Date Noted    Supervision of high risk pregnancy in third trimester 2020     Priority: High     [facility] Provider Group choice: CNM group-would like CNM prenatal and repeat CS  Partner's name: Williams  [x]NOB folder  [x]Dating  [x] 1st trimester screening: MFM referral placed for 1st trimester screening place  [x]Offer AFP after 15 wks  [x]Fetal anatomy US ordered  [x]Rubella immune  [x]Hep B immune   [x]Varicella immune  [x]Pap 2020 NILM, plan postpartum  [x] No added risk for PRE-E  [x] No increased risk for GDM  [x]Yes, plan utox, discussed w patient - done at intake  [x]COVID vaccine completed-declines booster  _____________________________________  [x]EOB folder  [x]PP Contraception plan: If tubal,consent date: Undecided  [x]Labor plans: TOLAC  [x]: None  [x]Infant feeding plan; Breastfeeding  [x]FLU shot - 10/18/23  [x]TDAP given  [x]RSV- NA  [x]Rhogam if needed, date: NA  [x]TOLAC consent done 3/7/24  [x] Water birth interest NA  [x]GCT, elevated GTT, passed  ________________________________________  [] OTC PP meds sent  []PP plans, time off, support system discussed, resources offered  []Planning CS-ERAS pkt        Hyperemesis gravidarum 2023     Priority: Medium     Weight loss in first trimester, : Zofran RX sent      Slow transit " constipation 10/18/2023     Priority: Medium    External hemorrhoids 10/18/2023     Priority: Medium    History of postpartum depression 10/18/2023     Priority: Medium     After giving birth to her first child, started on Celexa      History of opioid abuse (H) 10/17/2023     Priority: Medium     2019, sober since      History of suicide attempt      Priority: Medium     age 14 and age 17       History of  2020     Priority: Medium     Plans repeat CS       Vaping nicotine dependence, tobacco product 07/15/2020     Priority: Medium     May be open to strategies to decrease nicotine   Considering hypnosis w family members, aunt recently had success to stop smoking      Vitamin D deficiency 2020     Priority: Medium     3/10/20- Vit D 22. Discuss supplementation next visit___    Formatting of this note might be different from the original.  3/10/20- Vit D 22. Discuss supplementation next visit___  Formatting of this note might be different from the original.  3/10/20- Vit D 22. Discuss supplementation next visit___      Cannabis dependence (H) 2018     Priority: Medium    Moderate episode of recurrent major depressive disorder (H) 2018     Priority: Medium     Blood type: A POS     Updated individualized prenatal care plan on the problem list for 3rd trimester    Return to clinic in 2 weeks and prn if questions or concerns.     SHAJI Prado CNM

## 2024-04-10 NOTE — PATIENT INSTRUCTIONS
Thank you for trusting us with your care!     If you need to contact us for questions about:  Symptoms, Scheduling & Medical Questions; Non-urgent (2-3 day response) Contreras message, Urgent (needing response today) [phone] (if after 3:30pm next day response)   Prescriptions: Please call your Pharmacy   Billing: [facility] [phone] or [facility]:[phone]

## 2024-05-06 ENCOUNTER — PRENATAL OFFICE VISIT (OUTPATIENT)
Dept: OBGYN | Facility: CLINIC | Age: 24
End: 2024-05-06
Attending: REGISTERED NURSE
Payer: COMMERCIAL

## 2024-05-06 VITALS
HEART RATE: 96 BPM | WEIGHT: 144 LBS | BODY MASS INDEX: 26.5 KG/M2 | SYSTOLIC BLOOD PRESSURE: 107 MMHG | HEIGHT: 62 IN | DIASTOLIC BLOOD PRESSURE: 69 MMHG

## 2024-05-06 DIAGNOSIS — O09.93 SUPERVISION OF HIGH RISK PREGNANCY IN THIRD TRIMESTER: Primary | ICD-10-CM

## 2024-05-06 DIAGNOSIS — Z98.891 HISTORY OF C-SECTION: ICD-10-CM

## 2024-05-06 PROCEDURE — 99207 PR PRENATAL VISIT: CPT | Performed by: REGISTERED NURSE

## 2024-05-06 PROCEDURE — 87653 STREP B DNA AMP PROBE: CPT | Performed by: REGISTERED NURSE

## 2024-05-06 PROCEDURE — G0463 HOSPITAL OUTPT CLINIC VISIT: HCPCS | Performed by: REGISTERED NURSE

## 2024-05-06 NOTE — PROGRESS NOTES
"Subjective:      24 year old  at 35w6d presentst for a routine prenatal appointment.    Denies vaginal bleeding or leakage of fluid.  Denies concerning contractions or cramping.   Reports regular fetal movement.       No HA, visual changes, RUQ or epigastric pain.    Patient concerns: Feeling well overall, though having some increased anxiety around delivery. Taking Citalopram and has Hydroxyzine to use as needed.    Hemorrhoids present, not causing pain at this time. Able to have regular and painless bowel movements    Objective:  Vitals:    24 1439   BP: 107/69   Pulse: 96   Weight: 65.3 kg (144 lb)   Height: 1.575 m (5' 2\")   , see ob flowsheet    Blood type: A POS   Assessment/Plan     Patient Active Problem List    Diagnosis Date Noted    Hyperemesis gravidarum 2023     Priority: Medium     Weight loss in first trimester, : Zofran RX sent      Slow transit constipation 10/18/2023     Priority: Medium    External hemorrhoids 10/18/2023     Priority: Medium    History of postpartum depression 10/18/2023     Priority: Medium     After giving birth to her first child, started on Celexa      History of opioid abuse (H) 10/17/2023     Priority: Medium     2019, sober since      History of suicide attempt      Priority: Medium     age 14 and age 17       History of  2020     Priority: Medium     Plans repeat CS       Vaping nicotine dependence, tobacco product 07/15/2020     Priority: Medium     May be open to strategies to decrease nicotine   Considering hypnosis w family members, aunt recently had success to stop smoking      Vitamin D deficiency 2020     Priority: Medium     3/10/20- Vit D 22. Discuss supplementation next visit___    Formatting of this note might be different from the original.  3/10/20- Vit D 22. Discuss supplementation next visit___  Formatting of this note might be different from the original.  3/10/20- Vit D 22. Discuss supplementation next " visit___      Supervision of high risk pregnancy in third trimester 03/12/2020     Priority: Medium     [facility] Patient Provider Group choice: CNM group-would like CNM prenatal and repeat CS  Partner's name: Williams  [x]NOB folder  [x]Dating  [x] 1st trimester screening: MFM referral placed for 1st trimester screening place  [x]Offer AFP after 15 wks  [x]Fetal anatomy US ordered  [x]Rubella immune  [x]Hep B immune   [x]Varicella immune  [x]Pap 11/5/2020 NILM, plan postpartum  [x] No added risk for PRE-E  [x] No increased risk for GDM  [x]Yes, plan utox, discussed w patient - done at intake  [x]COVID vaccine completed-declines booster  _____________________________________  [x]EOB folder  [x]PP Contraception plan: If tubal,consent date: Undecided  [x]Labor plans: TOLAC  [x]: None  [x]Infant feeding plan; Breastfeeding  [x]FLU shot - 10/18/23  [x]TDAP given  [x]RSV- NA  [x]Rhogam if needed, date: NA  [x]TOLAC consent done 3/7/24  [x] Water birth interest NA  [x]GCT, elevated GTT, passed  ________________________________________  [] OTC PP meds sent  []PP plans, time off, support system discussed, resources offered  []Planning CS-ERAS pkt        Cannabis dependence (H) 08/19/2018     Priority: Medium    Moderate episode of recurrent major depressive disorder (H) 08/19/2018     Priority: Medium       No orders of the defined types were placed in this encounter.    - Updated individualized prenatal care plan on the problem list for 3rd trimester  - Group Beta Strep (GBS) collected by patient per her preference.   - Maintain regular bowel movements and use creams as prescribed if hemorrhoids become painful.   - Reviewed use of Hydroxyzine as needed to help with situational anxiety in addition to Celexa.   - Labor signs discussed. Reinforced daily fetal movement counts.  - Reviewed why/how to contact provider if headache/visual changes/RUQ or epigastric pain, decreased fetal movement, vaginal  bleeding, leakage of fluid.  - Return to clinic in 1 week and prn if questions or concerns.     I, Doyle Silverio, am serving as a scribe; to document services personally performed by  Zara Deal CNM, SHAJI based on data collection and the provider's statements to me.   MANOLO Callahan, RN, BSN    I agree with the PFSH and ROS as completed by Doyle Silverio except for changes made by me. The remainder of the encounter was performed by me and scribed by Doyle Silverio. The scribed note accurately reflects my personal services and decisions made by me.     SHAJI Hodges CNM

## 2024-05-07 LAB — GP B STREP DNA SPEC QL NAA+PROBE: NEGATIVE

## 2024-05-13 ENCOUNTER — PRENATAL OFFICE VISIT (OUTPATIENT)
Dept: OBGYN | Facility: CLINIC | Age: 24
End: 2024-05-13
Attending: ADVANCED PRACTICE MIDWIFE
Payer: COMMERCIAL

## 2024-05-13 VITALS
DIASTOLIC BLOOD PRESSURE: 66 MMHG | HEART RATE: 102 BPM | HEIGHT: 62 IN | SYSTOLIC BLOOD PRESSURE: 101 MMHG | WEIGHT: 144 LBS | BODY MASS INDEX: 26.5 KG/M2

## 2024-05-13 DIAGNOSIS — O09.93 SUPERVISION OF HIGH RISK PREGNANCY IN THIRD TRIMESTER: Primary | ICD-10-CM

## 2024-05-13 DIAGNOSIS — Z98.891 HISTORY OF C-SECTION: ICD-10-CM

## 2024-05-13 PROCEDURE — 99207 PR PRENATAL VISIT: CPT | Performed by: ADVANCED PRACTICE MIDWIFE

## 2024-05-13 PROCEDURE — G0463 HOSPITAL OUTPT CLINIC VISIT: HCPCS | Performed by: ADVANCED PRACTICE MIDWIFE

## 2024-05-13 RX ORDER — IBUPROFEN 600 MG/1
600 TABLET, FILM COATED ORAL EVERY 6 HOURS PRN
Qty: 60 TABLET | Refills: 0 | Status: SHIPPED | OUTPATIENT
Start: 2024-05-13 | End: 2024-08-20

## 2024-05-13 RX ORDER — AMOXICILLIN 250 MG
1 CAPSULE ORAL DAILY
Qty: 100 TABLET | Refills: 0 | Status: SHIPPED | OUTPATIENT
Start: 2024-05-13

## 2024-05-13 RX ORDER — ACETAMINOPHEN 325 MG/1
650 TABLET ORAL EVERY 6 HOURS PRN
Qty: 100 TABLET | Refills: 0 | Status: SHIPPED | OUTPATIENT
Start: 2024-05-13 | End: 2024-08-20

## 2024-05-13 ASSESSMENT — PAIN SCALES - GENERAL: PAINLEVEL: NO PAIN (0)

## 2024-05-13 NOTE — PROGRESS NOTES
"Subjective:     24 year old  at 36w6d presents for a routine prenatal appointment.    Patient concerns: Feeling well overall. Notes she is experiencing painful hemorrhoids.   Has had some irregular contractions- not concerned about labor. Denies vaginal bleeding or leakage of fluid.    Reports + fetal movement.       No HA, visual changes, RUQ or epigastric pain.     Desires TOLAC, hoping for spontaneous labor.    Objective:  Vitals:    24 1304   BP: 101/66   Pulse: 102   Weight: 65.3 kg (144 lb)   Height: 1.575 m (5' 2\")   See OB flowsheet    Assessment/Plan     Patient Active Problem List    Diagnosis Date Noted    Hyperemesis gravidarum 2023     Priority: Medium     Weight loss in first trimester, : Zofran RX sent      Slow transit constipation 10/18/2023     Priority: Medium    External hemorrhoids 10/18/2023     Priority: Medium    History of postpartum depression 10/18/2023     Priority: Medium     After giving birth to her first child, started on Celexa      History of opioid abuse (H) 10/17/2023     Priority: Medium     2019, sober since      History of suicide attempt      Priority: Medium     age 14 and age 17       History of - tolac consent signed 2020     Priority: Medium     Hx of CS 9/15/20, TOLAC consent 3/7/24      Vaping nicotine dependence, tobacco product 07/15/2020     Priority: Medium     May be open to strategies to decrease nicotine   Considering hypnosis w family members, aunt recently had success to stop smoking      Vitamin D deficiency 2020     Priority: Medium     3/10/20- Vit D 22. Discuss supplementation next visit___    Formatting of this note might be different from the original.  3/10/20- Vit D 22. Discuss supplementation next visit___  Formatting of this note might be different from the original.  3/10/20- Vit D 22. Discuss supplementation next visit___      Supervision of high risk pregnancy in third trimester 2020     Priority: " Medium     [facility] Patient Provider Group choice: CNM group-would like CNM prenatal and repeat CS  Partner's name: Williams  [x]NOB folder  [x]Dating  [x] 1st trimester screening: MFM referral placed for 1st trimester screening place  [x]Offer AFP after 15 wks  [x]Fetal anatomy US ordered  [x]Rubella immune  [x]Hep B immune   [x]Varicella immune  [x]Pap 11/5/2020 NILM, plan postpartum  [x] No added risk for PRE-E  [x] No increased risk for GDM  [x]Yes, plan utox, discussed w patient - done at intake  [x]COVID vaccine completed-declines booster  _____________________________________  [x]EOB folder  [x]PP Contraception plan: If tubal,consent date: Undecided  [x]Labor plans: TOLAC  [x]: None  [x]Infant feeding plan; Breastfeeding  [x]FLU shot - 10/18/23  [x]TDAP given  [x]RSV- NA  [x]Rhogam if needed, date: NA  [x]TOLAC consent done 3/7/24  [x] Water birth interest NA  [x]GCT, elevated GTT, passed  ________________________________________  [x] OTC PP meds sent  [x]PP plans: 6 weeks off partner, Williams, to work evenings and help during the day. Mother in law able to help  [x]Planning CS-ERAS pkt NA TOLAC        Cannabis dependence (H) 08/19/2018     Priority: Medium    Moderate episode of recurrent major depressive disorder (H) 08/19/2018     Priority: Medium       - Updated individualized prenatal care plan on problem list including OTC meds and PP suppport plans    - Labor signs discussed. Reinforced daily fetal movement counts.  - Reviewed why/how to contact provider if headache/visual changes/RUQ or epigastric pain, decreased fetal movement, vaginal bleeding, leakage of fluid.    - Care for hemorrhoids   - stool softener.    - adequate water intake   - high fiber diet   - sitz bath  - Reviewed recommended postpartum OTC medication. Pt desires prescription. Prescription for tylenol, motrin and senna-docusate sent to pt's preferred pharmacy. Reviewed no use of motrin during pregnancy.   -  Reviewed GBS status, negative 05/06/2024.     - Return to clinic in 1 week and prn if questions or concerns.     I, Doyle Silverio, am serving as a scribe; to document services personally performed by Roxann Espinosa CNM, SHAJI based on data collection and the provider's statements to me.   MANOLO Callahan, RN, BSN    The encounter was performed by me and scribed by the SNM. The scribed note accurately reflects my personal services and decisions made by me.     SHAJI Guerra, PAULA

## 2024-05-13 NOTE — PATIENT INSTRUCTIONS
Thank you for trusting us with your care!     If you need to contact us for questions about:  Symptoms, Scheduling & Medical Questions; Non-urgent (2-3 day response) Contreras message, Urgent (needing response today) [phone] (if after 3:30pm next day response)   Prescriptions: Please call your Pharmacy   Billing: [facility] [phone] or [facility]:[phone]
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
## 2024-05-20 ENCOUNTER — PRENATAL OFFICE VISIT (OUTPATIENT)
Dept: OBGYN | Facility: CLINIC | Age: 24
End: 2024-05-20
Attending: ADVANCED PRACTICE MIDWIFE
Payer: COMMERCIAL

## 2024-05-20 VITALS
BODY MASS INDEX: 26.46 KG/M2 | WEIGHT: 143.8 LBS | HEIGHT: 62 IN | HEART RATE: 90 BPM | DIASTOLIC BLOOD PRESSURE: 64 MMHG | SYSTOLIC BLOOD PRESSURE: 98 MMHG

## 2024-05-20 DIAGNOSIS — Z87.59 HISTORY OF POSTPARTUM DEPRESSION: ICD-10-CM

## 2024-05-20 DIAGNOSIS — Z86.59 HISTORY OF POSTPARTUM DEPRESSION: ICD-10-CM

## 2024-05-20 DIAGNOSIS — O09.93 SUPERVISION OF HIGH RISK PREGNANCY IN THIRD TRIMESTER: ICD-10-CM

## 2024-05-20 DIAGNOSIS — Z98.891 HISTORY OF C-SECTION: ICD-10-CM

## 2024-05-20 DIAGNOSIS — K64.4 EXTERNAL HEMORRHOIDS: ICD-10-CM

## 2024-05-20 DIAGNOSIS — F11.11 HISTORY OF OPIOID ABUSE (H): ICD-10-CM

## 2024-05-20 DIAGNOSIS — Z91.51 HISTORY OF SUICIDE ATTEMPT: ICD-10-CM

## 2024-05-20 DIAGNOSIS — K59.01 SLOW TRANSIT CONSTIPATION: ICD-10-CM

## 2024-05-20 DIAGNOSIS — F17.290 VAPING NICOTINE DEPENDENCE, TOBACCO PRODUCT: Primary | ICD-10-CM

## 2024-05-20 PROBLEM — E55.9 VITAMIN D DEFICIENCY: Status: RESOLVED | Noted: 2020-03-12 | Resolved: 2024-05-20

## 2024-05-20 PROCEDURE — 99207 PR PRENATAL VISIT: CPT | Performed by: ADVANCED PRACTICE MIDWIFE

## 2024-05-20 PROCEDURE — G0463 HOSPITAL OUTPT CLINIC VISIT: HCPCS | Performed by: ADVANCED PRACTICE MIDWIFE

## 2024-05-20 NOTE — PROGRESS NOTES
"Subjective:     24 year old  at 37w6d presents for routine prenatal visit.    no vaginal bleeding or leakage of fluid. some contractions or cramping.   Reports regular fetal movement.       No HA, visual changes, RUQ or epigastric pain.       Patient concerns: ready for baby!     Objective:  Vitals:    24 1328   BP: 98/64   Pulse: 90   Weight: 65.2 kg (143 lb 12.8 oz)   Height: 1.575 m (5' 2\")      See OB flowsheet    Assessment/Plan  Patient Active Problem List    Diagnosis Date Noted    Supervision of high risk pregnancy in third trimester 2020     Priority: High     [facility] Patient Provider Group choice: CNM group-would like CNM prenatal and repeat CS  Partner's name: Williams  [x]NOB folder  [x]Dating  [x] 1st trimester screening: MFM referral placed for 1st trimester screening place  [x]Offer AFP after 15 wks  [x]Fetal anatomy US ordered  [x]Rubella immune  [x]Hep B immune   [x]Varicella immune  [x]Pap 2020 NILM, plan postpartum  [x] No added risk for PRE-E  [x] No increased risk for GDM  [x]Yes, plan utox, discussed w patient - done at intake  [x]COVID vaccine completed-declines booster  _____________________________________  [x]EOB folder  [x]PP Contraception plan: If tubal,consent date: Undecided  [x]Labor plans: TOLAC  [x]: None  [x]Infant feeding plan; Breastfeeding  [x]FLU shot - 10/18/23  [x]TDAP given  [x]RSV- NA  [x]Rhogam if needed, date: NA  [x]TOLAC consent done 3/7/24  [x] Water birth interest NA  [x]GCT, elevated GTT, passed  ________________________________________  [x] OTC PP meds sent  [x]PP plans: 6 weeks off partner, Williams, to work evenings and help during the day. Mother in law able to help  [x]Planning CS-ERAS pkt NA TOLAC        Hyperemesis gravidarum 2023     Priority: Medium     Weight loss in first trimester, : Zofran RX sent      Slow transit constipation 10/18/2023     Priority: Medium    External hemorrhoids 10/18/2023     " Priority: Medium    History of postpartum depression 10/18/2023     Priority: Medium     After giving birth to her first child, started on Celexa      History of opioid abuse (H) 10/17/2023     Priority: Medium     2019, sober since      History of suicide attempt      Priority: Medium     age 14 and age 17       History of - tolac consent signed 2020     Priority: Medium     Hx of CS 9/15/20, TOLAC consent 3/7/24      Vaping nicotine dependence, tobacco product 07/15/2020     Priority: Medium     May be open to strategies to decrease nicotine   Considering hypnosis w family members, aunt recently had success to stop smoking      Cannabis dependence (H) 2018     Priority: Medium    Moderate episode of recurrent major depressive disorder (H) 2018     Priority: Medium     No orders of the defined types were placed in this encounter.    Discussed plans of labor and birth, updated the individualized prenatal care plan on the problem list  - Reviewed why/how to contact provider if headache/visual changes/RUQ or epigastric pain, decreased fetal movement, vaginal bleeding, leakage of fluid or strong/regular contractions  Return to clinic in 1 week and prn if questions or concerns.   SHAJI Galeas CNM

## 2024-05-22 ENCOUNTER — NURSE TRIAGE (OUTPATIENT)
Dept: OBGYN | Facility: CLINIC | Age: 24
End: 2024-05-22
Payer: COMMERCIAL

## 2024-05-22 ENCOUNTER — HOSPITAL ENCOUNTER (OUTPATIENT)
Facility: CLINIC | Age: 24
Discharge: HOME | End: 2024-05-22
Attending: OBSTETRICS & GYNECOLOGY | Admitting: ADVANCED PRACTICE MIDWIFE
Payer: COMMERCIAL

## 2024-05-22 PROBLEM — O47.9 UTERINE CONTRACTIONS: Status: ACTIVE | Noted: 2024-05-22

## 2024-05-22 PROBLEM — O47.9 UTERINE CONTRACTIONS DURING PREGNANCY: Status: ACTIVE | Noted: 2024-05-22

## 2024-05-22 LAB
AMPHETAMINES UR QL SCN: ABNORMAL
BARBITURATES UR QL SCN: ABNORMAL
BENZODIAZ UR QL SCN: ABNORMAL
BZE UR QL SCN: ABNORMAL
CANNABINOIDS UR QL SCN: ABNORMAL
CREAT UR-MCNC: 125 MG/DL
FENTANYL UR QL: ABNORMAL
OPIATES UR QL SCN: ABNORMAL
PCP QUAL URINE (ROCHE): ABNORMAL

## 2024-05-22 PROCEDURE — 80349 CANNABINOIDS NATURAL: CPT | Performed by: ADVANCED PRACTICE MIDWIFE

## 2024-05-22 PROCEDURE — G0463 HOSPITAL OUTPT CLINIC VISIT: HCPCS

## 2024-05-22 PROCEDURE — 99214 OFFICE O/P EST MOD 30 MIN: CPT | Mod: 25 | Performed by: ADVANCED PRACTICE MIDWIFE

## 2024-05-22 PROCEDURE — 250N000013 HC RX MED GY IP 250 OP 250 PS 637: Performed by: ADVANCED PRACTICE MIDWIFE

## 2024-05-22 PROCEDURE — 80307 DRUG TEST PRSMV CHEM ANLYZR: CPT | Performed by: ADVANCED PRACTICE MIDWIFE

## 2024-05-22 PROCEDURE — 59025 FETAL NON-STRESS TEST: CPT | Mod: 26 | Performed by: ADVANCED PRACTICE MIDWIFE

## 2024-05-22 RX ORDER — ONDANSETRON 2 MG/ML
4 INJECTION INTRAMUSCULAR; INTRAVENOUS EVERY 6 HOURS PRN
Status: DISCONTINUED | OUTPATIENT
Start: 2024-05-22 | End: 2024-05-22 | Stop reason: HOSPADM

## 2024-05-22 RX ORDER — METOCLOPRAMIDE 10 MG/1
10 TABLET ORAL EVERY 6 HOURS PRN
Status: DISCONTINUED | OUTPATIENT
Start: 2024-05-22 | End: 2024-05-22 | Stop reason: HOSPADM

## 2024-05-22 RX ORDER — ONDANSETRON 4 MG/1
4 TABLET, ORALLY DISINTEGRATING ORAL EVERY 6 HOURS PRN
Status: DISCONTINUED | OUTPATIENT
Start: 2024-05-22 | End: 2024-05-22 | Stop reason: HOSPADM

## 2024-05-22 RX ORDER — PROCHLORPERAZINE 25 MG
25 SUPPOSITORY, RECTAL RECTAL EVERY 12 HOURS PRN
Status: DISCONTINUED | OUTPATIENT
Start: 2024-05-22 | End: 2024-05-22 | Stop reason: HOSPADM

## 2024-05-22 RX ORDER — ACETAMINOPHEN 325 MG/1
975 TABLET ORAL EVERY 6 HOURS PRN
Status: DISCONTINUED | OUTPATIENT
Start: 2024-05-22 | End: 2024-05-22 | Stop reason: HOSPADM

## 2024-05-22 RX ORDER — PROCHLORPERAZINE MALEATE 10 MG
10 TABLET ORAL EVERY 6 HOURS PRN
Status: DISCONTINUED | OUTPATIENT
Start: 2024-05-22 | End: 2024-05-22 | Stop reason: HOSPADM

## 2024-05-22 RX ORDER — METOCLOPRAMIDE HYDROCHLORIDE 5 MG/ML
10 INJECTION INTRAMUSCULAR; INTRAVENOUS EVERY 6 HOURS PRN
Status: DISCONTINUED | OUTPATIENT
Start: 2024-05-22 | End: 2024-05-22 | Stop reason: HOSPADM

## 2024-05-22 RX ADMIN — ACETAMINOPHEN 975 MG: 325 TABLET, FILM COATED ORAL at 14:29

## 2024-05-22 ASSESSMENT — ACTIVITIES OF DAILY LIVING (ADL)
ADLS_ACUITY_SCORE: 35
ADLS_ACUITY_SCORE: 18
ADLS_ACUITY_SCORE: 18

## 2024-05-22 NOTE — PLAN OF CARE
Data: Patient assessed in the [facility] for uterine contractions. Cervix 2 cm dilated and 50% effaced. Fetal station -2. Membranes intact. Contractions are  , 2-3 minutes apart, and last 40-60 seconds. Uterine assessment is mild by palpation during contractions and soft by palpation at rest. See flowsheets for fetal assessment documentation.     Action:  Presumed adequate fetal oxygenation documented. Early labor precautions and discharge instructions reviewed, including when to notify provider if warning signs present. Patient instructed to report decrease in fetal movement, vaginal bleeding, changes in membrane status, abdominal pain, or any concerns related to the pregnancy to patient's provider/clinic.     Response: Orders to discharge home per PAULA Fernandez. Plan for patient is to re-evaluate labor status  As needed as labor progresses . Patient verbalized understanding of education and agreement with plan. Discharged to home with significant other Shmuel.

## 2024-05-22 NOTE — PROGRESS NOTES
HOSPITAL TRIAGE NOTE  ===================    CHIEF COMPLAINT  ========================  Sophia Stiles is a 24 year old patient presenting today at 38w1d for evaluation of uterine contractions.    Patient's last menstrual period was 2023 (approximate).  Estimated Date of Delivery: 2024       HPI  ==================   Mare is a 23 yo  who presented to triage with regular uterine contractions since 4am. Initially contractions were every 10 minutes, but they have slowly become more frequent throughout the day. Now contractions are every 2-3 minutes. She is needing to breathe through the contractions every 5 minutes or so. She is feeling less fetal movement since she started to have contractions but is still feeling movement often.   Mare has a hx of CS due to IAI remote from delivery.   Mare is hoping for a .     Denies fever, cough, SOB or chest pain. Denies having contact with anyone who is Covid-19 positive. Pt has had Covid-19 Vaccinations.  Agreeable to Covid-19 testing  Prenatal record and labs reviewed from [facility], through [facility].    CONTRACTIONS: every 2-3 minutes  ABDOMINAL PAIN: cramping low in her abdomen and a little in her low back  FETAL MOVEMENT: active    VAGINAL BLEEDING: none  RUPTURE OF MEMBRANES: no  PELVIC PAIN: none    PREGNANCY COMPLICATIONS: None hx of FARA and SI, sober since , occasional THC use      # Pain Assessment:      2020    11:05 AM   Current Pain Score   Patient currently in pain? yes   Sophia tellez pain level was assessed and she currently denies pain.        REVIEW OF SYSTEMS  =====================  C: NEGATIVE for fever, chills  I: NEGATIVE for worrisome rashes, moles or lesions  E: NEGATIVE for vision changes or irritation  R: NEGATIVE for significant cough or SOB  CV: NEGATIVE for chest pain, palpitations or varicosities  GI: NEGATIVE for nausea, abdominal pain, heartburn, or change in bowel habits  : NEGATIVE  for frequency, dysuria, or hematuria  M: NEGATIVE for significant arthralgias or myalgia  N: NEGATIVE for headache, weakness, dizziness or paresthesias  P: NEGATIVE for changes in mood or affect    PROBLEM LIST  ===============  Patient Active Problem List    Diagnosis Date Noted    Uterine contractions during pregnancy 2024     Priority: Medium    Hyperemesis gravidarum 2023     Priority: Medium     Weight loss in first trimester, : Zofran RX sent      Slow transit constipation 10/18/2023     Priority: Medium    External hemorrhoids 10/18/2023     Priority: Medium    History of postpartum depression 10/18/2023     Priority: Medium     After giving birth to her first child, started on Celexa      History of opioid abuse (H) 10/17/2023     Priority: Medium     2019, sober since      History of suicide attempt      Priority: Medium     age 14 and age 17       History of - tolac consent signed 2020     Priority: Medium     Hx of CS 9/15/20, TOLAC consent 3/7/24      Vaping nicotine dependence, tobacco product 07/15/2020     Priority: Medium     May be open to strategies to decrease nicotine   Considering hypnosis w family members, aunt recently had success to stop smoking      Supervision of high risk pregnancy in third trimester 2020     Priority: Medium     [facility] Patient Provider Group choice: CNM group-would like CNM prenatal and repeat CS  Partner's name: Williams  [x]NOB folder  [x]Dating  [x] 1st trimester screening: MFM referral placed for 1st trimester screening place  [x]Offer AFP after 15 wks  [x]Fetal anatomy US ordered  [x]Rubella immune  [x]Hep B immune   [x]Varicella immune  [x]Pap 2020 NILM, plan postpartum  [x] No added risk for PRE-E  [x] No increased risk for GDM  [x]Yes, plan utox, discussed w patient - done at intake  [x]COVID vaccine completed-declines booster  _____________________________________  [x]EOB folder  [x]PP Contraception  plan: If tubal,consent date: Undecided  [x]Labor plans: TOLAC  [x]: None  [x]Infant feeding plan; Breastfeeding  [x]FLU shot - 10/18/23  [x]TDAP given  [x]RSV- NA  [x]Rhogam if needed, date: NA  [x]TOLAC consent done 3/7/24  [x] Water birth interest NA  [x]GCT, elevated GTT, passed  ________________________________________  [x] OTC PP meds sent  [x]PP plans: 6 weeks off partner, Williams, to work evenings and help during the day. Mother in law able to help  [x]Planning CS-ERAS pkt NA TOLAC        Cannabis dependence (H) 2018     Priority: Medium    Moderate episode of recurrent major depressive disorder (H) 2018     Priority: Medium       HISTORIES  ==============  ALLERGIES:    No Known Allergies  PAST MEDICAL HISTORY  Past Medical History:   Diagnosis Date    Accidental overdose of heroin (H) 2020    Twice in 2019    Anxiety     has used mirazapine     Cannabis dependence (H) 2018    Chemical dependency (H) 2020    7/15/20: Sober since 2020.    Depression     Depressive disorder     Heroin overdose (H)     x2    Moderate episode of recurrent major depressive disorder (H) 2018    Polysubstance (excluding opioids) dependence (H) 2019    Positive GBS test 2020    S/P  section 2020    Smoker 07/15/2020    7/15/20: states she has weaned down to 3 cigs/day    Substance abuse (H)     history    Suicide attempt (H)     age 14 and age 17     Vitamin D deficiency 2020    3/10/20- Vit D 22. Discuss supplementation next visit___     SOCIAL HISTORY  Social History     Socioeconomic History    Marital status: Single     Spouse name: Oskar other: Williams    Number of children: Not on file    Years of education: 11.5    Highest education level: 11th grade   Occupational History    Occupation: unemployed   Tobacco Use    Smoking status: Every Day     Current packs/day: 0.00     Average packs/day: 0.5 packs/day for 6.0 years (3.0 ttl pk-yrs)     Types:  Vaping Device, Cigarettes     Start date: 1/7/2015     Last attempt to quit: 2021     Years since quitting: 3.3    Smokeless tobacco: Former    Tobacco comments:     Patient utilizing nicotine lozenges (no longer using)   Vaping Use    Vaping status: Every Day    Substances: Nicotine, THC   Substance and Sexual Activity    Alcohol use: Not Currently     Comment: Went to treatment at [facility], currently in remission    Drug use: Not Currently     Types: Cocaine, Marijuana, Opiates, Methamphetamines     Comment: + heroin. last smoked marijuana 2/10, has not used any other substance since 12/18/2019    Sexual activity: Yes     Partners: Male   Other Topics Concern    Parent/sibling w/ CABG, MI or angioplasty before 65F 55M? Not Asked   Social History Narrative    Not on file     Social Determinants of Health     Financial Resource Strain: High Risk (1/1/2022)    [facility]    Financial Resource Strain     Difficulty of Paying Living Expenses: Not on file     Difficulty of Paying Living Expenses: Not on file   Food Insecurity: No Food Insecurity (3/10/2020)    Hunger Vital Sign     Worried About Running Out of Food in the Last Year: Never true     Ran Out of Food in the Last Year: Never true   Transportation Needs: No Transportation Needs (3/10/2020)    PRAPARE - Transportation     Lack of Transportation (Medical): No     Lack of Transportation (Non-Medical): No   Physical Activity: Insufficiently Active (3/10/2020)    Exercise Vital Sign     Days of Exercise per Week: 1 day     Minutes of Exercise per Session: 60 min   Stress: Stress Concern Present (3/10/2020)    [employer] [place] [employer] - Occupational Stress Questionnaire     Feeling of Stress : Very much   Social Connections: Unknown (1/1/2022)    [facility]     Social Connections     Frequency of Communication with Friends and Family: Not on file   Interpersonal Safety: Not At Risk (3/10/2020)    Humiliation, Afraid, Rape, and Kick questionnaire     Fear of Current or Ex-Partner: No     Emotionally Abused: No     Physically Abused: No     Sexually Abused: No   Housing Stability: Not on file     PARTNER: Shmuel  FAMILY HISTORY  Family History   Problem Relation Age of Onset    No Known Problems Mother     No Known Problems Father     No Known Problems Maternal Grandmother     No Known Problems Maternal Grandfather     No Known Problems Paternal Grandmother     No Known Problems Paternal Grandfather     No Known Problems Son     No Known Problems Maternal Half-Brother     Interpersonal Violence Maternal Half-Brother         stabbed    No Known Problems Maternal Half-Sister     No Known Problems Maternal Half-Sister     No Known Problems Maternal Half-Sister     No Known Problems Maternal Half-Sister     No Known Problems Maternal Half-Sister     No Known Problems Paternal Half-Brother     No Known Problems Paternal Half-Sister      OB HISTORY  OB History    Para Term  AB Living   2 1 1 0 0 1   SAB IAB Ectopic Multiple Live Births   0 0 0 0 1      # Outcome Date GA Lbr Marlo/2nd Weight Sex Type Anes PTL Lv   2 Current            1 Term 09/15/20 39w4d   M CS-LTranv EPI, Nitrous, Spinal N MARIANA      Complications: GBS, Intraamniotic Infection      Name: DUMONT,MALE-FELICITEE      Apgar1: 2  Apgar5: 6      Obstetric Comments   : triple I, GBS        Prenatal Labs:   Lab Results   Component Value Date    ABO A 09/15/2020    RH Pos 09/15/2020    AS Negative 10/18/2023    RUQIGG Positive 10/18/2023    HEPBANG Nonreactive 10/18/2023    HGB 12.3 2024    HIAGAB Nonreactive 10/18/2023    GLU1 150 (H) 2024     Rubella- immune    ULTRASOUND(s) reviewed: 24 19/0 weeks, normal anatomy, anterior placenta, no previa, EFW 43%, AC 36%    EXAM  ============  LMP  2023 (Approximate)   GENERAL APPEARANCE: healthy, alert and no distress  RESP: lungs clear to auscultation - no rales, rhonchi or wheezes  CV: regular rates and rhythm, normal S1 S2, no S3 or S4 and no murmur,and no varicosities  ABDOMEN:  soft, nontender, no epigastric pain  SKIN: no suspicious lesions or rashes  NEURO: Denies headache, blurred vision, other vision changes  PSYCH: mentation appears normal. and affect normal/bright  MS/ LEGS: Reflexes normal bilaterally    CONTRACTIONS: every 2-3 minutes   FETAL HEART TONES: continuous EFM- baseline 120 with moderate variability and positive accelerations.     After 50 minutes of monitoring baby had a variable deceleration with a angela of 70 bpm, which resolved with position changes. Baby was then monitored for another 60 minutes and continued to have a category 1 tracing    NST: REACTIVE  EFW: 7.0lbs    PELVIC EXAM: 1.5/ 50%/ Posterior/ average/ -2 Vila = 4    PRESENTATION: VERTEX and KAREN by US  BLOOD: no  DISCHARGE: clear, did pass mucus plug at 5am    ROM: no  ROMPLUS: not done    LABS: UDS, discussed with pt, accepts  Lab results reviewed- UDS not back yet  DIAGNOSIS  ============  38w1d seen on the Birthplace Triage for labor evaluation-   Early labor with cervix of 1.5cm/50%/-2 Vila = 4  Single deceleration at 1300, otherwise Category 1 FHT  Hx of FARA, sober x 4 years  Patient Active Problem List   Diagnosis    Supervision of high risk pregnancy in third trimester    Cannabis dependence (H)    Moderate episode of recurrent major depressive disorder (H)    Vaping nicotine dependence, tobacco product    History of - tolac consent signed    History of opioid abuse (H)    History of suicide attempt    Slow transit constipation    External hemorrhoids    History of postpartum depression    Hyperemesis gravidarum    Uterine contractions during pregnancy       PLAN  ============  -Recommended Fee have extended monitoring due to deceleration noted.  Monitored for 60 minutes following deceleration, Category 1 FHT. Reviewed reassuring tracing with pt.   -Will discharge home, reviewed strategies to cope with early labor  -Return when contractions are stronger, lasting 1 minute in length q 5 minutes or with LOF, VB or DFM  -Call or return to the Birthplace with contractions, cramping, abdominal or pelvic pain, vaginal bleeding, leaking fluid or decreased fetal movement.    Rebecca Sanchez CNM

## 2024-05-22 NOTE — TELEPHONE ENCOUNTER
"S-(situation): possible labor     B-(background): 38+1, second baby, first baby  would like TOLAC. JOSE     A-(assessment): Pt symptoms started at 4am and reports she \"lost the mucus plug at around 5am\". Pt reports contractions are now 10 minutes apart, have been for a couple hours, was 2 cm dilated at last appt. Pt reports her abdomen feels \"super tight with pain in my vagina and pelvis\". Pt reports baby is \"moving around less today but not sure about kick counts, I haven't really been paying attention\". Pt reports leaking of clear/mucous fluids. Having diarrhea.     R-(recommendations): Per RN protocol, go to L& D, paged CNM on call and called L&D Charge with patient information.         Reason for Disposition   Leakage of fluid from vagina   Baby moving less today (e.g., kick count < 5 in 1 hour or < 10 in 2 hours) and 23 or more weeks pregnant    Additional Information   Negative: First baby (primipara) with contractions < 6 minutes apart, and present 2 hours   Negative: History of prior delivery (multipara) with contractions < 10 minutes apart, and present 1 hour   Negative: History of rapid prior delivery with contractions < 10 minutes apart   Negative: Leakage of fluid from vagina and green or brown in color   Negative: Vaginal bleeding or spotting  (Exception: Brief spotting after intercourse or pelvic exam.)   Negative: Severe headache or headache that won't go away   Negative: New blurred vision or vision changes   Negative: Constant abdominal pain lasting > 2 hours   Negative: MODERATE-SEVERE abdominal pain    Protocols used: Pregnancy - Labor-A-OH      CNM On-Call Rebecca called pt to discuss sx further, LVM. Pt called back, writer let pt know about what provider wanted to discuss as well as that the provider will meet pt since pt is on their way to L&D.   "

## 2024-05-24 ENCOUNTER — TELEPHONE (OUTPATIENT)
Dept: OBGYN | Facility: CLINIC | Age: 24
End: 2024-05-24
Payer: COMMERCIAL

## 2024-05-24 LAB
CANNABINOIDS UR CFM-MCNC: 1400 NG/ML
CARBOXYTHC/CREAT UR: 1120 NG/MG CREAT

## 2024-05-24 NOTE — TELEPHONE ENCOUNTER
Spoke with patient to go over 5/28 appointment change, due to original provider illness   Daily Assessment

## 2024-05-27 NOTE — PROGRESS NOTES
"Subjective:     24 year old  at 39w0d presents for routine prenatal visit.   Presented to triage 24 with contractions from 4am -1pm. She was evaluated in triage and her cervix was 1/5cm/50%/posterior/average and was discharged home.   -Mare reports irregular and strong contractions that have continued since her visit to triage  -Offered membrane sweep today, would like sweep    Mare has a hx of CS due to IAI remote from delivery, desires   Birth plans, will have Shmuel at her birth  (Shmuel present today, please check in regarding safety in relationship if she presents alone)  Currently taking Celexa 40mg, mood is stable    No vaginal bleeding or leakage of fluid. Reports irregular and strong contractions that last for an hour to hour and a half at at time.  Reports regular fetal movement.       No HA, visual changes, RUQ or epigastric pain.       Patient concerns: None, eager to go into labor!    Objective:  Vitals:    24 1013   BP: 105/70   Pulse: 106   Weight: 64.9 kg (143 lb)   Height: 1.575 m (5' 2\")      SVE: 1-250/-3/post/soft  Membrane sweep completed    Assessment/Plan  Patient Active Problem List    Diagnosis Date Noted    Uterine contractions during pregnancy 2024     Priority: Medium    Uterine contractions 2024     Priority: Medium    Hyperemesis gravidarum 2023     Priority: Medium     Weight loss in first trimester, : Zofran RX sent      Slow transit constipation 10/18/2023     Priority: Medium    External hemorrhoids 10/18/2023     Priority: Medium    History of postpartum depression 10/18/2023     Priority: Medium     After giving birth to her first child, started on Celexa      History of opioid abuse (H) 10/17/2023     Priority: Medium     2019, sober since      History of suicide attempt      Priority: Medium     age 14 and age 17       History of - tolac consent signed 2020     Priority: Medium     Hx of CS 9/15/20, TOLAC consent " 3/7/24      Vaping nicotine dependence, tobacco product 07/15/2020     Priority: Medium     May be open to strategies to decrease nicotine   Considering hypnosis w family members, aunt recently had success to stop smoking      Supervision of high risk pregnancy in third trimester 03/12/2020     Priority: Medium     [facility] Patient Provider Group choice: CNM group-would like CNM prenatal and repeat CS  Partner's name: Williams  [x]NOB folder  [x]Dating  [x] 1st trimester screening: MFM referral placed for 1st trimester screening place  [x]Offer AFP after 15 wks  [x]Fetal anatomy US ordered  [x]Rubella immune  [x]Hep B immune   [x]Varicella immune  [x]Pap 11/5/2020 NILM, plan postpartum  [x] No added risk for PRE-E  [x] No increased risk for GDM  [x]Yes, plan utox, discussed w patient - done at intake  [x]COVID vaccine completed-declines booster  _____________________________________  [x]EOB folder  [x]PP Contraception plan: If tubal,consent date: Undecided  [x]Labor plans: TOLAC  [x]: None  [x]Infant feeding plan; Breastfeeding  [x]FLU shot - 10/18/23  [x]TDAP given  [x]RSV- NA  [x]Rhogam if needed, date: NA  [x]TOLAC consent done 3/7/24  [x] Water birth interest NA  [x]GCT, elevated GTT, passed  ________________________________________  [x] OTC PP meds sent  [x]PP plans: 6 weeks off partner, Williams, to work evenings and help during the day. Mother in law able to help  [x]Planning CS-ERAS pkt NA TOLAC        Cannabis dependence (H) 08/19/2018     Priority: Medium    Moderate episode of recurrent major depressive disorder (H) 08/19/2018     Priority: Medium     - Discussed plans of labor and birth, updated the individualized prenatal care plan on the problem list  - Risks, benefits, and alternatives discussed with membrane sweep. Patient agrees. Sweep performed  - Reviewed why/how to contact provider if headache/visual changes/RUQ or epigastric pain, decreased fetal movement, vaginal bleeding,  leakage of fluid or strong/regular contractions  - Reviewed spontaneous labor verus induction of labor. Would like to continue to anticipate spontaneous labor at this time. Encouraged self care such as: warm baths, massage, chiropractic care, and rest  - Return to clinic in 1 week and prn if questions or concerns.   -Has 41 week BPP scheduled    IDoyle, dagmar serving as a scribe; to document services personally performed by  Rebecca Sanchez CNM, SHAJI based on data collection and the provider's statements to me.   MANOLO Callahan, RN, BSN    I agree with past family and social history and review of systems completed by the Medical/Advanced Practice Provider student except for changes made by me. The remainder of the encounter was performed by me and scribed by the student. The scribed note accurately reflects personal services and decisions made by me.    SHAJI Jurado, PAULA

## 2024-05-28 ENCOUNTER — PRENATAL OFFICE VISIT (OUTPATIENT)
Dept: OBGYN | Facility: CLINIC | Age: 24
End: 2024-05-28
Attending: ADVANCED PRACTICE MIDWIFE
Payer: COMMERCIAL

## 2024-05-28 VITALS
WEIGHT: 143 LBS | BODY MASS INDEX: 26.31 KG/M2 | DIASTOLIC BLOOD PRESSURE: 70 MMHG | SYSTOLIC BLOOD PRESSURE: 105 MMHG | HEART RATE: 106 BPM | HEIGHT: 62 IN

## 2024-05-28 DIAGNOSIS — O09.93 SUPERVISION OF HIGH RISK PREGNANCY IN THIRD TRIMESTER: Primary | ICD-10-CM

## 2024-05-28 DIAGNOSIS — Z91.51 HISTORY OF SUICIDE ATTEMPT: ICD-10-CM

## 2024-05-28 DIAGNOSIS — K64.4 EXTERNAL HEMORRHOIDS: ICD-10-CM

## 2024-05-28 DIAGNOSIS — F11.11 HISTORY OF OPIOID ABUSE (H): ICD-10-CM

## 2024-05-28 DIAGNOSIS — Z87.59 HISTORY OF POSTPARTUM DEPRESSION: ICD-10-CM

## 2024-05-28 DIAGNOSIS — Z86.59 HISTORY OF POSTPARTUM DEPRESSION: ICD-10-CM

## 2024-05-28 DIAGNOSIS — F17.290 VAPING NICOTINE DEPENDENCE, TOBACCO PRODUCT: ICD-10-CM

## 2024-05-28 DIAGNOSIS — K59.01 SLOW TRANSIT CONSTIPATION: ICD-10-CM

## 2024-05-28 DIAGNOSIS — O47.9 UTERINE CONTRACTIONS DURING PREGNANCY: ICD-10-CM

## 2024-05-28 PROCEDURE — G0463 HOSPITAL OUTPT CLINIC VISIT: HCPCS | Performed by: ADVANCED PRACTICE MIDWIFE

## 2024-05-28 PROCEDURE — 99207 PR PRENATAL VISIT: CPT | Performed by: ADVANCED PRACTICE MIDWIFE

## 2024-06-03 ENCOUNTER — PRENATAL OFFICE VISIT (OUTPATIENT)
Dept: OBGYN | Facility: CLINIC | Age: 24
End: 2024-06-03
Attending: MIDWIFE
Payer: COMMERCIAL

## 2024-06-03 VITALS
DIASTOLIC BLOOD PRESSURE: 73 MMHG | WEIGHT: 148 LBS | HEART RATE: 106 BPM | BODY MASS INDEX: 27.23 KG/M2 | SYSTOLIC BLOOD PRESSURE: 108 MMHG | HEIGHT: 62 IN

## 2024-06-03 DIAGNOSIS — K64.4 EXTERNAL HEMORRHOIDS: ICD-10-CM

## 2024-06-03 DIAGNOSIS — F33.1 MODERATE EPISODE OF RECURRENT MAJOR DEPRESSIVE DISORDER (H): ICD-10-CM

## 2024-06-03 DIAGNOSIS — Z86.59 HISTORY OF POSTPARTUM DEPRESSION: ICD-10-CM

## 2024-06-03 DIAGNOSIS — O47.9 UTERINE CONTRACTIONS DURING PREGNANCY: ICD-10-CM

## 2024-06-03 DIAGNOSIS — K59.01 SLOW TRANSIT CONSTIPATION: ICD-10-CM

## 2024-06-03 DIAGNOSIS — F11.11 HISTORY OF OPIOID ABUSE (H): ICD-10-CM

## 2024-06-03 DIAGNOSIS — Z91.51 HISTORY OF SUICIDE ATTEMPT: ICD-10-CM

## 2024-06-03 DIAGNOSIS — Z87.59 HISTORY OF POSTPARTUM DEPRESSION: ICD-10-CM

## 2024-06-03 DIAGNOSIS — Z98.891 HISTORY OF C-SECTION: ICD-10-CM

## 2024-06-03 DIAGNOSIS — O09.93 SUPERVISION OF HIGH RISK PREGNANCY IN THIRD TRIMESTER: Primary | ICD-10-CM

## 2024-06-03 DIAGNOSIS — F17.290 VAPING NICOTINE DEPENDENCE, TOBACCO PRODUCT: ICD-10-CM

## 2024-06-03 PROCEDURE — 99207 PR PRENATAL VISIT: CPT | Performed by: MIDWIFE

## 2024-06-03 PROCEDURE — G0463 HOSPITAL OUTPT CLINIC VISIT: HCPCS | Performed by: MIDWIFE

## 2024-06-03 NOTE — PROGRESS NOTES
"Subjective:     24 year old  at 39w6d presents for routine prenatal visit.   Denies vaginal bleeding or leakage of fluid. Is having more contractions/cramping but still inconsistent. Reports active fetal movement.       Denies HA, visual changes, RUQ or epigastric pain.       Patient concerns:    - Membrane sweep? Reviewed r/b. Pt would like to repeat this week.     Objective:  Vitals:    24 1511   BP: 108/73   BP Location: Left arm   Patient Position: Chair   Pulse: 106   Weight: 67.1 kg (148 lb)   Height: 1.575 m (5' 2\")    See OB flowsheet    SVE: 1.5cm, 40, -3, posterior, soft. Membrane sweep performed. Loose, bulgy bag of water palpated      Assessment/Plan  Patient Active Problem List    Diagnosis Date Noted    Uterine contractions during pregnancy 2024     Priority: Medium    Uterine contractions 2024     Priority: Medium    Hyperemesis gravidarum 2023     Priority: Medium     Weight loss in first trimester, : Zofran RX sent      Slow transit constipation 10/18/2023     Priority: Medium    External hemorrhoids 10/18/2023     Priority: Medium    History of postpartum depression 10/18/2023     Priority: Medium     After giving birth to her first child, started on Celexa      History of opioid abuse (H) 10/17/2023     Priority: Medium     2019, sober since      History of suicide attempt      Priority: Medium     age 14 and age 17       History of - tolac consent signed 2020     Priority: Medium     Hx of CS 9/15/20, TOLAC consent 3/7/24      Vaping nicotine dependence, tobacco product 07/15/2020     Priority: Medium     May be open to strategies to decrease nicotine   Considering hypnosis w family members, aunt recently had success to stop smoking      Supervision of high risk pregnancy in third trimester 2020     Priority: Medium     [facility] ([facility] Patient Provider Group choice: CNM group-would like CNM prenatal and repeat CS  Partner's name: " Williams  [x]NOB folder  [x]Dating  [x] 1st trimester screening: MFM referral placed for 1st trimester screening place  [x]Offer AFP after 15 wks  [x]Fetal anatomy US ordered  [x]Rubella immune  [x]Hep B immune   [x]Varicella immune  [x]Pap 11/5/2020 NILM, plan postpartum  [x] No added risk for PRE-E  [x] No increased risk for GDM  [x]Yes, plan utox, discussed w patient - done at intake  [x]COVID vaccine completed-declines booster  _____________________________________  [x]EOB folder  [x]PP Contraception plan: If tubal,consent date: Undecided  [x]Labor plans: TOLAC  [x]: None  [x]Infant feeding plan; Breastfeeding  [x]FLU shot - 10/18/23  [x]TDAP given  [x]RSV- NA  [x]Rhogam if needed, date: NA  [x]TOLAC consent done 3/7/24  [x] Water birth interest NA  [x]GCT, elevated GTT, passed  ________________________________________  [x] OTC PP meds sent  [x]PP plans: 6 weeks off partner, Williams, to work evenings and help during the day. Mother in law able to help  [x]Planning CS-ERAS pkt NA TOLAC        Cannabis dependence (H) 08/19/2018     Priority: Medium    Moderate episode of recurrent major depressive disorder (H) 08/19/2018     Priority: Medium       Discussed plans of labor and birth, updated the individualized prenatal care plan on the problem list  Reviewed why/how to contact provider if headache/visual changes/RUQ or epigastric pain, decreased fetal movement, vaginal bleeding, leakage of fluid or strong/regular contractions  Pt has routine postdates BPP at 41 weeks.     Return to clinic in 1 week and prn if questions or concerns.     SHAJI Simmons CNM

## 2024-06-03 NOTE — PATIENT INSTRUCTIONS
Thank you for trusting us with your care!     If you need to contact us for questions about:  Symptoms, Scheduling & Medical Questions; Non-urgent (2-3 day response) [facility] message, Urgent (needing response today) [phone] (if after 3:30pm next day response)   Prescriptions: Please call your Pharmacy   Billing: [facility] [phone] or [facility]:[phone]

## 2024-06-05 ENCOUNTER — TELEPHONE (OUTPATIENT)
Dept: OBGYN | Facility: CLINIC | Age: 24
End: 2024-06-05
Payer: COMMERCIAL

## 2024-06-05 NOTE — TELEPHONE ENCOUNTER
Left voicemail for patient to call back re: [employer] message expressing interest in scheduling IOL at 41 weeks.     - SHAJI MonkM

## 2024-06-05 NOTE — PROGRESS NOTES
"Subjective:      20 year old  at 37w5d presents for a routine prenatal appointment.    + occasional vaginal spotting after sex.  Unsure if leakage of fluid or just increase in vaginal discharge.  Last 4 days has woken up with wetness - clear and then it changes to white during the day. No malodor, no itching, no irritation.   + irregular contractions or cramping, could be Kartik Davis. + fetal movement.     No HA, visual changes, RUQ or epigastric pain.     Pt desires SVE but aware of recommendation of rule out ROM.  Pt would prefer to have sterile speculum and ferning as she doesn't feel like she has time for triage visit unless clinically necessary.    Objective:  Vitals:    20 0804   BP: 109/72   BP Location: Left arm   Patient Position: Chair   Pulse: 106   Weight: 74 kg (163 lb 1.6 oz)   Height: 1.549 m (5' 1\")    See OB flowsheet    SSE - Noted no pooling. Negative Valsalva.   Negative ferning.  Cervix appears closed/long.   Wet prep - Negative clue cells, negative yeast, negative Trich, negative KOH whiff.     Assessment/Plan     Encounter Diagnoses   Name Primary?     Supervision of high risk pregnancy, antepartum Yes     Chemical dependency (H)      Cannabis dependence (H)      Moderate episode of recurrent major depressive disorder (H)      Personal history of self-harm      Orders Placed This Encounter   Procedures     CBC with Platelets     (O09.90) Supervision of high risk pregnancy, antepartum  (primary encounter diagnosis)  Plan: Group B strep PCR, CBC with Platelets          PHQ-9 SCORE 7/15/2020 2020 2020   PHQ-9 Total Score 1 3 4   Some encounter information is confidential and restricted. Go to Review Flowsheets activity to see all data.     GBS screening: Obtained.  Birth preferences reviewed: Medicated  Labor signs discussed. Reinforced daily fetal movement counts.     Encouraged spinning babies daily  Encouraged daily abdominal massage/meditation to enjoy last weeks of " pregnancy   Reviewed why/how to contact provider if headache/visual changes/RUQ or epigastric pain, decreased fetal movement, vaginal bleeding, leakage of fluid.   Return to clinic in 1 week and prn if questions or concerns.     SHAJI Mera CNM         Detail Level: Detailed General Sunscreen Counseling: Recommended broad spectrum sunscreen with SPF >30, reapplied every 2 hours after that as long as sun exposure continues.

## 2024-06-06 NOTE — TELEPHONE ENCOUNTER
Spoke with Mare, who would like to schedule her induction for when she is 41w GA, on 6/11.     Scheduled for 7:30 PM on 6/11. Confirmed with L&D charge RN and sent pt WiTricity message confirming this date and time per her request.

## 2024-06-08 ENCOUNTER — HOSPITAL ENCOUNTER (INPATIENT)
Facility: CLINIC | Age: 24
LOS: 1 days | Discharge: HOME HEALTH | End: 2024-06-09
Attending: ADVANCED PRACTICE MIDWIFE | Admitting: ADVANCED PRACTICE MIDWIFE
Payer: COMMERCIAL

## 2024-06-08 ENCOUNTER — ANESTHESIA EVENT (OUTPATIENT)
Dept: OBGYN | Facility: CLINIC | Age: 24
End: 2024-06-08
Payer: COMMERCIAL

## 2024-06-08 ENCOUNTER — ANESTHESIA (OUTPATIENT)
Dept: OBGYN | Facility: CLINIC | Age: 24
End: 2024-06-08
Payer: COMMERCIAL

## 2024-06-08 LAB
ABO/RH(D): NORMAL
AMPHETAMINES UR QL SCN: ABNORMAL
ANTIBODY SCREEN: NEGATIVE
BARBITURATES UR QL SCN: ABNORMAL
BENZODIAZ UR QL SCN: ABNORMAL
BZE UR QL SCN: ABNORMAL
CANNABINOIDS UR QL SCN: ABNORMAL
ERYTHROCYTE [DISTWIDTH] IN BLOOD BY AUTOMATED COUNT: 12.8 % (ref 10–15)
FENTANYL UR QL: ABNORMAL
HCT VFR BLD AUTO: 32.8 % (ref 35–47)
HGB BLD-MCNC: 11.1 G/DL (ref 11.7–15.7)
MCH RBC QN AUTO: 29.4 PG (ref 26.5–33)
MCHC RBC AUTO-ENTMCNC: 33.8 G/DL (ref 31.5–36.5)
MCV RBC AUTO: 87 FL (ref 78–100)
OPIATES UR QL SCN: ABNORMAL
PCP QUAL URINE (ROCHE): ABNORMAL
PLATELET # BLD AUTO: 395 10E3/UL (ref 150–450)
RBC # BLD AUTO: 3.77 10E6/UL (ref 3.8–5.2)
SPECIMEN EXPIRATION DATE: NORMAL
T PALLIDUM AB SER QL: NONREACTIVE
WBC # BLD AUTO: 15.1 10E3/UL (ref 4–11)

## 2024-06-08 PROCEDURE — 250N000011 HC RX IP 250 OP 636: Performed by: STUDENT IN AN ORGANIZED HEALTH CARE EDUCATION/TRAINING PROGRAM

## 2024-06-08 PROCEDURE — 370N000003 HC ANESTHESIA WARD SERVICE: Performed by: ANESTHESIOLOGY

## 2024-06-08 PROCEDURE — 250N000011 HC RX IP 250 OP 636: Mod: JW | Performed by: STUDENT IN AN ORGANIZED HEALTH CARE EDUCATION/TRAINING PROGRAM

## 2024-06-08 PROCEDURE — 86900 BLOOD TYPING SEROLOGIC ABO: CPT | Performed by: ADVANCED PRACTICE MIDWIFE

## 2024-06-08 PROCEDURE — 80307 DRUG TEST PRSMV CHEM ANLYZR: CPT | Performed by: ADVANCED PRACTICE MIDWIFE

## 2024-06-08 PROCEDURE — 120N000002 HC R&B MED SURG/OB UMMC

## 2024-06-08 PROCEDURE — 722N000001 HC LABOR CARE VAGINAL DELIVERY SINGLE

## 2024-06-08 PROCEDURE — 250N000013 HC RX MED GY IP 250 OP 250 PS 637: Performed by: ADVANCED PRACTICE MIDWIFE

## 2024-06-08 PROCEDURE — 250N000011 HC RX IP 250 OP 636: Mod: JZ | Performed by: STUDENT IN AN ORGANIZED HEALTH CARE EDUCATION/TRAINING PROGRAM

## 2024-06-08 PROCEDURE — 250N000009 HC RX 250: Performed by: ADVANCED PRACTICE MIDWIFE

## 2024-06-08 PROCEDURE — 10907ZC DRAINAGE OF AMNIOTIC FLUID, THERAPEUTIC FROM PRODUCTS OF CONCEPTION, VIA NATURAL OR ARTIFICIAL OPENING: ICD-10-PCS | Performed by: ADVANCED PRACTICE MIDWIFE

## 2024-06-08 PROCEDURE — 85027 COMPLETE CBC AUTOMATED: CPT | Performed by: ADVANCED PRACTICE MIDWIFE

## 2024-06-08 PROCEDURE — 258N000003 HC RX IP 258 OP 636: Mod: JZ | Performed by: ADVANCED PRACTICE MIDWIFE

## 2024-06-08 PROCEDURE — 3E0R3BZ INTRODUCTION OF ANESTHETIC AGENT INTO SPINAL CANAL, PERCUTANEOUS APPROACH: ICD-10-PCS | Performed by: ANESTHESIOLOGY

## 2024-06-08 PROCEDURE — 00HU33Z INSERTION OF INFUSION DEVICE INTO SPINAL CANAL, PERCUTANEOUS APPROACH: ICD-10-PCS | Performed by: ANESTHESIOLOGY

## 2024-06-08 PROCEDURE — 86780 TREPONEMA PALLIDUM: CPT | Performed by: ADVANCED PRACTICE MIDWIFE

## 2024-06-08 PROCEDURE — 250N000011 HC RX IP 250 OP 636: Mod: JZ | Performed by: ADVANCED PRACTICE MIDWIFE

## 2024-06-08 RX ORDER — CITRIC ACID/SODIUM CITRATE 334-500MG
30 SOLUTION, ORAL ORAL
Status: DISCONTINUED | OUTPATIENT
Start: 2024-06-08 | End: 2024-06-08 | Stop reason: HOSPADM

## 2024-06-08 RX ORDER — IBUPROFEN 800 MG/1
800 TABLET, FILM COATED ORAL
Status: COMPLETED | OUTPATIENT
Start: 2024-06-08 | End: 2024-06-08

## 2024-06-08 RX ORDER — ONDANSETRON 4 MG/1
4 TABLET, ORALLY DISINTEGRATING ORAL EVERY 6 HOURS PRN
Status: DISCONTINUED | OUTPATIENT
Start: 2024-06-08 | End: 2024-06-08 | Stop reason: HOSPADM

## 2024-06-08 RX ORDER — OXYTOCIN/0.9 % SODIUM CHLORIDE 30/500 ML
340 PLASTIC BAG, INJECTION (ML) INTRAVENOUS CONTINUOUS PRN
Status: DISCONTINUED | OUTPATIENT
Start: 2024-06-08 | End: 2024-06-08 | Stop reason: HOSPADM

## 2024-06-08 RX ORDER — METHYLERGONOVINE MALEATE 0.2 MG/ML
200 INJECTION INTRAVENOUS
Status: DISCONTINUED | OUTPATIENT
Start: 2024-06-08 | End: 2024-06-08 | Stop reason: HOSPADM

## 2024-06-08 RX ORDER — NALOXONE HYDROCHLORIDE 0.4 MG/ML
0.4 INJECTION, SOLUTION INTRAMUSCULAR; INTRAVENOUS; SUBCUTANEOUS
Status: DISCONTINUED | OUTPATIENT
Start: 2024-06-08 | End: 2024-06-08 | Stop reason: HOSPADM

## 2024-06-08 RX ORDER — NALOXONE HYDROCHLORIDE 0.4 MG/ML
0.2 INJECTION, SOLUTION INTRAMUSCULAR; INTRAVENOUS; SUBCUTANEOUS
Status: DISCONTINUED | OUTPATIENT
Start: 2024-06-08 | End: 2024-06-08 | Stop reason: HOSPADM

## 2024-06-08 RX ORDER — IBUPROFEN 800 MG/1
800 TABLET, FILM COATED ORAL EVERY 6 HOURS PRN
Status: DISCONTINUED | OUTPATIENT
Start: 2024-06-08 | End: 2024-06-09 | Stop reason: HOSPADM

## 2024-06-08 RX ORDER — PROCHLORPERAZINE 25 MG
25 SUPPOSITORY, RECTAL RECTAL EVERY 12 HOURS PRN
Status: DISCONTINUED | OUTPATIENT
Start: 2024-06-08 | End: 2024-06-08 | Stop reason: HOSPADM

## 2024-06-08 RX ORDER — CARBOPROST TROMETHAMINE 250 UG/ML
250 INJECTION, SOLUTION INTRAMUSCULAR
Status: DISCONTINUED | OUTPATIENT
Start: 2024-06-08 | End: 2024-06-09 | Stop reason: HOSPADM

## 2024-06-08 RX ORDER — CITALOPRAM HYDROBROMIDE 10 MG/1
10 TABLET ORAL DAILY
Status: DISCONTINUED | OUTPATIENT
Start: 2024-06-08 | End: 2024-06-09 | Stop reason: HOSPADM

## 2024-06-08 RX ORDER — SODIUM CHLORIDE, SODIUM LACTATE, POTASSIUM CHLORIDE, CALCIUM CHLORIDE 600; 310; 30; 20 MG/100ML; MG/100ML; MG/100ML; MG/100ML
INJECTION, SOLUTION INTRAVENOUS CONTINUOUS
Status: DISCONTINUED | OUTPATIENT
Start: 2024-06-08 | End: 2024-06-09 | Stop reason: HOSPADM

## 2024-06-08 RX ORDER — MISOPROSTOL 200 UG/1
800 TABLET ORAL
Status: DISCONTINUED | OUTPATIENT
Start: 2024-06-08 | End: 2024-06-09 | Stop reason: HOSPADM

## 2024-06-08 RX ORDER — LOPERAMIDE HCL 2 MG
2 CAPSULE ORAL
Status: DISCONTINUED | OUTPATIENT
Start: 2024-06-08 | End: 2024-06-08 | Stop reason: HOSPADM

## 2024-06-08 RX ORDER — FENTANYL/ROPIVACAINE/NS/PF 2MCG/ML-.1
PLASTIC BAG, INJECTION (ML) EPIDURAL
Status: DISCONTINUED | OUTPATIENT
Start: 2024-06-08 | End: 2024-06-08 | Stop reason: HOSPADM

## 2024-06-08 RX ORDER — MODIFIED LANOLIN
OINTMENT (GRAM) TOPICAL
Status: DISCONTINUED | OUTPATIENT
Start: 2024-06-08 | End: 2024-06-09 | Stop reason: HOSPADM

## 2024-06-08 RX ORDER — TRANEXAMIC ACID 10 MG/ML
1 INJECTION, SOLUTION INTRAVENOUS EVERY 30 MIN PRN
Status: DISCONTINUED | OUTPATIENT
Start: 2024-06-08 | End: 2024-06-08 | Stop reason: HOSPADM

## 2024-06-08 RX ORDER — LOPERAMIDE HCL 2 MG
2 CAPSULE ORAL
Status: DISCONTINUED | OUTPATIENT
Start: 2024-06-08 | End: 2024-06-09 | Stop reason: HOSPADM

## 2024-06-08 RX ORDER — MISOPROSTOL 200 UG/1
800 TABLET ORAL
Status: DISCONTINUED | OUTPATIENT
Start: 2024-06-08 | End: 2024-06-08 | Stop reason: HOSPADM

## 2024-06-08 RX ORDER — TRANEXAMIC ACID 10 MG/ML
1 INJECTION, SOLUTION INTRAVENOUS EVERY 30 MIN PRN
Status: DISCONTINUED | OUTPATIENT
Start: 2024-06-08 | End: 2024-06-09 | Stop reason: HOSPADM

## 2024-06-08 RX ORDER — OXYTOCIN/0.9 % SODIUM CHLORIDE 30/500 ML
100-340 PLASTIC BAG, INJECTION (ML) INTRAVENOUS CONTINUOUS PRN
Status: DISCONTINUED | OUTPATIENT
Start: 2024-06-08 | End: 2024-06-09 | Stop reason: HOSPADM

## 2024-06-08 RX ORDER — FENTANYL/ROPIVACAINE/NS/PF 2MCG/ML-.1
PLASTIC BAG, INJECTION (ML) EPIDURAL
Status: DISCONTINUED | OUTPATIENT
Start: 2024-06-08 | End: 2024-06-08

## 2024-06-08 RX ORDER — NALBUPHINE HYDROCHLORIDE 20 MG/ML
2.5-5 INJECTION, SOLUTION INTRAMUSCULAR; INTRAVENOUS; SUBCUTANEOUS EVERY 6 HOURS PRN
Status: DISCONTINUED | OUTPATIENT
Start: 2024-06-08 | End: 2024-06-09 | Stop reason: HOSPADM

## 2024-06-08 RX ORDER — ONDANSETRON 2 MG/ML
4 INJECTION INTRAMUSCULAR; INTRAVENOUS EVERY 6 HOURS PRN
Status: DISCONTINUED | OUTPATIENT
Start: 2024-06-08 | End: 2024-06-08 | Stop reason: HOSPADM

## 2024-06-08 RX ORDER — OXYTOCIN 10 [USP'U]/ML
10 INJECTION, SOLUTION INTRAMUSCULAR; INTRAVENOUS
Status: DISCONTINUED | OUTPATIENT
Start: 2024-06-08 | End: 2024-06-09 | Stop reason: HOSPADM

## 2024-06-08 RX ORDER — OXYTOCIN/0.9 % SODIUM CHLORIDE 30/500 ML
340 PLASTIC BAG, INJECTION (ML) INTRAVENOUS CONTINUOUS PRN
Status: DISCONTINUED | OUTPATIENT
Start: 2024-06-08 | End: 2024-06-09 | Stop reason: HOSPADM

## 2024-06-08 RX ORDER — OXYTOCIN 10 [USP'U]/ML
10 INJECTION, SOLUTION INTRAMUSCULAR; INTRAVENOUS
Status: DISCONTINUED | OUTPATIENT
Start: 2024-06-08 | End: 2024-06-08 | Stop reason: HOSPADM

## 2024-06-08 RX ORDER — FENTANYL CITRATE-0.9 % NACL/PF 10 MCG/ML
100 PLASTIC BAG, INJECTION (ML) INTRAVENOUS EVERY 5 MIN PRN
Status: DISCONTINUED | OUTPATIENT
Start: 2024-06-08 | End: 2024-06-08 | Stop reason: HOSPADM

## 2024-06-08 RX ORDER — METOCLOPRAMIDE HYDROCHLORIDE 5 MG/ML
10 INJECTION INTRAMUSCULAR; INTRAVENOUS EVERY 6 HOURS PRN
Status: DISCONTINUED | OUTPATIENT
Start: 2024-06-08 | End: 2024-06-08 | Stop reason: HOSPADM

## 2024-06-08 RX ORDER — MISOPROSTOL 200 UG/1
400 TABLET ORAL
Status: DISCONTINUED | OUTPATIENT
Start: 2024-06-08 | End: 2024-06-08 | Stop reason: HOSPADM

## 2024-06-08 RX ORDER — LOPERAMIDE HCL 2 MG
4 CAPSULE ORAL
Status: DISCONTINUED | OUTPATIENT
Start: 2024-06-08 | End: 2024-06-09 | Stop reason: HOSPADM

## 2024-06-08 RX ORDER — KETOROLAC TROMETHAMINE 30 MG/ML
30 INJECTION, SOLUTION INTRAMUSCULAR; INTRAVENOUS
Status: COMPLETED | OUTPATIENT
Start: 2024-06-08 | End: 2024-06-08

## 2024-06-08 RX ORDER — HYDROCORTISONE 25 MG/G
CREAM TOPICAL 3 TIMES DAILY PRN
Status: DISCONTINUED | OUTPATIENT
Start: 2024-06-08 | End: 2024-06-09 | Stop reason: HOSPADM

## 2024-06-08 RX ORDER — CARBOPROST TROMETHAMINE 250 UG/ML
250 INJECTION, SOLUTION INTRAMUSCULAR
Status: DISCONTINUED | OUTPATIENT
Start: 2024-06-08 | End: 2024-06-08 | Stop reason: HOSPADM

## 2024-06-08 RX ORDER — FENTANYL CITRATE 50 UG/ML
100 INJECTION, SOLUTION INTRAMUSCULAR; INTRAVENOUS
Status: DISCONTINUED | OUTPATIENT
Start: 2024-06-08 | End: 2024-06-08 | Stop reason: HOSPADM

## 2024-06-08 RX ORDER — PROCHLORPERAZINE MALEATE 10 MG
10 TABLET ORAL EVERY 6 HOURS PRN
Status: DISCONTINUED | OUTPATIENT
Start: 2024-06-08 | End: 2024-06-08 | Stop reason: HOSPADM

## 2024-06-08 RX ORDER — METHYLERGONOVINE MALEATE 0.2 MG/ML
200 INJECTION INTRAVENOUS
Status: DISCONTINUED | OUTPATIENT
Start: 2024-06-08 | End: 2024-06-09 | Stop reason: HOSPADM

## 2024-06-08 RX ORDER — DOCUSATE SODIUM 100 MG/1
100 CAPSULE, LIQUID FILLED ORAL DAILY
Status: DISCONTINUED | OUTPATIENT
Start: 2024-06-08 | End: 2024-06-09 | Stop reason: HOSPADM

## 2024-06-08 RX ORDER — FENTANYL CITRATE 50 UG/ML
INJECTION, SOLUTION INTRAMUSCULAR; INTRAVENOUS PRN
Status: DISCONTINUED | OUTPATIENT
Start: 2024-06-08 | End: 2024-06-08

## 2024-06-08 RX ORDER — METOCLOPRAMIDE 10 MG/1
10 TABLET ORAL EVERY 6 HOURS PRN
Status: DISCONTINUED | OUTPATIENT
Start: 2024-06-08 | End: 2024-06-08 | Stop reason: HOSPADM

## 2024-06-08 RX ORDER — FAMOTIDINE 20 MG/1
20 TABLET, FILM COATED ORAL 2 TIMES DAILY
Status: DISCONTINUED | OUTPATIENT
Start: 2024-06-08 | End: 2024-06-09 | Stop reason: HOSPADM

## 2024-06-08 RX ORDER — ACETAMINOPHEN 325 MG/1
650 TABLET ORAL EVERY 4 HOURS PRN
Status: DISCONTINUED | OUTPATIENT
Start: 2024-06-08 | End: 2024-06-09 | Stop reason: HOSPADM

## 2024-06-08 RX ORDER — BISACODYL 10 MG
10 SUPPOSITORY, RECTAL RECTAL DAILY PRN
Status: DISCONTINUED | OUTPATIENT
Start: 2024-06-08 | End: 2024-06-09 | Stop reason: HOSPADM

## 2024-06-08 RX ORDER — LOPERAMIDE HCL 2 MG
4 CAPSULE ORAL
Status: DISCONTINUED | OUTPATIENT
Start: 2024-06-08 | End: 2024-06-08 | Stop reason: HOSPADM

## 2024-06-08 RX ORDER — MISOPROSTOL 200 UG/1
400 TABLET ORAL
Status: DISCONTINUED | OUTPATIENT
Start: 2024-06-08 | End: 2024-06-09 | Stop reason: HOSPADM

## 2024-06-08 RX ADMIN — SODIUM CHLORIDE, POTASSIUM CHLORIDE, SODIUM LACTATE AND CALCIUM CHLORIDE 500 ML: 600; 310; 30; 20 INJECTION, SOLUTION INTRAVENOUS at 04:52

## 2024-06-08 RX ADMIN — Medication 340 ML/HR: at 16:43

## 2024-06-08 RX ADMIN — ACETAMINOPHEN 650 MG: 325 TABLET, FILM COATED ORAL at 20:20

## 2024-06-08 RX ADMIN — SODIUM CHLORIDE, POTASSIUM CHLORIDE, SODIUM LACTATE AND CALCIUM CHLORIDE: 600; 310; 30; 20 INJECTION, SOLUTION INTRAVENOUS at 11:32

## 2024-06-08 RX ADMIN — Medication: at 06:28

## 2024-06-08 RX ADMIN — FENTANYL CITRATE 100 MCG: 50 INJECTION INTRAMUSCULAR; INTRAVENOUS at 13:20

## 2024-06-08 RX ADMIN — DOCUSATE SODIUM 100 MG: 100 CAPSULE, LIQUID FILLED ORAL at 20:21

## 2024-06-08 RX ADMIN — Medication 8 ML: at 11:10

## 2024-06-08 RX ADMIN — ONDANSETRON 4 MG: 2 INJECTION INTRAMUSCULAR; INTRAVENOUS at 06:40

## 2024-06-08 RX ADMIN — KETOROLAC TROMETHAMINE 30 MG: 30 INJECTION, SOLUTION INTRAMUSCULAR; INTRAVENOUS at 18:15

## 2024-06-08 RX ADMIN — SODIUM CHLORIDE, POTASSIUM CHLORIDE, SODIUM LACTATE AND CALCIUM CHLORIDE 500 ML: 600; 310; 30; 20 INJECTION, SOLUTION INTRAVENOUS at 16:03

## 2024-06-08 RX ADMIN — FAMOTIDINE 20 MG: 10 TABLET ORAL at 13:07

## 2024-06-08 RX ADMIN — SODIUM CHLORIDE, POTASSIUM CHLORIDE, SODIUM LACTATE AND CALCIUM CHLORIDE: 600; 310; 30; 20 INJECTION, SOLUTION INTRAVENOUS at 06:53

## 2024-06-08 RX ADMIN — ONDANSETRON 4 MG: 2 INJECTION INTRAMUSCULAR; INTRAVENOUS at 12:41

## 2024-06-08 ASSESSMENT — ACTIVITIES OF DAILY LIVING (ADL)
ADLS_ACUITY_SCORE: 18
ADLS_ACUITY_SCORE: 23
ADLS_ACUITY_SCORE: 23
ADLS_ACUITY_SCORE: 19
ADLS_ACUITY_SCORE: 19
ADLS_ACUITY_SCORE: 35
ADLS_ACUITY_SCORE: 23
ADLS_ACUITY_SCORE: 23
ADLS_ACUITY_SCORE: 18
ADLS_ACUITY_SCORE: 19
ADLS_ACUITY_SCORE: 19
ADLS_ACUITY_SCORE: 18
ADLS_ACUITY_SCORE: 18
ADLS_ACUITY_SCORE: 23
ADLS_ACUITY_SCORE: 18
ADLS_ACUITY_SCORE: 18
ADLS_ACUITY_SCORE: 23
ADLS_ACUITY_SCORE: 23
ADLS_ACUITY_SCORE: 19

## 2024-06-08 ASSESSMENT — LIFESTYLE VARIABLES: TOBACCO_USE: 1

## 2024-06-08 NOTE — PROVIDER NOTIFICATION
06/08/24 1248   Provider Notification   Provider Name/Title JOHNNY Sanchez CNM   Method of Notification Electronic Page   Notification Reason Pain;Labor Status     Pt feeling uncomfortable again. Zofran given and pt shaky. Anesthesia will be requested bedside to reassess. No pressure reported.

## 2024-06-08 NOTE — PROVIDER NOTIFICATION
06/08/24 1554   Provider Notification   Provider Name/Title Dr. JOHNNY Sanchez and Dr. Delacruz   Method of Notification In Department   Notification Reason Other (Comment);Status Update  (strip review)     Providers notified that pt still uncomfortable. Decelerations attempted to be resolved per RN with position changes, see comments. Plan to assess pt bedside

## 2024-06-08 NOTE — PROGRESS NOTES
SUBJECTIVE:  ==============  Felicitpatricia EVELYNE Stiles is much more comfortable now that she has an epidural. She did vomit twice prior to getting it placed. SVE reveals ROP presentation, Mare consents to Carley maneuvers to assist in repostioning baby. Then would like to take a nap.  Discussed Celexa use, Mare reports she has not taken it for one month. She had PPD following her first birth, would like to re-start medication postpartum.     General appearance: comfortable  Support: Partner, Shmuel and Mother, Yina in room and supportive at bedside      OBJECTIVE:  ==============  VITALS  Patient Vitals for the past 24 hrs:   BP Temp Temp src Resp SpO2   06/08/24 0846 -- -- -- -- 95 %   06/08/24 0844 109/75 98.4  F (36.9  C) Oral 18 --   06/08/24 0812 110/75 -- -- 18 --   06/08/24 0811 -- -- -- -- 100 %   06/08/24 0736 97/57 -- -- -- 100 %   06/08/24 0731 -- -- -- -- 100 %   06/08/24 0726 -- -- -- -- 100 %   06/08/24 0721 122/79 -- -- -- 100 %   06/08/24 0716 -- -- -- -- 100 %   06/08/24 0711 -- -- -- -- 99 %   06/08/24 0707 116/76 -- -- -- --   06/08/24 0706 -- -- -- -- 100 %   06/08/24 0701 -- -- -- -- 100 %   06/08/24 0655 -- -- -- -- 99 %   06/08/24 0651 118/79 -- -- -- 100 %   06/08/24 0645 112/77 -- -- -- 97 %   06/08/24 0640 (!) 134/92 -- -- -- 100 %   06/08/24 0635 116/82 -- -- -- 100 %   06/08/24 0630 114/70 97.5  F (36.4  C) Oral -- --   06/08/24 0628 134/78 -- -- -- --   06/08/24 0624 122/68 -- -- -- --   06/08/24 0622 128/58 -- -- -- --   06/08/24 0620 111/68 -- -- -- --   06/08/24 0618 120/71 -- -- -- --   06/08/24 0215 121/68 98  F (36.7  C) Oral -- --       FETAL HEART RATE ASSESSMENT:  Baseline rate 110, normal  Variability moderate  Accelerations present   Decelerations present, deceleration type: variable, deceleration frequency: intermittent. Are the decelerations significant?   No   Present for <50% of the time  Single mild variable noted at 0745 & 0845 with angela of 70, no other decels  noted    CONTRACTIONS: Contractions every 2-3 minutes.  Palpate: strong  Pitocin- none,  Antibiotics- none    ROM: not ruptured, BBOW  PELVIC EXAM:PELVIC EXAM: 5/ 90/ Midline/ soft/ -2     # Pain Assessment:      2024     8:44 AM   Current Pain Score   Patient currently in pain? yes   - Sophia is experiencing pain due to contractions. Pain management was discussed with Sophia and her mother and partner and the plan was created in a collaborative fashion.  Jeanneicitee's response to the current recommendations: engaged  - comfortable with epidural, repositioning, heat, massage, emotional support      Assessment: EFM interpretation suggests absence of concern for fetal metabolic acidemia at this time due to accelerations present, decelerations absent, heart rate: normal baseline, and variability: moderate    Labor course:  24  0230 2/50/-2 mid/average   0730 5/90%/-2/ mid/soft/BBOW, ROP  [id number] Carley circuit     ASSESSMENT:  ==============  Sophia EVELYNE Stiles  24 year old  female  Estimated Date of Delivery: 2024  IUP @ 40w4d Early labor  TOLAC, 5cm  Fetus ROP  Fetal Heart Rate Tracing primarily category one over the last 120 minutes  GBS- negative    Patient Active Problem List   Diagnosis    Supervision of high risk pregnancy in third trimester    Cannabis dependence (H)    Moderate episode of recurrent major depressive disorder (H)    Vaping nicotine dependence, tobacco product    History of - tolac consent signed    History of opioid abuse (H)    History of suicide attempt    Slow transit constipation    External hemorrhoids    History of postpartum depression    Hyperemesis gravidarum    Uterine contractions during pregnancy    Uterine contractions    Labor and delivery, indication for care        PLAN:  ===========  -Frequent position changes to facilitate labor with epidural anesthesia. Carley circuit completed  -Anticipate progress and NSVB.   -Reevaluate progress in 2-3  hours or sooner with a change in status.    -On-call [facility] MD aware of patient desire to TOLAC    I, MANOLO Wilkins, am serving as a scribe; to document services personally performed by Rebecca Sanchez based on data collection and the provider's statements to me.   MANOLO Wilkins    I agree with past family and social history and review of systems completed by the Medical/Advanced Practice Provider student except for changes made by me. The remainder of the encounter was performed by me and scribed by the student. The scribed note accurately reflects personal services and decisions made by me.    Rebecca Sanchez, SHAJI, PAULA

## 2024-06-08 NOTE — H&P
ADMIT NOTE  =================  40w4d    Sophia Stiles is a 24 year old female with an Patient's last menstrual period was 2023 (approximate). and Estimated Date of Delivery: 2024 is [facility] on 2024 at 2:42 AM in early labor.     HPI  ================  Pt reports contractions since , stating they are 2-5 minutes apart. Patient has noticed some leakage of fluids, clear with mucus. Denies any bleeding. Fetal movement is present. Patient had an induction scheduled at 41 weeks.     Hx of  in  for Cat II RFD and OP positioning.  Desires TOLAC.    Contractions- every 2-5 minutes  Fetal movement- active  ROM- no  Vaginal bleeding- none  GBS- negative  FOB- is involved, at bedside- Shmuel    Weight gain- 148-131 lbs, Total weight gain- 17 lbs  Height- 62  Pre-pregnancy BMI- 23.95  First prenatal visit at 7 weeks, Total visits- 13    PROBLEM LIST  =================  Patient Active Problem List    Diagnosis Date Noted    Labor and delivery, indication for care 2024     Priority: Medium    Uterine contractions during pregnancy 2024     Priority: Medium    Uterine contractions 2024     Priority: Medium    Hyperemesis gravidarum 2023     Priority: Medium     Weight loss in first trimester, : Zofran RX sent      Slow transit constipation 10/18/2023     Priority: Medium    External hemorrhoids 10/18/2023     Priority: Medium    History of postpartum depression 10/18/2023     Priority: Medium     After giving birth to her first child, started on Celexa      History of opioid abuse (H) 10/17/2023     Priority: Medium     2019, sober since      History of suicide attempt      Priority: Medium     age 14 and age 17       History of - tolac consent signed 2020     Priority: Medium     Hx of CS 9/15/20, TOLAC consent 3/7/24      Vaping nicotine dependence, tobacco product 07/15/2020     Priority: Medium     May be open to  strategies to decrease nicotine   Considering hypnosis w family members, aunt recently had success to stop smoking      Supervision of high risk pregnancy in third trimester 03/12/2020     Priority: Medium     [facility] Patient Provider Group choice: CNM group-would like CNM prenatal and repeat CS  Partner's name: Williams  [x]NOB folder  [x]Dating  [x] 1st trimester screening: MFM referral placed for 1st trimester screening place  [x]Offer AFP after 15 wks  [x]Fetal anatomy US ordered  [x]Rubella immune  [x]Hep B immune   [x]Varicella immune  [x]Pap 11/5/2020 NILM, plan postpartum  [x] No added risk for PRE-E  [x] No increased risk for GDM  [x]Yes, plan utox, discussed w patient - done at intake  [x]COVID vaccine completed-declines booster  _____________________________________  [x]EOB folder  [x]PP Contraception plan: If tubal,consent date: Undecided  [x]Labor plans: TOLAC  [x]: None  [x]Infant feeding plan; Breastfeeding  [x]FLU shot - 10/18/23  [x]TDAP given  [x]RSV- NA  [x]Rhogam if needed, date: NA  [x]TOLAC consent done 3/7/24  [x] Water birth interest NA  [x]GCT, elevated GTT, passed  ________________________________________  [x] OTC PP meds sent  [x]PP plans: 6 weeks off partner, Williams, to work evenings and help during the day. Mother in law able to help  [x]Planning CS-ERAS pkt NA TOLAC        Cannabis dependence (H) 08/19/2018     Priority: Medium    Moderate episode of recurrent major depressive disorder (H) 08/19/2018     Priority: Medium     HISTORIES  ============  No Known Allergies  Past Medical History:   Diagnosis Date    Accidental overdose of heroin (H) 05/01/2020    Twice in 2019    Anxiety     has used mirazapine     Cannabis dependence (H) 08/19/2018    Chemical dependency (H) 04/01/2020    7/15/20: Sober since march 20, 2020.    Depression     Depressive disorder     Heroin overdose (H)     x2    Moderate episode of recurrent major depressive disorder (H) 08/19/2018     Polysubstance (excluding opioids) dependence (H) 2019    Positive GBS test 2020    S/P  section 2020    Smoker 07/15/2020    7/15/20: states she has weaned down to 3 cigs/day    Substance abuse (H)     history    Suicide attempt (H)     age 14 and age 17     Vitamin D deficiency 2020    3/10/20- Vit D 22. Discuss supplementation next visit___     Past Surgical History:   Procedure Laterality Date     SECTION N/A 9/15/2020    Procedure:  SECTION;  Surgeon: Marilin Trammell MD;  Location:  L+D   .  Family History   Problem Relation Age of Onset    No Known Problems Mother     No Known Problems Father     No Known Problems Maternal Grandmother     No Known Problems Maternal Grandfather     No Known Problems Paternal Grandmother     No Known Problems Paternal Grandfather     No Known Problems Son     No Known Problems Maternal Half-Brother     Interpersonal Violence Maternal Half-Brother         stabbed    No Known Problems Maternal Half-Sister     No Known Problems Maternal Half-Sister     No Known Problems Maternal Half-Sister     No Known Problems Maternal Half-Sister     No Known Problems Maternal Half-Sister     No Known Problems Paternal Half-Brother     No Known Problems Paternal Half-Sister      Social History     Tobacco Use    Smoking status: Every Day     Current packs/day: 0.00     Average packs/day: 0.5 packs/day for 6.0 years (3.0 ttl pk-yrs)     Types: Vaping Device, Cigarettes     Start date: 2015     Last attempt to quit:      Years since quitting: 3.4    Smokeless tobacco: Former    Tobacco comments:     Patient utilizing nicotine lozenges (no longer using)   Substance Use Topics    Alcohol use: Not Currently     Comment: Went to treatment at [place], currently in remission     OB History    Para Term  AB Living   2 1 1 0 0 1   SAB IAB Ectopic Multiple Live Births   0 0 0 0 1      # Outcome Date GA Lbr Marlo/2nd Weight  Sex Type Anes PTL Lv   2 Current            1 Term 09/15/20 39w4d   M CS-LTranv EPI, Nitrous, Spinal N MARIANA      Complications: GBS, Intraamniotic Infection      Name: JULIAMALE-FELICITEE      Apgar1: 2  Apgar5: 6      Obstetric Comments   : triple I, GBS         LABS:   ===========  Prenatal Labs:  Rhogam not indicated   Lab Results   Component Value Date    ABO A 09/15/2020    RH Pos 09/15/2020    AS Negative 10/18/2023    RUQIGG Positive 10/18/2023    HEPBANG Nonreactive 10/18/2023    HGB 12.3 2024    HIAGAB Nonreactive 10/18/2023    GLU1 150 (H) 2024     Rubella Immune  Lab Results   Component Value Date    GBS Positive 2020     ROS  =========  Pt denies significant respiratory, cardiovacular, GI, or muscular/skeletalcomplaints.    See RN data base ROS.     PHYSICAL EXAM:  ===============  /68 (BP Location: Right arm, Patient Position: Semi-Ceja's, Cuff Size: Adult Regular)   Temp 98  F (36.7  C) (Oral)   LMP 2023 (Approximate)   General appearance: uncomfortable with contractions  GENERAL APPEARANCE: healthy, alert and no distress  SKIN: no suspicious lesions or rashes  NEURO: Denies headache, blurred vision, other vision changes  PSYCH: mentation appears normal. and affect normal/bright    Abdomen: gravid, vertex fetus per Leopold's, non-tender between contractions.   Cephalic presentation confirmed by BSUS  EFW-  7.5-8 lbs.   CONTRACTIONS: every 1-3 minutes and strong  FETAL HEART TONES: continuous EFM- baseline 120 with moderate variability and positive accelerations. No decelerations.  PELVIC EXAM: 2/ 50%/ Mid/ average/ -2   RODRIGUEZ SCORE: 9  BLOODY SHOW: no   ROM:no  FLUID: clear  ROMPLUS: not done    ASSESSMENT:  ==============  IUP @ 40w4d admitted in early labor   NST REACTIVE  Fetal Heart Tones - category one  GBS- negative    Patient Active Problem List   Diagnosis    Supervision of high risk pregnancy in third trimester    Cannabis dependence (H)    Moderate  episode of recurrent major depressive disorder (H)    Vaping nicotine dependence, tobacco product    History of - tolac consent signed    History of opioid abuse (H)    History of suicide attempt    Slow transit constipation    External hemorrhoids    History of postpartum depression    Hyperemesis gravidarum    Uterine contractions during pregnancy    Uterine contractions    Labor and delivery, indication for care     PLAN:  ===========  Admit - see IP orders  Pain medication options of nitrous oxide, fentanyl IV and epidural anesthesia reviewed with pt. Pt is interested in hydrotherapy, nitrous, and epidural.   Re-evaluate in 4-6 hours, earlier as clinically indicated    Yamini RODRIGUEZ, am serving as a scribe; to document services personally performed by  Aiden Castro CNM based on data collection and the provider's statements to me. - Yamini FRENCH  The encounter was performed by me and scribed by the student. The scribed note accurately reflects my personal services and decisions made by me. SHAJI Monk CNM

## 2024-06-08 NOTE — PROGRESS NOTES
SUBJECTIVE:  ==============  Sophia STUBBS Linsey was willing to try several different positions to help her baby to turn. She is starting to feel pain in her low abdomen and left lower back. She is anxious as this happened with her last birth - the epidural stopped working.     General appearance: uncomfortable with contractions    Support: partner Shmuel and mother Yina      OBJECTIVE:  ==============  VITALS  Patient Vitals for the past 24 hrs:   BP Temp Temp src Resp SpO2   06/08/24 1036 113/69 98.1  F (36.7  C) Oral 16 99 %   06/08/24 0936 -- -- -- -- 100 %   06/08/24 0935 101/68 -- -- -- --   06/08/24 0846 -- -- -- -- 95 %   06/08/24 0844 109/75 98.4  F (36.9  C) Oral 18 --   06/08/24 0812 110/75 -- -- 18 --   06/08/24 0811 -- -- -- -- 100 %   06/08/24 0736 97/57 -- -- -- 100 %   06/08/24 0731 -- -- -- -- 100 %   06/08/24 0726 -- -- -- -- 100 %   06/08/24 0721 122/79 -- -- -- 100 %   06/08/24 0716 -- -- -- -- 100 %   06/08/24 0711 -- -- -- -- 99 %   06/08/24 0707 116/76 -- -- -- --   06/08/24 0706 -- -- -- -- 100 %   06/08/24 0701 -- -- -- -- 100 %   06/08/24 0655 -- -- -- -- 99 %   06/08/24 0651 118/79 -- -- -- 100 %   06/08/24 0645 112/77 -- -- -- 97 %   06/08/24 0640 (!) 134/92 -- -- -- 100 %   06/08/24 0635 116/82 -- -- -- 100 %   06/08/24 0630 114/70 97.5  F (36.4  C) Oral -- --   06/08/24 0628 134/78 -- -- -- --   06/08/24 0624 122/68 -- -- -- --   06/08/24 0622 128/58 -- -- -- --   06/08/24 0620 111/68 -- -- -- --   06/08/24 0618 120/71 -- -- -- --   06/08/24 0215 121/68 98  F (36.7  C) Oral -- --       FETAL HEART RATE ASSESSMENT:  Baseline rate 110, normal  Variability moderate  Accelerations present   Decelerations present, deceleration type: variable, deceleration frequency: intermittent. Are the decelerations significant?   No   late , deceleration frequency: intermittent. Are the decelerations significant?   No     One single variable deceleration at 11:55 with a angela of 100  One single late  deceleration at 11:35 with a angela of 80 and lasted for 60 seconds.   Otherwise Category 1 FHT      CONTRACTIONS: Contractions every 2-3 minutes.  Palpate: strong  Pitocin- none,  Antibiotics- none    ROM: not ruptured BBOW  PELVIC EXAM:PELVIC EXAM: 7/ 90%/ Mid/ soft/ -2     # Pain Assessment:      2024    10:36 AM   Current Pain Score   Patient currently in pain? yes   - Felicitee is experiencing pain due to increasing pain with contractions despite epidural. Pain management was discussed and the plan was created in a collaborative fashion.  Felicitee's response to the current recommendations: engaged  - will request that Anesthesia come to assess pt's pain      Assessment: EFM interpretation suggests absence of concern for fetal metabolic acidemia at this time due to accelerations present, heart rate: normal baseline, and variability: moderate     The interventions currently taken to improve the fetal heart rate tracing and fetal oxygenation are: maternal positioning    Labor course:    24  0230 2/50/-2 mid/average   0730 5/90%/-2/ mid/soft/BBOW, ROP  [id number] Carley circuit   1100: 7/90%/-2 BBOW LOT      ASSESSMENT:  ==============  Sophia STUBBS Linsey  24 year old  female  Estimated Date of Delivery: 2024  IUP @ 40w4d active labor   TOLAC 7cm   Fetal Heart Rate Tracing primarily category one over the last 60 minutes  Two decelerations, one late and one intermittent in the last 60 minutes  GBS- negative    Patient Active Problem List   Diagnosis    Supervision of high risk pregnancy in third trimester    Cannabis dependence (H)    Moderate episode of recurrent major depressive disorder (H)    Vaping nicotine dependence, tobacco product    History of - tolac consent signed    History of opioid abuse (H)    History of suicide attempt    Slow transit constipation    External hemorrhoids    History of postpartum depression    Hyperemesis gravidarum    Uterine contractions during  pregnancy    Uterine contractions    Labor and delivery, indication for care          PLAN:  ===========  -Frequent position changes to facilitate labor with epidural anesthesia.   - On-call [facility] MD informed of pt's hx and desire to TOLAC  -Will continue to monitor FHT closely  -Anticipate progress and NSVB.   -Reevaluate progress in 2-3 hours or sooner with a change in status.      Rebecca Sanchez, SHAJI, CNM

## 2024-06-08 NOTE — PROGRESS NOTES
SUBJECTIVE:  ============  General appearance: uncomfortable with contractions.  Tried soaking in tub, which was helpful. Open to trying nitrous.    Support: Shmuel at bedside     OBJECTIVE:  ===========  Blood pressure 121/68, temperature 98  F (36.7  C), temperature source Oral, last menstrual period 2023, not currently breastfeeding.    CONTINUOUS FETAL MONITORING:  Baseline rate 120, normal, Variability moderate, Accelerations present, Decelerations not present      CONTRACTIONS: contractions every 3 minutes.  Palpate: strong  Pitocin: none    Antibiotics- none  ROM: not ruptured    PELVIC EXAM: deferred    Coping/Pain: patient is coping well, ready for pain management options, anesthesia in for consult.    ASSESSMENT:  ==============  Sophia STUBBS Linsey  24 year old  female  Estimated Date of Delivery: 2024  IUP @ 40w4d early labor   Fetal Heart rate tracing  category one over the last 60 minutes  Assessment: EFM interpretation suggests absence of concern for fetal metabolic acidemia at this time due to   GBS- negative    Patient Active Problem List   Diagnosis    Supervision of high risk pregnancy in third trimester    Cannabis dependence (H)    Moderate episode of recurrent major depressive disorder (H)    Vaping nicotine dependence, tobacco product    History of - tolac consent signed    History of opioid abuse (H)    History of suicide attempt    Slow transit constipation    External hemorrhoids    History of postpartum depression    Hyperemesis gravidarum    Uterine contractions during pregnancy    Uterine contractions    Labor and delivery, indication for care     PLAN:  ===========  Anesthesia in room for epidural consent. Pt aware she can ask for when needed.  Ambulation, hydration, position changes, birthing ball/sling and tub options to facilitate labor.  Reevaluate in 4-6 hours prn    Yamini RODRIGUEZ am serving as a scribe; to document services personally performed  by  Aiden Castro CNM based on data collection and the provider's statements to me.  - Yamini FRENCH     The encounter was performed by me and scribed by the student. The scribed note accurately reflects my personal services and decisions made by me. SHAJI oMnk CNM

## 2024-06-08 NOTE — PROGRESS NOTES
SW reached out to nurse to inquire about UA completed on Fee as per chart review note reports that Fee has been sober since 2019 from opioids and no current UA was completed recently . Last UA was 5/22 and positive for Marijuana only.     SW was informed that no current UA was completed as SW consult is only for Marijuana as Fee reports her last use of marijuana was yesterday morning and uses frequently.   SW team plans to see Fee postpartum if cord tissue is collected on baby to discuss hospital policy for mandated reporting and provide additional support if needed.     ABELARDO Kothari, [facility]  Maternal and Child Health   Office: [phone]  Cell: [phone]  After Hours Pager: [phone]  [email or web address]

## 2024-06-08 NOTE — PROGRESS NOTES
1550 Interim note: Dr. Delacruz called to bedside for decelerations and assessment for VAVD    Rebecca Sanchez, SHAJI, MCM

## 2024-06-08 NOTE — PROGRESS NOTES
Vaginal Delivery Note   of viable Female with JOHNNY Chau, [facility] and Dr. ORESTES Cabrera in attendance.  NICU and Nursery RN present.  Infant with spontaneous weak cry, to mother's abdomen, dried and stimulated.  APGAR at 1 minute:  7 and APGAR at 5 minutes:  9.  Placenta delivered with out complication, no laceration, pat cares provided.  Mother and baby in stable condition.

## 2024-06-08 NOTE — ANESTHESIA PREPROCEDURE EVALUATION
Anesthesia Pre-Procedure Evaluation    Patient: Sophia Stiles   MRN: [id number] : 2000        Procedure : Epidural           Past Medical History:   Diagnosis Date    Accidental overdose of heroin (H) 2020    Twice in 2019    Anxiety     has used mirazapine     Cannabis dependence (H) 2018    Chemical dependency (H) 2020    7/15/20: Sober since 2020.    Depression     Depressive disorder     Heroin overdose (H)     x2    Moderate episode of recurrent major depressive disorder (H) 2018    Polysubstance (excluding opioids) dependence (H) 2019    Positive GBS test 2020    S/P  section 2020    Smoker 07/15/2020    7/15/20: states she has weaned down to 3 cigs/day    Substance abuse (H)     history    Suicide attempt (H)     age 14 and age 17     Vitamin D deficiency 2020    3/10/20- Vit D 22. Discuss supplementation next visit___      Past Surgical History:   Procedure Laterality Date     SECTION N/A 9/15/2020    Procedure:  SECTION;  Surgeon: Marilin Trammell MD;  Location:  L+D      No Known Allergies   Social History     Tobacco Use    Smoking status: Every Day     Current packs/day: 0.00     Average packs/day: 0.5 packs/day for 6.0 years (3.0 ttl pk-yrs)     Types: Vaping Device, Cigarettes     Start date: 2015     Last attempt to quit:      Years since quitting: 3.4    Smokeless tobacco: Former    Tobacco comments:     Patient utilizing nicotine lozenges (no longer using)   Substance Use Topics    Alcohol use: Not Currently     Comment: Went to treatment at [place], currently in remission      Wt Readings from Last 1 Encounters:   24 67.1 kg (148 lb)        Anesthesia Evaluation   Pt has had prior anesthetic. Type: OB Labor Epidural.        ROS/MED HX  ENT/Pulmonary:  - neg pulmonary ROS   (+)                tobacco use, Past use,                       Neurologic:  - neg neurologic ROS    "  Cardiovascular:  - neg cardiovascular ROS     METS/Exercise Tolerance:     Hematologic:  - neg hematologic  ROS     Musculoskeletal:  - neg musculoskeletal ROS     GI/Hepatic:  - neg GI/hepatic ROS     Renal/Genitourinary:  - neg Renal ROS     Endo:  - neg endo ROS     Psychiatric/Substance Use: Comment: Hx of accidental heroin overdose x2 in 2019  Hx of prior suicide attempt    (+) psychiatric history depression and anxiety alcohol abuse (in remission)  Recreational drug usage: Cannabis.    Infectious Disease:       Malignancy:  - neg malignancy ROS     Other: Comment:  at 40w4d    Pregnancy c/b Hx of  in  for Cat II RFD and OP positioning.  Desires TOLAC     (+) Possibly pregnant, , ,previous , TOLAC candidate         Physical Exam    Airway         TM distance: > 3 FB   Neck ROM: full   Mouth opening: > 3 cm    Respiratory Devices and Support         Dental       (+) Minor Abnormalities - some fillings, tiny chips      Cardiovascular   cardiovascular exam normal       Rhythm and rate: regular and normal     Pulmonary   pulmonary exam normal        breath sounds clear to auscultation           OUTSIDE LABS:  CBC:   Lab Results   Component Value Date    WBC 15.1 (H) 2024    WBC 11.7 (H) 2024    HGB 11.1 (L) 2024    HGB 12.3 2024    HCT 32.8 (L) 2024    HCT 35.7 2024     2024     2024     BMP:   Lab Results   Component Value Date     09/15/2020     2020    POTASSIUM 3.7 09/15/2020    POTASSIUM 3.7 2020    CHLORIDE 108 09/15/2020    CHLORIDE 109 2020    CO2 21 09/15/2020    CO2 24 2020    BUN 10 09/15/2020    BUN 8 2020    CR 0.87 09/15/2020    CR 0.49 (L) 2020    GLC 80 09/15/2020    GLC 75 2020     COAGS: No results found for: \"PTT\", \"INR\", \"FIBR\"  POC:   Lab Results   Component Value Date    HCG Negative 2019     HEPATIC:   Lab Results   Component Value Date    " ALBUMIN 3.7 2020    PROTTOTAL 6.8 2020    ALT 19 09/15/2020    AST 19 09/15/2020    GGT 20 2019    ALKPHOS 39 (L) 2020    BILITOTAL 0.2 2020     OTHER:   Lab Results   Component Value Date    NANI 8.2 (L) 09/15/2020    TSH 0.54 2019       Anesthesia Plan    ASA Status:  2       Anesthesia Type: Epidural.   Induction: N/a.   Maintenance: N/A.        Consents    Anesthesia Plan(s) and associated risks, benefits, and realistic alternatives discussed. Questions answered and patient/representative(s) expressed understanding.     - Discussed: Risks, Benefits and Alternatives for the PROCEDURE were discussed     - Discussed with:  Patient            Postoperative Care    Pain management: Neuraxial analgesia.        Comments:    Other Comments: Sophia is a  at 40w4d presenting with spontaneous early labor. History of  x 1 for category 2 fetal tracing. Medical history opioid use, tobacco use, depression. Plan DPE with standard ASA monitors. We discussed the risks and benefits of neuraxial analgesia including, but not limited to: spinal headache, infection, bleeding, damage to surrounding structures, failed or patchy epidural requiring replacement or conversion to general anesthesia in an emergent setting. Sophia Stiles verbalized understanding of these risks. All questions were answered. She would like to proceed with the procedure.              neg OB ROS.      ALIYAH MONZON MD    I have reviewed the pertinent notes and labs in the chart from the past 30 days and (re)examined the patient.  Any updates or changes from those notes are reflected in this note.

## 2024-06-08 NOTE — PROVIDER NOTIFICATION
06/08/24 5002   Provider Notification   Provider Name/Title Dr ALLISON Delacruz and JOHNNY Sanchez CNM   Method of Notification At Bedside     MD and CNM at bedside to check position of baby. Baby mow KAREN. Cervix reduced. Starting to push. IV fluid bolus for decels.

## 2024-06-08 NOTE — ANESTHESIA PROCEDURE NOTES
Dural puncture epidural Procedure Note    Pre-Procedure   Staff -        Anesthesiologist:  Anila Medina MD       Resident/Fellow: Porsha Penny MD       Performed By: resident and with residents       Procedure performed by resident/fellow/CRNA in presence of a teaching physician.         Location: OB       Procedure Start/Stop Times: 6/8/2024 5:50 AM       Pre-Anesthestic Checklist: patient identified, IV checked, risks and benefits discussed, informed consent, monitors and equipment checked, pre-op evaluation, at physician/surgeon's request and post-op pain management  Timeout:       Correct Patient: Yes        Correct Procedure: Yes        Correct Site: Yes        Correct Position: Yes   Procedure Documentation  Procedure: dural puncture epidural       Patient Position: sitting       Skin prep: Chloraprep       Local skin infiltrated with mL of 1% lidocaine.        Insertion Site: L3-4. (midline approach).       Technique: LORT saline        KAREN at 5 cm.       Needle Type: ToAegisy       Needle Gauge: 17.        Needle Length (Inches): 5        Spinal Needle Type: Pencan       Spinal Needle (gauge): 25        Spinal Needle Length (inches): 4.69       Catheter: 19 G.          Catheter threaded easily.         5 cm epidural space.         Threaded 10 cm at skin.         # of attempts: 2 and  # of redirects:  1    Assessment/Narrative         Paresthesias: No.       Test dose of 3 mL lidocaine 1.5% w/ 1:200,000 epinephrine at 06:12 CDT.         Test dose negative, 3 minutes after injection, for signs of intravascular, subdural, or intrathecal injection.       Insertion/Infusion Method: LORT saline       Aspiration negative for Heme or CSF via Epidural Catheter.       CSF fluid: with Spinal needle.CSF fluid removed: with Epidural needle - not with Epidural needle.    Medication(s) Administered   Medication Administration Time: 6/8/2024 5:50 AM      FOR [facility] ([facility]) ONLY:   Pain Team Contact  "information: please page the Pain Team Via [facility]. Search \"Pain\". During daytime hours, please page the attending first. At night please page the resident first.      "

## 2024-06-08 NOTE — PLAN OF CARE
VSS and afebrile. See flowsheets for FHR and Uterine patterns and assessments. Patient denies headache, vision changes, epigastric pain, and bleeding. Fluids and Epidural infusing. Support persons at bedside. Call light in reach. Patients questions and concerns addressed and answered. Will continue current plan of care. Patient educated on informing staff of any changes. Report given to Michelle KERNS at 1704

## 2024-06-08 NOTE — PROGRESS NOTES
SUBJECTIVE:  ==============  Deceleration noted and presented to pt's bedside for evaluation.  Felicitee EVELYNE Stiles is very uncomfortable, states left hip burns and it feels like her pelvis if breaking. Anesthesia called to replace epidural. Repositioned onto left side following decel and then right which was not tolerated by baby, moved to hands and knees position, baby heart rate recovered.   General appearance: uncomfortable     Support: Labor nurse, midwife, midwife student, mother Yina, partner Shmuel    OBJECTIVE:  ==============  VITALS  Patient Vitals for the past 24 hrs:   BP Temp Temp src Resp SpO2   06/08/24 1449 -- 98.3  F (36.8  C) Oral -- --   06/08/24 1352 103/62 -- -- -- 99 %   06/08/24 1351 -- -- -- -- 99 %   06/08/24 1350 103/62 -- -- -- --   06/08/24 1346 -- -- -- -- 100 %   06/08/24 1344 117/73 -- -- -- --   06/08/24 1341 -- -- -- -- 98 %   06/08/24 1338 118/59 99  F (37.2  C) Oral 18 --   06/08/24 1336 109/71 -- -- -- 100 %   06/08/24 1334 112/74 -- -- -- --   06/08/24 1332 112/71 -- -- -- --   06/08/24 1331 -- -- -- -- 100 %   06/08/24 1330 113/72 -- -- -- --   06/08/24 1328 110/72 -- -- -- --   06/08/24 1326 106/67 -- -- -- 100 %   06/08/24 1325 106/65 -- -- -- --   06/08/24 1321 -- -- -- -- 100 %   06/08/24 1227 110/67 97.6  F (36.4  C) Oral 16 --   06/08/24 1226 -- -- -- -- 100 %   06/08/24 1223 112/68 -- -- -- --   06/08/24 1221 -- -- -- -- 99 %   06/08/24 1217 117/66 -- -- -- --   06/08/24 1212 103/65 -- -- -- --   06/08/24 1211 -- -- -- -- 100 %   06/08/24 1206 110/64 -- -- -- 98 %   06/08/24 1202 112/66 -- -- -- --   06/08/24 1201 -- -- -- -- 99 %   06/08/24 1158 120/58 -- -- -- --   06/08/24 1156 -- -- -- -- 99 %   06/08/24 1152 101/66 -- -- -- --   06/08/24 1151 -- -- -- -- 99 %   06/08/24 1149 117/67 -- -- -- --   06/08/24 1146 -- -- -- -- 100 %   06/08/24 1142 129/60 -- -- -- --   06/08/24 1141 -- -- -- -- 99 %   06/08/24 1137 110/62 -- -- -- --   06/08/24 1136 -- -- -- -- 99 %    06/08/24 1132 96/52 -- -- -- --   06/08/24 1131 -- -- -- -- 99 %   06/08/24 1129 98/54 -- -- -- --   06/08/24 1126 -- -- -- -- 100 %   06/08/24 1121 97/62 -- -- -- --   06/08/24 1119 101/56 -- -- -- --   06/08/24 1117 109/67 -- -- -- --   06/08/24 1116 107/68 -- -- -- --   06/08/24 1113 104/65 -- -- -- --   06/08/24 1111 110/63 -- -- -- 100 %   06/08/24 1036 113/69 98.1  F (36.7  C) Oral 16 99 %   06/08/24 0936 -- -- -- -- 100 %   06/08/24 0935 101/68 -- -- -- --   06/08/24 0846 -- -- -- -- 95 %   06/08/24 0844 109/75 98.4  F (36.9  C) Oral 18 --   06/08/24 0812 110/75 -- -- 18 --   06/08/24 0811 -- -- -- -- 100 %   06/08/24 0736 97/57 -- -- -- 100 %   06/08/24 0731 -- -- -- -- 100 %   06/08/24 0726 -- -- -- -- 100 %   06/08/24 0721 122/79 -- -- -- 100 %   06/08/24 0716 -- -- -- -- 100 %   06/08/24 0711 -- -- -- -- 99 %   06/08/24 0707 116/76 -- -- -- --   06/08/24 0706 -- -- -- -- 100 %   06/08/24 0701 -- -- -- -- 100 %   06/08/24 0655 -- -- -- -- 99 %   06/08/24 0651 118/79 -- -- -- 100 %   06/08/24 0645 112/77 -- -- -- 97 %   06/08/24 0640 (!) 134/92 -- -- -- 100 %   06/08/24 0635 116/82 -- -- -- 100 %   06/08/24 0630 114/70 97.5  F (36.4  C) Oral -- --   06/08/24 0628 134/78 -- -- -- --   06/08/24 0624 122/68 -- -- -- --   06/08/24 0622 128/58 -- -- -- --   06/08/24 0620 111/68 -- -- -- --   06/08/24 0618 120/71 -- -- -- --   06/08/24 0215 121/68 98  F (36.7  C) Oral -- --       FETAL HEART RATE ASSESSMENT:  Baseline rate 110, normal  Variability moderate  Accelerations present   Decelerations present, deceleration type: variable, deceleration frequency: intermittent. Are the decelerations significant?   No   Present for <50% of the time   1421 to 1427 prolonged late deceleration, angela 80. Early decelerations noted. 1458 to 1503 prolonged late deceleration.   CONTRACTIONS: Contractions every 2-4 minutes.  Palpate: strong and back pain  Pitocin- none,  Antibiotics- none    ROM: moderate meconium fluid  PELVIC  EXAM:PELVIC EXAM: 8/ 90%/ Mid/ soft/ 0   # Pain Assessment:      2024     1:38 PM   Current Pain Score   Patient currently in pain? yes   - Sophia is experiencing pain due to contractions. Pain management was discussed with Sophia and her mother and partner and the plan was created in a collaborative fashion.  Sophia's response to the current recommendations: engaged  - Epidural not effective on left side, anesthesia team replacing.       Assessment: EFM interpretation suggests concern for fetal metabolic acidemia at this time due to accelerations present, decelerations present variable, deceleration frequency: intermittent. Are the decelerations significant?   No  heart rate: normal baseline, and variability: moderate  Two prolonged late decelerations (noted above)  The interventions currently taken to improve the fetal heart rate tracing and fetal oxygenation are: maternal positioning, fluid bolus and FSE placement    Labor course:  24  0230 2/50/-2 mid/average   0730 5/90%/-2/ mid/soft/BBOW, ROP  1033-4308 Carley circuit   1100: 7/90%/-2 BBOW LOT  1340 7/90%-1 AROM MSAF  1440 8/90%/0   1500 FSE placed for decelerations, Anesthesia called  1538 Epidural replaced. Dr. Delacrzu pagejose roberto for assessment  1600 Anterior lip, pushed and reduced  1615 Complete + 2  1638 VAVD with Dr. Delacruz and Deborah    ASSESSMENT:  ==============  Felicitee EVELYNE Stiles  24 year old  female  Estimated Date of Delivery: 2024  IUP @ 40w4d active labor   TOLAC 8cm  Fetal Heart Rate Tracing category two   MSAF  GBS- negative      Patient Active Problem List   Diagnosis    Supervision of high risk pregnancy in third trimester    Cannabis dependence (H)    Moderate episode of recurrent major depressive disorder (H)    Vaping nicotine dependence, tobacco product    History of - tolac consent signed    History of opioid abuse (H)    History of suicide attempt    Slow transit constipation    External hemorrhoids     History of postpartum depression    Hyperemesis gravidarum    Uterine contractions during pregnancy    Uterine contractions    Labor and delivery, indication for care          PLAN:  ===========  -Will place FSE and page Dr. Delacruz regarding prolonged late decelerations.   -Close observation of fetal well being, may need VAVD  -Fluid bolus   -Frequent position changes to facilitate labor with epidural anesthesia.   -Anticipate progress and .        Per the category II algorithm, the proceed to  section or operative vaginal birth . Reevaluate in 30 minutes.     I, MANOLO Wilkins am serving as a scribe; to document services personally performed by Rebecca Sanchez based on data collection and the provider's statements to me.   MANOLO Wilkins    I agree with past family and social history and review of systems completed by the Medical/Advanced Practice Provider student except for changes made by me. The remainder of the encounter was performed by me and scribed by the student. The scribed note accurately reflects personal services and decisions made by me.    SHAJI Jurado, PAULA

## 2024-06-08 NOTE — CONSULTS
Ob/Gyn Consult Note    Sophia Stiles MRN# [id number]   Age: 24 year old YOB: 2000     Date of Admission:  2024         HPI:   Sophia Stiles is a 24 year old  at 40w4d by LMP c/w 5w2d US admitted to the [facility] service with spontaneous labor desiring a TOLAC. She is now more comfortable with an epidural. I was consulted due to a Cat 2 FHT.     Her prior CS was in  for Cat 2 FHT.           Pregnancy history:     OBSTETRIC HISTORY:      Prenatal Labs:   Lab Results   Component Value Date    ABO A 09/15/2020    RH Pos 09/15/2020    AS Negative 2024    HEPBANG Nonreactive 10/18/2023    CHPCRT Negative 10/18/2023    GCPCRT Negative 10/18/2023    HGB 11.1 (L) 2024       GBS Status: negative     Medication Prior to Admission  Medications Prior to Admission   Medication Sig Dispense Refill Last Dose    acetaminophen (TYLENOL) 325 MG tablet Take 2 tablets (650 mg) by mouth every 6 hours as needed for mild pain Start after Delivery. 100 tablet 0 2024    Cholecalciferol (VITAMIN D) 50 MCG ( UT) CAPS Take 1 capsule by mouth daily Take one capsule daily. 90 capsule 3 More than a month    citalopram (CELEXA) 40 MG tablet Take 1 tablet (40 mg) by mouth daily 90 tablet 3 More than a month    citalopram (CELEXA) 40 MG tablet Take 1 tablet by mouth every morning   More than a month    docusate sodium (COLACE) 100 MG tablet Take 1 tablet (100 mg) by mouth daily 60 tablet 1 More than a month    doxylamine (UNISOM) 25 MG TABS tablet Take 1 tablet (25 mg) by mouth at bedtime 90 tablet 2 More than a month    famotidine (PEPCID) 20 MG tablet Take 1 tablet (20 mg) by mouth 2 times daily 90 tablet 1 2024    Ferrous Sulfate (IRON PO) Take 1 tablet by mouth daily   More than a month    hydrocortisone, Perianal, (HYDROCORTISONE) 2.5 % cream Place rectally 2 times daily as needed for hemorrhoids 30 g 1 Past Week    hydrocortisone, Perianal, (HYDROCORTISONE) 2.5 % cream Place rectally  2 times daily as needed for hemorrhoids Apply twice a day for hemorrhoids x 3-5 days 30 g 1 Past Week    ibuprofen (ADVIL/MOTRIN) 600 MG tablet Take 1 tablet (600 mg) by mouth every 6 hours as needed for moderate pain Start after delivery 60 tablet 0 More than a month    nicotine (NICORETTE) 2 MG gum Place 1 each (2 mg) inside cheek every hour as needed for smoking cessation 40 each 1 More than a month    Omega-3 Fish Oil 500 MG capsule Take 2 capsules (1,000 mg) by mouth daily 90 capsule 3 More than a month    Prenatal 27-1 MG TABS Take 1 tablet by mouth daily at 2 pm   More than a month    Prenatal Vit-Fe Fumarate-FA (PRENATAL MULTIVITAMIN W/IRON) 27-0.8 MG tablet Take 1 tablet by mouth daily   More than a month    senna-docusate (SENOKOT-S/PERICOLACE) 8.6-50 MG tablet Take 1 tablet by mouth daily Start after delivery. 100 tablet 0 More than a month   .        Maternal Past Medical History:     Past Medical History:   Diagnosis Date    Accidental overdose of heroin (H) 2020    Twice in 2019    Anxiety     has used mirazapine     Cannabis dependence (H) 2018    Chemical dependency (H) 2020    7/15/20: Sober since 2020.    Depression     Depressive disorder     Heroin overdose (H)     x2    Moderate episode of recurrent major depressive disorder (H) 2018    Polysubstance (excluding opioids) dependence (H) 2019    Positive GBS test 2020    S/P  section 2020    Smoker 07/15/2020    7/15/20: states she has weaned down to 3 cigs/day    Substance abuse (H)     history    Suicide attempt (H)     age 14 and age 17     Vitamin D deficiency 2020    3/10/20- Vit D 22. Discuss supplementation next visit___          Maternal Past Surgical History:     Past Surgical History:   Procedure Laterality Date     SECTION N/A 9/15/2020    Procedure:  SECTION;  Surgeon: Marilin Trammell MD;  Location:  L+D                Physical Exam:      Vitals:    24 1529 24 1531 24 1534 24 1536   BP: 125/79 119/71 121/84    BP Location:       Patient Position:       Cuff Size:       Resp:       Temp:       TempSrc:       SpO2:  100%  100%     Gen: Well appearing, shifting positions in bed   Attempted to determine fetal position by BSUS, spine to maternal left but fetal head too low in pelvis to tell     Cervix: 8 but cervix completely reducible with pushing with a contraction/+1 station, KAREN position w/o significant caput     Fetal Heart Rate Tracing: Baseline difficult to determine due to low baseline but was around 110, moderate variability, accels present, mixed late and variable decels to 90bpm   Tocometer: 3 contractions in 10 minutes        Assessment:   Sophia Stiles is a 24 year old  at 40w4d admitted to the [facility] service in spontaneous labor desiring a TOLAC with a Cat 2 FHT.         Plan:     - able to reduce cervical lip to complete. Recommend starting pushing now. Briefly discussed option of VAVD and will stand-by in case deep variable and prolonged decels continue.     Erin Delacruz MD

## 2024-06-08 NOTE — PROVIDER NOTIFICATION
06/08/24 1257   Provider Notification   Provider Name/Title JOHNNY Sanchez CNM   Method of Notification Electronic Page   Notification Reason SVE;Patient Request     Provider requested bedside per patient for SVE. Provider plan to come bedside at 1320.

## 2024-06-08 NOTE — PROGRESS NOTES
The patient sensorial block was right sided, the left side didn't get analgesia with the first epidural catheter.  We tried having the patient one side down and gave bolus while on the left side, but that did not provide pain relief.     We offered the patient to replace the epidural catheter and she reports better bilateral pain control.     Marcy Baldwin MD  Anesthesiology Critical Care

## 2024-06-08 NOTE — L&D DELIVERY NOTE
L&D Delivery Note:     Sophia Stiles is a 24 year old now  who presented at 40w4d for IOL in the setting of prior  section with the CNM service. OB team was consulted due to persistent category II FHT.    FHT notable for recurrent prolonged decelerations with a agnela of 60 bpm.     After discussion of fetal and maternal risks, informed consent was obtained from patient for VAVD. She agreed to proceed with VAVD for fetal distress. Bladder was emptied via straight catheter. Fetal head with no caput and in KAREN position at 2+ station. EFW 7.5#. She was felt to be an appropriate candidate for a VAVD.      Vacuum applied to fetal head at 2-3 cm from posterior occiput. Pressure applied at 1628, first pull for 25 seconds resulting in fetal descent. First pop off occurred likely secondary to fetal hair and poor suction given good fetal descent.    Pressure released until next contraction then reapplied at 1629 for 30 seconds. 2nd pull resulted in good fetal descent. Second pop off occurred at the conclusion of the contraction. Vacuum replaced.     Pressure increased at 1632 and 3rd pull for 40 seconds resulted in good fetal descent with pop off at the conclusion of the contraction. Given the fetal head was , the vacuum was not reapplied over the next contraction. However, delivery did not occur with terminal bradycardia and therefore vacuum was reapplied given fetal distress.    Final pull at 1638 of 20 seconds resulted in fetus delivering, vacuum removed and maternal efforts used to deliver the baby without complications.    No nuchal cord was noted.  Apgars of 7 and 9 with weight of 8 lb 4 oz.  The cord was double clamped after 60 seconds and cut.  A cord segment and cord blood were obtained.  30U of IV pitocin was started . The placenta was then delivered using gentle traction and suprapubic pressure.  The uterus was noted to be firm after fundal massage.  The perineum was assessed and without  lacerations. Bilateral periurethral lacerations were hemostatic and therefore not repaired.  Total QBL was 100 mL.  The placenta appeared intact with a 3V umbilical cord.     Dr. Delacruz was present for the procedure.    Geovani Cabrera MD  OB/GYN PGY-3  06/08/24 4:45 PM    I was present during the delivery and supervised the resident perform the patient's VAVD. Given continued fetal descent, poor suction due to fetal hair, and faster delivery to be achieved with continued VAVD attempts instead of CS the vacuum was reapplied after popoffs.     Erin Delacruz MD

## 2024-06-08 NOTE — PROGRESS NOTES
SUBJECTIVE:  ==============  Felicitee EVELYNE Linsey has continued to change positions to help baby to turn, has been having increased discomfort on epidural, receiving epidural bolus from Anesthesia which she states does help. SVE shows no change from previous exam, baby still LOT. Fee consents to AROM.   General appearance: uncomfortable with contractions     Support: partner Shmuel and mother Yina      OBJECTIVE:  ==============  VITALS  Patient Vitals for the past 24 hrs:   BP Temp Temp src Resp SpO2   06/08/24 1352 103/62 -- -- -- 99 %   06/08/24 1351 -- -- -- -- 99 %   06/08/24 1350 103/62 -- -- -- --   06/08/24 1346 -- -- -- -- 100 %   06/08/24 1344 117/73 -- -- -- --   06/08/24 1341 -- -- -- -- 98 %   06/08/24 1338 118/59 99  F (37.2  C) Oral 18 --   06/08/24 1336 109/71 -- -- -- 100 %   06/08/24 1334 112/74 -- -- -- --   06/08/24 1332 112/71 -- -- -- --   06/08/24 1331 -- -- -- -- 100 %   06/08/24 1330 113/72 -- -- -- --   06/08/24 1328 110/72 -- -- -- --   06/08/24 1326 106/67 -- -- -- 100 %   06/08/24 1325 106/65 -- -- -- --   06/08/24 1321 -- -- -- -- 100 %   06/08/24 1227 110/67 97.6  F (36.4  C) Oral 16 --   06/08/24 1226 -- -- -- -- 100 %   06/08/24 1223 112/68 -- -- -- --   06/08/24 1221 -- -- -- -- 99 %   06/08/24 1217 117/66 -- -- -- --   06/08/24 1212 103/65 -- -- -- --   06/08/24 1211 -- -- -- -- 100 %   06/08/24 1206 110/64 -- -- -- 98 %   06/08/24 1202 112/66 -- -- -- --   06/08/24 1201 -- -- -- -- 99 %   06/08/24 1158 120/58 -- -- -- --   06/08/24 1156 -- -- -- -- 99 %   06/08/24 1152 101/66 -- -- -- --   06/08/24 1151 -- -- -- -- 99 %   06/08/24 1149 117/67 -- -- -- --   06/08/24 1146 -- -- -- -- 100 %   06/08/24 1142 129/60 -- -- -- --   06/08/24 1141 -- -- -- -- 99 %   06/08/24 1137 110/62 -- -- -- --   06/08/24 1136 -- -- -- -- 99 %   06/08/24 1132 96/52 -- -- -- --   06/08/24 1131 -- -- -- -- 99 %   06/08/24 1129 98/54 -- -- -- --   06/08/24 1126 -- -- -- -- 100 %   06/08/24 1121 97/62 --  -- -- --   06/08/24 1119 101/56 -- -- -- --   06/08/24 1117 109/67 -- -- -- --   06/08/24 1116 107/68 -- -- -- --   06/08/24 1113 104/65 -- -- -- --   06/08/24 1111 110/63 -- -- -- 100 %   06/08/24 1036 113/69 98.1  F (36.7  C) Oral 16 99 %   06/08/24 0936 -- -- -- -- 100 %   06/08/24 0935 101/68 -- -- -- --   06/08/24 0846 -- -- -- -- 95 %   06/08/24 0844 109/75 98.4  F (36.9  C) Oral 18 --   06/08/24 0812 110/75 -- -- 18 --   06/08/24 0811 -- -- -- -- 100 %   06/08/24 0736 97/57 -- -- -- 100 %   06/08/24 0731 -- -- -- -- 100 %   06/08/24 0726 -- -- -- -- 100 %   06/08/24 0721 122/79 -- -- -- 100 %   06/08/24 0716 -- -- -- -- 100 %   06/08/24 0711 -- -- -- -- 99 %   06/08/24 0707 116/76 -- -- -- --   06/08/24 0706 -- -- -- -- 100 %   06/08/24 0701 -- -- -- -- 100 %   06/08/24 0655 -- -- -- -- 99 %   06/08/24 0651 118/79 -- -- -- 100 %   06/08/24 0645 112/77 -- -- -- 97 %   06/08/24 0640 (!) 134/92 -- -- -- 100 %   06/08/24 0635 116/82 -- -- -- 100 %   06/08/24 0630 114/70 97.5  F (36.4  C) Oral -- --   06/08/24 0628 134/78 -- -- -- --   06/08/24 0624 122/68 -- -- -- --   06/08/24 0622 128/58 -- -- -- --   06/08/24 0620 111/68 -- -- -- --   06/08/24 0618 120/71 -- -- -- --   06/08/24 0215 121/68 98  F (36.7  C) Oral -- --       FETAL HEART RATE ASSESSMENT:  Baseline rate 120, normal  Variability moderate  Accelerations present   Decelerations present, deceleration type: early, deceleration frequency: intermittent. Are the decelerations significant?   No   present for <50% of the time    CONTRACTIONS: Contractions every 3-4 minutes.  Palpate: strong  Pitocin- none,  Antibiotics- none    ROM: AROM, moderate meconium fluid  PELVIC EXAM:PELVIC EXAM: 7/ 90/ Mid/ soft/ -1      # Pain Assessment:      6/8/2024     1:38 PM   Current Pain Score   Patient currently in pain? yes   - Sophia is experiencing pain due to contractions. Pain management was discussed with Sophia and her mother and partner and the plan was  created in a collaborative fashion.  Carmineee's response to the current recommendations: engaged  - epidural with epidural bolus      Labor course:  24  0230 2/50/-2 mid/average   0730 5/90%/-2/ mid/soft/BBOW, ROP  1405-4255 [address] circuit   1100: 7/90%/-2 BBOW LOT  1340 7/90%-1 AROM MSAF    ASSESSMENT:  ==============  Sophia STUBBS Linsey  24 year old  female  Estimated Date of Delivery: 2024  IUP @ 40w4d active labor   TOLAC 7cm  AROM MSAF  Fetal Heart Rate Tracing primarily category one over the last 60 minutes  GBS- negative      Patient Active Problem List   Diagnosis    Supervision of high risk pregnancy in third trimester    Cannabis dependence (H)    Moderate episode of recurrent major depressive disorder (H)    Vaping nicotine dependence, tobacco product    History of - tolac consent signed    History of opioid abuse (H)    History of suicide attempt    Slow transit constipation    External hemorrhoids    History of postpartum depression    Hyperemesis gravidarum    Uterine contractions during pregnancy    Uterine contractions    Labor and delivery, indication for care          PLAN:  ===========  -Frequent position changes to facilitate labor with epidural anesthesia.   -Anticipate progress and NSVB.   -TOLAC - In house [facility] MD aware of pt's desire to TOLAC  -Reevaluate progress in 2-3 hours or sooner with a change in status.      I, MANOLO Wilkins, am serving as a scribe; to document services personally performed by SHAJI Jurado CNM based on data collection and the provider's statements to me.   MANOLO Wilkins    I agree with past family and social history and review of systems completed by the Medical/Advanced Practice Provider student except for changes made by me. The remainder of the encounter was performed by me and scribed by the student. The scribed note accurately reflects personal services and decisions made by me.    Rebecca  Daniel, SHAJI, CNM

## 2024-06-08 NOTE — ANESTHESIA PROCEDURE NOTES
"Epidural catheter Procedure Note    Pre-Procedure   Staff -        Anesthesiologist:  Marcy Baldwin MD       Resident/Fellow: Justyna Rico MD       Performed By: resident       Location: OB       Procedure Start/Stop Times: 6/8/2024 3:11 PM and 6/8/2024 3:28 PM       Pre-Anesthestic Checklist: patient identified, IV checked, risks and benefits discussed, informed consent, monitors and equipment checked, pre-op evaluation, at physician/surgeon's request and post-op pain management  Timeout:       Correct Patient: Yes        Correct Procedure: Yes        Correct Site: Yes        Correct Position: Yes   Procedure Documentation  Procedure: epidural catheter       Patient Position: sitting       Patient Prep/Sterile Barriers: sterile gloves, mask, patient draped       Skin prep: Chloraprep       Local skin infiltrated with 3 mL of 1% lidocaine.        Insertion Site: L3-4. (midline approach).       Technique: LORT saline        KAREN at 6.5 cm.       Needle Type: Anatexisy needle       Needle Gauge: 17.        Needle Length (Inches): 3.5        Catheter: 18 G.          Catheter threaded easily.         4.5 cm epidural space.         Threaded 11 cm at skin.         # of attempts: 1 and  # of redirects:  0    Assessment/Narrative         Paresthesias: No.       Test dose of 3 mL lidocaine 1.5% w/ 1:200,000 epinephrine at 17:25 CDT.         Test dose negative, 3 minutes after injection, for signs of intravascular, subdural, or intrathecal injection.       Insertion/Infusion Method: LORT saline       Aspiration negative for Heme or CSF via Epidural Catheter.    Medication(s) Administered   0.1% ropivacaine + 2 mcg/mL fentaNYL in NS - EPIDURAL   6 mL - 6/8/2024 3:28:00 PM  Medication Administration Time: 6/8/2024 3:11 PM      FOR [facility] [place] ONLY:   Pain Team Contact information: please page the Pain Team Via [employer]. Search \"Pain\". During daytime hours, please page the attending first. At night please page the "  first.

## 2024-06-09 VITALS
DIASTOLIC BLOOD PRESSURE: 69 MMHG | OXYGEN SATURATION: 98 % | RESPIRATION RATE: 16 BRPM | TEMPERATURE: 98.3 F | SYSTOLIC BLOOD PRESSURE: 110 MMHG | HEART RATE: 70 BPM

## 2024-06-09 LAB — HGB BLD-MCNC: 10.4 G/DL (ref 11.7–15.7)

## 2024-06-09 PROCEDURE — 36415 COLL VENOUS BLD VENIPUNCTURE: CPT | Performed by: ADVANCED PRACTICE MIDWIFE

## 2024-06-09 PROCEDURE — 250N000013 HC RX MED GY IP 250 OP 250 PS 637: Performed by: ADVANCED PRACTICE MIDWIFE

## 2024-06-09 PROCEDURE — 85018 HEMOGLOBIN: CPT | Performed by: ADVANCED PRACTICE MIDWIFE

## 2024-06-09 RX ADMIN — IBUPROFEN 800 MG: 800 TABLET, FILM COATED ORAL at 19:09

## 2024-06-09 RX ADMIN — DOCUSATE SODIUM 100 MG: 100 CAPSULE, LIQUID FILLED ORAL at 09:16

## 2024-06-09 RX ADMIN — ACETAMINOPHEN 650 MG: 325 TABLET, FILM COATED ORAL at 15:37

## 2024-06-09 RX ADMIN — ACETAMINOPHEN 650 MG: 325 TABLET, FILM COATED ORAL at 00:30

## 2024-06-09 RX ADMIN — IBUPROFEN 800 MG: 800 TABLET, FILM COATED ORAL at 00:30

## 2024-06-09 RX ADMIN — IBUPROFEN 800 MG: 800 TABLET, FILM COATED ORAL at 12:32

## 2024-06-09 RX ADMIN — ACETAMINOPHEN 650 MG: 325 TABLET, FILM COATED ORAL at 05:05

## 2024-06-09 RX ADMIN — CITALOPRAM HYDROBROMIDE 10 MG: 10 TABLET ORAL at 09:16

## 2024-06-09 ASSESSMENT — ACTIVITIES OF DAILY LIVING (ADL)
ADLS_ACUITY_SCORE: 18

## 2024-06-09 NOTE — DISCHARGE INSTRUCTIONS
Warning Signs after Having a Baby    Keep this paper on your fridge or somewhere else where you can see it.    Call your provider if you have any of these symptoms up to 12 weeks after having your baby.    Thoughts of hurting yourself or your baby  Pain in your chest or trouble breathing  Severe headache not helped by pain medicine  Eyesight concerns (blurry vision, seeing spots or flashes of light, other changes to eyesight)  Fainting, shaking or other signs of a seizure    Call 9-1-1 if you feel that it is an emergency.     The symptoms below can happen to anyone after giving birth. They can be very serious. Call your provider if you have any of these warning signs.    My provider s phone number: _______________________    Losing too much blood (hemorrhage)    Call your provider if you soak through a pad in less than an hour or pass blood clots bigger than a golf ball. These may be signs that you are bleeding too much.    Blood clots in the legs or lungs    After you give birth, your body naturally clots its blood to help prevent blood loss. Sometimes this increased clotting can happen in other areas of the body, like the legs or lungs. This can block your blood flow and be very dangerous.     Call your provider if you:  Have a red, swollen spot on the back of your leg that is warm or painful when you touch it.   Are coughing up blood.     Infection    Call your provider if you have any of these symptoms:  Fever of 100.4 F (38 C) or higher.  Pain or redness around your stitches if you had an incision.   Any yellow, white, or green fluid coming from places where you had stitches or surgery.    Mood Problems (postpartum depression)    Many people feel sad or have mood changes after having a baby. But for some people, these mood swings are worse.     Call your provider right away if you feel so anxious or nervous that you can't care for yourself or your baby.    Preeclampsia (high blood pressure)    Even if you  didn't have high blood pressure when you were pregnant, you are at risk for the high blood pressure disease called preeclampsia. This risk can last up to 12 weeks after giving birth.     Call your provider if you have:   Pain on your right side under your rib cage  Sudden swelling in the hands and face    Remember: You know your body. If something doesn't feel right, get medical help.     For informational purposes only. Not to replace the advice of your health care provider. Copyright 2020 [facility]. All rights reserved. Clinically reviewed by Avelina Gallardo, RNC-OB, MSN. [employer] [id number] - Rev 02/23.

## 2024-06-09 NOTE — PROGRESS NOTES
Data: Sophia Stiles transferred to [place] [address] via wheelchair at 1936. Baby transferred via parent's arms.  Action: Receiving unit notified of transfer: Yes. Patient and family notified of room change. Report was given to Marilin Bui RN by Lois DEVI RN. Belongings sent to receiving unit. Accompanied by Registered Nurse. Oriented patient to surroundings. Call light within reach. ID bands double-checked with receiving RN.  Response: Patient tolerated transfer and is stable.   Statement Selected

## 2024-06-09 NOTE — LACTATION NOTE
This note was copied from a baby's chart.  Consult for:  Patient request. Breastfeeding now and plans a combo of breastfeeding and pumping/kenton feeding ebm.     Infant Name: Shayla    Inf[facility]    Delivery Information:  Sherri was born at 40w4d via vacuum assisted vaginal delivery on 2024 4:38 PM     Maternal Health History:    Information for the patient's mother:  StilesMare [[id number]]     Past Medical History:   Diagnosis Date    Accidental overdose of heroin (H) 2020    Twice in 2019    Anxiety     has used mirazapine     Cannabis dependence (H) 2018    Chemical dependency (H) 2020    7/15/20: Sober since 2020.    Depression     Depressive disorder     Heroin overdose (H)     x2    Moderate episode of recurrent major depressive disorder (H) 2018    Polysubstance (excluding opioids) dependence (H) 2019    Positive GBS test 2020    S/P  section 2020    Smoker 07/15/2020    7/15/20: states she has weaned down to 3 cigs/day    Substance abuse (H)     history    Suicide attempt (H)     age 14 and age 17     Vitamin D deficiency 2020    3/10/20- Vit D 22. Discuss supplementation next visit___     and   Information for the patient's mother:  Mare Stiles [[id number]]     Patient Active Problem List   Diagnosis    Supervision of high risk pregnancy in third trimester    Cannabis dependence (H)    Moderate episode of recurrent major depressive disorder (H)    Vaping nicotine dependence, tobacco product    History of - tolac consent signed    History of opioid abuse (H)    History of suicide attempt    Slow transit constipation    External hemorrhoids    History of postpartum depression    Hyperemesis gravidarum    Uterine contractions during pregnancy    Uterine contractions    Labor and delivery, indication for care      Mare has a history of opioid abuse but shares she has been sober since the end  of 2019/early 2020. She vapes nicotine and does use Marijuana. Reviewed potential impact of nicotine and prolactin levels; advised reduction if able and pumping if signs of low milk supply. Mare plans to introduce some pumping once past the first few days of cluster feeding. Reviewed recommendations around Marijuana use and breastfeeding. Had reviewed with Peds prior.     Mare takes Celexa. Per Mercedes's Medications and Mothers' Milk Celexa is considered category L2-Limited Data- Probably Compatible. Mercedes recommends monitoring infant for sedation or irritability, not waking to feed/poor feeding, and weight gain. ?    Maternal Breast Exam:  Mare noted breast growth and sensitivity in early pregnancy. She denies any history of breast/chest injury or surgery. Her breasts are soft and symmetrical with bilateral intact, everted nipples. She has been able to hand express colostrum. ?    Breastfeeding/ Lactation History: Mare shares that she pumped and bottle fed her son who was born in 2020. She had a difficult delivery that resulted in an emergency c/s and her son was unable to latch. She did not have issues with milk supply.     Infant information: Shayla was AGA at birth and has age appropriate output. She is <24 hours old. Shayla has bruising on her head due to vacuum assisted delivery.     Weight Change Since Birth: NA, < 24 hours old.     Oral exam of baby:  Deferred as infant on the breast.     Feeding History: Mare shares that Shayla has been alert and breastfeeding well since delivery. She is happy that it has been easy to latch Shayla and that breastfeeding is going so well since she had challenges breastfeeding her son.     Shayla initially preferred the right breast but is now latching easily to both breasts. Reviewed positioning tips related to bruising on head in case she shows side preference.     Feeding Assessment: Mare had Shayla latched in the cross cradle position on the right breast when I arrived in the room.  Shayla appeared to have a deep latch, demonstrated sustained, coordinated nutritive sucking and was heard swallowing intermittently. Fee denied discomfort.     Encouraged hand expression for practice after feedings. Feed back any expressed milk via spoon or cup.     Education:   [x] Expected  feeding patterns in the first few days (pg. 38 of Your Guide to To Postpartum and [place] [facility])/ the Second Night  [x] Stages of milk production  [x] Benefits of hand expression of colostrum  [] Early feeding cues     [x] Benefits of feeding on cue  [x] Benefits of skin to skin  [x] Breastfeeding positions  [x] Tips to get and maintain a deep latch  [x] Nutritive vs.non-nutritive sucking  [x] Gentle breast compressions as needed to enhance milk transfer  [x] How to tell when baby is finished  [x] How to tell if baby is getting enough  [x] Expected  output  [x]  weight loss  [x] Infant Feeding Log  [] [employer] Breastfeeding/Pumping videos  [] Signs breastfeeding is going well (comfortable latch, audible swallows, age appropriate output and weight loss)    [x] Tips to prevent engorgement  [x] Signs of engorgement  [x] Tips to manage engorgement  [x] Pumping recommendations (based on patient need)  [] CDC breast pump part/infant feeding supplies cleaning recommendations  [x] Inpatient breastfeeding support  [x] Outpatient lactation resources    Handouts: Infant Feeding Log (Week 1, Your Guide to Postpartum &  Care Book)    Home Breast Pump: Mare has a pump to use at home.     Plan: Continue breastfeeding on cue with RN support as needed, goal of 8-12 feedings per day.     Encourage frequent skin to skin and hand expression.     Mare plans to add in pumping, but will likely breastfeed exclusively in the first few days due to frequent feedings.  Reviewed tips for adding in pumping when she's ready. If weight loss/output/milk supply concerning at any point encouraged to add in pumping for breast  stimulation earlier.     Encouraged follow up with outpatient lactation consultant  as needed after discharge. Fee plans to check in with her [facility] for available lactation support.       Viviana Brenner RN, IBCLC   Lactation Consultant  [place]: [facility] [phone]  Office: [phone]             No

## 2024-06-09 NOTE — PLAN OF CARE
Data: Vital signs within normal limits. Postpartum checks within normal limits, fundus is midline and firm at umbilicus Patient eating and drinking normally. Patient able to empty bladder independently and is up ambulating. No apparent signs of infection. Breastfeeding on demand every 2-3 hours. Patient performing self cares and is able to care for infant.  Action: Patient medicated during the shift for pain and cramping. Patient reassessed within 1 hour after each medication and pain was improved - patient stated she was comfortable. Patient education done about non-pharm alternatives.   Response: Positive attachment behaviors observed with infant. Support persons is spouse and he is present at the bedside.  Plan: Continue with education and plan of care.    Goal Outcome Evaluation:  Problem: Postpartum (Vaginal Delivery)  Goal: Hemostasis  Outcome: Progressing     Problem: Postpartum (Vaginal Delivery)  Goal: Absence of Infection Signs and Symptoms  Outcome: Progressing     Problem: Postpartum (Vaginal Delivery)  Goal: Optimal Pain Control and Function  Outcome: Progressing  Intervention: Prevent or Manage Pain  Recent Flowsheet Documentation  Taken 6/8/2024 2020 by Marilin Bui RN  Pain Management Interventions:   medication (see MAR)   cold applied     Problem: Postpartum (Vaginal Delivery)  Goal: Effective Urinary Elimination  Outcome: Progressing

## 2024-06-09 NOTE — PROGRESS NOTES
Patient arrived to [facility] unit via wheelchair at 1940 ,with belongings, accompanied by spouse, with infant in arms. Got report from SAUMYA Domingo and checked bands. Unit and room orientation complete. All questions and concerns addressed at this time. Continue with education and plan of care.

## 2024-06-09 NOTE — CONSULTS
Social Work Initial Consult    DATA/ASSESSMENT    General Information  Assessment completed with: Parents, Mare and Shmuel  Type of visit: Initial Assessment      Reason for Consult: emotional/coping/adjustment concerns, substance use concerns    Living Environment:   Primary caregiver: Self.   Lives with: Spouse (Shmuel) and 4 year old son and Shmuel's Mother.          Current living arrangements: house      Able to return to prior arrangements: yes     Family Factors  Family Risk Factors: other children at home needing care and attention  Family Strength Factors: able and willing to advocate for self/family, able and willing to ask for help/accept help, connected with mental health support, demonstrated commitment to being present and engaged in cares, experienced parents, local family, reliable transportation, parental employment, stable housing, strong social support, willingness to havee vulnerable conversations about emotions     Assessment of Support  Parental Marital Status:   Who is your support system?: Parent(s) Description of Support System: Supportive  Support Assessment: Adequate family and caregiver support    Employment/Financial  Patient's caregiver works full/part time: Yes Patient's caregiver able to return to work: yes   Patient works full/part time: Yes Patient able to return to work: Yes     Coping/Stress  Felicitee acknowledged having good mental health. She reported that she has a history of depression and anxiety and reported that she has services for medication management. She reported that this pregnancy she had a lot more pelvic pain and was sick often.She had a previous pregnancy and reported good mental health following that delivery as well. She reported that she has been four years sober. SW and patient discussed what to do if she recognizes any PMAD signs or symptoms to discuss them with her PCP and or babys pediatrician. She seemed understanding of this recommendation.            Additional Information:  Sophia and Shmuel (FOB) have a new baby girl named Shayla Johnston. This is the second child for them as they have a 4 year old boy as well. They currently reside in [place], [place] in a [facility] and also live with Shmuel's mother.. 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 Both reported having adequte baby supplies including a safe place for baby to sleep. SW and Patient discussed her use and admitting to using THC. SW completed necessary report. Pt is aware of mandatory reporting policy.   SW saw UA was positive for fentanyl and consulted with attending who is treating patients infant (Park Smith), also attempted to connect with patients attending however was unsuccessful. Park reported that patient was receiving fentanyl since roughly 6am since 06/08am and which could result in patients UA being positive for fentanyl. Due to that information, that part was not included in [facility] report.      INTERVENTION    Conducted chart review and consulted with medical team regarding plan of care. Introduced SW role and scope of practice.     Conducted psychosocial assessment   Referral to CPS    Provided SW contact info    PLAN    No further SW interventions identified. Please re-consult with SW prior to discharge as needed Diana Nickerson MA, SW  Casual Pediatric Social Worker  On call pager: [phone]

## 2024-06-09 NOTE — PLAN OF CARE
Goal Outcome Evaluation:      Plan of Care Reviewed With: patient, spouse    Overall Patient Progress: improvingOverall Patient Progress: improving    Outcome Evaluation: VSS. Pt breastfeeding infant on cue, good latch noted. Perineal pain and back pain (from epidural sites) well controlled with PRN Tylenol and Ibuprofen. Pt tolerating regular diet, ambulating in room and voiding without difficulty. Pt had a postpartum bowel movement. Postpartum checks WNL. Pt bonding well with infant. Spouse, Shmuel, present and attentive at bedside.    Discharge instructions reviewed with patient and spouse. Pt and spouse verbalized understanding. Baby bands verified. Pt discharged to home, accompanied by infant and spouse.     Problem: Postpartum (Vaginal Delivery)  Goal: Successful Parent Role Transition  Outcome: Met  Intervention: Support Parent Role Transition  Recent Flowsheet Documentation  Taken 6/9/2024 1110 by Michelle Jones RN  Supportive Measures:   active listening utilized   positive reinforcement provided   self-care encouraged   verbalization of feelings encouraged  Parent-Child Attachment Promotion:   caring behavior modeled   cue recognition promoted   face-to-face positioning promoted   interaction encouraged   parent/caregiver presence encouraged   participation in care promoted   positive reinforcement provided   rooming-in promoted   skin-to-skin contact encouraged   strengths emphasized     Problem: Postpartum (Vaginal Delivery)  Goal: Effective Urinary Elimination  Outcome: Met  Intervention: Monitor and Manage Urinary Retention  Recent Flowsheet Documentation  Taken 6/9/2024 1110 by Michelle Jones RN  Urinary Elimination Promotion: frequent voiding encouraged     Problem: Adult Inpatient Plan of Care  Goal: Optimal Comfort and Wellbeing  Outcome: Met  Intervention: Monitor Pain and Promote Comfort  Recent Flowsheet Documentation  Taken 6/9/2024 1537 by Michelle Jones RN  Pain Management  Interventions: medication (see MAR)  Taken 6/9/2024 1232 by Michelle Jones, RN  Pain Management Interventions: medication (see MAR)  Intervention: Provide Person-Centered Care  Recent Flowsheet Documentation  Taken 6/9/2024 1110 by Michelle Jones, RN  Trust Relationship/Rapport:   care explained   choices provided   questions answered   questions encouraged   emotional support provided   empathic listening provided   reassurance provided   thoughts/feelings acknowledged
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
## 2024-06-10 ENCOUNTER — PATIENT OUTREACH (OUTPATIENT)
Dept: CARE COORDINATION | Facility: CLINIC | Age: 24
End: 2024-06-10
Payer: COMMERCIAL

## 2024-06-10 ENCOUNTER — TELEPHONE (OUTPATIENT)
Dept: OBGYN | Facility: CLINIC | Age: 24
End: 2024-06-10
Payer: COMMERCIAL

## 2024-06-10 NOTE — TELEPHONE ENCOUNTER
[facility] Call Center    Phone Message    May a detailed message be left on voicemail: yes     Reason for Call: Other: Pt is requesting to speak to a nurse regarding concerns with lactation. Advised to send a TE per secure chat. Please reach back out to pt.      Action Taken: Other: magaly whs    Travel Screening: Not Applicable     Date of Service:

## 2024-06-10 NOTE — PROGRESS NOTES
[facility]: [facility]    Background: Transitional Care Management program identified per system criteria and reviewed by [facility] team for possible outreach.    Assessment: Upon chart review, [facility] Team member will not proceed with patient outreach related to this episode of Transitional Care Management program due to reason below:    Patient has active communication with a nurse, provider or care team for reason of post-hospital follow up plan.  Outreach call by [facility] team not indicated to minimize duplicative efforts.     Plan: Transitional Care Management episode addressed appropriately per reason noted above.      MARIA ESTHER Delcid (she/her/hers)  Social Work Clinic Care Coordinator   [facility]  [email or web address]  [phone]      *Connected Care Resource Team does NOT follow patient ongoing. Referrals are identified based on internal discharge reports and the outreach is to ensure patient has an understanding of their discharge instructions.

## 2024-06-10 NOTE — TELEPHONE ENCOUNTER
Writer called Mare just to check in to see if her daughter did wake up to feed.     Mare stated that she did and she just was really happy that I could help her out.     All questions answered.

## 2024-06-11 ENCOUNTER — MEDICAL CORRESPONDENCE (OUTPATIENT)
Dept: HEALTH INFORMATION MANAGEMENT | Facility: CLINIC | Age: 24
End: 2024-06-11

## 2024-06-13 ENCOUNTER — TELEPHONE (OUTPATIENT)
Dept: OBGYN | Facility: CLINIC | Age: 24
End: 2024-06-13
Payer: COMMERCIAL

## 2024-07-13 ENCOUNTER — HEALTH MAINTENANCE LETTER (OUTPATIENT)
Age: 24
End: 2024-07-13

## 2024-08-17 PROBLEM — O47.9 UTERINE CONTRACTIONS DURING PREGNANCY: Status: RESOLVED | Noted: 2024-05-22 | Resolved: 2024-08-17

## 2024-08-17 PROBLEM — O47.9 UTERINE CONTRACTIONS: Status: RESOLVED | Noted: 2024-05-22 | Resolved: 2024-08-17

## 2024-08-17 NOTE — PROGRESS NOTES
6-week Postpartum Visit:     Assessment:   25 yo  at 6 weeks postpartum   NSVB 24 VAVD/ girl, 8lbs 4 oz, Shayla  Lactating and occasional Formula-feeding   Contraceptive Counseling, considering Mirena  PHQ9 7, YELITZA 7: 5  Hx of postpartum depression, denies symptoms now  Hx of substance use disorder  Last pap =  NILM due  PP Hgb = 8.2  Postpartum hemorrhage and retained placenta, went to [facility] and had a D & C       Plan:   1. Adjustment to parenting, self care and importance of a support system discussed. Postpartum education given including: postpartum mood changes and postpartum depression. [place]  Mental Health Resource Card given for future reference prn.   2. Return of fertility discussed. Plans condoms for contraception, may consider Mirena or Paragard. Education given on this method. Resumption of intercourse reviewed with possible changes in libido and vaginal lubrication while nursing.  3. Discussed resumption of exercise and normal timing of return to pre-pregnancy weight. Postpartum physical activity reviewed and encouraged modified abdominal crunches and Kegels daily. Encouraged integrating exercise, such as walking 20-30mns daily.   4.  Nutrition and supplements reviewed.  Advised continuation of a prenatal or multivitamin, also Vitamin D3 2000 IU geltab daily and an omega 3 fatty acid supplement.  5. Reviewed warning signs of pelvic pain, excessive bleeding or abdominal pain, fever/chills, or signs of breast infection.   6. Breastfeeding support resource list and contact info reviewed  7. CBC for follow up from postpartum anemia secondary to hemorrhage     -RTC for routine health maintenance or sooner as needed.       Rebecca Sanchez, SHAJI, CNM     Subjective:   Sophia Stiles is a 24 year old female who presents for postpartum visit. She is 6 weeks postpartum following a  VAVD .  I have fully reviewed the prenatal and intrapartum course. The delivery was at  "Term and 40/4 weeks gestation Her baby girl is named Shayla and weighed 8 lbs 4 oz at birth. Reflections on her birth include very happy with her birth experience. Very happy to have been able to have a vaginal birth after CS.   Mare had Cat II FHT and Dr. Delacruz was consulted and performed a VAVD. Bilateral periurethral lacerations hemostatic and not needing repair  Mare is back at work, working short shifts as a . Shmuel back at work in construction    Postpartum course has been complicated by delayed postpartum hemorrhage  and retained placenta. Passed a large clot and went to [facility] where they did a D &C. Saw retained placenta.  Occasional weakness and spot in her vision, occasional mild headaches  Working full time cement marce     Baby's course has been stable. Baby is breastfeeding and also getting some formula when Mare is working.   Lochia ceased at 6 weeks postpartum.  Bowel function is normal. Bladder function is normal. She has not resumed intercourse. Desired contraception: condoms. Appetite is up and down. Reports sleeping 6 hours a night.    YELITZA 7: 5; PHQ 9: 7  She has resumed regular exercise.     Review of Systems  -A 12 point comprehensive review of systems was negative except as noted above.  -Prenatal history and intrapartum course were also reviewed today.  -Social, Medical and family history reviewed    Objective:     Vitals:    08/20/24 1514   BP: 104/70   Pulse: 62   Weight: 54.3 kg (119 lb 12.8 oz)   Height: 1.575 m (5' 2\")       Physical Exam:  General Appearance: Alert, cooperative, no distress, appears stated age  Head: Normocephalic, without obvious abnormality, atraumatic  Eyes: Conjunctiva/corneas clear, does not wear corrective lenses  Neck: Supple, symmetrical, trachea midline, no adenopathy  Thyroid: not enlarged, symmetric, no tenderness/mass/nodules  Back: Symmetric, no curvature, ROM normal, no CVA tenderness  Lungs: Clear to auscultation bilaterally, respirations " unlabored  Heart: Regular rate and rhythm, S1 and S2 normal, no murmur, rub, or gallop  Breasts: Engorgement resolved/Lactating.  Nipples intact with no cracking.  Abdomen: Soft, non-tender, no masses.   Diastasis  0 fb.  Pelvic: External genitalia normal without lesions or irritation. Vagina and cervix show no lesions, inflammation, discharge or tenderness. Uterus fully involuted.  No adnexal mass or tenderness. Pap smear obtained.   Extremities: Extremities normal, atraumatic, no cyanosis or edema  Skin: Skin color, texture, turgor normal, no rashes or lesions  Lymph nodes: Cervical, supraclavicular, and axillary nodes normal  Neurologic: Alert and oriented x 3.      Last Pap: 11/2020. Results were: normal  Immunization History   Administered Date(s) Administered    COVID-19 Vaccine (Manasa) 06/23/2021    DTAP (<7y) 1999, 1999, 04/12/2000, 10/16/2000, 05/12/2004    DTaP, Unspecified 1999, 1999, 04/12/2000, 10/16/2000, 05/12/2004, 08/16/2011, 10/30/2019    Flu, Unspecified 09/27/2013    HIB(PRP-OMP)(PedvaxHIB) 1999, 1999, 04/12/2000, 10/16/2000    HIB, Unspecified 1999, 1999, 04/12/2000, 10/16/2000    HPV Quadrivalent 11/16/2012, 01/16/2013, 09/27/2013    HepB, Unspecified 1999    Hepatitis B, Adult 07/15/2020, 08/24/2020    Hepatitis B, Peds 1999, 1999, 1999    Influenza (High Dose) 3 valent vaccine 10/29/2019    Influenza Vaccine >6 months,quad, PF 09/27/2013, 10/27/2016, 11/05/2020, 10/18/2023    Influenza Vaccine, 6+MO IM (QUADRIVALENT W/PRESERVATIVES) 09/27/2013, 08/30/2020    Influenza, seasonal, injectable, PF 10/30/2019    MMR 04/12/2000, 05/12/2004    Meningococcal ACWY (Menactra ) 08/16/2011    Polio, Unspecified 1999, 1999, 04/12/2000, 05/12/2004    Poliovirus, inactivated (IPV) 1999, 1999, 04/12/2000, 05/12/2004    TDAP (Adacel,Boostrix) 1999, 1999, 04/12/2000, 10/16/2000, 05/12/2004, 08/16/2011,  10/30/2019, 03/25/2024    TDAP Vaccine (Boostrix) 07/15/2020    Tdap (Adacel,boostrix) 08/16/2011    Varicella 10/06/2000, 08/16/2011, 09/18/2020     Immunization status: up to date and documented

## 2024-08-20 ENCOUNTER — PRENATAL OFFICE VISIT (OUTPATIENT)
Dept: OBGYN | Facility: CLINIC | Age: 24
End: 2024-08-20
Attending: ADVANCED PRACTICE MIDWIFE
Payer: COMMERCIAL

## 2024-08-20 VITALS
WEIGHT: 119.8 LBS | SYSTOLIC BLOOD PRESSURE: 104 MMHG | DIASTOLIC BLOOD PRESSURE: 70 MMHG | BODY MASS INDEX: 22.05 KG/M2 | HEIGHT: 62 IN | HEART RATE: 62 BPM

## 2024-08-20 DIAGNOSIS — Z98.891 HISTORY OF VBAC: ICD-10-CM

## 2024-08-20 DIAGNOSIS — O34.219 VBAC, DELIVERED: ICD-10-CM

## 2024-08-20 PROBLEM — O09.93 SUPERVISION OF HIGH RISK PREGNANCY IN THIRD TRIMESTER: Status: RESOLVED | Noted: 2020-03-12 | Resolved: 2024-08-20

## 2024-08-20 PROBLEM — Z87.59 HISTORY OF RETAINED PLACENTA: Status: ACTIVE | Noted: 2024-06-14

## 2024-08-20 PROBLEM — O21.0 HYPEREMESIS GRAVIDARUM: Status: RESOLVED | Noted: 2023-12-13 | Resolved: 2024-08-20

## 2024-08-20 LAB
ERYTHROCYTE [DISTWIDTH] IN BLOOD BY AUTOMATED COUNT: 14 % (ref 10–15)
HCT VFR BLD AUTO: 36.3 % (ref 35–47)
HGB BLD-MCNC: 12.1 G/DL (ref 11.7–15.7)
MCH RBC QN AUTO: 29.4 PG (ref 26.5–33)
MCHC RBC AUTO-ENTMCNC: 33.3 G/DL (ref 31.5–36.5)
MCV RBC AUTO: 88 FL (ref 78–100)
PLATELET # BLD AUTO: 240 10E3/UL (ref 150–450)
RBC # BLD AUTO: 4.11 10E6/UL (ref 3.8–5.2)
TSH SERPL DL<=0.005 MIU/L-ACNC: 0.92 UIU/ML (ref 0.3–4.2)
WBC # BLD AUTO: 5.3 10E3/UL (ref 4–11)

## 2024-08-20 PROCEDURE — 84443 ASSAY THYROID STIM HORMONE: CPT | Performed by: ADVANCED PRACTICE MIDWIFE

## 2024-08-20 PROCEDURE — 36415 COLL VENOUS BLD VENIPUNCTURE: CPT | Performed by: ADVANCED PRACTICE MIDWIFE

## 2024-08-20 PROCEDURE — 85027 COMPLETE CBC AUTOMATED: CPT | Performed by: ADVANCED PRACTICE MIDWIFE

## 2024-08-20 PROCEDURE — G0463 HOSPITAL OUTPT CLINIC VISIT: HCPCS | Mod: 25 | Performed by: ADVANCED PRACTICE MIDWIFE

## 2024-08-20 PROCEDURE — 99207 PR POST PARTUM EXAM: CPT | Performed by: ADVANCED PRACTICE MIDWIFE

## 2024-08-20 PROCEDURE — G0145 SCR C/V CYTO,THINLAYER,RESCR: HCPCS | Performed by: ADVANCED PRACTICE MIDWIFE

## 2024-08-20 ASSESSMENT — ANXIETY QUESTIONNAIRES
5. BEING SO RESTLESS THAT IT IS HARD TO SIT STILL: NOT AT ALL
GAD7 TOTAL SCORE: 5
1. FEELING NERVOUS, ANXIOUS, OR ON EDGE: NOT AT ALL
6. BECOMING EASILY ANNOYED OR IRRITABLE: MORE THAN HALF THE DAYS
GAD7 TOTAL SCORE: 5
3. WORRYING TOO MUCH ABOUT DIFFERENT THINGS: SEVERAL DAYS
7. FEELING AFRAID AS IF SOMETHING AWFUL MIGHT HAPPEN: SEVERAL DAYS
2. NOT BEING ABLE TO STOP OR CONTROL WORRYING: NOT AT ALL

## 2024-08-20 ASSESSMENT — PATIENT HEALTH QUESTIONNAIRE - PHQ9
SUM OF ALL RESPONSES TO PHQ QUESTIONS 1-9: 7
5. POOR APPETITE OR OVEREATING: SEVERAL DAYS

## 2024-08-20 NOTE — NURSING NOTE
Chief Complaint   Patient presents with    Postpartum Care     6 weeks postpartum care      SUBJECTIVE:   Sophia Stiles is here for her 6-week postpartum checkup.     PHQ-9 score: 7  Hx of Abuse:  No    Delivery Date: 06/08/2024.    Delivering provider:  Erin Delacruz MD.    Type of delivery:  Vaginal delivery with vacuum assist.  Perineum:    in tact.     Delivery complications: Fetal intolerance  Infant gender:  girl, weight 8 pounds 4 oz.  Feeding Method:   and Bottlefed.  Complications reported with feeding:  none, infant thriving .    Bleeding:  None.  Duration:  6 weeks.  Menses resumed:  No  Bowel/Urinary problems:  No    Contraception Planned:  None -- is she planning pregnancy? No  She  has not had intercourse since delivery..

## 2024-08-23 LAB
BKR LAB AP GYN ADEQUACY: NORMAL
BKR LAB AP GYN INTERPRETATION: NORMAL
BKR LAB AP HPV REFLEX: NORMAL
BKR LAB AP PREVIOUS ABNORMAL: NORMAL
PATH REPORT.COMMENTS IMP SPEC: NORMAL
PATH REPORT.COMMENTS IMP SPEC: NORMAL
PATH REPORT.RELEVANT HX SPEC: NORMAL

## 2025-07-15 NOTE — PROGRESS NOTES
"[facility] Assessment      Vital signs:     /67   Pulse 100   Ht 1.575 m (5' 2\")   Wt 63.5 kg (140 lb)   LMP 12/13/2019   BMI 25.61 kg/m      Temp 98.6 Tympanic    Direct admission   [facility] and then went to live with her boyfriend and his mother in [place], [place].  Pt \"social distancing\" during the entire stay.  No symptoms of COVID-19. Patient has no symptoms (cough, fever, or shortness of breath)    Counselor: Lena  Drug of Choice: Heroin/Cocaine  Last use: 3/20/20  Home clinic/MD:   [facility]  [address] [place], [place]  [id number] [phone]  Rody Richardson    Psychiatrist/therapist: none    Medical history/current conditions:  15W4D pregnant.  Estimated day of delivery September 18,2020.  Not planned pregnancy.  Former [facility] male pt is the babies father.    Pt currently not wanting to take Subutex    H&P Screen:  H&P within the last 90 days: Yes.  Date: 3/24/20 Location: [facility] bldg / OB 3rd floor    Mental Health diagnosis: anxiety and depression.  Pt reports she was advised by Provider (SHAJI Prado CNM) at OB visit to cease taking her Remeron and Sertraline.  (Pt reported that she had stopped taking her Sertraline at the 8th week of her pregnancy) Remeron was taken off of pt medication list    Medication compliant?: pt currently not taking any medication for anxiety or depression.  Pt advised by writer to alert [facility] staff with any MH difficulty    Recent sucidal thoughts? no     When? na  Current thought of self-harm? no    Plan? na    Pain assessment:   Pt. Experiencing pain at this time?  No      Nursing Assessment Summary:  As listed above  Integrative Therapies: Essential Oils    Patient requesting essential oil inhaler to manage (Mood/Mental Health/Physical/Spiritual symptoms).   Discussed appropriate use of essential oil inhalers and instructed patient not to leave labeled " Per US exam patient has no fluid to drain.  Bedside RN updated.   "product out on unit.   Patient was screened for kidney disease, asthma/reactive airway disease and rashes and wounds or 1st trimester of pregnancy  List Essential Oils requested by pt \"D-Crave\" (pt is in 2nd trimester)  Patient verbalized and demonstrated understanding of how to use essential oil inhaler correctly and will notify LP RN with any concerns or side effects. Patient agrees not to share their essential oil inhaler with other clients.  Continue to support the patient in safely utilizing integrative therapies as able to manage symptoms during treatment.       Patient tobacco use:    Do you use tobacco? yes   Type? cigarettes  How often? daily  How much? 10 cigarettes a day   Are you interested in quitting?yes.  Writer requested ([phone]) for OB provider, SHAJI Prado CNM to consider \"Spearmint\" nicorette gum AND Nicotine patch if appropriate.  pt agrees.      Pt is aware of the dangers of tobacco cessation and in contemplation.    Pt given written education.      Patient flu vaccine (screen during flu season / offer vaccination at [facility]) pt received flu vaccination       On-going nursing intervention required?   No    Acute care visit recommended: no .  Pt's next OB appt in May, 2020    Pt encouraged to reach out to PCP at [place] if needed       "

## 2025-07-19 ENCOUNTER — HEALTH MAINTENANCE LETTER (OUTPATIENT)
Age: 25
End: 2025-07-19

## (undated) DEVICE — SU VICRYL 3-0 SH 27" J316H

## (undated) DEVICE — PREP CHLORAPREP 26ML TINTED ORANGE  260815

## (undated) DEVICE — PACK C-SECTION LF PL15OTA83B

## (undated) DEVICE — SOL ADH LIQUID BENZOIN SWAB 0.6ML C1544

## (undated) DEVICE — SUCTION CANISTER MEDIVAC LINER 1500ML W/LID 65651-515

## (undated) DEVICE — BARRIER SEPRAFILM 5X6" SINGLE SHEET 4301-02

## (undated) DEVICE — SU VICRYL 3-0 CTX 36" UND J980H

## (undated) DEVICE — SOL WATER IRRIG 1000ML BOTTLE 07139-09

## (undated) DEVICE — STOCKING SLEEVE COMPRESSION CALF LG

## (undated) DEVICE — ESU GROUND PAD UNIVERSAL W/O CORD

## (undated) DEVICE — DRSG STERI STRIP 1/4X3" R1541

## (undated) DEVICE — SPONGE LAP 18X18" 1515

## (undated) DEVICE — SU MONOCRYL 4-0 PS-2 18" UND Y496G

## (undated) DEVICE — BNDG ABDOMINAL BINDER 10X26-50" 08140145

## (undated) DEVICE — DRAPE SETUP C-SECTION INVISISHIELD 54X90" DYNJE5600

## (undated) DEVICE — GLOVE ESTEEM POWDER FREE SMT 6.5  2D72PT65

## (undated) DEVICE — SU VICRYL 0 CT-1 36" J346H

## (undated) DEVICE — BASIN SET MAJOR

## (undated) DEVICE — SOL NACL 0.9% IRRIG 1000ML BOTTLE 07138-09

## (undated) DEVICE — CATH TRAY FOLEY 16FR BARDEX W/DRAIN BAG STATLOCK 300316A

## (undated) DEVICE — STRAP KNEE/BODY 31143004

## (undated) DEVICE — GLOVE PROTEXIS BLUE W/NEU-THERA 7.0  2D73EB70

## (undated) RX ORDER — FENTANYL CITRATE 50 UG/ML
INJECTION, SOLUTION INTRAMUSCULAR; INTRAVENOUS
Status: DISPENSED
Start: 2020-09-15

## (undated) RX ORDER — FENTANYL CITRATE 50 UG/ML
INJECTION, SOLUTION INTRAMUSCULAR; INTRAVENOUS
Status: DISPENSED
Start: 2024-06-08

## (undated) RX ORDER — ACETAMINOPHEN 325 MG/1
TABLET ORAL
Status: DISPENSED
Start: 2020-09-16

## (undated) RX ORDER — OXYTOCIN/0.9 % SODIUM CHLORIDE 30/500 ML
PLASTIC BAG, INJECTION (ML) INTRAVENOUS
Status: DISPENSED
Start: 2020-09-15

## (undated) RX ORDER — HYDROMORPHONE HYDROCHLORIDE 1 MG/ML
INJECTION, SOLUTION INTRAMUSCULAR; INTRAVENOUS; SUBCUTANEOUS
Status: DISPENSED
Start: 2020-09-15

## (undated) RX ORDER — KETOROLAC TROMETHAMINE 30 MG/ML
INJECTION, SOLUTION INTRAMUSCULAR; INTRAVENOUS
Status: DISPENSED
Start: 2020-09-15

## (undated) RX ORDER — MORPHINE SULFATE 1 MG/ML
INJECTION, SOLUTION EPIDURAL; INTRATHECAL; INTRAVENOUS
Status: DISPENSED
Start: 2020-09-15

## (undated) RX ORDER — PROPOFOL 10 MG/ML
INJECTION, EMULSION INTRAVENOUS
Status: DISPENSED
Start: 2020-09-15

## (undated) RX ORDER — OXYTOCIN/0.9 % SODIUM CHLORIDE 30/500 ML
PLASTIC BAG, INJECTION (ML) INTRAVENOUS
Status: DISPENSED
Start: 2020-09-16

## (undated) RX ORDER — LIDOCAINE HYDROCHLORIDE 20 MG/ML
INJECTION, SOLUTION EPIDURAL; INFILTRATION; INTRACAUDAL; PERINEURAL
Status: DISPENSED
Start: 2020-09-16